# Patient Record
Sex: MALE | Race: BLACK OR AFRICAN AMERICAN | NOT HISPANIC OR LATINO | Employment: UNEMPLOYED | URBAN - METROPOLITAN AREA
[De-identification: names, ages, dates, MRNs, and addresses within clinical notes are randomized per-mention and may not be internally consistent; named-entity substitution may affect disease eponyms.]

---

## 2019-09-25 ENCOUNTER — OFFICE VISIT (OUTPATIENT)
Dept: FAMILY MEDICINE CLINIC | Facility: CLINIC | Age: 29
End: 2019-09-25

## 2019-09-25 VITALS
TEMPERATURE: 97.8 F | BODY MASS INDEX: 31.33 KG/M2 | DIASTOLIC BLOOD PRESSURE: 78 MMHG | RESPIRATION RATE: 20 BRPM | SYSTOLIC BLOOD PRESSURE: 112 MMHG | HEIGHT: 67 IN | HEART RATE: 86 BPM | OXYGEN SATURATION: 97 % | WEIGHT: 199.6 LBS

## 2019-09-25 DIAGNOSIS — R53.83 FATIGUE, UNSPECIFIED TYPE: ICD-10-CM

## 2019-09-25 DIAGNOSIS — M35.2 BEHCET'S DISEASE (HCC): Primary | ICD-10-CM

## 2019-09-25 DIAGNOSIS — J30.9 ALLERGIC RHINITIS, UNSPECIFIED SEASONALITY, UNSPECIFIED TRIGGER: ICD-10-CM

## 2019-09-25 DIAGNOSIS — G43.009 MIGRAINE WITHOUT AURA AND WITHOUT STATUS MIGRAINOSUS, NOT INTRACTABLE: ICD-10-CM

## 2019-09-25 PROCEDURE — 99204 OFFICE O/P NEW MOD 45 MIN: CPT | Performed by: FAMILY MEDICINE

## 2019-09-25 RX ORDER — KETOTIFEN FUMARATE 0.35 MG/ML
1 SOLUTION/ DROPS OPHTHALMIC 2 TIMES DAILY
COMMUNITY
End: 2019-11-12

## 2019-09-25 RX ORDER — SUMATRIPTAN 50 MG/1
TABLET, FILM COATED ORAL
Qty: 9 TABLET | Refills: 1 | Status: SHIPPED | OUTPATIENT
Start: 2019-09-25 | End: 2019-11-12

## 2019-09-25 RX ORDER — IBUPROFEN 400 MG/1
400 TABLET ORAL EVERY 6 HOURS PRN
COMMUNITY
End: 2019-11-12

## 2019-09-25 RX ORDER — LORATADINE 10 MG/1
10 TABLET ORAL DAILY
Qty: 30 TABLET | Refills: 3 | Status: SHIPPED | OUTPATIENT
Start: 2019-09-25 | End: 2019-11-12

## 2019-09-25 RX ORDER — FLUTICASONE PROPIONATE 50 MCG
2 SPRAY, SUSPENSION (ML) NASAL DAILY
Qty: 1 BOTTLE | Refills: 3 | Status: SHIPPED | OUTPATIENT
Start: 2019-09-25 | End: 2019-11-12

## 2019-09-25 RX ORDER — COLCHICINE 0.6 MG/1
0.6 TABLET ORAL 2 TIMES DAILY
COMMUNITY
End: 2020-06-03

## 2019-09-25 NOTE — PROGRESS NOTES
Meliton Patel is a 29 y.o. male who presents today to establish care.    Chief Complaint   Patient presents with   • Establish Care     NEED PCP        Pateint is here to establish care. He changed PCP due to being unhappy with his care. Rash on shoulder last month told is was herpes zoster by the ED. Took medication for 2 weeks but still has marks where the rash was. He also has some headache and fatigue. The fever comes and goes. No temp above 99.5. Happens 15-20 days a month he has headache and fever. Has had for the past 10 days especially. Describes as a burning pain in his head. Ibuprofen helps for a few hours but headache comes back. Working and studying make it worse and he becomes fatigued. He feels like he has swollen lymphoids in his neck. Feels like her has fever at night and worsening congestion at night.  He also reports nasal congestion and stuffiness.  He also has oral sores that come and go on the inside of the mouth. He has SOA and coughing whenever he is around smoke or large amount of dust. He was told he has Bahcets disease by previous PCP. He has seen rheumatology in the past but they were an hour and a half away. He was started on colchicine for Behcets disease but has not taken in it two months.          Review of Systems   Constitutional: Positive for chills and fatigue. Negative for fever.   HENT: Positive for congestion, mouth sores, postnasal drip, rhinorrhea, sinus pressure, sneezing and swollen glands. Negative for ear pain and trouble swallowing.    Eyes: Negative for visual disturbance.   Respiratory: Positive for cough. Negative for chest tightness, shortness of breath and wheezing.    Cardiovascular: Negative for chest pain and palpitations.   Gastrointestinal: Positive for nausea. Negative for abdominal pain, blood in stool, constipation, diarrhea, vomiting and GERD.   Endocrine: Negative for polydipsia and polyuria.   Genitourinary: Negative for difficulty urinating, flank pain,  frequency and hematuria.   Musculoskeletal: Negative for arthralgias and myalgias.   Skin: Negative for rash and skin lesions.   Neurological: Positive for headache. Negative for dizziness, seizures, syncope and confusion.   Hematological: Does not bruise/bleed easily.   Psychiatric/Behavioral: Negative for suicidal ideas and depressed mood. The patient is not nervous/anxious.         PHQ-9 Depression Screening  Little interest or pleasure in doing things? 0   Feeling down, depressed, or hopeless? 0   Trouble falling or staying asleep, or sleeping too much?     Feeling tired or having little energy?     Poor appetite or overeating?     Feeling bad about yourself - or that you are a failure or have let yourself or your family down?     Trouble concentrating on things, such as reading the newspaper or watching television?     Moving or speaking so slowly that other people could have noticed? Or the opposite - being so fidgety or restless that you have been moving around a lot more than usual?     Thoughts that you would be better off dead, or of hurting yourself in some way?     PHQ-9 Total Score 0   If you checked off any problems, how difficult have these problems made it for you to do your work, take care of things at home, or get along with other people?         History reviewed. No pertinent past medical history.     Past Surgical History:   Procedure Laterality Date   • ANAL FISTULA REPAIR  2009   • CHOLECYSTECTOMY          Family History   Problem Relation Age of Onset   • No Known Problems Mother    • No Known Problems Father    • Heart attack Maternal Grandfather    • No Known Problems Paternal Grandmother    • No Known Problems Paternal Grandfather         Social History     Socioeconomic History   • Marital status:      Spouse name: Not on file   • Number of children: Not on file   • Years of education: Not on file   • Highest education level: Not on file   Tobacco Use   • Smoking status: Former  "Smoker     Last attempt to quit: 2016     Years since quitting: 3.7   • Smokeless tobacco: Never Used   Substance and Sexual Activity   • Alcohol use: No     Frequency: Never   • Drug use: No   • Sexual activity: Yes     Partners: Female     Birth control/protection: None        Current Outpatient Medications on File Prior to Visit   Medication Sig Dispense Refill   • colchicine 0.6 MG tablet Take 0.6 mg by mouth 2 (Two) Times a Day.     • ibuprofen (ADVIL,MOTRIN) 400 MG tablet Take 400 mg by mouth Every 6 (Six) Hours As Needed for Mild Pain .     • ketotifen (ZADITOR) 0.025 % ophthalmic solution Administer 1 drop to both eyes 2 (Two) Times a Day.       No current facility-administered medications on file prior to visit.        No Known Allergies     Visit Vitals  /78   Pulse 86   Temp 97.8 °F (36.6 °C)   Resp 20   Ht 171 cm (67.32\")   Wt 90.5 kg (199 lb 9.6 oz)   SpO2 97%   BMI 30.96 kg/m²      Body mass index is 30.96 kg/m².    Physical Exam   Constitutional: He is oriented to person, place, and time. He appears well-developed and well-nourished. No distress.   HENT:   Head: Normocephalic and atraumatic.   Right Ear: Hearing, tympanic membrane, external ear and ear canal normal. cerumen impaction is not present.  Left Ear: Hearing, tympanic membrane, external ear and ear canal normal. An impacted cerumen is not present.  Mouth/Throat: Uvula is midline, oropharynx is clear and moist and mucous membranes are normal.   Eyes: EOM are normal. Pupils are equal, round, and reactive to light. Right eye exhibits no discharge. Left eye exhibits no discharge. No scleral icterus.   Neck: Normal range of motion. Neck supple. No tracheal deviation present. No thyromegaly present.   Cardiovascular: Normal rate, regular rhythm, normal heart sounds and intact distal pulses. Exam reveals no gallop and no friction rub.   No murmur heard.  Pulmonary/Chest: Effort normal and breath sounds normal. No stridor. No respiratory " distress. He has no wheezes. He has no rales.   Abdominal: Soft. Bowel sounds are normal. He exhibits no distension and no mass. There is no tenderness. There is no rebound and no guarding. No hernia.   Musculoskeletal: Normal range of motion. He exhibits no edema or tenderness.   Lymphadenopathy:     He has no cervical adenopathy.   Neurological: He is alert and oriented to person, place, and time. He exhibits normal muscle tone.   Skin: Skin is warm and dry. No rash noted. He is not diaphoretic.   Psychiatric: He has a normal mood and affect. His behavior is normal.        No results found for this or any previous visit.     Problems Addressed this Visit        Cardiovascular and Mediastinum    Migraine without aura and without status migrainosus, not intractable     Headaches are newly identified.  Medication changes per orders.             Relevant Medications    ibuprofen (ADVIL,MOTRIN) 400 MG tablet    colchicine 0.6 MG tablet    SUMAtriptan (IMITREX) 50 MG tablet       Respiratory    Allergic rhinitis     Patient was started on Flonase and loratadine.         Relevant Medications    fluticasone (FLONASE) 50 MCG/ACT nasal spray    loratadine (CLARITIN) 10 MG tablet       Other    Behcet's disease (CMS/HCC) - Primary     Diagnosis is uncertain although patient symptomology does seem consistent with Behcet's disease.  Patient was given referral to rheumatology.         Relevant Orders    Ambulatory Referral to Rheumatology    Fatigue     Labs were drawn to assess for physiologic causes of fatigue.         Relevant Orders    CBC & Differential    Comprehensive Metabolic Panel    Lipid Panel    TSH Rfx On Abnormal To Free T4    Hepatitis C Antibody    Hepatitis B Virus Profile          Return in about 1 month (around 10/25/2019).    Parts of this office note have been dictated by voice recognition software. Grammatical and/or spelling errors may be present.     Jose Guadalupe Bell MD  9/26/2019

## 2019-09-26 PROBLEM — G43.009 MIGRAINE WITHOUT AURA AND WITHOUT STATUS MIGRAINOSUS, NOT INTRACTABLE: Status: ACTIVE | Noted: 2019-09-26

## 2019-09-26 PROBLEM — J30.9 ALLERGIC RHINITIS: Status: ACTIVE | Noted: 2019-09-26

## 2019-09-26 NOTE — ASSESSMENT & PLAN NOTE
Diagnosis is uncertain although patient symptomology does seem consistent with Behcet's disease.  Patient was given referral to rheumatology.

## 2019-09-27 LAB
ALBUMIN SERPL-MCNC: 4.9 G/DL (ref 3.5–5.2)
ALBUMIN/GLOB SERPL: 2 G/DL
ALP SERPL-CCNC: 75 U/L (ref 39–117)
ALT SERPL-CCNC: 37 U/L (ref 1–41)
AST SERPL-CCNC: 20 U/L (ref 1–40)
BASOPHILS # BLD AUTO: 0.04 10*3/MM3 (ref 0–0.2)
BASOPHILS NFR BLD AUTO: 0.8 % (ref 0–1.5)
BILIRUB SERPL-MCNC: 1.1 MG/DL (ref 0.2–1.2)
BUN SERPL-MCNC: 21 MG/DL (ref 6–20)
BUN/CREAT SERPL: 29.2 (ref 7–25)
CALCIUM SERPL-MCNC: 9.5 MG/DL (ref 8.6–10.5)
CHLORIDE SERPL-SCNC: 104 MMOL/L (ref 98–107)
CHOLEST SERPL-MCNC: 183 MG/DL (ref 0–200)
CO2 SERPL-SCNC: 24.9 MMOL/L (ref 22–29)
CREAT SERPL-MCNC: 0.72 MG/DL (ref 0.76–1.27)
EOSINOPHIL # BLD AUTO: 0.18 10*3/MM3 (ref 0–0.4)
EOSINOPHIL NFR BLD AUTO: 3.8 % (ref 0.3–6.2)
ERYTHROCYTE [DISTWIDTH] IN BLOOD BY AUTOMATED COUNT: 12.4 % (ref 12.3–15.4)
GLOBULIN SER CALC-MCNC: 2.5 GM/DL
GLUCOSE SERPL-MCNC: 114 MG/DL (ref 65–99)
HBV CORE AB SERPL QL IA: NEGATIVE
HBV CORE IGM SERPL QL IA: NEGATIVE
HBV E AB SERPL QL IA: NEGATIVE
HBV E AG SERPL QL IA: NEGATIVE
HBV SURFACE AB SER QL: REACTIVE
HBV SURFACE AG SERPL QL IA: NEGATIVE
HCT VFR BLD AUTO: 46.4 % (ref 37.5–51)
HCV AB S/CO SERPL IA: <0.1 S/CO RATIO (ref 0–0.9)
HDLC SERPL-MCNC: 56 MG/DL (ref 40–60)
HGB BLD-MCNC: 15.6 G/DL (ref 13–17.7)
IMM GRANULOCYTES # BLD AUTO: 0.03 10*3/MM3 (ref 0–0.05)
IMM GRANULOCYTES NFR BLD AUTO: 0.6 % (ref 0–0.5)
LDLC SERPL CALC-MCNC: 87 MG/DL (ref 0–100)
LYMPHOCYTES # BLD AUTO: 1.19 10*3/MM3 (ref 0.7–3.1)
LYMPHOCYTES NFR BLD AUTO: 25.1 % (ref 19.6–45.3)
MCH RBC QN AUTO: 28.8 PG (ref 26.6–33)
MCHC RBC AUTO-ENTMCNC: 33.6 G/DL (ref 31.5–35.7)
MCV RBC AUTO: 85.6 FL (ref 79–97)
MONOCYTES # BLD AUTO: 0.38 10*3/MM3 (ref 0.1–0.9)
MONOCYTES NFR BLD AUTO: 8 % (ref 5–12)
NEUTROPHILS # BLD AUTO: 2.93 10*3/MM3 (ref 1.7–7)
NEUTROPHILS NFR BLD AUTO: 61.7 % (ref 42.7–76)
NRBC BLD AUTO-RTO: 0 /100 WBC (ref 0–0.2)
PLATELET # BLD AUTO: 243 10*3/MM3 (ref 140–450)
POTASSIUM SERPL-SCNC: 4.2 MMOL/L (ref 3.5–5.2)
PROT SERPL-MCNC: 7.4 G/DL (ref 6–8.5)
RBC # BLD AUTO: 5.42 10*6/MM3 (ref 4.14–5.8)
SODIUM SERPL-SCNC: 141 MMOL/L (ref 136–145)
TRIGL SERPL-MCNC: 200 MG/DL (ref 0–150)
TSH SERPL DL<=0.005 MIU/L-ACNC: 0.84 UIU/ML (ref 0.27–4.2)
VLDLC SERPL CALC-MCNC: 40 MG/DL
WBC # BLD AUTO: 4.75 10*3/MM3 (ref 3.4–10.8)

## 2019-10-15 ENCOUNTER — FLU SHOT (OUTPATIENT)
Dept: FAMILY MEDICINE CLINIC | Facility: CLINIC | Age: 29
End: 2019-10-15

## 2019-10-15 DIAGNOSIS — Z23 FLU VACCINE NEED: ICD-10-CM

## 2019-10-15 PROCEDURE — 90674 CCIIV4 VAC NO PRSV 0.5 ML IM: CPT | Performed by: FAMILY MEDICINE

## 2019-10-15 PROCEDURE — 90471 IMMUNIZATION ADMIN: CPT | Performed by: FAMILY MEDICINE

## 2019-11-12 ENCOUNTER — OFFICE VISIT (OUTPATIENT)
Dept: FAMILY MEDICINE CLINIC | Facility: CLINIC | Age: 29
End: 2019-11-12

## 2019-11-12 VITALS
DIASTOLIC BLOOD PRESSURE: 80 MMHG | BODY MASS INDEX: 31.28 KG/M2 | WEIGHT: 206.4 LBS | RESPIRATION RATE: 16 BRPM | SYSTOLIC BLOOD PRESSURE: 128 MMHG | HEIGHT: 68 IN | OXYGEN SATURATION: 97 % | TEMPERATURE: 97.3 F | HEART RATE: 80 BPM

## 2019-11-12 DIAGNOSIS — R13.10 IMPAIRED SWALLOWING ASSOCIATED WITH THROAT PAIN: ICD-10-CM

## 2019-11-12 DIAGNOSIS — K21.9 GASTROESOPHAGEAL REFLUX DISEASE, ESOPHAGITIS PRESENCE NOT SPECIFIED: Primary | ICD-10-CM

## 2019-11-12 DIAGNOSIS — R07.0 IMPAIRED SWALLOWING ASSOCIATED WITH THROAT PAIN: ICD-10-CM

## 2019-11-12 PROCEDURE — 99213 OFFICE O/P EST LOW 20 MIN: CPT | Performed by: FAMILY MEDICINE

## 2019-11-12 RX ORDER — OMEPRAZOLE 40 MG/1
40 CAPSULE, DELAYED RELEASE ORAL DAILY
Qty: 30 CAPSULE | Refills: 3 | Status: SHIPPED | OUTPATIENT
Start: 2019-11-12 | End: 2020-06-03 | Stop reason: SDUPTHER

## 2019-11-12 NOTE — PROGRESS NOTES
Meliton Patel is a 29 y.o. male who presents today for Sore Throat (pain when swallowing)      Patient states he has had difficulty swallowing for the past week and a sore throat for 2 weeks. Describes as a throbbing pain. He feels like food is getting stuck. Worse with food than with drinking. He has nausea but no vomiting. No changes with different foods. No alleviating or aggravating factors.       Sore Throat    This is a new problem. The current episode started 1 to 4 weeks ago. The problem has been gradually worsening (pain is lasting longer each time. ). Neither side of throat is experiencing more pain than the other. There has been no fever. The pain is at a severity of 7/10. The pain is moderate. Associated symptoms include abdominal pain, congestion, shortness of breath and trouble swallowing. Pertinent negatives include no coughing, diarrhea, drooling, ear discharge, ear pain, headaches, hoarse voice, plugged ear sensation, neck pain, stridor, swollen glands or vomiting. He has had no exposure to strep or mono. He has tried nothing for the symptoms.        Review of Systems   Constitutional: Negative for chills, diaphoresis and fever.   HENT: Positive for congestion, sore throat and trouble swallowing. Negative for drooling, ear discharge, ear pain, hoarse voice and swollen glands.    Respiratory: Positive for shortness of breath. Negative for cough and stridor.    Cardiovascular: Negative for chest pain and palpitations.   Gastrointestinal: Positive for abdominal pain, nausea and GERD. Negative for diarrhea and vomiting.   Musculoskeletal: Negative for neck pain.        The following portions of the patient's history were reviewed and updated as appropriate: allergies, current medications, past family history, past medical history, past social history, past surgical history and problem list.    Current Outpatient Medications on File Prior to Visit   Medication Sig Dispense Refill   • colchicine 0.6 MG  "tablet Take 0.6 mg by mouth 2 (Two) Times a Day.       No current facility-administered medications on file prior to visit.        No Known Allergies     Visit Vitals  /80   Pulse 80   Temp 97.3 °F (36.3 °C)   Resp 16   Ht 171.5 cm (67.5\")   Wt 93.6 kg (206 lb 6.4 oz)   SpO2 97%   BMI 31.85 kg/m²        Physical Exam   Constitutional: He is oriented to person, place, and time. He appears well-developed and well-nourished. No distress.   HENT:   Head: Atraumatic.   Eyes: EOM are normal.   Neck: Normal range of motion. Neck supple.   Cardiovascular: Normal rate, regular rhythm, normal heart sounds and intact distal pulses. Exam reveals no gallop and no friction rub.   No murmur heard.  Pulmonary/Chest: Effort normal and breath sounds normal. No respiratory distress. He has no wheezes. He has no rales.   Abdominal: Soft. Bowel sounds are normal. He exhibits no distension and no mass. There is no tenderness.   Musculoskeletal: He exhibits no edema.   Neurological: He is alert and oriented to person, place, and time.   Skin: Skin is warm and dry. He is not diaphoretic.   Psychiatric: He has a normal mood and affect. His behavior is normal.             Problems Addressed this Visit        Digestive    Gastroesophageal reflux disease - Primary     Patient was started on omeprazole.  He has appointment with  gastroenterology scheduled for later this week.  RTC precautions given.         Relevant Medications    omeprazole (priLOSEC) 40 MG capsule       Nervous and Auditory    Impaired swallowing associated with throat pain          Return in about 3 months (around 2/12/2020) for Follow-up GERD .    Parts of this office note have been dictated by voice recognition software. Grammatical and/or spelling errors may be present.    Jose Guadalupe Bell MD   11/18/2019            "

## 2019-11-18 PROBLEM — R13.10 IMPAIRED SWALLOWING ASSOCIATED WITH THROAT PAIN: Status: ACTIVE | Noted: 2019-11-18

## 2019-11-18 PROBLEM — R07.0 IMPAIRED SWALLOWING ASSOCIATED WITH THROAT PAIN: Status: ACTIVE | Noted: 2019-11-18

## 2019-11-18 PROBLEM — K21.9 GASTROESOPHAGEAL REFLUX DISEASE: Status: ACTIVE | Noted: 2019-11-18

## 2019-11-18 NOTE — ASSESSMENT & PLAN NOTE
Patient was started on omeprazole.  He has appointment with [facility] scheduled for later this week.  RTC precautions given.

## 2019-12-27 ENCOUNTER — OFFICE VISIT (OUTPATIENT)
Dept: FAMILY MEDICINE CLINIC | Facility: CLINIC | Age: 29
End: 2019-12-27

## 2019-12-27 VITALS
TEMPERATURE: 97.6 F | SYSTOLIC BLOOD PRESSURE: 100 MMHG | WEIGHT: 208 LBS | OXYGEN SATURATION: 98 % | BODY MASS INDEX: 31.52 KG/M2 | RESPIRATION RATE: 16 BRPM | DIASTOLIC BLOOD PRESSURE: 60 MMHG | HEIGHT: 68 IN | HEART RATE: 76 BPM

## 2019-12-27 DIAGNOSIS — N48.9 PENILE LESION: ICD-10-CM

## 2019-12-27 DIAGNOSIS — Z78.9 LANGUAGE BARRIER TO COMMUNICATION: ICD-10-CM

## 2019-12-27 DIAGNOSIS — M35.2 BEHCET'S DISEASE (HCC): ICD-10-CM

## 2019-12-27 DIAGNOSIS — J02.0 STREP PHARYNGITIS: Primary | ICD-10-CM

## 2019-12-27 DIAGNOSIS — N48.1 BALANITIS: ICD-10-CM

## 2019-12-27 LAB
HCT VFR BLDA CALC: 45.9 % (ref 38–51)
HGB BLDA-MCNC: 15.1 G/DL (ref 12–17)
MCH, POC: 29
MCHC, POC: 32.9
MCV, POC: 88.2
PLATELET # BLD AUTO: 286 10*3/MM3
PMV BLD: 7.6 FL
RBC, POC: 5.2
RDW, POC: 12
WBC # BLD: 7.4 10*3/UL

## 2019-12-27 PROCEDURE — 85027 COMPLETE CBC AUTOMATED: CPT | Performed by: NURSE PRACTITIONER

## 2019-12-27 PROCEDURE — 99213 OFFICE O/P EST LOW 20 MIN: CPT | Performed by: NURSE PRACTITIONER

## 2019-12-27 RX ORDER — NYSTATIN AND TRIAMCINOLONE ACETONIDE 100000; 1 [USP'U]/G; MG/G
OINTMENT TOPICAL 2 TIMES DAILY
Qty: 60 G | Refills: 0 | Status: SHIPPED | OUTPATIENT
Start: 2019-12-27 | End: 2020-06-03 | Stop reason: SDUPTHER

## 2019-12-27 RX ORDER — AMOXICILLIN 875 MG/1
875 TABLET, COATED ORAL 2 TIMES DAILY
Qty: 20 TABLET | Refills: 0 | Status: SHIPPED | OUTPATIENT
Start: 2019-12-27 | End: 2020-01-06

## 2019-12-27 NOTE — PROGRESS NOTES
Subjective   Meliton Patel is a 29 y.o. male.     Patient presents today with c/o headache, feeling feverish, malaise, sore throat. Patient has a history of Behcet's disease and is also c/o a penile lesion. He is currently being followed by [facility]. Patient is requesting referrals to infectious disease and dermatology as he states the penile lesions has been waxing and waning for over a year.     URI    This is a new problem. The current episode started 1 to 4 weeks ago. The problem has been gradually worsening. The maximum temperature recorded prior to his arrival was 100.4 - 100.9 F. Associated symptoms include congestion, headaches, neck pain and a sore throat. Pertinent negatives include no abdominal pain, chest pain, coughing, diarrhea, dysuria, ear pain, nausea, plugged ear sensation, rash, rhinorrhea, sinus pain, sneezing, vomiting or wheezing. He has tried nothing for the symptoms.       The following portions of the patient's history were reviewed and updated as appropriate: allergies, current medications, past family history, past medical history, past social history, past surgical history and problem list.    Allergies as of 12/27/2019   • (No Known Allergies)       Current Outpatient Medications on File Prior to Visit   Medication Sig   • colchicine 0.6 MG tablet Take 0.6 mg by mouth 2 (Two) Times a Day.   • omeprazole (priLOSEC) 40 MG capsule Take 1 capsule by mouth Daily.     No current facility-administered medications on file prior to visit.        Review of Systems   Constitutional: Positive for activity change, appetite change, fatigue and fever. Negative for chills, diaphoresis and unexpected weight change.   HENT: Positive for congestion and sore throat. Negative for ear pain, postnasal drip, rhinorrhea, sinus pressure, sinus pain, sneezing and voice change. Trouble swallowing: painful swallowing.    Respiratory: Negative.  Negative for cough and wheezing.    Cardiovascular: Negative.   Negative for chest pain.   Gastrointestinal: Negative for abdominal pain, diarrhea, nausea and vomiting.   Genitourinary: Positive for penile pain. Negative for difficulty urinating, discharge, dysuria, flank pain, hematuria, penile swelling, scrotal swelling and testicular pain.   Musculoskeletal: Positive for myalgias and neck pain. Negative for arthralgias.   Skin: Negative for rash.   Neurological: Positive for headaches. Negative for dizziness, tremors, seizures, syncope, facial asymmetry, speech difficulty, weakness, light-headedness and numbness.       Objective   Physical Exam   Constitutional: He is oriented to person, place, and time. Vital signs are normal. He appears well-developed and well-nourished. He is cooperative.  Non-toxic appearance. He does not have a sickly appearance. He does not appear ill. No distress.   HENT:   Head: Normocephalic and atraumatic.   Right Ear: Tympanic membrane and external ear normal.   Left Ear: Tympanic membrane and external ear normal.   Nose: Nose normal.   Mouth/Throat: Uvula is midline and mucous membranes are normal. No oral lesions. Posterior oropharyngeal erythema (moderate) present.   Neck: Normal range of motion. Neck supple. No thyromegaly present.   Cardiovascular: Normal rate, regular rhythm and normal heart sounds.   Pulmonary/Chest: Effort normal and breath sounds normal.   Abdominal: Soft. He exhibits no distension. There is no tenderness.   Genitourinary: Uncircumcised. Penile erythema and penile tenderness present.         Genitourinary Comments: When foreskin retracted area at base of glans appears erythematous, inflamed. OneSwab collected-patient reports significant pain with swab of area.    Lymphadenopathy:     He has no cervical adenopathy.   Neurological: He is alert and oriented to person, place, and time.   Skin: Skin is warm, dry and intact. No rash noted. He is not diaphoretic.   Psychiatric: He has a normal mood and affect. His behavior is  normal. Judgment and thought content normal.   Vitals reviewed.    Vitals:    12/27/19 1113   BP: 100/60   Pulse: 76   Resp: 16   Temp: 97.6 °F (36.4 °C)   SpO2: 98%     Body mass index is 32.1 kg/m².    Assessment/Plan   Meliton was seen today for headache.    Diagnoses and all orders for this visit:    Strep pharyngitis  -     amoxicillin (AMOXIL) 875 MG tablet; Take 1 tablet by mouth 2 (Two) Times a Day for 10 days.    Balanitis  -     nystatin-triamcinolone (MYCOLOG) 753933-5.1 UNIT/GM-% ointment; Apply  topically to the appropriate area as directed 2 (Two) Times a Day.    Behcet's disease (CMS/HCC)  -     POCT CBC    *WBC 7.4, Hgb 15.1, Hct 45.9, Plts 286    Penile lesion  -     Ambulatory Referral to Dermatology  -     Ambulatory Referral to Infectious Disease     *OneSwab of penile lesion for HSV    Discussed the nature of the medical condition(s) risks, complications, implications, management, safe and proper use of medications. Encouraged medication compliance, and keeping scheduled follow up appointments with me and any other providers.     Due to language barrier, an  was present during the history-taking and subsequent discussion (and for part of the physical exam) with this patient. A male chaperone (Dr. Bennett and Kevin Fry MA) during genital exam.

## 2019-12-27 NOTE — PATIENT INSTRUCTIONS
ÇáÊåÇÈ ÇáÍáÞ ÇáÚÞÏí  Strep Throat    ÇáÊåÇÈ ÇáÍáÞ ÇáÚÞÏí åæ ÚÈÇÑÉ Úä ÚÏæì ÈßÊíÑíÉ Ýí ÇáÍáÞ. ÞÏ íÓãí ãÞÏã ÇáÑÚÇíÉ ÇáÕÍíÉ ÇáÚÏæì ÇáÊåÇÈ ÇááæÒÊíä Ãæ ÇáÊåÇÈ ÇáÈáÚæã ÈäÇÁð Úáì ãÇ ÅÐÇ ßÇäÊ åäÇß ÊæÑã Ýí ÇááæÒÊíä Ãæ ãÄÎÑÉ ÇáÍáÞ. æíÚÊÈÑ ÇáÊåÇÈ ÇáÍáÞ ÇáÚÞÏí ÃßËÑ ÔíæÚðÇ áÏì ÇáÃØÝÇá ãä 5-15 ÚÇãðÇ ÎáÇá ÃÔåÑ ÇáÚÇã ÇáÔÊæíÉ¡ æáßäå íãßä Ãä íÕíÈ ÇáÃÔÎÇÕ Ýí Ãí Óä¡ æÎáÇá Ãí ÝÕá ãä ÇáÝÕæá. ÊäÊÞá Êáß ÇáÚÏæì ãä ÔÎÕ áÂÎÑ (ãÚÏí) ãä ÎáÇá ÇáÓÚÇá Ãæ ÇáÚØÇÓ¡ Ãæ Ãí ÓÈíá ááÇÊÕÇá ÇáÞÑíÈ.  ãÇ åí ÇáÃÓÈÇÈ¿  ÇáÊåÇÈ ÇáÍáÞ ÇáÚÞÏí íÍÏË ÈÓÈÈ ÈßÊÑíÇ ÊÓãì ÇáãßæÑÇÊ ÇáÚÞÏíÉ.  ãÇ ÚæÇãá ÒíÇÏÉ ÇáÎØÑ¿  íÒÏÇÏ ÙåæÑ åÐå ÇáÍÇáÉ Ýí:  • ÇáÃÔÎÇÕ ÇáÐíä íÞÖæä æÞÊðÇ Ýí ÃãÇßä ãÒÏÍãÉ ÍíË íãßä ááÚÏæì Ãä ÊäÊÔÑ ÈÓåæáÉ.  • ÇáÃÔÎÇÕ ÇáÐíä íßæäæä Ýí ÇÊÕÇá ÞÑíÈ ÈÔÎÕ ãÕÇÈ ÈÇáÊåÇÈ ÇáÍáÞ ÇáÚÞÏí.  ãÇ åí ÇáÚáÇãÇÊ Ãæ ÇáÃÚÑÇÖ¿  ÊÔãá ÃÚÑÇÖ åÐå ÇáÍÇáÉ ÇáÂÊí:  • Íãì Ãæ ÞÔÚÑíÑÉ.  • ÇÍãÑÇÑ Ãæ ÊæÑã Ãæ Ãáã Ýí ÇááæÒÊíä Ãæ ÇáÍáÞ.  • Ãáã Ãæ ÕÚæÈÉ Ýí ÇáÈáÚ.  • ÙåæÑ ÈÞÚ ÈíÖÇÁ Ãæ ÕÝÑÇÁ Úáì ÇááæÒÊíä Ãæ ÇáÍáÞ.  • ÛÏÏ ãÊæÑãÉ Ãæ ãÊÃáãÉ ÊÙåÑ ÈÇáÚäÞ Ãæ ÃÓÝá ÇáÝß.  • ÙåæÑ ØÝÍ ÌáÏí Úáì ÌãíÚ ÃÌÒÇÁ ÇáÌÓã (äÇÏÑ).  ßíÝ íÊã ÊÔÎíÕ ÇáãÑÖ¿  íÊã ÊÔÎíÕ åÐå ÇáÍÇáÉ ãä ÎáÇá ÅÌÑÇÁ ÝÍÕ ÇáãÓÊÖÏ ÇáÓÑíÚ Úä ØÑíÞ ÃÎÐ ãÓÍÉ ãä ÍáÞß (ãÒÑÚÉ ÍáÞ). ÚÇÏÉ ãÇ Êßæä äÊÇÆÌ ÇáÝÍÕ ÇáÓÑíÚ ááÈßÊÑíÇ ÇáÚÞÏíÉ ÌÇåÒÉ ÎáÇá ÏÞÇÆÞ ãÚÏæÏÉ¡ æáßä äÊÇÆÌ ÇÎÊÈÇÑ ãÒÑÚÉ ÇáÍáÞ ÊÙåÑ ÈÚÏ íæã Ãæ íæãíä.  ßíÝ íÊã ÇáÚáÇÌ¿  íÊã ÚáÇÌ åÐå ÇáÍÇáÉ ÈÇáãÖÇÏÇÊ ÇáÍíæíÉ.  ÇÊÈÚ ÇáÊÚáíãÇÊ ÇáÊÇáíÉ Ýí ÇáãäÒá:  ÇáÃÏæíÉ  • ÊäÇæá ÇáÃÏæíÉ ÇáÊí ÊõÕÑÝ ÈæÕÝÉ ØÈíÉ Ãæ Ïæä æÕÝÉ ØÈíÉ æÝÞðÇ áãÇ íÎÈÑß Èå ãÞÏã ÇáÑÚÇíÉ ÇáÕÍíÉ ÇáÎÇÕ Èß.  • ÊäÇæá ÇáãÖÇÏÇÊ ÇáÍíæíÉ æÝÞðÇ áÊÚáíãÇÊ ãÞÏã ÇáÑÚÇíÉ ÇáÕÍíÉ. áÇ ÊÊæÞÝ Úä ÊäÇæá ÇáãÖÇÏÇÊ ÇáÍíæíÉ ÍÊì ãÚ ÊÍÓä ÍÇáÊß.  • ÇÌÚá ÃÝÑÇÏ ÇáÃÓÑÉ ÇáÐíä íÚäÇæä ãä ÇáÊåÇÈ Ýí ÇáÍáÞ Ãæ Íãì íÎÖÚæä ááÝÍÕ ááßÔÝ Úä ÇáÊåÇÈ ÇáÍáÞ ÇáÚÞÏí. ÝÞÏ íÍÊÇÌæä Åáì ãÖÇÏÇÊ ÍíæíÉ ÅÐÇ ßÇäæÇ íÚÇäæä ãä ÚÏæì ÇáÈßÊÑíÇ ÇáÚÞÏíÉ.  ÊäÇæá ÇáØÚÇã æÇáÔÑÇÈ  • áÇ ÊÔÇÑß ÇáØÚÇã Ãæ ÃßæÇÈ ÇáÔÑÈ¡ Ãæ ÇÓÊÎÏÇã ÇáãÓÊáÒãÇÊ ÇáÔÎÕíÉ ÇáÊí ãä Çáããßä Ãä ÊÊÓÈÈ Ýí ÇäÊÞÇá ÇáÚÏæì ááÂÎÑíä.  • ÅÐÇ ßÇä ÇáÈáÚ ÕÚÈðÇ¡ ÍÇæá ÊäÇæá  ÇáÃØÚãÉ ÇááíäÉ ÍÊì íÊÍÓä ÇáÊåÇÈ ÇáÍáÞ.  • ÇÔÑÈ ßãíÉ ßÇÝíÉ ãä ÇáÓæÇÆá ááãÍÇÝÙÉ Úáì ÕÝÇÁ ÇáÈæá Ãæ áæäå ÇáÃÕÝÑ ÇáÈÇåÊ.  ÊÚáíãÇÊ ÚÇãÉ  • ÇÓÊÚãá ÇáÛÑÛÑÉ ÈãÒíÌ ÇáãÇÁ ÇáãÇáÍ ãä 3-4 ãÑÇÊ íæãíÇð Ãæ ÚäÏ ÇááÒæã. áÕäÚ ãÍáæá ãÒíÌ ÇáãÇÁ ÇáãÇáÍ¡ ÃÐÈ ÊãÇãðÇ äÕÝ ãáÚÞÉ Åáì ãáÚÞÉ æÇÍÏÉ Ýí ßæÈ ãÇÁ ÏÇÝÆ.  • ÊÃßÏ ãä ÞíÇã ÌãíÚ ÃÝÑÇÏ ÇáÃÓÑÉ ÈÛÓá íÏíåã ÌíÏðÇ.  • ÇÍÕá Úáì ßËíÑ ãä ÇáÑÇÍÉ.  • ÇÍÑÕ Úáì ÇáÈÞÇÁ Ýí ÇáãäÒá¡ æÚÏã ÇáÐåÇÈ Åáì ÇáãÏÑÓÉ Ãæ ÇáÚãá ÅáÇ ÈÚÏ 24 ÓÇÚÉ ãä ÊäÇæá ÇáãÖÇÏÇÊ ÇáÍíæíÉ.  • ÇÍÑÕ Úáì ÇáÇáÊÒÇã ÈÌãíÚ ÒíÇÑÇÊ ÇáãÊÇÈÚÉ Úáì ÇáäÍæ ÇáÐí íÎÈÑß Èå ãÞÏã ÇáÑÚÇíÉ ÇáÕÍíÉ ÇáÎÇÕ Èß. ÝåÐÇ ÃãÑ ãåã.  ÇÊÕá ÈãÞÏã ÑÚÇíÉ ÕÍíÉ Ýí ÇáÍÇáÇÊ ÇáÊÇáíÉ:  • ÇÓÊãÑÇÑ ÊÖÎã ÇáÛÏÏ ÇáÊí ÊÙåÑ Ýí ÚäÞß.  • ÇáÅÕÇÈÉ ÈØÝÍ Ãæ ÓÚÇá Ãæ Ãáã ÈÇáÃÐä.  • íÎÑÌ ÓÇÆá ßËíÝ ÚäÏ ÇáÓÚÇá áæäå ÃÎÖÑ Ãæ ÃÕÝÑ ãÇÆá Åáì ÇáÈäí Ãæ ãÎÊáØ ÈÏã.  • ÇáÅÕÇÈÉ ÈÃáã Ãæ ÊÚÈ ÊÒÏÇÏ ÍÏÊå Ãæ áÇ íÊÍÓä ãÚ ÊäÇæá ÇáÏæÇÁ.  • ÇáÔÚæÑ ÈÃä ÇáãÔßáÇÊ ÊÓæÁ ÈÏáÇð ãä Ãä ÊÊÍÓä.  • ÅÐÇ ßäÊ ÊÚÇäí ãä ÇáÍãì.  ÇØáÈ ÇáãÓÇÚÏÉ Úáì ÇáÝæÑ Ýí ÇáÍÇáÇÊ ÇáÊÇáíÉ:  • ÅÕÇÈÊß ÈÃíÉ ÃÚÑÇÖ ÌÏíÏÉ¡ ãËá: ÇáÞíÁ¡ Ãæ ÇáÕÏÇÚ ÇáÍÇÏ¡ Ãæ ÊíÈÓ ÇáÚäÞ Ãæ ÇáÔÚæÑ ÈÃáã Ýíå¡ Ãæ Ãáã Ýí ÇáÕÏÑ¡ Ãæ ÖíÞ Ýí ÇáÊäÝÓ.  • ÇáÔÚæÑ ÈÃáã ÍÇÏ Ýí ÇáÍáÞ¡ Ãæ æÌæÏ áÚÇÈ ÓÇÆá¡ Ãæ ÍÏæË ÊÛíÑÇÊ Ýí ÇáÕæÊ.  • ÇáÅÕÇÈÉ ÈÊæÑã Ýí ÇáÚäÞ¡ Ãæ ÇÍãÑÇÑ ÇáÌáÏ ÎáÝ ÇáÚäÞ¡ Ãæ ÇáÔÚæÑ ÈÃáã Èå.  • ÇáÅÕÇÈÉ ÈÚáÇãÇÊ ÇáÌÝÇÝ¡ ãËá: ÇáÅÚíÇÁ¡ æÌÝÇÝ ÇáÝã¡ æÇäÎÝÇÖ ãÚÏá ÇáÊÈæá.  • ÒíÇÏÉ ÇáÔÚæÑ ÈÇáäÚÇÓ¡ Ãæ ÚÏã ÇáÞÏÑÉ Úáì ÇáÇÓÊíÞÇÙ ÊãÇãðÇ.  • ÇÍãÑÇÑ ÇáãÝÇÕá Ãæ ÇáÔÚæÑ ÈÇáÃáã ÈåÇ.  áíÓ ÇáåÏÝ ãä åÐå ÇáãÚáæãÇÊ Ãä Êßæä ÈÏíáÇð ááÅÑÔÇÏÇÊ ÇáÊí íÞÏãåÇ ãæÝÑ ÇáÑÚÇíÉ ÇáÕÍíÉ. ÊÃßÏ ãä ãäÇÞÔÉ ÃíÉ ÃÓÆáÉ ÊÏæÑ Ýí Ðåäß ãÚ ãæÝÑ ÇáÑÚÇíÉ ÇáÕÍíÉ.þ  Document Released: 08/15/2012 Document Revised: 04/06/2018 Document Reviewed: 04/11/2016  [employer] Interactive Patient Education © 2019 [employer]  Amoxicillin capsules or tablets  ãÇ åÐÇ ÇáÏæÇÁ¿  ÃãæßÓíÓááíä ãÖÇÏ Ííæí ãä ãÌãæÚÉ ÇáÈäÓáíä.  íÓÊÎÏã áÚáÇÌ ÃäæÇÚ ãÚíäÉ ãä ÇáÚÏæì ÇáÈßÊíÑíÉ.  áä íßæä åÐÇ ÇáÏæÇÁ ÝÇÚáÇð áÚáÇÌ äÒáÇÊ ÇáÈÑÏ Ãæ ÇáÃäÝáæäÒÇ Ãæ ÇáÚÏæì ÇáÝíÑæÓíÉ ÇáÃÎÑì.  íãßä ÇÓÊÎÏÇã  åÐÇ ÇáÏæÇÁ áÃÛÑÇÖ ÃÎÑìº ÇÓÃá ãÞÏã ÇáÑÚÇíÉ ÇáÕÍíÉ Ãæ ÇáÕíÏáí ÅÐÇ ßÇäÊ áÏíß ÃÓÆáÉ.  ÃÓãÇÁ ÇáÚáÇãÇÊ ÇáÊÌÇÑíÉ ÇáãÚÑæÝÉ:þ Amoxil, Moxilin, Sumox, Trimox  ãÇ åí ÇáÃÔíÇÁ ÇáÊí íÌÈ Ãä ÃÎÈÑ ÈåÇ ãÞÏã ÇáÑÚÇíÉ ÇáÕÍíÉ ÞÈá ÊäÇæá åÐÇ ÇáÏæÇÁ¿  ÇáÃØÈÇÁ ÈÍÇÌÉ áãÚÑÝÉ ãÇ ÅÐÇ ßäÊ ãÕÇÈðÇ ÈÃí ãä åÐå ÇáÍÇáÇÊ:  -ÇáÑÈæ  -ÃãÑÇÖ Çáßáì  -ÑÏ ÝÚá ÛíÑ ÚÇÏí Ãæ ÍÓÇÓíÉ ÖÏ ¡ÃãæßÓíáíä Ãæ ãÖÇÏÇÊ ÇáÈäÓíáíä ÇáÍíæíÉ ÇáÃÎÑì Ãæ ãÖÇÏÇÊ ÇáÓíÝÇáæÓÈÑíä ÇáÍíæíÉ  -ÑÏ ÝÚá ÛíÑ ÚÇÏí Ãæ ÍÓÇÓíÉ ÖÏ ÇáÃÏæíÉ ÇáÃÎÑì Ãæ ÇáÃØÚãÉ Ãæ ÇáÃÕÈÇÛ Ãæ ÇáãæÇÏ ÇáÍÇÝÙÉ  -ÍÇãá Ãæ ÊÓÚíä ááÍãá  -ÇáÑÖÇÚÉ ÇáØÈíÚíÉ  ßíÝ íÌÈ Úáíø ÇÓÊÚãÇá åÐÇ ÇáÏæÇÁ¿  ÊäÇæá åÐÇ ÇáÏæÇÁ ÈÇáÝã ãÚ ßæÈ ãä ÇáãÇÁ.  ÇÊøóÈÚ ÇáÊÚáíãÇÊ ÇáãæÌæÏÉ Úáì ÇáÚÈæÉ Ãæ ãáÕÞ ÇáæÕÝÉ ÇáØÈíÉ.  íãßäß ÊäÇæá åÐÇ ÇáÏæÇÁ ãÚ ØÚÇã Ãæ Úáì ãÚÏÉ ÝÇÑÛÉ.  ÊäÇæá åÐÇ ÇáÏæÇÁ Úáì ÝÊÑÇÊ ãäÊÙãÉ.  áÇ ÊÊäÇæá åÐÇ ÇáÏæÇÁ ÃßËÑ ãä ÇáãÑÇÊ ÇáãæÕæÝÉ.  Þã ÈÅäåÇÁ ÇáÝÊÑÉ ÇáßÇãáÉ ÇáÎÇÕÉ ÈåÐÇ ÇáÏæÇÁ ßãÇ åæ ãÍÏÏ ÍÊì áæ ÙääÊ Ãä ÍÇáÊß ÃÝÖá.  áÇ ÊÝæÊ ÌÑÚÇÊ Ãæ ÊÊæÞÝ Úä åÐÇ ÇáÏæÇÁ ÞÈá ãæÚÏå.  ÊÍÏË Åáí ØÈíÈ ÇáÃØÝÇá ÈÔÃä ÇÓÊÚãÇá åÐÇ ÇáÏæÇÁ ááÃØÝÇá.  Ýí Ííä Ãä åÐÇ ÇáÏæÇÁ íãßä æÕÝå áÍÇáÇÊ ãÍÏÏÉ¡ ÅáÇ Ãäå íáÒã ÇÊÎÇÐ ÇáÇÍÊíÇØÇÊ ÇááÇÒãÉ.  ÇáÌÑÚÉ ÇáÒÇÆÏÉ : ÅÐÇ ÇÚÊÞÏÊ Ãäß ÊäÇæáÊ ßãíÉ ßÈíÑÉ ÌÏðÇ ãä åÐÇ ÇáÏæÇÁ¡ ÇÊÕá ÈãÑßÒ ÇáÊÍßã Ýí ÇáÓãæã Ãæ ÛÑÝÉ ÇáØæÇÑÆ ÝæÑðÇ.  ãáÇÍÙÉ: íÓÊÎÏã åÐÇ ÇáÏæÇÁ ãä ÃÌáß ÃäÊ ÝÞØ. áÇ ÊÔÇÑß ÂÎÑíä ãÚß Ýí ÊäÇæá åÐÇ ÇáÏæÇÁ.  ãÇÐÇ áæ ÊÑßÊ (äÓíÊ) ÌÑÚÉ¿  ÅÐÇ ÝÇÊÊß ÌÑÚÉ¡ ÊäÇæáåÇ Ýí ÃÞÑÈ æÞÊ ããßä.  ÅÐÇ ßÇä åÐÇ æÞÊ ÌÑÚÊß ÇáÊÇáíÉ ÊÞÑíÈðÇ ¡ ÝÊäÇæá Êáß ÇáÌÑÚÉ ÝÞØ.  áÇ ÊÊäÇæá ÌÑÚÊíä Ãæ ÌÑÚÉ ÒÇÆÏÉ.  ãÇ åí ÇáÃÔíÇÁ ÇáÊí ÞÏ ÊÊÝÇÚá ãÚ åÐÇ ÇáÏæÇÁ¿  -ÃãíáæÑÇíÏ  -ÍÈæÈ ãäÚ ÇáÍãá  -ßáæÑÇãÝíäíßæá  -ÇáãÇßÑæáÇíÏÇÊ  -ÈÑæÈíäíÓíÏ  -ÇáÓæáÝæäÇãÇíÏÇÊ  -ÇáÊíÊÑÇÓíßáíäÇÊ  åÐå ÇáÞÇÆãÉ ÞÏ áÇ ÊÕÝ ßá ÇáÊÝÇÚáÇÊ ÇáãÍÊãáÉ. Þã ÈÅÚØÇÁ ãÞÏã ÇáÑÚÇíÉ ÇáÕÍíÉ ÞÇÆãÉ Èßá ÇáÃÏæíÉ Ãæ ÇáÃÚÔÇÈ Ãæ ÇáÃÏæíÉ ÈÏæä æÕÝÉ Ãæ ÇáãßãáÇÊ ÇáÛÐÇÆíÉ ÇáÊí ÊÓÊÎÏãåÇ. ÃÎÈÑåã ÃíÖðÇ ÅÐÇ ßäÊ ÊÏÎä Ãæ ÊÔÑÈ ÇáßÍæá Ãæ ÊÓÊÎÏã ÚÞÇÑÇÊ ÛíÑ ÞÇäæäíÉ. ÞÏ ÊÊÝÇÚá ÈÚÖ ÇáÚäÇÕÑ ãÚ ÏæÇÆß.  ãÇ ÇáÐí íÌÈ Úáíø ÊÑÞÈå ÚäÏ ÇÓÊÚãÇá åÐÇ ÇáÏæÇÁ¿  ÃÎÈÑ ØÈíÈß Ãæ  ÃÎÕÇÆí ÇáÑÚÇíÉ ÇáÕÍíÉ ÅÐÇ áã ÊÊÍÓä ÃÚÑÇÖß ÎáÇá 2 Åáì 3 ÃíÇã.  ÊäÇæá ÌãíÚ ÌÑÚÇÊ åÐÇ ÇáÏæÇÁ ÍÓÈ ÇáÅÑÔÇÏÇÊ.  áÇ ÊÝæÊ ÌÑÚÇÊ Ãæ ÊÊæÞÝ Úä åÐÇ ÇáÏæÇÁ ÞÈá ãæÚÏå.  ÅÐÇ ßäÊ ãÕÇÈðÇ ÈÇáÓßÑ¡ ÝÞÏ ÊÍÕá Úáì äÊíÌÉ ÅíÌÇÈíÉ ÒÇÆÝÉ Úä ÇáÓßÑ Ýí Èæáß Ýí ÃäæÇÚ ãÚíäÉ ãä ÇÎÊÈÇÑÇÊ ÇáÈæá.  ÇÊÕá ÈØÈíÈß Ãæ ÃÎÕÇÆí ÇáÑÚÇíÉ ÇáÕÍíÉ.  áÇ ÊÚÇáÌ ÇáÅÓåÇá ÈÇáÃÏæíÉ ÇáãÊÇÍÉ ÈÏæä æÕÝÉ.  ÇÊÕá ÈØÈíÈß ÅÐÇ ßÇä áÏíß ÅÓåÇá ÇÓÊãÑ áÃßËÑ ãä 2 íæã Ãæ ÅÐÇ ßÇä ÇáÅÓåÇá ÔÏíÏðÇ æãÇÆíðÇ.  ãÇ åí ÇáÂËÇÑ ÇáÌÇäÈíÉ ÇáÊí íãßä Ãä ÃáÇÍÙåÇ ÚäÏ ÊáÞí åÐÇ ÇáÏæÇÁ¿  ÇáÂËÇÑ ÇáÌÇäÈíÉ ÇáÊí íÌÈ Ãä ÊÈáÛ ØÈíÈß Ãæ ÃÎÕÇÆí ÇáÑÚÇíÉ ÇáÕÍíÉ ÈåÇ Ýí ÃÞÑÈ æÞÊ ããßä:  -ÇáÍÓÇÓíÉ ãËá ÇáØÝÍ ÇáÌáÏí æÇáÍßÉ æÇáÇÑÊßÇÑíÇ (ÇáÔÑí) ææÑã ÇáæÌå Ãæ ÇáÔÝÇå Ãæ ÇááÓÇä.  -ãÔÇßá ÇáÊäÝÓ  -Èæá ÛÇãÞ  -ÇÍãÑÇÑ Ãæ ÊÍæÕá Ãæ ÊÞÔÑ Ãæ ÊÑåá Ýí ÇáÌáÏ¡ ÈãÇ Ýí Ðáß ãäØÞÉ ÏÇÎá ÇáÝã  -äæÈÇÊ ÇáÊÔäÌ  -ÅÓåÇá ÔÏíÏ Ãæ ãÇÆí  -ÕÚæÈÉ ÇáÊÈæá Ãæ ÊÛíÑ Ýí ßãíÉ ÇáÈæá  -äÒíÝ Ãæ ßÏãÇÊ ÛíÑ ãÚÊÇÏÉ  -ÖÚÝ Ãæ ÅÌåÇÏ ÛíÑ ãÚÊÇÏ  -ÇÕÝÑÇÑ ÇáÚíäíä Ãæ ÇáÌáÏ  ÇáÂËÇÑ ÇáÌÇäÈíÉ ÇáÊí áÇ ÊÊØáÈ ÑÚÇíÉ ØÈíÉ (ÃÈáÛ ØÈíÈß Ãæ ÃÎÕÇÆí ÇáÑÚÇíÉ ÇáÕÍíÉ ÅÐÇ ÇÓÊãÑÊ Ãæ ßÇäÊ ãËíÑÉ ááÞáÞ):  -ÇáÏæÇÑ  -ÇáÕÏÇÚ  -ÇÑÊÈÇß ÇáãÚÏÉ  -ÕÚæÈÉ Çáäæã  åÐå ÇáÞÇÆãÉ ÞÏ áÇ ÊÕÝ ßá ÇáÂËÇÑ ÇáÌÇäÈíÉ ÇáãÍÊãáÉ. ÇÊÕá ÈØÈíÈß ááÇÓÊÔÇÑÉ ÇáØÈíÉ Úä ÇáÂËÇÑ ÇáÌÇäÈíÉ. íãßäß ÇáÅÈáÇÛ Úä ÇáÂËÇÑ ÇáÌÇäÈíÉ áÅÏÇÑÉ ÇáÃÛÐíÉ æÇáÃÏæíÉ ÇáÃãÑíßíÉ (FDA) Úáì ÇáÑÞã ý.[phone]  Ãíä íÌÈ Úáíø ÇáÇÍÊÝÇÙ ÈÏæÇÆí¿  íÍÝÙ ÈÚíÏðÇ Úä ãÊäÇæá ÇáÃØÝÇá.  íÎÒä Ýí ÏÑÌÉ ÍÑÇÑÉ ÇáÛÑÝÉ Èíä 20 æ25 ÏÑÌÉ ãÆæíÉ (68 æ77 ÝåÑäåÇíÊ).  ÍÇÝÙ Úáì ÇáæÚÇÁ ãÍßã ÇáÅÛáÇÞ.  ÊÎáÕ ãä Ãí ÏæÇÁ ÛíÑ ãÓÊÎÏã ÈÚÏ ÊÇÑíÎ ÇäÊåÇÁ ÇáÕáÇÍíÉ ÇáãØÈæÚ Úáì ÇáãáÕÞ Ãæ ÇáÚÈæÉ.  ãáÇÍÙÉ: åÐå ÇáÕÍíÝÉ ÚÈÇÑÉ Úä ãáÎÕ: ÞÏ áÇ íÛØí åÐÇ ÇáãáÎøóÕ ßá ÇáãÚáæãÇÊ ÇáããßäÉ. ÅÐÇ ßÇäÊ áÏíß Ãí ÃÓÆáÉ Úä åÐÇ ÇáÏæÇÁ¡ ÊÍÏøË Åáì ÇáØÈíÈ Ãæ ÇáÕíÏáí Ãæ ãÞÏã ÇáÑÚÇíÉ ÇáÕÍíÉ.    © 2019 [employer] (2010-11-19 00:00:00)

## 2019-12-29 ENCOUNTER — TELEPHONE (OUTPATIENT)
Dept: FAMILY MEDICINE CLINIC | Facility: CLINIC | Age: 29
End: 2019-12-29

## 2019-12-29 NOTE — TELEPHONE ENCOUNTER
Pt advised of the new med changes. Pt voiced his understanding.   ----- Message from JAY León sent at 12/29/2019  2:27 PM EST -----  Regarding: FW: RX ALTERNATIVE   Contact: [phone]  I called the pharmacy and they can get insurance to pay for the medications by splitting the medications into two separate medicines. He should use both creams at the same time. They are filling it now.  ----- Message -----  From: Suzan Andrade MA  Sent: 12/27/2019   6:58 PM EST  To: JAY León  Subject: FW: RX ALTERNATIVE                                   ----- Message -----  From: Courtney Clements  Sent: 12/27/2019   2:39 PM EST  To: Suzan Andrade MA  Subject: RX ALTERNATIVE                                   nystatin-triamcinolone (MYCOLOG) 382161-8.1 UNIT/GM-% ointment IS NOT COVERED BY INSURANCE, CAN HE GET AN ALTERNATIVE PRESCRIBED?

## 2020-01-02 ENCOUNTER — TELEPHONE (OUTPATIENT)
Dept: FAMILY MEDICINE CLINIC | Facility: CLINIC | Age: 30
End: 2020-01-02

## 2020-01-02 NOTE — TELEPHONE ENCOUNTER
----- Message from JAY León sent at 1/1/2020  9:03 AM EST -----  Please let him know that he tested negative for herpes.

## 2020-05-04 ENCOUNTER — DOCUMENTATION (OUTPATIENT)
Dept: FAMILY MEDICINE CLINIC | Facility: CLINIC | Age: 30
End: 2020-05-04

## 2020-05-04 ENCOUNTER — HOSPITAL ENCOUNTER (EMERGENCY)
Facility: HOSPITAL | Age: 30
Discharge: HOME | End: 2020-05-04
Attending: EMERGENCY MEDICINE | Admitting: EMERGENCY MEDICINE

## 2020-05-04 ENCOUNTER — APPOINTMENT (OUTPATIENT)
Dept: CT IMAGING | Facility: HOSPITAL | Age: 30
End: 2020-05-04

## 2020-05-04 VITALS
HEIGHT: 68 IN | TEMPERATURE: 98.6 F | SYSTOLIC BLOOD PRESSURE: 129 MMHG | RESPIRATION RATE: 16 BRPM | DIASTOLIC BLOOD PRESSURE: 78 MMHG | BODY MASS INDEX: 32.84 KG/M2 | OXYGEN SATURATION: 95 % | HEART RATE: 104 BPM | WEIGHT: 216.71 LBS

## 2020-05-04 DIAGNOSIS — R06.02 SHORTNESS OF BREATH: ICD-10-CM

## 2020-05-04 DIAGNOSIS — R07.9 CHEST PAIN, UNSPECIFIED TYPE: Primary | ICD-10-CM

## 2020-05-04 LAB
ALBUMIN SERPL-MCNC: 5 G/DL (ref 3.5–5.2)
ALBUMIN/GLOB SERPL: 1.9 G/DL
ALP SERPL-CCNC: 82 U/L (ref 39–117)
ALT SERPL W P-5'-P-CCNC: 38 U/L (ref 1–41)
ANION GAP SERPL CALCULATED.3IONS-SCNC: 12 MMOL/L (ref 5–15)
AST SERPL-CCNC: 25 U/L (ref 1–40)
BASOPHILS # BLD AUTO: 0.04 10*3/MM3 (ref 0–0.2)
BASOPHILS NFR BLD AUTO: 0.7 % (ref 0–1.5)
BILIRUB SERPL-MCNC: 0.8 MG/DL (ref 0.2–1.2)
BUN BLD-MCNC: 16 MG/DL (ref 6–20)
BUN/CREAT SERPL: 18.4 (ref 7–25)
CALCIUM SPEC-SCNC: 9 MG/DL (ref 8.6–10.5)
CHLORIDE SERPL-SCNC: 104 MMOL/L (ref 98–107)
CO2 SERPL-SCNC: 24 MMOL/L (ref 22–29)
CREAT BLD-MCNC: 0.87 MG/DL (ref 0.76–1.27)
DEPRECATED RDW RBC AUTO: 37.5 FL (ref 37–54)
EOSINOPHIL # BLD AUTO: 0.24 10*3/MM3 (ref 0–0.4)
EOSINOPHIL NFR BLD AUTO: 4.3 % (ref 0.3–6.2)
ERYTHROCYTE [DISTWIDTH] IN BLOOD BY AUTOMATED COUNT: 12.1 % (ref 12.3–15.4)
GFR SERPL CREATININE-BSD FRML MDRD: 123 ML/MIN/1.73
GLOBULIN UR ELPH-MCNC: 2.7 GM/DL
GLUCOSE BLD-MCNC: 115 MG/DL (ref 65–99)
HCT VFR BLD AUTO: 45.7 % (ref 37.5–51)
HGB BLD-MCNC: 15.5 G/DL (ref 13–17.7)
HOLD SPECIMEN: NORMAL
HOLD SPECIMEN: NORMAL
IMM GRANULOCYTES # BLD AUTO: 0.03 10*3/MM3 (ref 0–0.05)
IMM GRANULOCYTES NFR BLD AUTO: 0.5 % (ref 0–0.5)
LYMPHOCYTES # BLD AUTO: 1.85 10*3/MM3 (ref 0.7–3.1)
LYMPHOCYTES NFR BLD AUTO: 33 % (ref 19.6–45.3)
MCH RBC QN AUTO: 29 PG (ref 26.6–33)
MCHC RBC AUTO-ENTMCNC: 33.9 G/DL (ref 31.5–35.7)
MCV RBC AUTO: 85.4 FL (ref 79–97)
MONOCYTES # BLD AUTO: 0.42 10*3/MM3 (ref 0.1–0.9)
MONOCYTES NFR BLD AUTO: 7.5 % (ref 5–12)
NEUTROPHILS # BLD AUTO: 3.03 10*3/MM3 (ref 1.7–7)
NEUTROPHILS NFR BLD AUTO: 54 % (ref 42.7–76)
NRBC BLD AUTO-RTO: 0 /100 WBC (ref 0–0.2)
NT-PROBNP SERPL-MCNC: 11.4 PG/ML (ref 5–450)
PLATELET # BLD AUTO: 248 10*3/MM3 (ref 140–450)
PMV BLD AUTO: 9.8 FL (ref 6–12)
POTASSIUM BLD-SCNC: 4.1 MMOL/L (ref 3.5–5.2)
PROT SERPL-MCNC: 7.7 G/DL (ref 6–8.5)
RBC # BLD AUTO: 5.35 10*6/MM3 (ref 4.14–5.8)
SARS-COV-2 RNA RESP QL NAA+PROBE: NOT DETECTED
SODIUM BLD-SCNC: 140 MMOL/L (ref 136–145)
TROPONIN T SERPL-MCNC: <0.01 NG/ML (ref 0–0.03)
WBC NRBC COR # BLD: 5.61 10*3/MM3 (ref 3.4–10.8)
WHOLE BLOOD HOLD SPECIMEN: NORMAL
WHOLE BLOOD HOLD SPECIMEN: NORMAL

## 2020-05-04 PROCEDURE — 80053 COMPREHEN METABOLIC PANEL: CPT | Performed by: EMERGENCY MEDICINE

## 2020-05-04 PROCEDURE — 0 IOPAMIDOL PER 1 ML: Performed by: EMERGENCY MEDICINE

## 2020-05-04 PROCEDURE — 87635 SARS-COV-2 COVID-19 AMP PRB: CPT | Performed by: NURSE PRACTITIONER

## 2020-05-04 PROCEDURE — 84484 ASSAY OF TROPONIN QUANT: CPT | Performed by: EMERGENCY MEDICINE

## 2020-05-04 PROCEDURE — 99283 EMERGENCY DEPT VISIT LOW MDM: CPT

## 2020-05-04 PROCEDURE — 83880 ASSAY OF NATRIURETIC PEPTIDE: CPT | Performed by: EMERGENCY MEDICINE

## 2020-05-04 PROCEDURE — 85025 COMPLETE CBC W/AUTO DIFF WBC: CPT | Performed by: EMERGENCY MEDICINE

## 2020-05-04 PROCEDURE — 93005 ELECTROCARDIOGRAM TRACING: CPT | Performed by: EMERGENCY MEDICINE

## 2020-05-04 PROCEDURE — 71275 CT ANGIOGRAPHY CHEST: CPT

## 2020-05-04 RX ORDER — SODIUM CHLORIDE 0.9 % (FLUSH) 0.9 %
10 SYRINGE (ML) INJECTION AS NEEDED
Status: DISCONTINUED | OUTPATIENT
Start: 2020-05-04 | End: 2020-05-05 | Stop reason: HOSPADM

## 2020-05-04 RX ORDER — PANTOPRAZOLE SODIUM 40 MG/1
40 TABLET, DELAYED RELEASE ORAL DAILY
Qty: 12 TABLET | Refills: 0 | Status: SHIPPED | OUTPATIENT
Start: 2020-05-04 | End: 2020-06-03

## 2020-05-04 RX ORDER — ALUMINA, MAGNESIA, AND SIMETHICONE 2400; 2400; 240 MG/30ML; MG/30ML; MG/30ML
15 SUSPENSION ORAL ONCE
Status: COMPLETED | OUTPATIENT
Start: 2020-05-04 | End: 2020-05-04

## 2020-05-04 RX ORDER — LIDOCAINE HYDROCHLORIDE 20 MG/ML
15 SOLUTION OROPHARYNGEAL ONCE
Status: COMPLETED | OUTPATIENT
Start: 2020-05-04 | End: 2020-05-04

## 2020-05-04 RX ADMIN — IOPAMIDOL 60 ML: 755 INJECTION, SOLUTION INTRAVENOUS at 14:08

## 2020-05-04 RX ADMIN — SODIUM CHLORIDE 500 ML: 9 INJECTION, SOLUTION INTRAVENOUS at 15:59

## 2020-05-04 RX ADMIN — ALUMINUM HYDROXIDE, MAGNESIUM HYDROXIDE, AND DIMETHICONE 15 ML: 400; 400; 40 SUSPENSION ORAL at 15:59

## 2020-05-04 RX ADMIN — LIDOCAINE HYDROCHLORIDE 15 ML: 20 SOLUTION ORAL; TOPICAL at 15:59

## 2020-05-04 NOTE — ED PROVIDER NOTES
Subjective   Patient is a 30-year-old male who presents to the ER today with complaints of midsternal chest pain, and shortness of breath that began 11 days ago but is worsened over the past 48 hours.  Was seen at his primary care provider's office at the [facility] and was found to be tachycardic with a rate of 118, nml BP, with normal oxygen saturations.  Patient reports that the shortness of breath is worse with exertion, and complains that the pain is slightly increased with deep breathing.  There is been no cough, fever, sore throat, lightheadedness or dizziness.  Denies any sick contact specifically no COVID or suspected COVID persons.  There is been no travel.  I spoke with Dr. Bell from [facility] who called ahead to give report on the patient.      History provided by:  Patient  Shortness of Breath   Severity:  Moderate  Onset quality:  Gradual  Duration:  11 days  Timing:  Constant  Progression:  Worsening  Chronicity:  New  Context: activity    Relieved by:  Nothing  Worsened by:  Activity and deep breathing  Ineffective treatments:  Rest  Associated symptoms: chest pain    Associated symptoms: no abdominal pain, no cough, no diaphoresis, no fever, no sore throat, no sputum production and no vomiting    Risk factors: no family hx of DVT, no hx of PE/DVT and no prolonged immobilization        Review of Systems   Constitutional: Negative for diaphoresis and fever.   HENT: Negative for sore throat.    Respiratory: Positive for shortness of breath. Negative for cough and sputum production.    Cardiovascular: Positive for chest pain.   Gastrointestinal: Negative for abdominal pain, diarrhea, nausea and vomiting.   Neurological: Negative for dizziness, syncope and light-headedness.   All other systems reviewed and are negative.      No past medical history on file.    No Known Allergies    No past surgical history on file.    No family history on file.    Social History     Socioeconomic History   • Marital status:       Spouse name: Not on file   • Number of children: Not on file   • Years of education: Not on file   • Highest education level: Not on file           Objective   Physical Exam   Constitutional: He is oriented to person, place, and time. He appears well-developed and well-nourished.   HENT:   Head: Normocephalic.   Neck: Neck supple.   Cardiovascular: Regular rhythm and normal pulses. Tachycardia present.   Pulmonary/Chest: Effort normal and breath sounds normal. No accessory muscle usage. No tachypnea. He has no decreased breath sounds. He has no wheezes. He has no rhonchi. He has no rales.   Abdominal: Soft. Bowel sounds are normal. He exhibits no distension. There is no tenderness. There is no guarding.   Musculoskeletal:        Right lower leg: Normal. He exhibits no edema.        Left lower leg: Normal. He exhibits no edema.   Neurological: He is alert and oriented to person, place, and time.   Skin: Skin is warm and dry.   Psychiatric: He has a normal mood and affect. His behavior is normal.   Vitals reviewed.      Procedures           ED Course      Recent Results (from the past 24 hour(s))   Comprehensive Metabolic Panel    Collection Time: 05/04/20  1:15 PM   Result Value Ref Range    Glucose 115 (H) 65 - 99 mg/dL    BUN 16 6 - 20 mg/dL    Creatinine 0.87 0.76 - 1.27 mg/dL    Sodium 140 136 - 145 mmol/L    Potassium 4.1 3.5 - 5.2 mmol/L    Chloride 104 98 - 107 mmol/L    CO2 24.0 22.0 - 29.0 mmol/L    Calcium 9.0 8.6 - 10.5 mg/dL    Total Protein 7.7 6.0 - 8.5 g/dL    Albumin 5.00 3.50 - 5.20 g/dL    ALT (SGPT) 38 1 - 41 U/L    AST (SGOT) 25 1 - 40 U/L    Alkaline Phosphatase 82 39 - 117 U/L    Total Bilirubin 0.8 0.2 - 1.2 mg/dL    eGFR  African Amer 123 >60 mL/min/1.73    Globulin 2.7 gm/dL    A/G Ratio 1.9 g/dL    BUN/Creatinine Ratio 18.4 7.0 - 25.0    Anion Gap 12.0 5.0 - 15.0 mmol/L   BNP    Collection Time: 05/04/20  1:15 PM   Result Value Ref Range    proBNP 11.4 5.0 - 450.0 pg/mL   Light  Blue Top    Collection Time: 05/04/20  1:15 PM   Result Value Ref Range    Extra Tube hold for add-on    Lavender Top    Collection Time: 05/04/20  1:15 PM   Result Value Ref Range    Extra Tube hold for add-on    Gold Top - SST    Collection Time: 05/04/20  1:15 PM   Result Value Ref Range    Extra Tube Hold for add-ons.    CBC Auto Differential    Collection Time: 05/04/20  1:15 PM   Result Value Ref Range    WBC 5.61 3.40 - 10.80 10*3/mm3    RBC 5.35 4.14 - 5.80 10*6/mm3    Hemoglobin 15.5 13.0 - 17.7 g/dL    Hematocrit 45.7 37.5 - 51.0 %    MCV 85.4 79.0 - 97.0 fL    MCH 29.0 26.6 - 33.0 pg    MCHC 33.9 31.5 - 35.7 g/dL    RDW 12.1 (L) 12.3 - 15.4 %    RDW-SD 37.5 37.0 - 54.0 fl    MPV 9.8 6.0 - 12.0 fL    Platelets 248 140 - 450 10*3/mm3    Neutrophil % 54.0 42.7 - 76.0 %    Lymphocyte % 33.0 19.6 - 45.3 %    Monocyte % 7.5 5.0 - 12.0 %    Eosinophil % 4.3 0.3 - 6.2 %    Basophil % 0.7 0.0 - 1.5 %    Immature Grans % 0.5 0.0 - 0.5 %    Neutrophils, Absolute 3.03 1.70 - 7.00 10*3/mm3    Lymphocytes, Absolute 1.85 0.70 - 3.10 10*3/mm3    Monocytes, Absolute 0.42 0.10 - 0.90 10*3/mm3    Eosinophils, Absolute 0.24 0.00 - 0.40 10*3/mm3    Basophils, Absolute 0.04 0.00 - 0.20 10*3/mm3    Immature Grans, Absolute 0.03 0.00 - 0.05 10*3/mm3    nRBC 0.0 0.0 - 0.2 /100 WBC   Troponin    Collection Time: 05/04/20  1:48 PM   Result Value Ref Range    Troponin T <0.010 0.000 - 0.030 ng/mL   Green Top (Gel)    Collection Time: 05/04/20  1:48 PM   Result Value Ref Range    Extra Tube Hold for add-ons.      Note: In addition to lab results from this visit, the labs listed above may include labs taken at another facility or during a different encounter within the last 24 hours. Please correlate lab times with ED admission and discharge times for further clarification of the services performed during this visit.    CT Angiogram Chest   Preliminary Result   1. No evidence of pulmonary embolus.   2. Mild pulmonary vascular  "congestion, and perhaps very early   interstitial edema. This study has low correlation with findings of   Covid 19 pneumonia.   3. No other significant chest disease is seen.                Vitals:    05/04/20 1301 05/04/20 1308   BP:  129/78   Pulse:  104   Resp:  16   Temp: 98.6 °F (37 °C)    TempSrc: Oral    SpO2:  95%   Weight: 98.3 kg (216 lb 11.4 oz)    Height: 172.7 cm (68\")      Medications   sodium chloride 0.9 % flush 10 mL (has no administration in time range)   sodium chloride 0.9 % flush 10 mL (has no administration in time range)   iopamidol (ISOVUE-370) 76 % injection 100 mL (60 mL Intravenous Given 5/4/20 1408)   aluminum-magnesium hydroxide-simethicone (MAALOX MAX) 400-400-40 MG/5ML suspension 15 mL (15 mL Oral Given 5/4/20 1559)   Lidocaine Viscous HCl (XYLOCAINE) 2 % mouth solution 15 mL (15 mL Mouth/Throat Given 5/4/20 1559)   sodium chloride 0.9 % bolus 500 mL (0 mL Intravenous Stopped 5/4/20 1640)     ECG/EMG Results (last 24 hours)     Procedure Component Value Units Date/Time    ECG 12 Lead [[id number]] Collected:  05/04/20 1310     Updated:  05/04/20 1401    Narrative:       Test Reason : SOA Protocol  Blood Pressure : **/** mmHG  Vent. Rate : 082 BPM     Atrial Rate : 082 BPM     P-R Int : 138 ms          QRS Dur : 092 ms      QT Int : 364 ms       P-R-T Axes : 022 -22 018 degrees     QTc Int : 425 ms    Normal sinus rhythm  No previous ECGs available  Confirmed by MAURA ALLEN MD ([id number]) on 5/4/2020 2:01:32 PM    Referred By:  EDMD           Confirmed By:MAURA ALLEN MD        ECG 12 Lead   Final Result   Test Reason : SOA Protocol   Blood Pressure : **/** mmHG   Vent. Rate : 082 BPM     Atrial Rate : 082 BPM      P-R Int : 138 ms          QRS Dur : 092 ms       QT Int : 364 ms       P-R-T Axes : 022 -22 018 degrees      QTc Int : 425 ms      Normal sinus rhythm   No previous ECGs available   Confirmed by MAURA ALLEN MD ([id number]) on 5/4/2020 2:01:32 PM      Referred By:  KAVIN           " Confirmed By:MAURA ALLEN MD        Reexamination: Patient sitting upright in bed in no acute distress.  States that he is comfortable and in no pain.    Discussed lab findings and radiology findings with patient.  Recommend follow-up with her chest pain clinic in the next 2 to 3 days.  COVID-19 testing performed.  Advised patient that we will contact him with results in the next 48 hours.  Until then, patient instructed to home quarantine until he receives the results of his COVID testing and is negative.  Instructed to return to the ER with worsening of symptoms or development of new symptoms.  Patient verbalized understanding of all discussed                                       MDM    Final diagnoses:   Chest pain, unspecified type   Shortness of breath            Melissa Clancy APRN  05/04/20 1710       Melissa Clancy APRN  05/04/20 1711

## 2020-05-04 NOTE — PROGRESS NOTES
29 yo male who walked into clinic complaining of CP and SOA for the past 11 days. He was triaged and instructed to go to the ED. Patient states he has not had cough, congestion, fever, sick contacts, LOC, and no one at home has similar symptoms. He states SOA has gotten worse and become constant in the last 3 days. He states he is SOA at rest and with activity. Worse with laying supine. He describes the chest pain as stabbing and lasting 20-30 seconds coming in episodes that occur 4-5 times daily.Patients vitals are stable but he remains tachycardic. No abnormal findings on heart or lung exam other than tachycardia.   Vitals: /74, O2 sat 97%, pulse 118.

## 2020-05-05 ENCOUNTER — EPISODE CHANGES (OUTPATIENT)
Dept: CASE MANAGEMENT | Facility: OTHER | Age: 30
End: 2020-05-05

## 2020-05-05 ENCOUNTER — TELEPHONE (OUTPATIENT)
Dept: EMERGENCY DEPT | Facility: HOSPITAL | Age: 30
End: 2020-05-05

## 2020-05-05 ENCOUNTER — PATIENT OUTREACH (OUTPATIENT)
Dept: CASE MANAGEMENT | Facility: OTHER | Age: 30
End: 2020-05-05

## 2020-05-05 NOTE — OUTREACH NOTE
Care Coordination Note    Called chest pain clinic and scheduled a mychart video visit 5/7/20 at 12:45pm.    Carla Gonzalez RN  Ambulatory     5/5/2020, 15:43

## 2020-05-05 NOTE — OUTREACH NOTE
ED Potential Covid Discharge Follow-up    Notified pt of video visit at chest pain clinic. Instructed pt to call clinic tomorrow with email address for further instructions for setting up visit. Advised to return to ED with new or worsening sxs. Pt v/u.    Carla Gonzalez RN  Ambulatory     5/5/2020, 15:45

## 2020-05-05 NOTE — OUTREACH NOTE
ED Potential Covid Discharge Follow-up    Pt returned call/vm regarding ED visit 5/4/20. Pt has been notified of negative covid results. He is still having the same chest pain and sob. He is taking the pantoprazole rx'd in ED. He said he had an appt at [facility] but it was cancelled due to covid. He accepted [facility] assistance in scheduling with chest pain clinic as per ED. He declined an appt with pcp Dr Bell at this time.    Carla Gonzalez RN  Ambulatory     5/5/2020, 15:34

## 2020-05-06 NOTE — PROGRESS NOTES
[facility]  Heart and Valve Center  Telemedicine note    05/07/2020         Meliton Lagos  [address]  [facility] [address]  [unfilled]    1990    Jose Guadalupe Bell MD    Meliton Lagos is a 30 y.o. male.      Subjective:     Chief Complaint:  No chief complaint on file.       This was an *** enabled telemedicine encounter.    HPI   30-year-old male with history of Behcet's disease and GERD scheduled today for telemedicine evaluation of chest pain at the request of Melissa THOMPSON/.  Patient presented the ED on 5/4 with an 11-day history of midsternal chest pain and shortness of breath.  He was seen by his primary care provider's office and was found to be tachycardic with a heart rate of 118.  Patient reports worsening dyspnea on exertion and chest tightness that is slightly worse with deep breathing.  EKG in the ED showed normal sinus rhythm with a heart rate of 82.  Troponin normal.  COVID-19 PCR swab normal.  CTA chest negative for PE but did show some mild pulmonary vascular congestion, possible very early interstitial edema versus minimal atelectasis.     Patient Active Problem List   Diagnosis   • Behcet's disease (CMS/HCC)   • Fatigue   • Allergic rhinitis   • Migraine without aura and without status migrainosus, not intractable   • Gastroesophageal reflux disease   • Impaired swallowing associated with throat pain       No past medical history on file.    Past Surgical History:   Procedure Laterality Date   • ANAL FISTULA REPAIR  2009   • CHOLECYSTECTOMY         Family History   Problem Relation Age of Onset   • No Known Problems Mother    • No Known Problems Father    • Heart attack Maternal Grandfather    • No Known Problems Paternal Grandmother    • No Known Problems Paternal Grandfather        Social History     Socioeconomic History   • Marital status:      Spouse name: Not on file   • Number of children: Not on file   • Years of education: Not on file   •  Highest education level: Not on file   Tobacco Use   • Smoking status: Former Smoker     Last attempt to quit: 2016     Years since quittin.3   • Smokeless tobacco: Never Used   Substance and Sexual Activity   • Alcohol use: No     Frequency: Never   • Drug use: No   • Sexual activity: Yes     Partners: Female     Birth control/protection: None       No Known Allergies      Current Outpatient Medications:   •  colchicine 0.6 MG tablet, Take 0.6 mg by mouth 2 (Two) Times a Day., Disp: , Rfl:   •  nystatin-triamcinolone (MYCOLOG) 934116-4.1 UNIT/GM-% ointment, Apply  topically to the appropriate area as directed 2 (Two) Times a Day., Disp: 60 g, Rfl: 0  •  omeprazole (priLOSEC) 40 MG capsule, Take 1 capsule by mouth Daily., Disp: 30 capsule, Rfl: 3  •  pantoprazole (PROTONIX) 40 MG EC tablet, Take 1 tablet by mouth Daily., Disp: 12 tablet, Rfl: 0    The following portions of the patient's history were reviewed today and updated as appropriate: allergies, current medications, past family history, past medical history, past social history, past surgical history and problem list     ROS    Objective:     There were no vitals filed for this visit.    There is no height or weight on file to calculate BMI.    Physical Exam    Lab and Diagnostic Review:  Results for orders placed or performed during the hospital encounter of 20   SARS-CoV-2 PCR ([place] IN-HOUSE PERFORMED), NP SWAB IN TRANSPORT MEDIA - Swab, Nasopharynx   Result Value Ref Range    COVID19 Not Detected Not Detected - Ref. Range   Comprehensive Metabolic Panel   Result Value Ref Range    Glucose 115 (H) 65 - 99 mg/dL    BUN 16 6 - 20 mg/dL    Creatinine 0.87 0.76 - 1.27 mg/dL    Sodium 140 136 - 145 mmol/L    Potassium 4.1 3.5 - 5.2 mmol/L    Chloride 104 98 - 107 mmol/L    CO2 24.0 22.0 - 29.0 mmol/L    Calcium 9.0 8.6 - 10.5 mg/dL    Total Protein 7.7 6.0 - 8.5 g/dL    Albumin 5.00 3.50 - 5.20 g/dL    ALT (SGPT) 38 1 - 41 U/L    AST (SGOT) 25 1  - 40 U/L    Alkaline Phosphatase 82 39 - 117 U/L    Total Bilirubin 0.8 0.2 - 1.2 mg/dL    eGFR  African Amer 123 >60 mL/min/1.73    Globulin 2.7 gm/dL    A/G Ratio 1.9 g/dL    BUN/Creatinine Ratio 18.4 7.0 - 25.0    Anion Gap 12.0 5.0 - 15.0 mmol/L   BNP   Result Value Ref Range    proBNP 11.4 5.0 - 450.0 pg/mL   Troponin   Result Value Ref Range    Troponin T <0.010 0.000 - 0.030 ng/mL   CBC Auto Differential   Result Value Ref Range    WBC 5.61 3.40 - 10.80 10*3/mm3    RBC 5.35 4.14 - 5.80 10*6/mm3    Hemoglobin 15.5 13.0 - 17.7 g/dL    Hematocrit 45.7 37.5 - 51.0 %    MCV 85.4 79.0 - 97.0 fL    MCH 29.0 26.6 - 33.0 pg    MCHC 33.9 31.5 - 35.7 g/dL    RDW 12.1 (L) 12.3 - 15.4 %    RDW-SD 37.5 37.0 - 54.0 fl    MPV 9.8 6.0 - 12.0 fL    Platelets 248 140 - 450 10*3/mm3    Neutrophil % 54.0 42.7 - 76.0 %    Lymphocyte % 33.0 19.6 - 45.3 %    Monocyte % 7.5 5.0 - 12.0 %    Eosinophil % 4.3 0.3 - 6.2 %    Basophil % 0.7 0.0 - 1.5 %    Immature Grans % 0.5 0.0 - 0.5 %    Neutrophils, Absolute 3.03 1.70 - 7.00 10*3/mm3    Lymphocytes, Absolute 1.85 0.70 - 3.10 10*3/mm3    Monocytes, Absolute 0.42 0.10 - 0.90 10*3/mm3    Eosinophils, Absolute 0.24 0.00 - 0.40 10*3/mm3    Basophils, Absolute 0.04 0.00 - 0.20 10*3/mm3    Immature Grans, Absolute 0.03 0.00 - 0.05 10*3/mm3    nRBC 0.0 0.0 - 0.2 /100 WBC   CTA Chest  IMPRESSION:  1.  No evidence of pulmonary embolus.  2.  Mild pulmonary vascular congestion, and perhaps very early  interstitial edema. This study has low correlation with findings of  Covid-19 pneumonia.  3.  No other significant chest disease is seen.    EKG 5/4/20 NSR  Assessment and Plan:   There are no diagnoses linked to this encounter.    This visit has been scheduled as a *** visit to comply with patient safety concerns in accordance with CDC recommendations. Total time of discussion was *** minutes.      It has been a pleasure to participate in the care of this patient.  Patient was instructed to call  the [facility] Center with any questions, concerns, or worsening symptoms.    *Please note that portions of this note were completed with a voice recognition program. Efforts were made to edit the dictations, but occasionally words are mistranscribed.

## 2020-05-07 ENCOUNTER — APPOINTMENT (OUTPATIENT)
Dept: CARDIOLOGY | Facility: HOSPITAL | Age: 30
End: 2020-05-07

## 2020-05-11 ENCOUNTER — OFFICE VISIT (OUTPATIENT)
Dept: CARDIOLOGY | Facility: HOSPITAL | Age: 30
End: 2020-05-11

## 2020-05-11 VITALS
DIASTOLIC BLOOD PRESSURE: 77 MMHG | OXYGEN SATURATION: 100 % | TEMPERATURE: 96.7 F | BODY MASS INDEX: 32.77 KG/M2 | SYSTOLIC BLOOD PRESSURE: 132 MMHG | HEIGHT: 68 IN | HEART RATE: 85 BPM | WEIGHT: 216.25 LBS

## 2020-05-11 DIAGNOSIS — R06.02 SHORTNESS OF BREATH: ICD-10-CM

## 2020-05-11 DIAGNOSIS — R07.9 CHEST PAIN, UNSPECIFIED TYPE: Primary | ICD-10-CM

## 2020-05-11 PROCEDURE — 99214 OFFICE O/P EST MOD 30 MIN: CPT | Performed by: NURSE PRACTITIONER

## 2020-05-11 NOTE — PROGRESS NOTES
"[facility]  Heart and Valve Center      05/11/2020         Meliton Lagos  [address]  [facility] [address]  [unfilled]    1990    Jose Guadalupe Bell MD    Meliton Lagos is a 30 y.o. male.      Subjective:     Chief Complaint:  Chest Pain and Establish Care       HPI   30-year-old male with history of questionable Behcet's disease, GERD and migraines who presents today for evaluation of chest pain at the request of Melissa THOMPSON/. Patient is accompanied by .  Patient presented the ED on 5/4 with an 11-day history of midsternal chest pain and shortness of breath.  Patient presented to his primary care provider and was found to be tachycardic with a heart rate of 118 bpm and therefore was sent to the ED.  EKGs and troponins negative.  CTA chest showed possible mild pulmonary vascular congestion and possible very early interstitial edema versus atelectasis. COVID 19 swab negative.    6 years ago he was living in [place] and had to travel to [place], he says his entire body turned yellow and he lost a lot of weight. He came [place] the [place] and had a cholecystectomy. He also had shortness of breath. These symptoms improved but 2 weeks ago his shortness of breath returned with associated chest pain. He reports shortness of breath is triggered by fumes (I.e. Cigarette smoke). Chest pain typically occurs after episode of shortness of breath. SOB happens at rest and usually associated with cough and some wheezing. However, he also notes some dyspnea and chest pain with activity. He does report that \"anger\" seems to aggravate symptoms. No worsening with food.      Cardiac risks: Gender  Patient Active Problem List   Diagnosis   • Behcet's disease (CMS/HCC)   • Fatigue   • Allergic rhinitis   • Migraine without aura and without status migrainosus, not intractable   • Gastroesophageal reflux disease   • Impaired swallowing associated with throat pain       History reviewed. No " pertinent past medical history.    Past Surgical History:   Procedure Laterality Date   • ANAL FISTULA REPAIR  2009   • CHOLECYSTECTOMY         Family History   Problem Relation Age of Onset   • No Known Problems Mother    • No Known Problems Father    • Heart attack Maternal Grandfather    • Heart disease Maternal Grandfather    • No Known Problems Paternal Grandmother    • No Known Problems Paternal Grandfather        Social History     Socioeconomic History   • Marital status:      Spouse name: Not on file   • Number of children: Not on file   • Years of education: Not on file   • Highest education level: Not on file   Tobacco Use   • Smoking status: Former Smoker     Last attempt to quit: 2016     Years since quittin.3   • Smokeless tobacco: Never Used   Substance and Sexual Activity   • Alcohol use: No     Frequency: Never   • Drug use: No   • Sexual activity: Yes     Partners: Female     Birth control/protection: None   Social History Narrative    Caffeine: 2 cups coffee daily       No Known Allergies      Current Outpatient Medications:   •  pantoprazole (PROTONIX) 40 MG EC tablet, Take 1 tablet by mouth Daily., Disp: 12 tablet, Rfl: 0  •  colchicine 0.6 MG tablet, Take 0.6 mg by mouth 2 (Two) Times a Day., Disp: , Rfl:   •  nystatin-triamcinolone (MYCOLOG) 532212-8.1 UNIT/GM-% ointment, Apply  topically to the appropriate area as directed 2 (Two) Times a Day., Disp: 60 g, Rfl: 0  •  omeprazole (priLOSEC) 40 MG capsule, Take 1 capsule by mouth Daily., Disp: 30 capsule, Rfl: 3    The following portions of the patient's history were reviewed today and updated as appropriate: allergies, current medications, past family history, past medical history, past social history, past surgical history and problem list     Review of Systems   Constitution: Positive for malaise/fatigue and weight gain. Negative for chills and fever.   HENT: Negative.    Eyes: Negative.    Cardiovascular: Positive for chest pain  "and dyspnea on exertion. Negative for claudication, cyanosis, irregular heartbeat, leg swelling, near-syncope, orthopnea, palpitations, paroxysmal nocturnal dyspnea and syncope.   Respiratory: Positive for shortness of breath and wheezing. Negative for cough and snoring.    Endocrine: Negative.    Hematologic/Lymphatic: Does not bruise/bleed easily.   Skin: Negative for poor wound healing.   Musculoskeletal: Negative.    Gastrointestinal: Negative for abdominal pain, heartburn, hematemesis, melena, nausea and vomiting.   Genitourinary: Negative.  Negative for hematuria.   Neurological: Negative.    Psychiatric/Behavioral: Negative.    Allergic/Immunologic: Negative.        Objective:     Vitals:    05/11/20 1316   BP: 132/77   BP Location: Right arm   Patient Position: Sitting   Cuff Size: Adult   Pulse: 85   Temp: 96.7 °F (35.9 °C)   TempSrc: Temporal   SpO2: 100%   Weight: 98.1 kg (216 lb 4 oz)   Height: 172.7 cm (68\")       Body mass index is 32.88 kg/m².    Physical Exam   Constitutional: He is oriented to person, place, and time. He appears well-developed and well-nourished. No distress.   HENT:   Head: Normocephalic.   Eyes: Pupils are equal, round, and reactive to light. Conjunctivae are normal.   Neck: Neck supple. No JVD present. No thyromegaly present.   Cardiovascular: Normal rate, regular rhythm, normal heart sounds and intact distal pulses. Exam reveals no gallop and no friction rub.   No murmur heard.  Pulmonary/Chest: Effort normal and breath sounds normal. No respiratory distress. He has no wheezes. He has no rales. He exhibits no tenderness.   Abdominal: Soft. Bowel sounds are normal.   Musculoskeletal: Normal range of motion. He exhibits no edema.   Neurological: He is alert and oriented to person, place, and time.   Skin: Skin is warm and dry.   Psychiatric: He has a normal mood and affect. His behavior is normal. Thought content normal.   Vitals reviewed.      Lab and Diagnostic Review:  Results " for orders placed or performed during the hospital encounter of 05/04/20   SARS-CoV-2 PCR ([place] IN-HOUSE PERFORMED), NP SWAB IN TRANSPORT MEDIA - Swab, Nasopharynx   Result Value Ref Range    COVID19 Not Detected Not Detected - Ref. Range   Comprehensive Metabolic Panel   Result Value Ref Range    Glucose 115 (H) 65 - 99 mg/dL    BUN 16 6 - 20 mg/dL    Creatinine 0.87 0.76 - 1.27 mg/dL    Sodium 140 136 - 145 mmol/L    Potassium 4.1 3.5 - 5.2 mmol/L    Chloride 104 98 - 107 mmol/L    CO2 24.0 22.0 - 29.0 mmol/L    Calcium 9.0 8.6 - 10.5 mg/dL    Total Protein 7.7 6.0 - 8.5 g/dL    Albumin 5.00 3.50 - 5.20 g/dL    ALT (SGPT) 38 1 - 41 U/L    AST (SGOT) 25 1 - 40 U/L    Alkaline Phosphatase 82 39 - 117 U/L    Total Bilirubin 0.8 0.2 - 1.2 mg/dL    eGFR  African Amer 123 >60 mL/min/1.73    Globulin 2.7 gm/dL    A/G Ratio 1.9 g/dL    BUN/Creatinine Ratio 18.4 7.0 - 25.0    Anion Gap 12.0 5.0 - 15.0 mmol/L   BNP   Result Value Ref Range    proBNP 11.4 5.0 - 450.0 pg/mL   Troponin   Result Value Ref Range    Troponin T <0.010 0.000 - 0.030 ng/mL   CBC Auto Differential   Result Value Ref Range    WBC 5.61 3.40 - 10.80 10*3/mm3    RBC 5.35 4.14 - 5.80 10*6/mm3    Hemoglobin 15.5 13.0 - 17.7 g/dL    Hematocrit 45.7 37.5 - 51.0 %    MCV 85.4 79.0 - 97.0 fL    MCH 29.0 26.6 - 33.0 pg    MCHC 33.9 31.5 - 35.7 g/dL    RDW 12.1 (L) 12.3 - 15.4 %    RDW-SD 37.5 37.0 - 54.0 fl    MPV 9.8 6.0 - 12.0 fL    Platelets 248 140 - 450 10*3/mm3    Neutrophil % 54.0 42.7 - 76.0 %    Lymphocyte % 33.0 19.6 - 45.3 %    Monocyte % 7.5 5.0 - 12.0 %    Eosinophil % 4.3 0.3 - 6.2 %    Basophil % 0.7 0.0 - 1.5 %    Immature Grans % 0.5 0.0 - 0.5 %    Neutrophils, Absolute 3.03 1.70 - 7.00 10*3/mm3    Lymphocytes, Absolute 1.85 0.70 - 3.10 10*3/mm3    Monocytes, Absolute 0.42 0.10 - 0.90 10*3/mm3    Eosinophils, Absolute 0.24 0.00 - 0.40 10*3/mm3    Basophils, Absolute 0.04 0.00 - 0.20 10*3/mm3    Immature Grans, Absolute 0.03 0.00 - 0.05  10*3/mm3    nRBC 0.0 0.0 - 0.2 /100 WBC     EKG 5/4/20  Normal sinus rhythm  No previous ECGs available  Assessment and Plan:   1. Chest pain, unspecified type  DENIZ risk score 0 and ASCVD risks low but difficult to determine etiology of symptoms, especially with language barrier.  He does have some exertional symptoms will proceed with GXT  - Treadmill Stress Test; Future  - Adult Transthoracic Echo Complete W/ Cont if Necessary Per Protocol; Future    2. Shortness of breath  - Treadmill Stress Test; Future  - Adult Transthoracic Echo Complete W/ Cont if Necessary Per Protocol; Future    If cardiac testing normal I have recommended that he follow-up with his primary care provider for noncardiac causes of chest pain and shortness of breath.  Will discuss results with patient over the phone      It has been a pleasure to participate in the care of this patient.  Patient was instructed to call the [facility] with any questions, concerns, or worsening symptoms.    *Please note that portions of this note were completed with a voice recognition program. Efforts were made to edit the dictations, but occasionally words are mistranscribed.

## 2020-05-22 ENCOUNTER — HOSPITAL ENCOUNTER (OUTPATIENT)
Dept: CARDIOLOGY | Facility: HOSPITAL | Age: 30
Discharge: HOME | End: 2020-05-22

## 2020-05-22 ENCOUNTER — HOSPITAL ENCOUNTER (OUTPATIENT)
Dept: CARDIOLOGY | Facility: HOSPITAL | Age: 30
Discharge: HOME | End: 2020-05-22
Admitting: NURSE PRACTITIONER

## 2020-05-22 VITALS
WEIGHT: 216.27 LBS | BODY MASS INDEX: 32.78 KG/M2 | HEIGHT: 68 IN | SYSTOLIC BLOOD PRESSURE: 100 MMHG | HEART RATE: 83 BPM | DIASTOLIC BLOOD PRESSURE: 80 MMHG | OXYGEN SATURATION: 96 % | RESPIRATION RATE: 18 BRPM

## 2020-05-22 VITALS — BODY MASS INDEX: 32.78 KG/M2 | WEIGHT: 216.27 LBS | HEIGHT: 68 IN

## 2020-05-22 DIAGNOSIS — R07.9 CHEST PAIN, UNSPECIFIED TYPE: ICD-10-CM

## 2020-05-22 DIAGNOSIS — R06.02 SHORTNESS OF BREATH: ICD-10-CM

## 2020-05-22 LAB
BH CV ECHO MEAS - AO MAX PG (FULL): 1.2 MMHG
BH CV ECHO MEAS - AO MAX PG: 4.3 MMHG
BH CV ECHO MEAS - AO MEAN PG (FULL): 0.97 MMHG
BH CV ECHO MEAS - AO MEAN PG: 2.4 MMHG
BH CV ECHO MEAS - AO ROOT AREA (BSA CORRECTED): 1.6
BH CV ECHO MEAS - AO ROOT AREA: 8.5 CM^2
BH CV ECHO MEAS - AO ROOT DIAM: 3.3 CM
BH CV ECHO MEAS - AO V2 MAX: 103.4 CM/SEC
BH CV ECHO MEAS - AO V2 MEAN: 71 CM/SEC
BH CV ECHO MEAS - AO V2 VTI: 24 CM
BH CV ECHO MEAS - AVA(I,A): 3.5 CM^2
BH CV ECHO MEAS - AVA(I,D): 3.5 CM^2
BH CV ECHO MEAS - AVA(V,A): 3.9 CM^2
BH CV ECHO MEAS - AVA(V,D): 3.9 CM^2
BH CV ECHO MEAS - BSA(HAYCOCK): 2.2 M^2
BH CV ECHO MEAS - BSA: 2.1 M^2
BH CV ECHO MEAS - BZI_BMI: 32.8 KILOGRAMS/M^2
BH CV ECHO MEAS - BZI_METRIC_HEIGHT: 172.7 CM
BH CV ECHO MEAS - BZI_METRIC_WEIGHT: 98 KG
BH CV ECHO MEAS - EDV(CUBED): 105.6 ML
BH CV ECHO MEAS - EDV(MOD-SP2): 86 ML
BH CV ECHO MEAS - EDV(MOD-SP4): 111 ML
BH CV ECHO MEAS - EDV(TEICH): 103.7 ML
BH CV ECHO MEAS - EF(CUBED): 71.7 %
BH CV ECHO MEAS - EF(MOD-BP): 62 %
BH CV ECHO MEAS - EF(MOD-SP2): 62.8 %
BH CV ECHO MEAS - EF(MOD-SP4): 61.3 %
BH CV ECHO MEAS - EF(TEICH): 63.4 %
BH CV ECHO MEAS - ESV(CUBED): 29.8 ML
BH CV ECHO MEAS - ESV(MOD-SP2): 32 ML
BH CV ECHO MEAS - ESV(MOD-SP4): 43 ML
BH CV ECHO MEAS - ESV(TEICH): 38 ML
BH CV ECHO MEAS - FS: 34.4 %
BH CV ECHO MEAS - IVS/LVPW: 1.1
BH CV ECHO MEAS - IVSD: 0.79 CM
BH CV ECHO MEAS - LA DIMENSION: 4 CM
BH CV ECHO MEAS - LA/AO: 1.2
BH CV ECHO MEAS - LAD MAJOR: 4.5 CM
BH CV ECHO MEAS - LAT PEAK E' VEL: 12.9 CM/SEC
BH CV ECHO MEAS - LATERAL E/E' RATIO: 7.4
BH CV ECHO MEAS - LV DIASTOLIC VOL/BSA (35-75): 52.6 ML/M^2
BH CV ECHO MEAS - LV MASS(C)D: 117.5 GRAMS
BH CV ECHO MEAS - LV MASS(C)DI: 55.6 GRAMS/M^2
BH CV ECHO MEAS - LV MAX PG: 3.1 MMHG
BH CV ECHO MEAS - LV MEAN PG: 1.4 MMHG
BH CV ECHO MEAS - LV SYSTOLIC VOL/BSA (12-30): 20.4 ML/M^2
BH CV ECHO MEAS - LV V1 MAX: 88.3 CM/SEC
BH CV ECHO MEAS - LV V1 MEAN: 54.9 CM/SEC
BH CV ECHO MEAS - LV V1 VTI: 18.6 CM
BH CV ECHO MEAS - LVIDD: 4.7 CM
BH CV ECHO MEAS - LVIDS: 3.1 CM
BH CV ECHO MEAS - LVLD AP2: 7.3 CM
BH CV ECHO MEAS - LVLD AP4: 7.5 CM
BH CV ECHO MEAS - LVLS AP2: 5.4 CM
BH CV ECHO MEAS - LVLS AP4: 5.9 CM
BH CV ECHO MEAS - LVOT AREA (M): 4.5 CM^2
BH CV ECHO MEAS - LVOT AREA: 4.6 CM^2
BH CV ECHO MEAS - LVOT DIAM: 2.4 CM
BH CV ECHO MEAS - LVPWD: 0.75 CM
BH CV ECHO MEAS - MED PEAK E' VEL: 10.7 CM/SEC
BH CV ECHO MEAS - MEDIAL E/E' RATIO: 8.9
BH CV ECHO MEAS - MV A MAX VEL: 64.1 CM/SEC
BH CV ECHO MEAS - MV DEC SLOPE: 449.7 CM/SEC^2
BH CV ECHO MEAS - MV DEC TIME: 0.21 SEC
BH CV ECHO MEAS - MV E MAX VEL: 97 CM/SEC
BH CV ECHO MEAS - MV E/A: 1.5
BH CV ECHO MEAS - MV P1/2T MAX VEL: 93 CM/SEC
BH CV ECHO MEAS - MV P1/2T: 60.6 MSEC
BH CV ECHO MEAS - MVA P1/2T LCG: 2.4 CM^2
BH CV ECHO MEAS - MVA(P1/2T): 3.6 CM^2
BH CV ECHO MEAS - PA ACC SLOPE: 447.2 CM/SEC^2
BH CV ECHO MEAS - PA ACC TIME: 0.17 SEC
BH CV ECHO MEAS - PA MAX PG: 2.4 MMHG
BH CV ECHO MEAS - PA PR(ACCEL): 3.7 MMHG
BH CV ECHO MEAS - PA V2 MAX: 77.3 CM/SEC
BH CV ECHO MEAS - PI END-D VEL: 82 CM/SEC
BH CV ECHO MEAS - RAP SYSTOLE: 3 MMHG
BH CV ECHO MEAS - RVSP: 16 MMHG
BH CV ECHO MEAS - SI(AO): 96.1 ML/M^2
BH CV ECHO MEAS - SI(CUBED): 35.9 ML/M^2
BH CV ECHO MEAS - SI(LVOT): 40.2 ML/M^2
BH CV ECHO MEAS - SI(MOD-SP2): 25.6 ML/M^2
BH CV ECHO MEAS - SI(MOD-SP4): 32.2 ML/M^2
BH CV ECHO MEAS - SI(TEICH): 31.1 ML/M^2
BH CV ECHO MEAS - SV(AO): 203.1 ML
BH CV ECHO MEAS - SV(CUBED): 75.7 ML
BH CV ECHO MEAS - SV(LVOT): 84.9 ML
BH CV ECHO MEAS - SV(MOD-SP2): 54 ML
BH CV ECHO MEAS - SV(MOD-SP4): 68 ML
BH CV ECHO MEAS - SV(TEICH): 65.7 ML
BH CV ECHO MEAS - TAPSE (>1.6): 2.9 CM2
BH CV ECHO MEAS - TR MAX PG: 13 MMHG
BH CV ECHO MEAS - TR MAX VEL: 179.8 CM/SEC
BH CV ECHO MEASUREMENTS AVERAGE E/E' RATIO: 8.22
BH CV STRESS BP STAGE 1: NORMAL
BH CV STRESS BP STAGE 2: NORMAL
BH CV STRESS DURATION MIN STAGE 1: 3
BH CV STRESS DURATION MIN STAGE 2: 1
BH CV STRESS DURATION SEC STAGE 2: 46
BH CV STRESS GRADE STAGE 1: 10
BH CV STRESS GRADE STAGE 2: 12
BH CV STRESS HR STAGE 1: 130
BH CV STRESS HR STAGE 2: 157
BH CV STRESS METS STAGE 1: 5
BH CV STRESS METS STAGE 2: 7.5
BH CV STRESS O2 STAGE 1: 96
BH CV STRESS O2 STAGE 2: 95
BH CV STRESS PROTOCOL 1: NORMAL
BH CV STRESS RECOVERY BP: NORMAL MMHG
BH CV STRESS RECOVERY HR: 101 BPM
BH CV STRESS RECOVERY O2: 96 %
BH CV STRESS SPEED STAGE 1: 1.7
BH CV STRESS SPEED STAGE 2: 2.5
BH CV STRESS STAGE 1: 1
BH CV STRESS STAGE 2: 2
BH CV VAS BP LEFT ARM: NORMAL MMHG
BH CV XLRA - RV BASE: 3.9 CM
BH CV XLRA - RV LENGTH: 7.2 CM
BH CV XLRA - RV MID: 2.4 CM
BH CV XLRA - TDI S': 13.8 CM/SEC
LEFT ATRIUM VOLUME INDEX: 16.1 ML/M^2
LEFT ATRIUM VOLUME: 34 ML
LV EF 2D ECHO EST: 60 %
MAXIMAL PREDICTED HEART RATE: 188 BPM
MAXIMAL PREDICTED HEART RATE: 188 BPM
PERCENT MAX PREDICTED HR: 86.17 %
STRESS BASELINE BP: NORMAL MMHG
STRESS BASELINE HR: 106 BPM
STRESS O2 SAT REST: 97 %
STRESS PERCENT HR: 101 %
STRESS POST ESTIMATED WORKLOAD: 6.7 METS
STRESS POST EXERCISE DUR MIN: 4 MIN
STRESS POST EXERCISE DUR SEC: 46 SEC
STRESS POST O2 SAT PEAK: 95 %
STRESS POST PEAK BP: NORMAL MMHG
STRESS POST PEAK HR: 162 BPM
STRESS TARGET HR: 160 BPM
STRESS TARGET HR: 160 BPM

## 2020-05-22 PROCEDURE — 93017 CV STRESS TEST TRACING ONLY: CPT

## 2020-05-22 PROCEDURE — 93306 TTE W/DOPPLER COMPLETE: CPT

## 2020-05-22 PROCEDURE — 93306 TTE W/DOPPLER COMPLETE: CPT | Performed by: INTERNAL MEDICINE

## 2020-05-22 PROCEDURE — 93018 CV STRESS TEST I&R ONLY: CPT | Performed by: INTERNAL MEDICINE

## 2020-05-26 ENCOUNTER — TELEPHONE (OUTPATIENT)
Dept: CARDIOLOGY | Facility: HOSPITAL | Age: 30
End: 2020-05-26

## 2020-05-26 NOTE — TELEPHONE ENCOUNTER
Called patient using  services. Discussed results of stress test and echo. Stress test was low risk, he did have decreased exercise capacity. He feels this is more related to his lungs. Echo was unremarkable except for possible small incidental PFO. Chest tightness occurs only with aggravation, when he lays on his side and when he is short of breath. Encouraged him to follow up with PCP and to start a walking program. If symptoms persist or worsen advised to call

## 2020-06-03 ENCOUNTER — OFFICE VISIT (OUTPATIENT)
Dept: FAMILY MEDICINE CLINIC | Facility: CLINIC | Age: 30
End: 2020-06-03

## 2020-06-03 VITALS
BODY MASS INDEX: 34.06 KG/M2 | WEIGHT: 217 LBS | RESPIRATION RATE: 21 BRPM | SYSTOLIC BLOOD PRESSURE: 100 MMHG | OXYGEN SATURATION: 97 % | TEMPERATURE: 97.6 F | HEART RATE: 85 BPM | HEIGHT: 67 IN | DIASTOLIC BLOOD PRESSURE: 76 MMHG

## 2020-06-03 DIAGNOSIS — K13.79 RECURRENT ORAL ULCERS: ICD-10-CM

## 2020-06-03 DIAGNOSIS — R21 RASH AND NONSPECIFIC SKIN ERUPTION: ICD-10-CM

## 2020-06-03 DIAGNOSIS — M79.10 MYALGIA: ICD-10-CM

## 2020-06-03 DIAGNOSIS — R53.83 FATIGUE, UNSPECIFIED TYPE: ICD-10-CM

## 2020-06-03 DIAGNOSIS — M25.50 ARTHRALGIA, UNSPECIFIED JOINT: ICD-10-CM

## 2020-06-03 DIAGNOSIS — R13.10 IMPAIRED SWALLOWING ASSOCIATED WITH THROAT PAIN: ICD-10-CM

## 2020-06-03 DIAGNOSIS — K21.9 GASTROESOPHAGEAL REFLUX DISEASE, ESOPHAGITIS PRESENCE NOT SPECIFIED: Primary | ICD-10-CM

## 2020-06-03 DIAGNOSIS — N50.89 ULCERS OF GENITAL ORGAN IN MALE: ICD-10-CM

## 2020-06-03 DIAGNOSIS — R07.0 IMPAIRED SWALLOWING ASSOCIATED WITH THROAT PAIN: ICD-10-CM

## 2020-06-03 DIAGNOSIS — M35.2 BEHCET'S DISEASE (HCC): ICD-10-CM

## 2020-06-03 PROBLEM — G44.89 OTHER HEADACHE SYNDROME: Status: ACTIVE | Noted: 2019-01-11

## 2020-06-03 PROBLEM — G44.89 OTHER HEADACHE SYNDROME: Status: RESOLVED | Noted: 2019-01-11 | Resolved: 2020-06-03

## 2020-06-03 PROCEDURE — 99214 OFFICE O/P EST MOD 30 MIN: CPT | Performed by: NURSE PRACTITIONER

## 2020-06-03 RX ORDER — OMEPRAZOLE 40 MG/1
40 CAPSULE, DELAYED RELEASE ORAL DAILY
Qty: 30 CAPSULE | Refills: 3 | Status: SHIPPED | OUTPATIENT
Start: 2020-06-03 | End: 2020-06-30

## 2020-06-03 RX ORDER — NYSTATIN AND TRIAMCINOLONE ACETONIDE 100000; 1 [USP'U]/G; MG/G
OINTMENT TOPICAL 2 TIMES DAILY
Qty: 60 G | Refills: 0 | OUTPATIENT
Start: 2020-06-03 | End: 2020-08-03 | Stop reason: HOSPADM

## 2020-06-03 NOTE — PROGRESS NOTES
Subjective   Meliton Lagos is a 30 y.o. male.     Patient presents today s/p ER visit for chest pain, shortness of breath 5/4/20. He had normal troponin and CT angiogram of chest. Patient was subsequently evaluated by cardiology and had a stress test with low risk results and transthoracic echo with EF 60%. He was advised to follow-up with his PCP for further evaluation. He is continuing to c/o shortness of breath which he states is worse at night. He denies snoring or waking up gasping for breath. Patient has a history of GERD and has not been taking any medication to treat-he stopped taking his PPI. He was referred to gastroenterology but was unable to get an appointment due to Covid-19 pandemic.   In addition, patient states that he is having excessive fatigue, intermittent oral and penile lesions, arthralgias and myalgias and is concerned that he may have an autoimmune disease. He was referred to  Rheumatology and was evaluated 11/7/19 for possible Behcet's disease (see scanned documents). To date, patient has not been diagnosed with any rheumatoid or autoimmune diseases. He states that he has been having these issues for over one year and is requesting a referral to infectious disease.       The following portions of the patient's history were reviewed and updated as appropriate: allergies, current medications, past family history, past medical history, past social history, past surgical history and problem list.    Allergies as of 06/03/2020   • (No Known Allergies)       Current Outpatient Medications on File Prior to Visit   Medication Sig   • [DISCONTINUED] colchicine 0.6 MG tablet Take 0.6 mg by mouth 2 (Two) Times a Day.   • [DISCONTINUED] nystatin-triamcinolone (MYCOLOG) 039378-9.1 UNIT/GM-% ointment Apply  topically to the appropriate area as directed 2 (Two) Times a Day.   • [DISCONTINUED] omeprazole (priLOSEC) 40 MG capsule Take 1 capsule by mouth Daily.   • [DISCONTINUED] pantoprazole  (PROTONIX) 40 MG EC tablet Take 1 tablet by mouth Daily.     No current facility-administered medications on file prior to visit.        Review of Systems   Constitutional: Positive for activity change and fatigue. Negative for appetite change, chills, diaphoresis, fever and unexpected weight change.   HENT: Positive for trouble swallowing. Negative for congestion, ear pain, postnasal drip, rhinorrhea, sinus pain, sore throat and voice change.         Recurrent oral ulcers   Respiratory: Positive for shortness of breath. Negative for cough, choking, chest tightness, wheezing and stridor.    Cardiovascular: Negative.    Gastrointestinal: Negative for abdominal distention, abdominal pain, diarrhea, nausea and vomiting.   Genitourinary: Positive for genital sores (recurrent penile ulcers).   Musculoskeletal: Positive for arthralgias and myalgias.   Skin: Positive for rash (intermittent).   Neurological: Negative.    Psychiatric/Behavioral: Negative for dysphoric mood. The patient is not nervous/anxious.        Objective   Physical Exam   Constitutional: He is oriented to person, place, and time. Vital signs are normal. He appears well-developed and well-nourished. He is cooperative.  Non-toxic appearance. He does not have a sickly appearance. He does not appear ill. No distress.   HENT:   Head: Normocephalic and atraumatic.   Right Ear: Tympanic membrane and external ear normal.   Left Ear: Tympanic membrane and external ear normal.   Nose: Mucosal edema present.   Mouth/Throat: Uvula is midline, oropharynx is clear and moist and mucous membranes are normal. No oral lesions.   Neck: Normal range of motion. Neck supple. No thyromegaly present.   Cardiovascular: Normal rate, regular rhythm and normal heart sounds.   Pulmonary/Chest: Effort normal and breath sounds normal. No stridor. No respiratory distress. He has no decreased breath sounds. He has no wheezes. He has no rhonchi. He has no rales.     Vascular Status -   His right foot exhibits no edema. His left foot exhibits no edema.  Lymphadenopathy:     He has no cervical adenopathy.   Neurological: He is alert and oriented to person, place, and time.   Skin: Skin is warm, dry and intact. No rash noted. He is not diaphoretic.   Psychiatric: He has a normal mood and affect. His behavior is normal. Judgment and thought content normal.   Vitals reviewed.    Vitals:    06/03/20 1527   BP: 100/76   Pulse: 85   Resp: 21   Temp: 97.6 °F (36.4 °C)   SpO2: 97%     Body mass index is 33.99 kg/m².    Assessment/Plan   Meliton was seen today for follow-up.    Diagnoses and all orders for this visit:    Gastroesophageal reflux disease, esophagitis presence not specified  -     omeprazole (priLOSEC) 40 MG capsule; Take 1 capsule by mouth Daily.  -     Ambulatory Referral to Gastroenterology    Behcet's disease (CMS/HCC)  -     C-reactive Protein  -     Rheumatoid Factor  -     BHAVIN    Arthralgia, unspecified joint  -     Sedimentation Rate  -     Uric Acid  -     Rheumatoid Factor  -     BHAVIN  -     HIV-1 / O / 2 Ag / Antibody 4th Generation    Myalgia  -     HIV-1 / O / 2 Ag / Antibody 4th Generation    Fatigue, unspecified type  -     Vitamin D 25 Hydroxy  -     Vitamin B12  -     EBV Antibody Profile  -     HIV-1 / O / 2 Ag / Antibody 4th Generation  -     TSH  -     Iron Profile    Rash and nonspecific skin eruption  -     RPR  -     BHAVIN  -     Hepatitis Panel, Acute  -     HIV-1 / O / 2 Ag / Antibody 4th Generation  -     Ambulatory Referral to Infectious Disease    Impaired swallowing associated with throat pain  -     omeprazole (priLOSEC) 40 MG capsule; Take 1 capsule by mouth Daily.  -     Ambulatory Referral to Gastroenterology    Recurrent oral ulcers  -     Ambulatory Referral to Infectious Disease    Ulcers of genital organ in male  -     nystatin-triamcinolone (MYCOLOG) 088626-3.1 UNIT/GM-% ointment; Apply  topically to the appropriate area as directed 2 (Two) Times a Day.  -      RPR  -     Ambulatory Referral to Infectious Disease     Discussed the nature of the medical condition(s) risks, complications, implications, management, safe and proper use of medications. Encouraged medication compliance, and keeping scheduled follow up appointments with me and any other providers.     Laboratory testing ordered today. Further recommendations after lab evaluation.    RTC if symptoms fail to improve, to ER if symptoms worsen.

## 2020-06-03 NOTE — PATIENT INSTRUCTIONS
ãÑÖ ÇáÌÒÑ ÇáãÚÏí ÇáãÑíÆí áÏì ÇáÈÇáÛíä  Gastroesophageal Reflux Disease, Adult    íÚäí LAMONT (ÇáÇÑÊÌÇÚ ÇáãÚÏí ÇáãÑíÆí) ÊÏÝÞ ÇáÍãÖ ãä ãÚÏÊß Åáì ÇáÃäÈæÈ ÇáÐí íÕá ÇáÝã ÈÇáãÚÏÉ (ÇáãÑíÁ). ÝÇáØÚÇã íäÊÞá ÚÇÏÉð Åáì ÇáÃÓÝá ÎáÇá ÇáãÑíÁ æíÈÞì Ýí ÇáãÚÏÉ áíõåÖã. áßä ÚäÏ ÍÏæË ÇáÇÑÊÌÇÚ ÇáãÚÏí ÇáãÑíÆí¡ ÝÅä ÇáØÚÇã æÍãÖ ÇáãÚÏÉ íÑÊÝÚÇä áÃÚáì æíÏÎáÇä ÇáãÑíÁ. ÃãÇ ÅÐÇ ÊÝÇÞãÊ ÇáãÔßáÉ¡ ÝÞÏ ÊõÔÎÕ ÇáÍÇáÉ Úáì ÅäåÇ ãÑÖ ÇáÇÑÊÌÇÚ ÇáãÚÏí ÇáãÑíÆí. Ðáß ÅÐÇ ßÇä ÇáÇÑÊÌÇÚ:  • ßËíÑ ÇáÍÏæË.   • íÓÈÈ ÃÚÑÇÖðÇ ãÊßÑÑÉ Ãæ ÔÏíÏÉ.   • íÓÈÈ ãÔÇßá ãËá ÊáÝ ÈÇáãÑíÁ.  ÝÚäÏãÇ íáÇãÓ ÇáÍãÖ ÇáãÑíÁ¡ ÝÅäå ÞÏ íÓÈÈ ÃáãðÇ (ÇáÊåÇÈðÇ) Ýí ÇáãÑíÁ. æÈãÑæÑ ÇáæÞÊ¡ íãßä Ãä íÓÈÈ ãÑÖ ÇáÇÑÊÌÇÚ ÇáãÚÏí ÇáãÑíÆí ÙåæÑ ÝÊÍÇÊ ÕÛíÑÉ (ÞÑÍ) Úáì ÈØÇäÉ ÇáãÑíÁ.  ãÇ ÃÓÈÇÈ ÇáÍÇáÉ¿  ÊÍÏË åÐå ÇáÍÇáÉ ÈÓÈÈ ãÔßáÉ Ýí ÇáÚÖáÇÊ Èíä ÇáãÑíÁ æÇáãÚÏÉ (ÇáãÕÑÉ ÇáãÑíÆíÉ ÇáÓÝáíÉ¡ Ãæ LES). æÊäÛáÞ ÚÖáÉ ÇáãÕÑÉ ÇáãÑíÆíÉ ÇáÓÝáíÉ ÚÇÏÉð ÈÚÏ ãÑæÑ ÇáØÚÇã ãä ÎáÇá ÇáãÑíÁ Åáì ÇáãÚÏÉ. áßä ÚäÏãÇ ÊÖÚÝ ÚÖáÉ ÇáãÕÑÉ ÇáãÑíÆíÉ ÇáÓÝáíÉ Ãæ ÊÕÈÍ ÛíÑ ØÈíÚíÉ¡ ÝÅäåÇ áÇ ÊäÛáÞ ÇäÛÇáÇð ÓáíãðÇ¡ ããÇ íÓãÍ ÈÕÚæÏ ÇáØÚÇã æÍãÖ ÇáãÚÏÉ Åáì ÇáãÑíÁ.  æíãßä Ãä ÊÖÚÝ ÚÖáÉ ÇáãÕÑÉ ÇáãÑíÆíÉ ÇáÓÝáíÉ ãä ÎáÇá ãæÇÏ ÛÐÇÆíÉ ãÚíäÉ æÃÏæíÉ æÍÇáÇÊ ØÈíÉ¡ ÈãÇ Ýí Ðáß:  • ÇÓÊÎÏÇã ÇáÊÈÛ.  • ÇáÍãá.  • ÝÊÞ ÍÌÇÈí.  • ÊäÇæá ÇáßÍæáíÇÊ.  • ÈÚÖ ÇáãÃßæáÇÊ æÇáãÔÑæÈÇÊ ÇáãÚíäÉ¡ ãËá ÇáÞåæÉ æÇáÔíßæáÇÊÉ æÇáÈÕá æÇáäÚäÇÚ.  ãÇ ÚæÇãá ÒíÇÏÉ ÇáÎØÑ¿  íÒÏÇÏ ÇÍÊãÇá ÇáÅÕÇÈÉ ÈåÐå ÇáÍÇáÉ Ýí ÇáÍÇáÇÊ ÇáÊÇáíÉ:  • ÒíÇÏÉ ÇáæÒä.  • ÇáÅÕÇÈÉ ÈÇÖØÑÇÈ íÄËÑ Ýí ÇáÃäÓÌÉ ÇáÖÇãÉ.  • ÊäÇæá NSAID (ãÖÇÏÇÊ ÇáÇáÊåÇÈ ÇááÇÓÊíÑæíÏíÉ).  ãÇ ÚáÇãÇÊ ÇáÍÇáÉ Ãæ ÃÚÑÇÖåÇ¿  ÊÔãá ÃÚÑÇÖ åÐå ÇáÍÇáÉ ãÇ íáí:  • ÍÑÞÉ Ýí Ýã ÇáãÚÏÉ.  • ÕÚæÈÉ Ãæ Ãáã Ýí ÇáÈáÛ.  • ÇáÔÚæÑ ÈæÌæÏ ÌáØÉ Ýí ÇáÍáÞ.  • ÇáÅÍÓÇÓ ÈØÚã ãÑÇÑÉ Ýí ÇáÝã.  • ãÔßáÇÊ Ýí ÇáÊäÝÓ.  • æÌæÏ ßãíÉ ßÈíÉ ãä ÇááÚÇÈ.  • ÇáÅÕÇÈÉ ÈÇÖØÑÇÈÇÊ Ýí ÇáãÚÏÉ Ãæ ÇäÊÝÇÎ.  • ÊÌÔÄ.  • Ãáã ÈÇáÕÏÑ. æíãßä Ãä íÍÏË Ãáã ÇáÕÏÑ ÈÓÈÈ ÚÏÉ ÍÇáÇÊ ãÎÊáÝÉ. áÐá íÌÈ ÇáÍÑÕ Úáì ãÑÇÌÚÉ ãÞÏã ÇáÑÚÇíÉ ÇáÕÍíÉ ÇáãÊÇÈÚ áß ÅÐÇ ßäÊ ÊÔÚÑ ÈÃáã Ýí ÇáÕÏÑ.  • ÖíÞ ÇáäÝÓ ææÌæÏ ÕÝíÑ ÚäÏ ÇáÊäÝÓ.  • ÓÚÇá ãÓÊãÑ (ãÒãä) Ãæ ÓÚÇá Ýí ÃæÞÇÊ  Çááíá.  • Èáì ãíäÇÁ ÇáÃÓäÇä.  • ÝÞÏÇä ÇáæÒä.  ßíÝ ÊõÔÎÕ åÐå ÇáÍÇáÉ¿  íÊæáì ãÞÏã ÇáÑÚÇíÉ ÇáÕÍíÉ ÇáÎÇÕ Èß ÈÊÓÌíá ÇáÊÇÑíÎ ÇáãÑÖí æÅÌÑÇÁ ÝÍÕ ØÈí. áÊÍÏíÏ ãÇ ÅÐÇ ßäÊ ÊÚÇäí ãä ãÑÖ ÇáÌÒÑ ÇáãÚÏí ÇáãÑíÆí ÎÝíÝ Ãæ ÍÇÏ¡ ÞÏ íÍÊÇÌ ãÞÏã ÇáÑÚÇíÉ ÇáÕÍíÉ Åáì ãÑÇÞÈÉ ÇÓÊÌÇÈÊß ááÚáÇÌ. ßãÇ ÞÏ ÊõÌÑì áß ÃíÖðÇ ÝÍæÕ¡ ÈãÇ Ýí Ðáß:  • ÇÎÊÈÇÑ ÝÍÕ ÇáãÚÏÉ æÇáãÑíÁ ÈÇÓÊÎÏÇã ÌåÇÒ Èå ßÇãíÑÇ ÕÛíÑÉ (ÊäÙíÑ ÏÇÎáí).  • ÇÎÊÈÇÑ áÞíÇÓ ãÓÊæì ÇáÍãæÖÉ Ýí ÇáãÑíÁ.  • ÇÎÊÈÇÑ áÞíÇÓ ãÏì ÇáÖÛØ ÇáãæÌæÏ Úáì ÇáãÑíÁ.  • ÝÍÕ ÈáÚ ÇáÈÇÑíæã Ãæ ÈáÚ ÇáÈÇÑíæã ÇáãÚÏá áÝÍÕ Ôßá ÇáãÑíÁ æÍÌãå æßÝÇÁÉ Úãáå.  ßíÝ ÊõÚÇáÌ åÐå ÇáÍÇáÉ¿  íßãä ÇáåÏÝ ãä ÇáÚáÇÌ Ýí ãÓÇÚÏÊß Úáì ÊÎÝíÝ ÇáÃÚÑÇÖ æÇáæÞÇíÉ ãä ÇáãÖÇÚÝÇÊ. ÊÎÊáÝ ØÑíÞÉ ÚáÇÌ åÐå ÇáÍÇáÉ ÇÚÊãÇÏðÇ Úáì ãÏì ÍÏÉ ÇáÃÚÑÇÖ. æÞÏ íæÕí ãÞÏã ÇáÑÚÇíÉ ÇáÕÍíÉ ÈãÇ íáí:  • ÊÛííÑÇÊ Ýí äÙÇãß ÇáÛÐÇÆí.  • ÇáÃÏæíÉ.  • ÇáÌÑÇÍÉ.  íÌÈ ÇÊÈÇÚ åÐå ÇáÊÚáíãÇÊ Ýí ÇáãäÒá:  ÇáÃßá æÇáÔÑÈ    • íÌÈ ÇÊÈÇÚ ÇáäÙÇã ÇáÛÐÇÆí ÇáÐí ÃæÕì Èå ãõÞÏøöã ÇáÑÚÇíÉ ÇáÕÍíÉ ÇáãÊÇÈÚ áß. æåæ ãÇ ÞÏ íÔãá ÊÌäÈ ÈÚÖ ÇáãÃßæáÇÊ æÇáãÔÑæÈÇÊ¡ ãËá:  ? ÇáÞåæÉ æÇáÔÇí (ÈÇáßÇÝííä Ãæ ÇáÎÇáííä ãäå).  ? ÇáãÔÑæÈÇÊ ÇáãÍÊæíÉ Úáì ßÍæá.  ? ãÔÑæÈÇÊ ÇáØÇÞÉ æÇáãÔÑæÈÇÊ ÇáÑíÇÖíÉ.  ? ÇáãÔÑæÈÇÊ ÇáÛÇÒíÉ Ãæ ÇáÕæÏÇ.  ? ÇáÔæßæáÇÊÉ æÇáßÇßÇæ.  ? äßåÇÊ ÇáÝáÝá æÇáäÚäÇÚ.  ? ÇáËæã æÇáÈÕá.  ? ÇáÝÌá ÇáÍÇÑ.  ? ÇáÃØÚãÉ ÇáÍÑíÝÉ Ãæ ÇáÍãÖíÉ¡ æãä ÈíäåÇ ÇáÈåÇÑÇÊ æãÓÍæÞ ÇáÝáÝá ÇáÍÇÑ æãÓÍæÞ ÇáßÇÑí æÇáÎá æÇáÕáÕÉ ÇáÍÇÑÉ æÕáÕÉ ÇáÈÇÑÈßíæ.  ? ÚÕÇÆÑ ÇáÝæÇßå ÇáÍãÖíÉº ãËá ÇáÈÑÊÞÇá æÇááíãæä æÇááíãæä ÇáÍÇãÖ.  ? ÇáÃØÚãÉ ÇáÊí ÊÚÊãÏ Úáì ÇáØãÇØã ãËá ÇáÕáÕÉ æÇáÝáÝá ÇáÍÇÑ æÇáÓáØÉ æÇáÈíÊÒÇ ÇáãÒæÏÉ ÈÇáÕáÕÉ ÇáÍãÑÇÁ.  ? ÇáÃØÚãÉ ÇáãÞáíÉ æÇáÛäíÉ ÈÇáÏåæä¡ ãËá ÇáÏæäÇÊÓ æÇáÈØÇØÓ ÇáãÞáíÉ æÔÑÇÆÍ ÇáÈØÇØÓ æÇáÊÊÈíáÇÊ ÚÇáíÉ ÇáÏåæä.  ? ÇááÍæã ÚÇáíÉ ÇáÏåæä¡ ãËá ÇáåæÊ ÏæÌ æÇááÍæã ÇáÍãÑÇÁ æÇáÈíÖÇÁ ÇáÛäíÉ ÈÇáÏåæä¡ ãËá ÔÑÇÆÍ ÇáÖáæÚ æÇáÓæÓíÓ æÇáßÝá æÇááÍã ÇáãÞÏÏ.  ? ãäÊÌÇÊ ÇáÃáÈÇä ÚÇáíÉ ÇáÏåæä¡ ãËá ÇáÍáíÈ ßÇãá ÇáÏÓã æÇáÒÈÏ æÇáÌÈä ÇáßÑíãí.  • íÌÈ ÊäÇæá æÌÈÇÊ ÕÛíÑÉ æãÊßÑÑÉ ÈÏáÇð ãä ÇáæÌÈÇÊ ÇáßÈíÑÉ.  • íÌÈ ÊÌäÈ ÔÑÈ ßãíÇÊ ßÈíÑÉ ãä ÇáÓæÇÆá ãÚ ÇáæÌÈÇÊ.  • íÌÈ ÊÌäÈ ÊäÇæá æÌÈÇÊ ÎáÇá 2-3 ÓÇÚÇÊ  ÞÈá Çáäæã.  • íÌÈ ÊÌäÈ ÇáÇÓÊáÞÇÁ ãÈÇÔÑÉ ÈÚÏ ÇáÃßá.  • ýíÊÚíä ÚÏã ããÇÑÓÉ ÇáÊãÑíäÇÊ ÇáÑíÇÖíÉ ÈÚÏ ÇáÃßá ãÈÇÔÑÉ.  äãØ ÇáÍíÇÉ    • íÊÚíä ÚÏã ÇÓÊÎÏÇã ãäÊÌÇÊ ÊÍÊæí Úáì ÇáäíßæÊíä Ãæ ÇáÊÈÛ¡ ãËá ÇáÓÌÇÆÑ æÇáÓÌÇÆÑ ÇáÅáßÊÑæäíÉ æÊÈÛ ÇáãÖÛ. íãßä ÇÓÊÔÇÑÉ ãÞÏã ÇáÑÚÇíÉ ÇáÕÍíÉ ÅÐÇ ÇÍÊÌÊ Åáì ÇáãÓÇÚÏÉ ááÅÞáÇÚ Úä ÇáÊÏÎíä.  • íÌÈ ãÍÇæáÉ ÇáÊÞáíá ãä ÇáÖÛæØ ÇáÊí ÊÊÚÑÖ áåÇ æãÓÊæì ÇáÊæÊÑ áÏíß ÈÇÊÈÇÚ æÓÇÆá ãËá ããÇÑÓÉ ÇáíæÌÇ Ãæ ÇáÊÃãá. ÃãÇ Ýí ÍÇáÉ ÇáÍÇÌÉ áãÓÇÚÏÉ áÎÝÖ ãÓÊæì ÇáÅÌåÇÏ¡ Ýíãßä ÇÓÊÔÇÑÉ ãõÞÏã ÇáÑÚÇíÉ ÇáÕÍíÉ.  • Ýí ÍÇáÉ ÒíÇÏÉ ÇáæÒä¡ ÝíÌÈ ÎÝÖ ÇáæÒä Åáì ÇáæÒä ÇáÕÍí. íÌÈ ÇÓÊÔÇÑÉ æØáÈ ÊæÌíåÇÊ ãÞÏã ÇáÑÚÇíÉ ÇáÕÍíÉ Íæá ßíÝíÉ ÎÝÖ ÇáæÒä ÈØÑíÞÉ ÕÍíÉ æÂãäÉ.  ÊÚáíãÇÊ ÚÇãÉ  • íÊÚíä ÇáÇäÊÈÇå áÌãíÚ ÇáÊÛíÑÇÊ ÇáÊí ÊØÑÃ Úáì ÇáÃÚÑÇÖ ÇáÊí ÊÚÇäíåÇ.  • íÊÚíä ÊäÇæá ÇáÃÏæíÉ ÇáÊí ÊõÕÑÝ ÈæÕÝÉ ØÈíÉ Ãæ Ïæä æÕÝÉ ØÈíÉ æÝÞðÇ áãÇ íÎÈÑßö Èå ãÞÏã ÇáÑÚÇíÉ ÇáÕÍíÉ ÇáÎÇÕ Èß. íÊÚíä ÚÏã ÊäÇæá ÇáÃÓÈÑíä Ãæ ÇáÃíÈæÈÑæÝíä Ãæ ÛíÑå ãä ãÖÇÏÇÊ ÇáÇáÊåÇÈÇÊ ÇááÇÓÊíÑæíÏíÉ ÅáÇ ÈãæÇÝÞÉ ãÞÏã ÇáÑÚÇíÉ.  • íÌÈ ÇÑÊÏÇÁ ãáÇÈÓ æÇÓÚÉ. æíÌÈ ÚÏã ÇÑÊÏÇÁ ÔíÁ ÖíÞ Íæá ÇáÎÕÑ ÞÏ íÖÛØ Úáì ÇáãÚÏÉ.  • íÌÈ ÑÝÚ (ÅÚáÇÁ) ãÞÏãÉ ÝÑÇÔß áãÓÇÝÉ 6 ÈæÕÇÊ ÈÇáÊÞÑíÈ (15 Óã).  • ßÐáß ÊÌäÈ ÇáÇäÍäÇÁ ÅÐÇ ßÇä íÄÏí Åáì ÒíÇÏÉ ÔÏÉ ÇáÃÚÑÇÖ.  • íÊÚíä ÇáÇáÊÒÇã ÈÌãíÚ ãæÇÚíÏ ÇáãÊÇÈÚÉ æÝÞðÇ áÊæÌíåÇÊ ãÞÏã ÇáÑÚÇíÉ ÇáÕÍíÉ. ÝåÐÇ ÃãÑ ãåã.  íÌÈ ÇáÇÊÕÇá ÈãÞÏã ÇáÑÚÇíÉ ÇáÕÍíÉ Ýí ÇáÍÇáÇÊ ÇáÊÇáíÉ:  • Ýí ÍÇáÉ ÇáÅÕÇÈÉ ÈÃí ããÇ íáí:  ? ÃÚÑÇÖ ÌÏíÏÉ.  ? ÝÞÏÇä ÇáæÒä Ïæä ÓÈÈ ãÝåæã.  ? ÕÚæÈÉ Ýí ÇáÈáÚ Ãæ Ãáã ÚäÏ ÇáÈáÚ.  ? æÌæÏ ÕæÊ ÃÒíÒ ÚäÏ ÇáÊäÝÓ Ãæ ÓÚÇá ãÓÊãÑ.  ? ÈÍÉ Ýí ÇáÕæÊ.  • ÚÏã ÇáÔÚæÑ ÈÊÍÓä ãÚ ÊáÞí ÇáÚáÇÌ.  íÊÚíä ØáÈ ÇáãÓÇÚÏÉ ÝæÑðÇ Ýí ÇáÍÇáÇÊ ÇáÊÇáíÉ:  • ÇáÔÚæÑ ÈÃáã Ýí ÇáÐÑÇÚíä Ãæ ÇáÚäÞ Ãæ ÇáÝß Ãæ ÇáÃÓäÇä Ãæ ÇáÙåÑ.  • ÇáÊÚÑÞ¡ Ãæ ÇáÔÚæÑ ÈÏæÇÑ Ãæ ÏæÎÉ.  • ÇáÅÕÇÈÉ ÈÃáã Ýí ÇáÕÏÑ Ãæ ÖíÞ Ýí ÇáÊäÝÓ.  • ÇáÊÞíÄ ÞíÆðÇ íÔÈå ÇáÏã Ãæ ÊÝá ÇáÞåæÉ.  • ÇáÅÛãÇÁ.  • ÅÎÑÇÌ ÈÑÇÒ ãÏãã Ãæ ÃÓæÏ.  • ÚÏã ÇáÞÏÑÉ Úáì ÇáÈáÚ Ãæ ÇáÔÑÈ Ãæ ÇáÃßá.  ãáÎÕ  • íÍÏË ÇÑÊÌÇÚ ÇáãÚÏÉ ÇáãÑíÆí ÚäÏ ÕÚæÏ ÇáÍãÖ ãä ÇáãÚÏÉ Åáì ÏÇÎá ÇáãÑíÁ. ãÑÖ ÇáÇÑÊÌÇÚ ÇáãÚÏí ÇáãÑíÆí ÍÇáÉ íÍÏË ÝíåÇ ÇáÇÑÊÌÇÚ ßËíÑðÇ¡ Ãæ  íÓÈÈ ÃÚÑÇÖðÇ ãÊßÑÑÉ Ãæ ÔÏíÏÉ¡ Ãæ íÓÈÈ ãÔÇßá ãËá ÊáÝ ÈÇáãÑíÁ.  • ÊÎÊáÝ ØÑíÞÉ ÚáÇÌ åÐå ÇáÍÇáÉ ÇÚÊãÇÏðÇ Úáì ãÏì ÍÏÉ ÇáÃÚÑÇÖ. æÞÏ íæÕí ãÞÏã ÇáÑÚÇíÉ ÇáÕÍíÉ ÈÅÌÑÇÁ ÊÛííÑÇÊ Ýí ÇáäÙÇã ÇáÛÐÇÆí æäãØ ÇáÍíÇÉ¡ Ãæ íÕÝ ÏæÇÁð¡ Ãæ íæÕí ÈÅÌÑÇÁ ÇáÌÑÇÍÉ.  • íÌÈ ÇáÇÊÕÇá ÈãõÞÏã ÇáÑÚÇíÉ ÇáÕÍíÉ ÅÐÇ ÙåÑÊ Úáíß ÃÚÑÇÖ ÌÏíÏÉ Ãæ ÒÇÏÊ ÔÏÉ ÇáÃÚÑÇÖ ÇáÊí ÊÚÇäí ãäåÇ.  • íÊÚíä ÊäÇæá ÇáÃÏæíÉ ÇáÊí ÊõÕÑÝ ÈæÕÝÉ ØÈíÉ Ãæ Ïæä æÕÝÉ ØÈíÉ æÝÞðÇ áãÇ íÎÈÑßö Èå ãÞÏã ÇáÑÚÇíÉ ÇáÕÍíÉ ÇáÎÇÕ Èß. íÊÚíä ÚÏã ÊäÇæá ÇáÃÓÈÑíä Ãæ ÇáÃíÈæÈÑæÝíä Ãæ ÛíÑå ãä ãÖÇÏÇÊ ÇáÇáÊåÇÈÇÊ ÇááÇÓÊíÑæíÏíÉ ÅáÇ ÈãæÇÝÞÉ ãÞÏã ÇáÑÚÇíÉ.  • íÊÚíä ÇáÇáÊÒÇã ÈÌãíÚ ãæÇÚíÏ ÇáãÊÇÈÚÉ æÝÞðÇ áÊæÌíåÇÊ ãÞÏã ÇáÑÚÇíÉ ÇáÕÍíÉ. ÝåÐÇ ÃãÑ ãåã.  áíÓ ÇáåÏÝ ãä åÐå ÇáãÚáæãÇÊ Ãä Êßæä ÈÏíáÇð ááÅÑÔÇÏÇÊ ÇáÊí íÞÏãåÇ ãæÝÑ ÇáÑÚÇíÉ ÇáÕÍíÉ. ÊÃßÏ ãä ãäÇÞÔÉ ÃíÉ ÃÓÆáÉ ÊÏæÑ Ýí Ðåäß ãÚ ãæÝÑ ÇáÑÚÇíÉ ÇáÕÍíÉ.þ  Document Released: 08/21/2012 Document Revised: 07/26/2019 Document Reviewed: 07/26/2019  [employer] Patient Education © 2020 [employer]  ÇáØÝÍ ÇáÌáÏí ÚäÏ ÇáÈÇáÛíä  Rash, Adult    ÇáØÝÍ ÚÈÇÑÉ Úä ÊÛíøõÑ Ýí áæä ÇáÌáÏ. æßÐáß ÞÏ íÄÏí ÇáØÝÍ Åáì ÊÛíøõÑ ÔÚæÑß ÈÌáÏß. æÊæÌÏ ÇáÚÏíÏ ãä ÇáÍÇáÇÊ æÇáÚæÇãá ÇáãÎÊáÝÉ ÇáÊí ãä Çáããßä Ãä ÊÓÈÈ ÇáØÝÍ. ßÐáß ÞÏ íÎÊÝí ÇáØÝÍ Ýí ÎáÇá ÃíÇã ÞáíáÉ¡ æÑÈãÇ íÓÊãÑ Ýí ÈÚÖ ÇáÍÇáÇÊ áÃÓÇÈíÚ ÞáíáÉ. ÊÔãá ÇáÃÓÈÇÈ ÇáÔÇÆÚÉ ááØÝÍ ÇáÌáÏí ãÇ íáí:  • ÚÏæì ÝíÑæÓíÉ¡ ãËá:  ? ÇáÈÑÏ.  ? ÇáÍÕÈÉ.  ? ãÑÖ ÈÇáíÏ Ãæ ÇáÞÏã Ãæ ÇáÝã.  • ÚÏæì ÈßÊíÑíÉ¡ ãËá:  ? ÇáÍãì ÇáÞÑãÒíÉ.  ? ÇáÞæÈÇÁ.  • ÚÏæì ÝØÑíÉ ãËá ÇáãÈíÖÇÊ.  • ÑÏæÏ ÝÚá ÊÍÓÓíÉ ÊÌÇå ÇáØÚÇã Ãæ ÇáÃÏæíÉ Ãæ ãäÊÌÇÊ ÇáÚäÇíÉ ÈÇáÈÔÑÉ.  ÇÊÈÚ åÐå ÇáÊÚáíãÇÊ Ýí ÇáãäÒá:  íåÏÝ ÇáÚáÇÌ Åáì ÅíÞÇÝ ÇáÍßÉ æãäÚ ÇáØÝÍ ÇáÌáÏí ãä ÇáÇäÊÔÇÑ. ÇäÊÈå áÌãíÚ ÇáÊÛíÑÇÊ ÇáÊí ÊØÑÃ Úáì ÇáÃÚÑÇÖ ÇáÊí ÊÚÇäíåÇ. ÇáÊÒã ÈåÐå ÇáÊÚãáíÇÊ ááãÓÇÚÏÉ Úáì ÊÍÓä ÍÇáÊß:  ÇáÃÏæíÉ    ÊäÇæá Ãæ ÇÓÊÎÏÇã ÇáÃÏæíÉ ÇáÊí ÊõÕÑÝ ÈæÕÝÉ ØÈíÉ Ãæ Ïæä æÕÝÉ ØÈíÉ æÝÞðÇ áÅÑÔÇÏÇÊ ãÞÏã ÇáÑÚÇíÉ ÇáÕÍíÉ ÇáÎÇÕ Èß. æÑÈãÇ ÊÔãá ÈÏæÑåÇ:  • ßÑíãÇÊ ßæÑÊíßæÓÊíÑæíÏÇÊ áÚáÇÌ ÇÍãÑÇÑ ÇáÌáÏ Ãæ ÊæÑãå.  • ÏåÇäÇÊ ãÖÇÏÉ ááÍßÉ.  • ÃÏæíÉ ÇáÍÓÇÓíÉ ÇáÊí ÊÄÎÐ Úä ØÑíÞ ÇáÝã (ãÖÇÏÇÊ  ÇáåíÓÊÇãíä).  • ÃÏæíÉ ßæÑÊíßæÓÊíÑæíÏÇÊ ÊÄÎÐ Úä ØÑíÞ ÇáÝã ááÃÚÑÇÖ ÇáÔÏíÏÉ.  ÇáÚäÇíÉ ÈÇáÈÔÑÉ  • ÖÚ ßãÇÏÇÊ ÈÇÑÏÉ Úáì ÇáãäÇØÞ ÇáãÕÇÈÉ.  • áÇ ÊÎÏÔ ÈÔÑÊß Ãæ ÊÍßåÇ.  • áÇ ÊÛØ ÇáØÝÍ. ÇÍÑÕ Úáì ÊÚÑíÖ ÇáØÝÍ Åáì ÇáåæÇÁ ÈÃßÈÑ ÞÏÑ ããßä.  ÇáÓíØÑÉ Úáì ÇáÍßÉ æÇáÍÏ ãä ÚÏã ÇáÑÇÍÉ  • ÊÌäÈ ÇáÇÓÊÍãÇã ÈãÇÁ ÓÇÎä ÅÐ ÞÏ íÄÏí Ðáß Åáì ÒíÇÏÉ ÇáÍßÉ. ÞÏ íÓÇÚÏ ÇáÍãÇã ÇáÈÇÑÏ Úáì ÊÍÓä ÍÇáÊß.  • ÌÑÈ ÇáÇÓÊÍãÇã ÈæÇÓØÉ:  ? ÃãáÇÍ ÇáÅÈÓæã. ÇÊÈÚ ÊÚáíãÇÊ ÇáÔÑßÉ ÇáãäÊÌÉ ÇáãßÊæÈÉ Úáì ÇáÚÈæÉ. íãßäß ÇáÍÕæá Úáì åÐå ÇáãÓÊÍÖÑÇÊ ãä ÇáÕíÏáíÉ ÇáãÌÇæÑÉ áßö Ãæ ãÊÌÑ ÇáÈÞÇáÉ.  ? ÕæÏÇ ÇáÎÈÒ. ÕÈ ßãíÉ ÕÛíÑÉ Åáì ãíÇå ÇáÇÓÊÍãÇã ßãÇ ÃÎÈÑß ãÞÏã ÇáÑÚÇíÉ ÇáÕÍíÉ.  ? ÏÞíÞ ÇáÔæÝÇÊ ÇáÛÑæÇäí. ÇÊÈÚ ÊÚáíãÇÊ ÇáÔÑßÉ ÇáãäÊÌÉ ÇáãßÊæÈÉ Úáì ÇáÚÈæÉ. íãßäß ÇáÍÕæá Úáíå ãä ÇáÕíÏáíÉ ÇáãÌÇæÑÉ Ãæ ãÊÌÑ ÇáÈÞÇáÉ.  • ÌÑÈ æÖÚ ãÚÌæä ÕæÏÇ ÇáÎÈÒ Úáì ÈÔÑÊß. ÊÞáøöíÈ ÇáãÇÁ Úáì ÕæÏÇ ÇáÎÈÒ ÍÊì ÊÕá Åáì ÞæÇã ÇáãÚÌæä.  • ÌÑøÈ ÇÓÊÚãÇá ÛÓæá ÇáßÇáÇãíä. æåÐÇ ÛÓæá íõÕÑÝ Ïæä æÕÝÉ ØÈíÉ æíÓÇÚÏ Úáì ÊÎÝíÝ ÇáÍßÉ.  • ÇÍÑÕ Úáì ÇáæÌæÏ Ýí ãßÇä ÈÇÑÏ æÊÌäÈ ÇáÊÚÑÖ ááÔãÓ. ÝÇáÚÑÞ æÇáÓÎæäÉ ÞÏ íÒíÏÇä ÇáÍßÉ.  ÊÚáíãÇÊ ÚÇãÉ    • íÌÈ äíá ÞÓØ ãä ÇáÑÇÍÉ ÍÓÈ ÇáÍÇÌÉ.  • ÇÔÑÈ ßãíÇÊ ßÇÝíÉ ãä ÇáÓæÇÆá áíÙá áæä Èæáß ÃÕÝÑ ÈÇåÊðÇ.  • ÇÑÊÏ ãáÇÈÓ æÇÓÚÉ.  • ÊÌäÈ ÇÓÊÎÏÇã ÃäæÇÚ ÇáÕÇÈæä Ãæ ÇáãäÙÝÇÊ ÇáãÚØÑÉ Ãæ ÇáÚØæÑ. ÇÓÊÎÏã ÃäæÇÚðÇ áØíÝÉ ãä ÇáÕÇÈæä æÇáãäÙÝÇÊ æÇáÚØæÑ æÛíÑåÇ ãä ÇáãäÊÌÇÊ ÇáÊÌãíáíÉ.  • ÊÌäÈ ÌãíÚ ÇáãæÇÏ ÇáÊí ÊõÓÈÈ ÇáØÝÍ. ÇÍÊÝÙ ÈãÝßÑÉ ÊÏæøöä ÈåÇ ÇáÃÔíÇÁ ÇáÊí ÊÓÈÈ áß ÇáØÝÍ. æíõÏæøöä ÈåÇ:  ? ãÇ ÇáÐí ÊÃßáå.  ? ãäÊÌÇÊ ÇáÊÌãíá ÇáÊí ÊÓÊÎÏãåÇ.  ? ÇáãÔÑæÈÇÊ ÇáÊí ÊÊäÇæáåÇ.  ? ÇáãáÇÈÓ ÇáÊí ÊÑÊÏíåÇ. æíÔãá Ðáß ÇáãÌæåÑÇÊ.  • ÇáÊÒã ÈÌãíÚ ãæÇÚíÏ ÇáãÊÇÈÚÉ æÝÞðÇ áÊæÌíåÇÊ ãÞÏã ÇáÑÚÇíÉ ÇáÕÍíÉ. ÝåÐÇ ÃãÑ ãåã.  ÇÊÕá ÈãÞÏã ÇáÑÚÇíÉ ÇáÕÍíÉ Ýí ÍÇáÉ:  • ÇáÊÚÑÞ áíáÇð.  • ÝÞÏÇäß ÇáæÒä.  • ÇáÊÈæá áÚÏÏ ãÑÇÊ ÃßËÑ ãä ÇáãÚÊÇÏ.  • ÇáÊÈæá ÃÞá ãä ÇáãÚÊÇÏ¡ Ãæ ãáÇÍÙÉ Ãä Èæáß áæäå ÏÇßä ÃßËÑ ãä ÇáãÚÊÇÏ.  • ÔÚæÑß ÈÇáÖÚÝ.  • ÇáÅÕÇÈÉ ÈÇáÊÞíÄ.  • ÊÍæá ÌáÏß Ãæ ÈíÇÖ Úíäíß Åáì Çááæä ÇáÃÕÝÑ (ÇáíÑÞÇä).  • ÈÔÑÊß:  ? ÇáÔÚæÑ ÈæÎÒ.  ? ÇáÔÚæÑ ÈÊäãíá.  • ÇáØÝÍ:  ? áÇ íÒæá ÈÚÏ ãÑæÑ ÚÏÉ ÃíÇã.  ? ÓæÁ  ÇáÍÇáÉ.  • ÊÔÚÑ ÈãÇ íáí:  ? ÇáÚØÔ Úáì äÍæ ÛíÑ ãÚÊÇÏ.  ? ÇáÅÑåÇÞ ÃßËÑ ãä ÇáãÚÊÇÏ.  • Ýí ÍÇáÉ ÇáÅÕÇÈÉ ÈÃí ããÇ íáí:  ? ÃÚÑÇÖ ÌÏíÏÉ.  ? Ãáã Ýí ÈØäß.  ? ÇáÅÕÇÈÉ ÈÇáÍãì.  ? ÇáÅÓåÇá.  ÇØáÈ ÇáãÓÇÚÏÉ Úáì ÇáÝæÑ Ýí ÇáÍÇáÇÊ ÇáÊÇáíÉ:  • ÇáÅÕÇÈÉ ÈÇáÍãì æÒíÇÏÉ ÔÏÉ ÇáÃÚÑÇÖ ÝÌÃÉ.  • ÇáÅÕÇÈÉ ÈÊÔæÔ ÇáÐåä.  • ÇáÅÕÇÈÉ ÈÕÏÇÚ ÔÏíÏ Ãæ ÊíÈÓ ÈÇáÚäÞ.  • ÇáÅÕÇÈÉ ÈÃáã Ãæ ÊÕáÈ ÔÏíÏ ÈÇáãÝÇÕá.  • ÇáãÚÇäÇÉ ãä äæÈÉ ÊÔäÌíÉ.  • ÙåæÑ ØÝÍ íÛØí ÌÓãß ßáå Ãæ ãÚÙãå. ÞÏ íßæä ÇáØÝÍ ãÄáã Ãæ ÛíÑ ãÄáã.  • ÙåæÑ ÈËæÑ:  ? ÃÚáì ÇáØÝÍ.  ? íÒÏÇÏ ÍÌãåÇ Ãæ ÊÙåÑ ãÌÊãÚÉ.  ? ãÄáãÉ.  ? ÏÇÎá ÃäÝß Ãæ Ýãß.  • ÙåæÑ ØÝÍ ÌáÏí:  ? íÈÏæ ãËá ÈÞÚ ÞÑãÒíÉ ÈÍÌã ÇáËÞæÈ ãäÊÔÑ Ýí ÌãíÚ ÃäÍÇÁ ÌÓÏß.  ? íÔÈå Ôßá ÞÑÕ ÇáÊÕæíÈ Ãæ åÏÝ ÇáÑãÇíÉ.  ? ÛíÑ ãÑÊÈØ ÈÇáÊÚÑÖ Åáì ÇáÔãÓ¡ æíßæä áæäå ÃÍãÑ æãÄáã æíÓÈÈ ÊÞÔÑ ÈÔÑÊß.  ãáÎÕ  • ÇáØÝÍ ÚÈÇÑÉ Úä ÊÛíøõÑ Ýí áæä ÇáÌáÏ. æÞÏ íÎÊÝí Ýí ÎáÇá ÃíÇã ÞáíáÉ¡ æÑÈãÇ íÓÊãÑ Ýí ÈÚÖ ÇáÍÇáÇÊ áãÏÉ ÃÓÇÈíÚ ÞáíáÉ.  • íåÏÝ ÇáÚáÇÌ Åáì ÅíÞÇÝ ÇáÍßÉ æãäÚ ÇáØÝÍ ÇáÌáÏí ãä ÇáÇäÊÔÇÑ.  • ÊäÇæá Ãæ ÇÓÊÎÏÇã ÇáÃÏæíÉ ÇáÊí ÊõÕÑÝ ÈæÕÝÉ ØÈíÉ Ãæ Ïæä æÕÝÉ ØÈíÉ æÝÞðÇ áÅÑÔÇÏÇÊ ãÞÏã ÇáÑÚÇíÉ ÇáÕÍíÉ ÇáÎÇÕ Èß.  • ÇÊÕá ÈãõÞÏã ÇáÑÚÇíÉ ÇáÕÍíÉ ÅÐÇ ÙåÑÊ Úáíß ÃÚÑÇÖ ÌÏíÏÉ Ãæ ÊÝÇÞãÊ ÇáÃÚÑÇÖ ÇáÊí ÊÚÇäí ãäåÇ.  • ÇáÊÒã ÈÌãíÚ ãæÇÚíÏ ÇáãÊÇÈÚÉ æÝÞðÇ áÊæÌíåÇÊ ãÞÏã ÇáÑÚÇíÉ ÇáÕÍíÉ. ÝåÐÇ ÃãÑ ãåã.  áíÓ ÇáåÏÝ ãä åÐå ÇáãÚáæãÇÊ Ãä Êßæä ÈÏíáÇð ááÅÑÔÇÏÇÊ ÇáÊí íÞÏãåÇ ãæÝÑ ÇáÑÚÇíÉ ÇáÕÍíÉ. ÊÃßÏ ãä ãäÇÞÔÉ ÃíÉ ÃÓÆáÉ ÊÏæÑ Ýí Ðåäß ãÚ ãæÝÑ ÇáÑÚÇíÉ ÇáÕÍíÉ.þ  Document Released: 08/15/2012 Document Revised: 08/20/2019 Document Reviewed: 08/20/2019  [employer] Patient Education © 2020 [employer]    Fatigue  If you have fatigue, you feel tired all the time and have a lack of energy or a lack of motivation. Fatigue may make it difficult to start or complete tasks because of exhaustion. In general, occasional or mild fatigue is often a normal response to activity or life. However, long-lasting (chronic) or extreme fatigue may be a symptom of a medical condition.  Follow these instructions at home:  General instructions  · Watch your fatigue  for any changes.  · Go to bed and get up at the same time every day.  · Avoid fatigue by pacing yourself during the day and getting enough sleep at night.  · Maintain a healthy weight.  Medicines  · Take over-the-counter and prescription medicines only as told by your health care provider.  · Take a multivitamin, if told by your health care provider.   · Do not use herbal or dietary supplements unless they are approved by your health care provider.  Activity    · Exercise regularly, as told by your health care provider.  · Use or practice techniques to help you relax, such as yoga, marilyn chi, meditation, or massage therapy.  Eating and drinking    · Avoid heavy meals in the evening.  · Eat a well-balanced diet, which includes lean proteins, whole grains, plenty of fruits and vegetables, and low-fat dairy products.  · Avoid consuming too much caffeine.  · Avoid the use of alcohol.  · Drink enough fluid to keep your urine pale yellow.  Lifestyle  · Change situations that cause you stress. Try to keep your work and personal schedule in balance.  · Do not use any products that contain nicotine or tobacco, such as cigarettes and e-cigarettes. If you need help quitting, ask your health care provider.  · Do not use drugs.  Contact a health care provider if:  · Your fatigue does not get better.  · You have a fever.  · You suddenly lose or gain weight.  · You have headaches.  · You have trouble falling asleep or sleeping through the night.  · You feel angry, guilty, anxious, or sad.  · You are unable to have a bowel movement (constipation).  · Your skin is dry.  · You have swelling in your legs or another part of your body.  Get help right away if:  · You feel confused.  · Your vision is blurry.  · You feel faint or you pass out.  · You have a severe headache.  · You have severe pain in your abdomen, your back, or the area between your waist and hips (pelvis).  · You have chest pain, shortness of breath, or an irregular or  fast heartbeat.  · You are unable to urinate, or you urinate less than normal.  · You have abnormal bleeding, such as bleeding from the rectum, vagina, nose, lungs, or nipples.  · You vomit blood.  · You have thoughts about hurting yourself or others.  If you ever feel like you may hurt yourself or others, or have thoughts about taking your own life, get help right away. You can go to your nearest emergency department or call:  · Your local emergency services (911 in the [place]  · A suicide crisis helpline, such as the National Suicide Prevention Lifeline at 1-765.104.7874. This is open 24 hours a day.  Summary  · If you have fatigue, you feel tired all the time and have a lack of energy or a lack of motivation.  · Fatigue may make it difficult to start or complete tasks because of exhaustion.  · Long-lasting (chronic) or extreme fatigue may be a symptom of a medical condition.  · Exercise regularly, as told by your health care provider.  · Change situations that cause you stress. Try to keep your work and personal schedule in balance.  This information is not intended to replace advice given to you by your health care provider. Make sure you discuss any questions you have with your health care provider.  Document Released: 10/14/2008 Document Revised: 04/09/2020 Document Reviewed: 09/12/2018  [employer] Patient Education © 2020 [employer]    Musculoskeletal Pain  Musculoskeletal pain refers to aches and pains in your bones, joints, muscles, and the tissues that surround them. This pain can occur in any part of the body. It can last for a short time (acute) or a long time (chronic).  A physical exam, lab tests, and imaging studies may be done to find the cause of your musculoskeletal pain.  Follow these instructions at home:    Lifestyle  · Try to control or lower your stress levels. Stress increases muscle tension and can worsen musculoskeletal pain. It is important to recognize when you are anxious or stressed  and learn ways to manage it. This may include:  ? Meditation or yoga.  ? Cognitive or behavioral therapy.  ? Acupuncture or massage therapy.  · You may continue all activities unless the activities cause more pain. When the pain gets better, slowly resume your normal activities. Gradually increase the intensity and duration of your activities or exercise.  Managing pain, stiffness, and swelling  · Take over-the-counter and prescription medicines only as told by your health care provider.  · When your pain is severe, bed rest may be helpful. Lie or sit in any position that is comfortable, but get out of bed and walk around at least every couple of hours.  · If directed, apply heat to the affected area as often as told by your health care provider. Use the heat source that your health care provider recommends, such as a moist heat pack or a heating pad.  ? Place a towel between your skin and the heat source.  ? Leave the heat on for 20-30 minutes.  ? Remove the heat if your skin turns bright red. This is especially important if you are unable to feel pain, heat, or cold. You may have a greater risk of getting burned.  · If directed, put ice on the painful area.  ? Put ice in a plastic bag.  ? Place a towel between your skin and the bag.  ? Leave the ice on for 20 minutes, 2-3 times a day.  General instructions  · Your health care provider may recommend that you see a physical therapist. This person can help you come up with a safe exercise program. Do any exercises as told by your physical therapist.  · Keep all follow-up visits, including any physical therapy visits, as told by your health care providers. This is important.  Contact a health care provider if:  · Your pain gets worse.  · Medicines do not help ease your pain.  · You cannot use the part of your body that hurts, such as your arm, leg, or neck.  · You have trouble sleeping.  · You have trouble doing your normal activities.  Get help right away if:  · You  have a new injury and your pain is worse or different.  · You feel numb or you have tingling in the painful area.  Summary  · Musculoskeletal pain refers to aches and pains in your bones, joints, muscles, and the tissues that surround them.  · This pain can occur in any part of the body.  · Your health care provider may recommend that you see a physical therapist. This person can help you come up with a safe exercise program. Do any exercises as told by your physical therapist.  · Lower your stress level. Stress can worsen musculoskeletal pain. Ways to lower stress may include meditation, yoga, cognitive or behavioral therapy, acupuncture, and massage therapy.  This information is not intended to replace advice given to you by your health care provider. Make sure you discuss any questions you have with your health care provider.  Document Released: 12/18/2006 Document Revised: 11/30/2018 Document Reviewed: 01/17/2018  [employer] Patient Education © 2020 [employer]

## 2020-06-05 ENCOUNTER — OFFICE VISIT (OUTPATIENT)
Dept: FAMILY MEDICINE CLINIC | Facility: CLINIC | Age: 30
End: 2020-06-05

## 2020-06-05 VITALS
HEART RATE: 82 BPM | SYSTOLIC BLOOD PRESSURE: 100 MMHG | TEMPERATURE: 98 F | WEIGHT: 217 LBS | HEIGHT: 68 IN | OXYGEN SATURATION: 99 % | RESPIRATION RATE: 18 BRPM | DIASTOLIC BLOOD PRESSURE: 72 MMHG | BODY MASS INDEX: 32.89 KG/M2

## 2020-06-05 DIAGNOSIS — K21.9 GASTROESOPHAGEAL REFLUX DISEASE, ESOPHAGITIS PRESENCE NOT SPECIFIED: Primary | ICD-10-CM

## 2020-06-05 PROCEDURE — 99213 OFFICE O/P EST LOW 20 MIN: CPT | Performed by: NURSE PRACTITIONER

## 2020-06-05 NOTE — PROGRESS NOTES
Subjective   Meliton Lagos is a 30 y.o. male.   Chief Complaint   Patient presents with   • Heartburn      Heartburn   He reports no abdominal pain, no chest pain, no choking, no coughing, no dysphagia, no early satiety, no globus sensation, no heartburn, no hoarse voice, no nausea, no sore throat, no stridor, no tooth decay, no water brash or no wheezing. This is a recurrent problem. The current episode started more than 1 year ago. The problem occurs frequently. The problem has been gradually improving. The symptoms are aggravated by certain foods. Pertinent negatives include no anemia, fatigue, melena, muscle weakness, orthopnea or weight loss. He has tried a PPI and an antacid for the symptoms. The treatment provided mild relief. Past procedures do not include an abdominal ultrasound, an EGD, esophageal manometry, esophageal pH monitoring, H. pylori antibody titer or a UGI. Past invasive treatments do not include gastroplasty, gastroplication or reflux surgery.    Patient speaks Amharic.   Using the language line solution.     Patient is here is as a walk in to follow up for labs. He was seen on 06/03/20. His labs are not yet resulted.    He is following up on heartburn.     He has referral to infectious disease. He is reporting he needs help to make appointment.       The following portions of the patient's history were reviewed and updated as appropriate: allergies, current medications, past family history, past medical history, past social history, past surgical history and problem list.    Review of Systems   Constitutional: Negative for fatigue and weight loss.   HENT: Negative for hoarse voice and sore throat.    Respiratory: Negative for cough, choking and wheezing.    Cardiovascular: Negative for chest pain.   Gastrointestinal: Negative for abdominal pain, dysphagia, heartburn, melena and nausea.   Musculoskeletal: Negative for muscle weakness.     Past Surgical History:   Procedure Laterality Date  "  • ANAL FISTULA REPAIR  2009   • CHOLECYSTECTOMY       History reviewed. No pertinent past medical history.    Objective   No Known Allergies  Visit Vitals  /72   Pulse 82   Temp 98 °F (36.7 °C)   Resp 18   Ht 172 cm (67.72\")   Wt 98.4 kg (217 lb)   SpO2 99%   BMI 33.27 kg/m²       Physical Exam   Constitutional: He appears well-developed and well-nourished.   HENT:   Head: Normocephalic.   Eyes: Pupils are equal, round, and reactive to light.   Neck: Normal range of motion.   Cardiovascular: Normal rate and regular rhythm.   Pulmonary/Chest: Effort normal and breath sounds normal.   Abdominal: Soft. There is no tenderness.   Musculoskeletal: Normal range of motion.   Skin: Skin is warm, dry and intact. Capillary refill takes less than 2 seconds.   Psychiatric: He has a normal mood and affect. His behavior is normal.   Vitals reviewed.      Assessment/Plan   There are no diagnoses linked to this encounter.    Discussed his previous referrals.   He now has appointment to follow up with PCP on 06/18/20 @ 4:15  He has been assisted to make appointments with specialist.   See patient instructions for heartburn.   Continue taking the omeprazole daily.          Patient Instructions   ÍÑÞÉ ÇáãÚÏÉ  Heartburn  ÍÑÞÉ ÇáãÚÏÉ åí Ãáã Ãæ ÅÒÚÇÌ ÞÏ íÕíÈ ÇáÍáÞ Ãæ ÇáÕÏÑ. æÚÇÏÉ ãÇ íæÕÝ ÈÃäå Ãáã ÍÇÑÞ. ßãÇ Ãäå ÞÏ íÓÈÈ ãÐÇÞðÇ ÍãÖíðÇ ÓíÆðÇ Ýí ÇáÝã. æÑÈãÇ íÒÏÇÏ ÇáÃáã ÇáäÇÊÌ Úä ÍÑÞÉ ÇáãÚÏÉ ÚäÏ ÇáÇÓÊáÞÇÁ Ãæ ÇáÇäÍäÇÁ¡ æÚÇÏÉ ãÇ ÊÓæÁ ÇáÍÇáÉ Ýí ÇáãÓÇÁ. ßÐáß ÞÏ ÊÍÏË ÍÑÞÉ ÇáãÚÏÉ äÊíÌÉ áÇÑÊÌÇÚ ãÍÊæíÇÊ ÇáãÚÏÉ áÃÚáì Åáì ÏÇÎá ÇáãÑíÁ (ÌóÒúÑ).  íÌÈ ÇÊÈÇÚ åÐå ÇáÊÚáíãÇÊ Ýí ÇáãäÒá:  ÇáÃßá æÇáÔÑÈ    • ÊÌäÈ ÊäÇæá ÇáÃØÚãÉ æÇáãÔÑæÈÇÊ ÇáÊí íæÕí ãõÞÏøöã ÇáÑÚÇíÉ ÇáÕÍíÉ ÇáãÊÇÈÚ áß ÈÊÌäÈåÇ. æÑÈãÇ íÔãá Ðáß:  ? ÇáÞåæÉ æÇáÔÇí (ÈÇáßÇÝííä Ãæ ÇáÎÇáííä ãäå).  ? ÇáãÔÑæÈÇÊ ÇáãÍÊæíÉ Úáì ßÍæá.  ? ãÔÑæÈÇÊ ÇáØÇÞÉ æÇáãÔÑæÈÇÊ ÇáÑíÇÖíÉ.  ? ÇáãÔÑæÈÇÊ ÇáÛÇÒíÉ Ãæ ÇáÕæÏÇ.  ? ÇáÔæßæáÇÊÉ æÇáßÇßÇæ.  ? äßåÇÊ ÇáÝáÝá æÇáäÚäÇÚ.  ? ÇáËæã æÇáÈÕá.  ? ÇáÝÌá ÇáÍÇÑ.  ? ÇáÃØÚãÉ ÇáÍÑíÝÉ Ãæ " ÇáÍãÖíÉ¡ æãä ÈíäåÇ ÇáÈåÇÑÇÊ æãÓÍæÞ ÇáÝáÝá ÇáÍÇÑ æãÓÍæÞ ÇáßÇÑí æÇáÎá æÇáÕáÕÉ ÇáÍÇÑÉ æÕáÕÉ ÇáÈÇÑÈßíæ.  ? ÚÕÇÆÑ ÇáÝæÇßå ÇáÍãÖíÉ¡ ãËá ÇáÈÑÊÞÇá æÇááíãæä æÇááíãæä ÇáÍÇãÖ.  ? ÇáÃØÚãÉ ÇáÊí ÊÚÊãÏ Úáì ÇáØãÇØã ãËá ÇáÕáÕÉ æÇáÝáÝá ÇáÍÇÑ æÇáÓáØÉ æÇáÈíÊÒÇ ÇáãÒæÏÉ ÈÇáÕáÕÉ ÇáÍãÑÇÁ.  ? ÇáÃØÚãÉ ÇáãÞáíÉ æÇáÛäíÉ ÈÇáÏåæä¡ ãËá ÇáÏæäÇÊÓ æÇáÈØÇØÓ ÇáãÞáíÉ æÔÑÇÆÍ ÇáÈØÇØÓ æÇáÊÊÈíáÇÊ ÚÇáíÉ ÇáÏåæä.  ? ÇááÍæã ÚÇáíÉ ÇáÏåæä¡ ãËá ÇáåæÊ ÏæÌ æÇááÍæã ÇáÍãÑÇÁ æÇáÈíÖÇÁ ÇáÛäíÉ ÈÇáÏåæä¡ ãËá ÔÑÇÆÍ ÇáÖáæÚ æÇáÓæÓíÓ æÇáßÝá æÇááÍã ÇáãÞÏÏ.  ? ãäÊÌÇÊ ÇáÃáÈÇä ÚÇáíÉ ÇáÏåæä¡ ãËá ÇáÍáíÈ ßÇãá ÇáÏÓã æÇáÒÈÏ æÇáÌÈä ÇáßÑíãí.  • íÌÈ ÊäÇæá æÌÈÇÊ ÕÛíÑÉ æãÊßÑÑÉ ÈÏáÇð ãä ÇáæÌÈÇÊ ÇáßÈíÑÉ.  • íÌÈ ÊÌäÈ ÔÑÈ ßãíÇÊ ßÈíÑÉ ãä ÇáÓæÇÆá ãÚ ÇáæÌÈÇÊ.  • íÌÈ ÊÌäÈ ÊäÇæá æÌÈÇÊ ÎáÇá 2-3 ÓÇÚÇÊ ÞÈá Çáäæã.  • íÌÈ ÊÌäÈ ÇáÇÓÊáÞÇÁ ãÈÇÔÑÉ ÈÚÏ ÇáÃßá.  • ýíÊÚíä ÚÏã ããÇÑÓÉ ÇáÊãÑíäÇÊ ÇáÑíÇÖíÉ ÈÚÏ ÇáÃßá ãÈÇÔÑÉ.  äãØ ÇáÍíÇÉ      • Ýí ÍÇáÉ ÒíÇÏÉ ÇáæÒä¡ ÝíÌÈ ÎÝÖ ÇáæÒä Åáì ÇáæÒä ÇáÕÍí. íÌÈ ÇÓÊÔÇÑÉ æØáÈ ÊæÌíåÇÊ ãÞÏã ÇáÑÚÇíÉ ÇáÕÍíÉ Íæá ßíÝíÉ ÎÝÖ ÇáæÒä ÈØÑíÞÉ ÕÍíÉ æÂãäÉ.  • íÊÚíä ÚÏã ÇÓÊÎÏÇã Ãí ãäÊÌÇÊ ÊÍÊæí Úáì ÇáäíßæÊíä Ãæ ÇáÊÈÛ¡ ãËá ÇáÓÌÇÆÑ æÇáÓÌÇÆÑ ÇáÅáßÊÑæäíÉ æÊÈÛ ÇáãÖÛ. ÝåÐå ÇáãäÊÌÇÊ ãä ÔÃäåÇ ÇáÊÓÈÈ Ýí ÊÝÇÞã ÇáÃÚÑÇÖ ÇáÊí ÊÚÇäí ãäåÇ. íÌÈ ÇÓÊÔÇÑÉ ãÞÏã ÇáÑÚÇíÉ ÇáÕÍíÉ ÅÐÇ ÇÍÊÌÊ Åáì ÇáãÓÇÚÏÉ ááÅÞáÇÚ Úä ÇáÊÏÎíä.  • íÌÈ ÇÑÊÏÇÁ ãáÇÈÓ æÇÓÚÉ. æíÌÈ ÚÏã ÇÑÊÏÇÁ ÔíÁ ÖíÞ Íæá ÇáÎÕÑ ÞÏ íÖÛØ Úáì ÇáãÚÏÉ.  • íÌÈ ÑÝÚ ÑÃÓ ÓÑíÑß ÈãÞÏÇÑ 6 ÈæÕÇÊ (15 Óã) ÊÞÑíÈðÇ ÚäÏ Çáäæã.  • íÌÈ ãÍÇæáÉ ÇáÍÏ ãä ÇáÖÛØ ÇáÐí ÊÊÚÑÖ áå¡ Úä ØÑíÞ ããÇÑÓÉ ÇáíæÌÇ Ãæ ÇáÊÃãá Úáì ÓÈíá ÇáãËÇá. ÃãÇ Ýí ÍÇáÉ ÇáÍÇÌÉ áãÓÇÚÏÉ áÎÝÖ ãÓÊæì ÇáÅÌåÇÏ¡ Ýíãßä ÇÓÊÔÇÑÉ ãõÞÏã ÇáÑÚÇíÉ ÇáÕÍíÉ.  ÊÚáíãÇÊ ÚÇãÉ  • íÊÚíä ÇáÇäÊÈÇå áÌãíÚ ÇáÊÛíÑÇÊ ÇáÊí ÊØÑÃ Úáì ÇáÃÚÑÇÖ ÇáÊí ÊÚÇäíåÇ.  • íÊÚíä ÊäÇæá ÇáÃÏæíÉ ÇáÊí ÊõÕÑÝ ÈæÕÝÉ ØÈíÉ Ãæ Ïæä æÕÝÉ ØÈíÉ æÝÞðÇ áãÇ íÎÈÑßö Èå ãÞÏã ÇáÑÚÇíÉ ÇáÕÍíÉ ÇáÎÇÕ Èß.  ? íÊÚíä ÚÏã ÊäÇæá ÇáÃÓÈÑíä Ãæ ÇáÃíÈæÈÑæÝíä Ãæ ÛíÑå ãä ãÖÇÏÇÊ ÇáÇáÊåÇÈÇÊ ÇááÇÓÊíÑæíÏíÉ ÅáÇ ÈãæÇÝÞÉ ãÞÏã ÇáÑÚÇíÉ.  ? íÌÈ Ãä íßæä ÅíÞÇÝ ÊäÇæá Ãí ÏæÇÁ ÝÞØ ÈÍÓÈ ÅÑÔÇÏÇÊ ãÞÏã ÇáÑÚÇíÉ ÇáÕÍíÉ ÇáãÊÇÈÚ áß.  æÓÈÈ Ðáß Ãä ËãÉ ÃÏæíÉ ãÚíäÉ ÞÏ íõÄÏí ÇáÊÚÌá Ýí ÅíÞÇÝåÇ Åáì ÊÝÇÞã ÇáÃÚÑÇÖ æÒíÇÏÊåÇ ÓæÁðÇ.  • íÊÚíä ÇáÇáÊÒÇã ÈÌãíÚ ãæÇÚíÏ ÇáãÊÇÈÚÉ æÝÞðÇ áÊæÌíåÇÊ ãÞÏã ÇáÑÚÇíÉ ÇáÕÍíÉ. ÝåÐÇ ÃãÑ ãåã.  íÌÈ ÇáÇÊÕÇá ÈãÞÏã ÇáÑÚÇíÉ ÇáÕÍíÉ Ýí ÇáÍÇáÇÊ ÇáÊÇáíÉ:  • ÙåæÑ ÃÚÑÇÖ ÌÏíÏÉ Úáíß.  • ÝÞÏÇä ÇáæÒä ÈÔßá ÛíÑ ãÈÑÑ.  • ÕÚæÈÉ ÇáÈáÚ Ãæ ÇáÊÃáã ÚäÏ ÇáÈáÚ.  • ÇáÅÕÇÈÉ ÈÃÒíÒ Ãæ ÓÚÇá ãÓÊãÑ.  • ÚÏã ÇáÔÚæÑ ÈÊÍÓä ãÚ ÊáÞí ÇáÚáÇÌ.  • ÇáÅÕÇÈÉ ÈÍÑÞÉ ÇáãÚÏÉ Úáì äÍæ ãÊßÑÑ áÃßËÑ ãä ÃÓÈæÚíä.  íÊÚíä ØáÈ ÇáãÓÇÚÏÉ Úáì ÇáÝæÑ Ýí ÇáÍÇáÇÊ ÇáÊÇáíÉ:  • ÇáÔÚæÑ ÈÃáã Ýí ÇáÐÑÇÚíä Ãæ ÇáÚäÞ Ãæ ÇáÝßíä Ãæ ÇáÃÓäÇä Ãæ ÇáÙåÑ.  • ÇáÊÚÑÞ Ãæ ÇáÔÚæÑ ÈÏæÇÑ Ãæ ÏæÎÉ.  • ÇáÅÕÇÈÉ ÈÖíÞ Ýí ÇáÊäÝÓ Ãæ Ãáã ÈÇáÕÏÑ.  • ÇáÊÞíÄ ãÚ ÊÔÇÈå ÇáÞíÁ ÈÇáÏã Ãæ ÍÈíÈÇÊ ÇáÞåæÉ.  • ÇÎÊáÇØ ÇáÈÑÇÒ ÈÇáÏã Ãæ íßæä áæäå ÃÓæÏðÇ.  ÑÈãÇ ÊãËá åÐå ÇáÃÚÑÇÖ ãÔßáÉ ÎØíÑÉ ÊÔßøá ÍÇáÉ ØÈíÉ ØÇÑÆÉ. íÊÚíä ÚÏã ÇäÊÙÇÑ ãÇ ÅÐÇ ßÇäÊ ÇáÃÚÑÇÖ ÓÊÒæá Ãã áÇ. æíÌÈ ÇáÍÕæá Úáì ÇáãÓÇÚÏÉ ÇáØÈíÉ Úáì ÇáÝæÑ. ÇÊÕá ÈÎÏãÇÊ ÇáØæÇÑÆ ÇáãÍáíÉ (911 Ýí ÇáæáÇíÇÊ ÇáãÊÍÏÉ). ÊÌäÈ ÇáÞíÇÏÉ ÈäÝÓß Åáì ÇáãÓÊÔÝì.  ãáÎÕ  • ÍÑÞÉ ÇáãÚÏÉ åí Ãáã Ãæ ÅÒÚÇÌ ÞÏ íÕíÈ ÇáÍáÞ Ãæ ÇáÕÏÑ. æÚÇÏÉ ãÇ íæÕÝ ÈÃäå Ãáã ÍÇÑÞ. ßãÇ Ãäå ÞÏ íÓÈÈ ãÐÇÞðÇ ÍãÖíðÇ ÓíÆðÇ Ýí ÇáÝã.  • ÊÌäÈ ÊäÇæá ÇáÃØÚãÉ æÇáãÔÑæÈÇÊ ÇáÊí íæÕí ãõÞÏøöã ÇáÑÚÇíÉ ÇáÕÍíÉ ÇáãÊÇÈÚ áß ÈÊÌäÈåÇ.  • íÊÚíä ÊäÇæá ÇáÃÏæíÉ ÇáÊí ÊõÕÑÝ ÈæÕÝÉ ØÈíÉ Ãæ Ïæä æÕÝÉ ØÈíÉ æÝÞðÇ áãÇ íÎÈÑßö Èå ãÞÏã ÇáÑÚÇíÉ ÇáÕÍíÉ ÇáÎÇÕ Èß. íÊÚíä ÚÏã ÊäÇæá ÇáÃÓÈÑíä Ãæ ÇáÃíÈæÈÑæÝíä Ãæ ÛíÑå ãä ãÖÇÏÇÊ ÇáÇáÊåÇÈÇÊ ÇááÇÓÊíÑæíÏíÉ ÅáÇ ÈãæÇÝÞÉ ãÞÏã ÇáÑÚÇíÉ.  • íÌÈ ÇáÇÊÕÇá ÈãõÞÏøöã ÇáÑÚÇíÉ ÇáÕÍíÉ ÅÐÇ áã ÊÊÍÓä ÇáÃÚÑÇÖ Ãæ ÅÐÇ ÊÝÇÞãÊ.  áíÓ ÇáåÏÝ ãä åÐå ÇáãÚáæãÇÊ Ãä Êßæä ÈÏíáÇð ááÅÑÔÇÏÇÊ ÇáÊí íÞÏãåÇ ãæÝÑ ÇáÑÚÇíÉ ÇáÕÍíÉ. ÊÃßÏ ãä ãäÇÞÔÉ ÃíÉ ÃÓÆáÉ ÊÏæÑ Ýí Ðåäß ãÚ ãæÝÑ ÇáÑÚÇíÉ ÇáÕÍíÉ.þ  Document Released: 04/10/2017 Document Revised: 06/27/2019 Document Reviewed: 06/27/2019  [employer] Patient Education © 2020 [employer]        Gabriela Batres, APRN

## 2020-06-07 NOTE — PATIENT INSTRUCTIONS
ÍÑÞÉ ÇáãÚÏÉ  Heartburn  ÍÑÞÉ ÇáãÚÏÉ åí Ãáã Ãæ ÅÒÚÇÌ ÞÏ íÕíÈ ÇáÍáÞ Ãæ ÇáÕÏÑ. æÚÇÏÉ ãÇ íæÕÝ ÈÃäå Ãáã ÍÇÑÞ. ßãÇ Ãäå ÞÏ íÓÈÈ ãÐÇÞðÇ ÍãÖíðÇ ÓíÆðÇ Ýí ÇáÝã. æÑÈãÇ íÒÏÇÏ ÇáÃáã ÇáäÇÊÌ Úä ÍÑÞÉ ÇáãÚÏÉ ÚäÏ ÇáÇÓÊáÞÇÁ Ãæ ÇáÇäÍäÇÁ¡ æÚÇÏÉ ãÇ ÊÓæÁ ÇáÍÇáÉ Ýí ÇáãÓÇÁ. ßÐáß ÞÏ ÊÍÏË ÍÑÞÉ ÇáãÚÏÉ äÊíÌÉ áÇÑÊÌÇÚ ãÍÊæíÇÊ ÇáãÚÏÉ áÃÚáì Åáì ÏÇÎá ÇáãÑíÁ (ÌóÒúÑ).  íÌÈ ÇÊÈÇÚ åÐå ÇáÊÚáíãÇÊ Ýí ÇáãäÒá:  ÇáÃßá æÇáÔÑÈ    • ÊÌäÈ ÊäÇæá ÇáÃØÚãÉ æÇáãÔÑæÈÇÊ ÇáÊí íæÕí ãõÞÏøöã ÇáÑÚÇíÉ ÇáÕÍíÉ ÇáãÊÇÈÚ áß ÈÊÌäÈåÇ. æÑÈãÇ íÔãá Ðáß:  ? ÇáÞåæÉ æÇáÔÇí (ÈÇáßÇÝííä Ãæ ÇáÎÇáííä ãäå).  ? ÇáãÔÑæÈÇÊ ÇáãÍÊæíÉ Úáì ßÍæá.  ? ãÔÑæÈÇÊ ÇáØÇÞÉ æÇáãÔÑæÈÇÊ ÇáÑíÇÖíÉ.  ? ÇáãÔÑæÈÇÊ ÇáÛÇÒíÉ Ãæ ÇáÕæÏÇ.  ? ÇáÔæßæáÇÊÉ æÇáßÇßÇæ.  ? äßåÇÊ ÇáÝáÝá æÇáäÚäÇÚ.  ? ÇáËæã æÇáÈÕá.  ? ÇáÝÌá ÇáÍÇÑ.  ? ÇáÃØÚãÉ ÇáÍÑíÝÉ Ãæ ÇáÍãÖíÉ¡ æãä ÈíäåÇ ÇáÈåÇÑÇÊ æãÓÍæÞ ÇáÝáÝá ÇáÍÇÑ æãÓÍæÞ ÇáßÇÑí æÇáÎá æÇáÕáÕÉ ÇáÍÇÑÉ æÕáÕÉ ÇáÈÇÑÈßíæ.  ? ÚÕÇÆÑ ÇáÝæÇßå ÇáÍãÖíÉ¡ ãËá ÇáÈÑÊÞÇá æÇááíãæä æÇááíãæä ÇáÍÇãÖ.  ? ÇáÃØÚãÉ ÇáÊí ÊÚÊãÏ Úáì ÇáØãÇØã ãËá ÇáÕáÕÉ æÇáÝáÝá ÇáÍÇÑ æÇáÓáØÉ æÇáÈíÊÒÇ ÇáãÒæÏÉ ÈÇáÕáÕÉ ÇáÍãÑÇÁ.  ? ÇáÃØÚãÉ ÇáãÞáíÉ æÇáÛäíÉ ÈÇáÏåæä¡ ãËá ÇáÏæäÇÊÓ æÇáÈØÇØÓ ÇáãÞáíÉ æÔÑÇÆÍ ÇáÈØÇØÓ æÇáÊÊÈíáÇÊ ÚÇáíÉ ÇáÏåæä.  ? ÇááÍæã ÚÇáíÉ ÇáÏåæä¡ ãËá ÇáåæÊ ÏæÌ æÇááÍæã ÇáÍãÑÇÁ æÇáÈíÖÇÁ ÇáÛäíÉ ÈÇáÏåæä¡ ãËá ÔÑÇÆÍ ÇáÖáæÚ æÇáÓæÓíÓ æÇáßÝá æÇááÍã ÇáãÞÏÏ.  ? ãäÊÌÇÊ ÇáÃáÈÇä ÚÇáíÉ ÇáÏåæä¡ ãËá ÇáÍáíÈ ßÇãá ÇáÏÓã æÇáÒÈÏ æÇáÌÈä ÇáßÑíãí.  • íÌÈ ÊäÇæá æÌÈÇÊ ÕÛíÑÉ æãÊßÑÑÉ ÈÏáÇð ãä ÇáæÌÈÇÊ ÇáßÈíÑÉ.  • íÌÈ ÊÌäÈ ÔÑÈ ßãíÇÊ ßÈíÑÉ ãä ÇáÓæÇÆá ãÚ ÇáæÌÈÇÊ.  • íÌÈ ÊÌäÈ ÊäÇæá æÌÈÇÊ ÎáÇá 2-3 ÓÇÚÇÊ ÞÈá Çáäæã.  • íÌÈ ÊÌäÈ ÇáÇÓÊáÞÇÁ ãÈÇÔÑÉ ÈÚÏ ÇáÃßá.  • ýíÊÚíä ÚÏã ããÇÑÓÉ ÇáÊãÑíäÇÊ ÇáÑíÇÖíÉ ÈÚÏ ÇáÃßá ãÈÇÔÑÉ.  äãØ ÇáÍíÇÉ      • Ýí ÍÇáÉ ÒíÇÏÉ ÇáæÒä¡ ÝíÌÈ ÎÝÖ ÇáæÒä Åáì ÇáæÒä ÇáÕÍí. íÌÈ ÇÓÊÔÇÑÉ æØáÈ ÊæÌíåÇÊ ãÞÏã ÇáÑÚÇíÉ ÇáÕÍíÉ Íæá ßíÝíÉ ÎÝÖ ÇáæÒä ÈØÑíÞÉ ÕÍíÉ æÂãäÉ.  • íÊÚíä ÚÏã ÇÓÊÎÏÇã Ãí ãäÊÌÇÊ ÊÍÊæí Úáì ÇáäíßæÊíä Ãæ ÇáÊÈÛ¡ ãËá ÇáÓÌÇÆÑ æÇáÓÌÇÆÑ ÇáÅáßÊÑæäíÉ æÊÈÛ ÇáãÖÛ. ÝåÐå ÇáãäÊÌÇÊ ãä ÔÃäåÇ ÇáÊÓÈÈ Ýí ÊÝÇÞã ÇáÃÚÑÇÖ ÇáÊí ÊÚÇäí ãäåÇ. íÌÈ ÇÓÊÔÇÑÉ ãÞÏã ÇáÑÚÇíÉ ÇáÕÍíÉ ÅÐÇ ÇÍÊÌÊ Åáì  ÇáãÓÇÚÏÉ ááÅÞáÇÚ Úä ÇáÊÏÎíä.  • íÌÈ ÇÑÊÏÇÁ ãáÇÈÓ æÇÓÚÉ. æíÌÈ ÚÏã ÇÑÊÏÇÁ ÔíÁ ÖíÞ Íæá ÇáÎÕÑ ÞÏ íÖÛØ Úáì ÇáãÚÏÉ.  • íÌÈ ÑÝÚ ÑÃÓ ÓÑíÑß ÈãÞÏÇÑ 6 ÈæÕÇÊ (15 Óã) ÊÞÑíÈðÇ ÚäÏ Çáäæã.  • íÌÈ ãÍÇæáÉ ÇáÍÏ ãä ÇáÖÛØ ÇáÐí ÊÊÚÑÖ áå¡ Úä ØÑíÞ ããÇÑÓÉ ÇáíæÌÇ Ãæ ÇáÊÃãá Úáì ÓÈíá ÇáãËÇá. ÃãÇ Ýí ÍÇáÉ ÇáÍÇÌÉ áãÓÇÚÏÉ áÎÝÖ ãÓÊæì ÇáÅÌåÇÏ¡ Ýíãßä ÇÓÊÔÇÑÉ ãõÞÏã ÇáÑÚÇíÉ ÇáÕÍíÉ.  ÊÚáíãÇÊ ÚÇãÉ  • íÊÚíä ÇáÇäÊÈÇå áÌãíÚ ÇáÊÛíÑÇÊ ÇáÊí ÊØÑÃ Úáì ÇáÃÚÑÇÖ ÇáÊí ÊÚÇäíåÇ.  • íÊÚíä ÊäÇæá ÇáÃÏæíÉ ÇáÊí ÊõÕÑÝ ÈæÕÝÉ ØÈíÉ Ãæ Ïæä æÕÝÉ ØÈíÉ æÝÞðÇ áãÇ íÎÈÑßö Èå ãÞÏã ÇáÑÚÇíÉ ÇáÕÍíÉ ÇáÎÇÕ Èß.  ? íÊÚíä ÚÏã ÊäÇæá ÇáÃÓÈÑíä Ãæ ÇáÃíÈæÈÑæÝíä Ãæ ÛíÑå ãä ãÖÇÏÇÊ ÇáÇáÊåÇÈÇÊ ÇááÇÓÊíÑæíÏíÉ ÅáÇ ÈãæÇÝÞÉ ãÞÏã ÇáÑÚÇíÉ.  ? íÌÈ Ãä íßæä ÅíÞÇÝ ÊäÇæá Ãí ÏæÇÁ ÝÞØ ÈÍÓÈ ÅÑÔÇÏÇÊ ãÞÏã ÇáÑÚÇíÉ ÇáÕÍíÉ ÇáãÊÇÈÚ áß. æÓÈÈ Ðáß Ãä ËãÉ ÃÏæíÉ ãÚíäÉ ÞÏ íõÄÏí ÇáÊÚÌá Ýí ÅíÞÇÝåÇ Åáì ÊÝÇÞã ÇáÃÚÑÇÖ æÒíÇÏÊåÇ ÓæÁðÇ.  • íÊÚíä ÇáÇáÊÒÇã ÈÌãíÚ ãæÇÚíÏ ÇáãÊÇÈÚÉ æÝÞðÇ áÊæÌíåÇÊ ãÞÏã ÇáÑÚÇíÉ ÇáÕÍíÉ. ÝåÐÇ ÃãÑ ãåã.  íÌÈ ÇáÇÊÕÇá ÈãÞÏã ÇáÑÚÇíÉ ÇáÕÍíÉ Ýí ÇáÍÇáÇÊ ÇáÊÇáíÉ:  • ÙåæÑ ÃÚÑÇÖ ÌÏíÏÉ Úáíß.  • ÝÞÏÇä ÇáæÒä ÈÔßá ÛíÑ ãÈÑÑ.  • ÕÚæÈÉ ÇáÈáÚ Ãæ ÇáÊÃáã ÚäÏ ÇáÈáÚ.  • ÇáÅÕÇÈÉ ÈÃÒíÒ Ãæ ÓÚÇá ãÓÊãÑ.  • ÚÏã ÇáÔÚæÑ ÈÊÍÓä ãÚ ÊáÞí ÇáÚáÇÌ.  • ÇáÅÕÇÈÉ ÈÍÑÞÉ ÇáãÚÏÉ Úáì äÍæ ãÊßÑÑ áÃßËÑ ãä ÃÓÈæÚíä.  íÊÚíä ØáÈ ÇáãÓÇÚÏÉ Úáì ÇáÝæÑ Ýí ÇáÍÇáÇÊ ÇáÊÇáíÉ:  • ÇáÔÚæÑ ÈÃáã Ýí ÇáÐÑÇÚíä Ãæ ÇáÚäÞ Ãæ ÇáÝßíä Ãæ ÇáÃÓäÇä Ãæ ÇáÙåÑ.  • ÇáÊÚÑÞ Ãæ ÇáÔÚæÑ ÈÏæÇÑ Ãæ ÏæÎÉ.  • ÇáÅÕÇÈÉ ÈÖíÞ Ýí ÇáÊäÝÓ Ãæ Ãáã ÈÇáÕÏÑ.  • ÇáÊÞíÄ ãÚ ÊÔÇÈå ÇáÞíÁ ÈÇáÏã Ãæ ÍÈíÈÇÊ ÇáÞåæÉ.  • ÇÎÊáÇØ ÇáÈÑÇÒ ÈÇáÏã Ãæ íßæä áæäå ÃÓæÏðÇ.  ÑÈãÇ ÊãËá åÐå ÇáÃÚÑÇÖ ãÔßáÉ ÎØíÑÉ ÊÔßøá ÍÇáÉ ØÈíÉ ØÇÑÆÉ. íÊÚíä ÚÏã ÇäÊÙÇÑ ãÇ ÅÐÇ ßÇäÊ ÇáÃÚÑÇÖ ÓÊÒæá Ãã áÇ. æíÌÈ ÇáÍÕæá Úáì ÇáãÓÇÚÏÉ ÇáØÈíÉ Úáì ÇáÝæÑ. ÇÊÕá ÈÎÏãÇÊ ÇáØæÇÑÆ ÇáãÍáíÉ (911 Ýí ÇáæáÇíÇÊ ÇáãÊÍÏÉ). ÊÌäÈ ÇáÞíÇÏÉ ÈäÝÓß Åáì ÇáãÓÊÔÝì.  ãáÎÕ  • ÍÑÞÉ ÇáãÚÏÉ åí Ãáã Ãæ ÅÒÚÇÌ ÞÏ íÕíÈ ÇáÍáÞ Ãæ ÇáÕÏÑ. æÚÇÏÉ ãÇ íæÕÝ ÈÃäå Ãáã ÍÇÑÞ. ßãÇ Ãäå ÞÏ íÓÈÈ ãÐÇÞðÇ ÍãÖíðÇ ÓíÆðÇ Ýí ÇáÝã.  • ÊÌäÈ ÊäÇæá ÇáÃØÚãÉ æÇáãÔÑæÈÇÊ ÇáÊí íæÕí ãõÞÏøöã ÇáÑÚÇíÉ ÇáÕÍíÉ ÇáãÊÇÈÚ áß ÈÊÌäÈåÇ.  • íÊÚíä ÊäÇæá  ÇáÃÏæíÉ ÇáÊí ÊõÕÑÝ ÈæÕÝÉ ØÈíÉ Ãæ Ïæä æÕÝÉ ØÈíÉ æÝÞðÇ áãÇ íÎÈÑßö Èå ãÞÏã ÇáÑÚÇíÉ ÇáÕÍíÉ ÇáÎÇÕ Èß. íÊÚíä ÚÏã ÊäÇæá ÇáÃÓÈÑíä Ãæ ÇáÃíÈæÈÑæÝíä Ãæ ÛíÑå ãä ãÖÇÏÇÊ ÇáÇáÊåÇÈÇÊ ÇááÇÓÊíÑæíÏíÉ ÅáÇ ÈãæÇÝÞÉ ãÞÏã ÇáÑÚÇíÉ.  • íÌÈ ÇáÇÊÕÇá ÈãõÞÏøöã ÇáÑÚÇíÉ ÇáÕÍíÉ ÅÐÇ áã ÊÊÍÓä ÇáÃÚÑÇÖ Ãæ ÅÐÇ ÊÝÇÞãÊ.  áíÓ ÇáåÏÝ ãä åÐå ÇáãÚáæãÇÊ Ãä Êßæä ÈÏíáÇð ááÅÑÔÇÏÇÊ ÇáÊí íÞÏãåÇ ãæÝÑ ÇáÑÚÇíÉ ÇáÕÍíÉ. ÊÃßÏ ãä ãäÇÞÔÉ ÃíÉ ÃÓÆáÉ ÊÏæÑ Ýí Ðåäß ãÚ ãæÝÑ ÇáÑÚÇíÉ ÇáÕÍíÉ.þ  Document Released: 04/10/2017 Document Revised: 06/27/2019 Document Reviewed: 06/27/2019  [employer] Patient Education © 2020 [employer]

## 2020-06-08 LAB
25(OH)D3+25(OH)D2 SERPL-MCNC: 14.3 NG/ML (ref 30–100)
ANA SER QL: NEGATIVE
CRP SERPL-MCNC: 0.23 MG/DL (ref 0–0.5)
EBV NA IGG SER IA-ACNC: >600 U/ML (ref 0–17.9)
EBV VCA IGG SER IA-ACNC: 399 U/ML (ref 0–17.9)
EBV VCA IGM SER IA-ACNC: <36 U/ML (ref 0–35.9)
ERYTHROCYTE [SEDIMENTATION RATE] IN BLOOD BY WESTERGREN METHOD: 4 MM/HR (ref 0–15)
HAV IGM SERPL QL IA: NEGATIVE
HBV CORE IGM SERPL QL IA: NEGATIVE
HBV SURFACE AG SERPL QL IA: NEGATIVE
HCV AB S/CO SERPL IA: <0.1 S/CO RATIO (ref 0–0.9)
HIV 1+2 AB+HIV1 P24 AG SERPL QL IA: NON REACTIVE
IRON SATN MFR SERPL: 19 % (ref 20–50)
IRON SERPL-MCNC: 82 MCG/DL (ref 59–158)
RPR SER QL: NON REACTIVE
SERVICE CMNT-IMP: ABNORMAL
TIBC SERPL-MCNC: 436 MCG/DL
TSH SERPL DL<=0.005 MIU/L-ACNC: 3.52 UIU/ML (ref 0.27–4.2)
UIBC SERPL-MCNC: 354 MCG/DL (ref 112–346)
URATE SERPL-MCNC: 4 MG/DL (ref 3.4–7)
VIT B12 SERPL-MCNC: 527 PG/ML (ref 211–946)

## 2020-06-10 DIAGNOSIS — E55.9 VITAMIN D DEFICIENCY: Primary | ICD-10-CM

## 2020-06-10 RX ORDER — ERGOCALCIFEROL 1.25 MG/1
50000 CAPSULE ORAL WEEKLY
Qty: 4 CAPSULE | Refills: 3 | Status: SHIPPED | OUTPATIENT
Start: 2020-06-10 | End: 2020-11-18 | Stop reason: SDUPTHER

## 2020-06-12 ENCOUNTER — TELEPHONE (OUTPATIENT)
Dept: FAMILY MEDICINE CLINIC | Facility: CLINIC | Age: 30
End: 2020-06-12

## 2020-06-12 NOTE — TELEPHONE ENCOUNTER
----- Message from JAY León sent at 6/12/2020  9:47 AM EDT -----  Regarding: RE: [address]   Contact: [phone]  yes  ----- Message -----  From: Lucy Hernandez  Sent: 6/12/2020   8:23 AM EDT  To: JAY León  Subject: FW: [address]                               ----- Message -----  From: Courtney Clements  Sent: 6/11/2020   3:55 PM EDT  To: Lucy Hernandez  Subject: [address]                               nystatin-triamcinolone COMBO IS NOT COVERED BY INSURANCE. CAN HE GET TWO SEPARATE CREAMS INSTEAD?

## 2020-06-28 ENCOUNTER — APPOINTMENT (OUTPATIENT)
Dept: PREADMISSION TESTING | Facility: HOSPITAL | Age: 30
End: 2020-06-28

## 2020-06-28 PROCEDURE — U0002 COVID-19 LAB TEST NON-CDC: HCPCS

## 2020-06-28 PROCEDURE — U0004 COV-19 TEST NON-CDC HGH THRU: HCPCS

## 2020-06-28 PROCEDURE — C9803 HOPD COVID-19 SPEC COLLECT: HCPCS

## 2020-06-29 LAB
REF LAB TEST METHOD: NORMAL
SARS-COV-2 RNA RESP QL NAA+PROBE: NOT DETECTED

## 2020-06-30 ENCOUNTER — LAB REQUISITION (OUTPATIENT)
Dept: LAB | Facility: HOSPITAL | Age: 30
End: 2020-06-30

## 2020-06-30 ENCOUNTER — OUTSIDE FACILITY SERVICE (OUTPATIENT)
Dept: GASTROENTEROLOGY | Facility: CLINIC | Age: 30
End: 2020-06-30

## 2020-06-30 DIAGNOSIS — R13.10 DYSPHAGIA, UNSPECIFIED: ICD-10-CM

## 2020-06-30 PROCEDURE — 43249 ESOPH EGD DILATION <30 MM: CPT | Performed by: INTERNAL MEDICINE

## 2020-06-30 PROCEDURE — 99204 OFFICE O/P NEW MOD 45 MIN: CPT | Performed by: INTERNAL MEDICINE

## 2020-06-30 PROCEDURE — 43239 EGD BIOPSY SINGLE/MULTIPLE: CPT | Performed by: INTERNAL MEDICINE

## 2020-06-30 PROCEDURE — 88305 TISSUE EXAM BY PATHOLOGIST: CPT | Performed by: INTERNAL MEDICINE

## 2020-06-30 RX ORDER — OMEPRAZOLE 40 MG/1
40 CAPSULE, DELAYED RELEASE ORAL
Qty: 60 CAPSULE | Refills: 11 | Status: SHIPPED | OUTPATIENT
Start: 2020-06-30 | End: 2020-11-25 | Stop reason: SDUPTHER

## 2020-07-01 LAB
CYTO UR: NORMAL
LAB AP CASE REPORT: NORMAL
LAB AP CLINICAL INFORMATION: NORMAL
PATH REPORT.FINAL DX SPEC: NORMAL
PATH REPORT.GROSS SPEC: NORMAL

## 2020-08-03 ENCOUNTER — APPOINTMENT (OUTPATIENT)
Dept: CT IMAGING | Facility: HOSPITAL | Age: 30
End: 2020-08-03

## 2020-08-03 ENCOUNTER — TELEPHONE (OUTPATIENT)
Dept: EMERGENCY DEPT | Facility: HOSPITAL | Age: 30
End: 2020-08-03

## 2020-08-03 ENCOUNTER — HOSPITAL ENCOUNTER (EMERGENCY)
Facility: HOSPITAL | Age: 30
Discharge: HOME | End: 2020-08-03
Attending: EMERGENCY MEDICINE | Admitting: EMERGENCY MEDICINE

## 2020-08-03 ENCOUNTER — APPOINTMENT (OUTPATIENT)
Dept: GENERAL RADIOLOGY | Facility: HOSPITAL | Age: 30
End: 2020-08-03

## 2020-08-03 VITALS
HEIGHT: 69 IN | TEMPERATURE: 98.8 F | SYSTOLIC BLOOD PRESSURE: 122 MMHG | OXYGEN SATURATION: 95 % | WEIGHT: 200 LBS | HEART RATE: 87 BPM | RESPIRATION RATE: 20 BRPM | BODY MASS INDEX: 29.62 KG/M2 | DIASTOLIC BLOOD PRESSURE: 74 MMHG

## 2020-08-03 DIAGNOSIS — R06.02 SHORTNESS OF BREATH: ICD-10-CM

## 2020-08-03 DIAGNOSIS — R07.9 CHEST PAIN, UNSPECIFIED TYPE: Primary | ICD-10-CM

## 2020-08-03 LAB
ALBUMIN SERPL-MCNC: 5 G/DL (ref 3.5–5.2)
ALBUMIN/GLOB SERPL: 1.6 G/DL
ALP SERPL-CCNC: 87 U/L (ref 39–117)
ALT SERPL W P-5'-P-CCNC: 55 U/L (ref 1–41)
ANION GAP SERPL CALCULATED.3IONS-SCNC: 13 MMOL/L (ref 5–15)
AST SERPL-CCNC: 36 U/L (ref 1–40)
BASOPHILS # BLD AUTO: 0.05 10*3/MM3 (ref 0–0.2)
BASOPHILS NFR BLD AUTO: 0.8 % (ref 0–1.5)
BILIRUB SERPL-MCNC: 0.8 MG/DL (ref 0–1.2)
BUN SERPL-MCNC: 10 MG/DL (ref 6–20)
BUN/CREAT SERPL: 10.9 (ref 7–25)
CALCIUM SPEC-SCNC: 9.8 MG/DL (ref 8.6–10.5)
CHLORIDE SERPL-SCNC: 101 MMOL/L (ref 98–107)
CO2 SERPL-SCNC: 27 MMOL/L (ref 22–29)
CREAT SERPL-MCNC: 0.92 MG/DL (ref 0.76–1.27)
DEPRECATED RDW RBC AUTO: 36.4 FL (ref 37–54)
EOSINOPHIL # BLD AUTO: 0.23 10*3/MM3 (ref 0–0.4)
EOSINOPHIL NFR BLD AUTO: 3.5 % (ref 0.3–6.2)
ERYTHROCYTE [DISTWIDTH] IN BLOOD BY AUTOMATED COUNT: 11.9 % (ref 12.3–15.4)
GFR SERPL CREATININE-BSD FRML MDRD: 116 ML/MIN/1.73
GFR SERPL CREATININE-BSD FRML MDRD: 95 ML/MIN/1.73
GLOBULIN UR ELPH-MCNC: 3.2 GM/DL
GLUCOSE SERPL-MCNC: 111 MG/DL (ref 65–99)
HCT VFR BLD AUTO: 47.1 % (ref 37.5–51)
HGB BLD-MCNC: 15.8 G/DL (ref 13–17.7)
HOLD SPECIMEN: NORMAL
HOLD SPECIMEN: NORMAL
IMM GRANULOCYTES # BLD AUTO: 0.03 10*3/MM3 (ref 0–0.05)
IMM GRANULOCYTES NFR BLD AUTO: 0.5 % (ref 0–0.5)
LIPASE SERPL-CCNC: 52 U/L (ref 13–60)
LYMPHOCYTES # BLD AUTO: 2.13 10*3/MM3 (ref 0.7–3.1)
LYMPHOCYTES NFR BLD AUTO: 32.5 % (ref 19.6–45.3)
MCH RBC QN AUTO: 28.6 PG (ref 26.6–33)
MCHC RBC AUTO-ENTMCNC: 33.5 G/DL (ref 31.5–35.7)
MCV RBC AUTO: 85.2 FL (ref 79–97)
MONOCYTES # BLD AUTO: 0.51 10*3/MM3 (ref 0.1–0.9)
MONOCYTES NFR BLD AUTO: 7.8 % (ref 5–12)
NEUTROPHILS NFR BLD AUTO: 3.61 10*3/MM3 (ref 1.7–7)
NEUTROPHILS NFR BLD AUTO: 54.9 % (ref 42.7–76)
NRBC BLD AUTO-RTO: 0 /100 WBC (ref 0–0.2)
NT-PROBNP SERPL-MCNC: 16.2 PG/ML (ref 0–450)
PLATELET # BLD AUTO: 276 10*3/MM3 (ref 140–450)
PMV BLD AUTO: 9.8 FL (ref 6–12)
POTASSIUM SERPL-SCNC: 4 MMOL/L (ref 3.5–5.2)
PROT SERPL-MCNC: 8.2 G/DL (ref 6–8.5)
RBC # BLD AUTO: 5.53 10*6/MM3 (ref 4.14–5.8)
REF LAB TEST METHOD: NORMAL
SARS-COV-2 RNA RESP QL NAA+PROBE: NOT DETECTED
SODIUM SERPL-SCNC: 141 MMOL/L (ref 136–145)
TROPONIN T SERPL-MCNC: <0.01 NG/ML (ref 0–0.03)
WBC # BLD AUTO: 6.56 10*3/MM3 (ref 3.4–10.8)
WHOLE BLOOD HOLD SPECIMEN: NORMAL
WHOLE BLOOD HOLD SPECIMEN: NORMAL

## 2020-08-03 PROCEDURE — 84484 ASSAY OF TROPONIN QUANT: CPT | Performed by: EMERGENCY MEDICINE

## 2020-08-03 PROCEDURE — 99284 EMERGENCY DEPT VISIT MOD MDM: CPT

## 2020-08-03 PROCEDURE — 80053 COMPREHEN METABOLIC PANEL: CPT | Performed by: EMERGENCY MEDICINE

## 2020-08-03 PROCEDURE — 83880 ASSAY OF NATRIURETIC PEPTIDE: CPT | Performed by: EMERGENCY MEDICINE

## 2020-08-03 PROCEDURE — 71275 CT ANGIOGRAPHY CHEST: CPT

## 2020-08-03 PROCEDURE — 83690 ASSAY OF LIPASE: CPT | Performed by: EMERGENCY MEDICINE

## 2020-08-03 PROCEDURE — 93005 ELECTROCARDIOGRAM TRACING: CPT | Performed by: EMERGENCY MEDICINE

## 2020-08-03 PROCEDURE — U0004 COV-19 TEST NON-CDC HGH THRU: HCPCS | Performed by: EMERGENCY MEDICINE

## 2020-08-03 PROCEDURE — 85025 COMPLETE CBC W/AUTO DIFF WBC: CPT | Performed by: EMERGENCY MEDICINE

## 2020-08-03 PROCEDURE — 93005 ELECTROCARDIOGRAM TRACING: CPT

## 2020-08-03 PROCEDURE — 0 IOPAMIDOL PER 1 ML: Performed by: EMERGENCY MEDICINE

## 2020-08-03 PROCEDURE — U0002 COVID-19 LAB TEST NON-CDC: HCPCS | Performed by: EMERGENCY MEDICINE

## 2020-08-03 PROCEDURE — 71045 X-RAY EXAM CHEST 1 VIEW: CPT

## 2020-08-03 RX ORDER — ALBUTEROL SULFATE 90 UG/1
2 AEROSOL, METERED RESPIRATORY (INHALATION) EVERY 4 HOURS PRN
Qty: 1 INHALER | Refills: 0 | Status: SHIPPED | OUTPATIENT
Start: 2020-08-03 | End: 2021-06-10

## 2020-08-03 RX ORDER — ASPIRIN 81 MG/1
324 TABLET, CHEWABLE ORAL ONCE
Status: DISCONTINUED | OUTPATIENT
Start: 2020-08-03 | End: 2020-08-03 | Stop reason: HOSPADM

## 2020-08-03 RX ORDER — SODIUM CHLORIDE 0.9 % (FLUSH) 0.9 %
10 SYRINGE (ML) INJECTION AS NEEDED
Status: DISCONTINUED | OUTPATIENT
Start: 2020-08-03 | End: 2020-08-03 | Stop reason: HOSPADM

## 2020-08-03 RX ADMIN — IOPAMIDOL 78 ML: 755 INJECTION, SOLUTION INTRAVENOUS at 01:39

## 2020-08-03 NOTE — ED PROVIDER NOTES
EMERGENCY DEPARTMENT ENCOUNTER      Pt Name: Meliton Lagos  MRN: [id number]  YOB: 1990  Date of evaluation: 8/3/2020  Provider: Pierce Harrison DO    CHIEF COMPLAINT       Chief Complaint   Patient presents with   • Chest Pain         HISTORY OF PRESENT ILLNESS  (Location/Symptom, Timing/Onset, Context/Setting, Quality, Duration, Modifying Factors, Severity.)   Meliton Lagos is a 30 y.o. male who presents to the emergency department for evaluation of intermittent chest pain shortness of breath on and off for the last 3 or 4 days.  Worse with deep inspiration.  Notes it is retrosternal, sharp in nature nonradiating.  Patient denies any fever or chills, no hemoptysis, denies any history of cardiac or pulmonary disease.  He has been a longtime smoker.  States he takes medication for acid reflux and vitamin D daily, has had a prior upper endoscopy.  Patient denies any cardiac history, no recent travel, no known sick contacts.  No known exposure to the coronavirus.  Currently at rest denies any active chest pain, but he does admits with any type of exertion he has some chest tightness and shortness of breath.  He denies any unilateral leg swelling, no history of clotting disorders.  Patient denies any other acute systemic complaints at this time.    History obtained using an Turkmen .    Nursing notes were reviewed.    REVIEW OF SYSTEMS    (2-9 systems for level 4, 10 or more for level 5)   ROS:  General:  No fevers, no chills, no weakness  Cardiovascular:  + Intermittent chest pain, no palpitations  Respiratory:  + shortness of breath, no cough, no wheezing  Gastrointestinal:  No pain, no nausea, no vomiting, no diarrhea  Musculoskeletal:  No muscle pain, no joint pain  Skin:  No rash, no easy bruising  Neurologic:  No speech problems, no headache, no extremity numbness, no extremity tingling, no extremity weakness  Psychiatric:  No anxiety  Genitourinary:  No dysuria, no  hematuria    Except as noted above the remainder of the review of systems was reviewed and negative.       PAST MEDICAL HISTORY     Past Medical History:   Diagnosis Date   • Jackson's esophagus    • GERD (gastroesophageal reflux disease)          SURGICAL HISTORY       Past Surgical History:   Procedure Laterality Date   • ANAL FISTULA REPAIR     • CHOLECYSTECTOMY           CURRENT MEDICATIONS       Current Facility-Administered Medications:   •  aspirin chewable tablet 324 mg, 324 mg, Oral, Once, Pierce Harrison, DO  •  sodium chloride 0.9 % flush 10 mL, 10 mL, Intravenous, PRN, Pierce Harrison, DO    Current Outpatient Medications:   •  omeprazole (priLOSEC) 40 MG capsule, Take 1 capsule by mouth 2 (Two) Times a Day Before Meals. Take a half hour before breakfast, Disp: 60 capsule, Rfl: 11  •  vitamin D (ERGOCALCIFEROL) 1.25 MG (20079 UT) capsule capsule, Take 1 capsule by mouth 1 (One) Time Per Week., Disp: 4 capsule, Rfl: 3  •  albuterol sulfate  (90 Base) MCG/ACT inhaler, Inhale 2 puffs Every 4 (Four) Hours As Needed for Wheezing or Shortness of Air., Disp: 1 inhaler, Rfl: 0    ALLERGIES     Patient has no known allergies.    FAMILY HISTORY       Family History   Problem Relation Age of Onset   • No Known Problems Mother    • No Known Problems Father    • Heart attack Maternal Grandfather    • Heart disease Maternal Grandfather    • No Known Problems Paternal Grandmother    • No Known Problems Paternal Grandfather           SOCIAL HISTORY       Social History     Socioeconomic History   • Marital status:      Spouse name: Not on file   • Number of children: Not on file   • Years of education: Not on file   • Highest education level: Not on file   Tobacco Use   • Smoking status: Former Smoker     Last attempt to quit: 2016     Years since quittin.5   • Smokeless tobacco: Never Used   Substance and Sexual Activity   • Alcohol use: No     Frequency: Never   • Drug use: No   •  "Sexual activity: Yes     Partners: Female     Birth control/protection: None   Social History Narrative    Caffeine: 2 cups coffee daily         PHYSICAL EXAM    (up to 7 for level 4, 8 or more for level 5)     Vitals:    08/03/20 0003 08/03/20 0022 08/03/20 0023   BP: 134/91 109/72    BP Location: Left arm     Patient Position: Sitting     Pulse: 93  82   Resp: 20     Temp: 98.8 °F (37.1 °C)     TempSrc: Oral     SpO2: 99%  97%   Weight: 90.7 kg (200 lb)     Height: 175 cm (68.9\")         Physical Exam  General :Patient is awake, alert, oriented, in no acute distress, nontoxic appearing  HEENT: Pupils are equally round and reactive to light, EOMI, conjunctivae clear, sclerae white, there is no injection no icterus.  Oral mucosa is moist, no exudate. Uvula is midline  Neck: Neck is supple, full range of motion, trachea midline  Cardiac: Heart regular rate, rhythm, no murmurs, rubs, or gallops  Lungs: Lungs are clear to auscultation, there is no wheezing, rhonchi, or rales. There is no use of accessory muscles.  Chest wall: There is no tenderness to palpation over the chest wall or over ribs  Abdomen: Abdomen is soft, nontender, nondistended. There is no firm or pulsatile masses, no rebound rigidity or guarding.   Musculoskeletal: 5 out of 5 strength in all 4 extremities.  No focal muscle deficits are appreciated.  No unilateral swelling.  Neuro: Motor intact, sensory intact, level of consciousness is normal  Dermatology: Skin is warm and dry  Psych: Mentation is grossly normal, cognition is grossly normal. Affect is appropriate.      DIAGNOSTIC RESULTS     EKG: All EKG's are interpreted by the Emergency Department Physician who either signs or Co-signs this chart in the absence of a cardiologist.    ECG 12 Lead   Final Result   Test Reason : chest pain   Blood Pressure : **/** mmHG   Vent. Rate : 109 BPM     Atrial Rate : 109 BPM      P-R Int : 130 ms          QRS Dur : 090 ms       QT Int : 340 ms       P-R-T Axes " : 053 -33 036 degrees      QTc Int : 457 ms         Sinus tachycardia   Left axis deviation   Nonspecific ST abnormality   Abnormal ECG   No previous ECGs available   Confirmed by SABRA DEVLIN MD ([id number]) on 8/3/2020 12:18:03 AM      Referred By:  EDMD           Confirmed By:SABRA DEVLIN MD          RADIOLOGY:   Non-plain film images such as CT, Ultrasound and MRI are read by the radiologist. Plain radiographic images are visualized and preliminarily interpreted by the emergency physician with the below findings:      [] Radiologist's Report Reviewed:  CT Chest Pulmonary Embolism   Final Result      1. No aortic aneurysm or dissection.   2. No PE.   3. Lungs clear.   4. Gallbladder surgically absent.      No CT findings present to indicate pneumonia. Note: CT may be negative in the earliest stages of COVID-19.      Signer Name: Willy Santiago MD    Signed: 8/3/2020 2:02 AM    Workstation Name: [facility]     Radiology [facility]      XR Chest 1 View   Final Result   1. Negative chest.      Signer Name: Willy Santiago MD    Signed: 8/3/2020 1:28 AM    Workstation Name: [facility]     Radiology [facility]            ED BEDSIDE ULTRASOUND:   Performed by ED Physician - none    LABS:    I have reviewed and interpreted all of the currently available lab results from this visit (if applicable):  Results for orders placed or performed during the hospital encounter of 08/03/20   Troponin   Result Value Ref Range    Troponin T <0.010 0.000 - 0.030 ng/mL   Comprehensive Metabolic Panel   Result Value Ref Range    Glucose 111 (H) 65 - 99 mg/dL    BUN 10 6 - 20 mg/dL    Creatinine 0.92 0.76 - 1.27 mg/dL    Sodium 141 136 - 145 mmol/L    Potassium 4.0 3.5 - 5.2 mmol/L    Chloride 101 98 - 107 mmol/L    CO2 27.0 22.0 - 29.0 mmol/L    Calcium 9.8 8.6 - 10.5 mg/dL    Total Protein 8.2 6.0 - 8.5 g/dL    Albumin 5.00 3.50 - 5.20 g/dL    ALT (SGPT) 55 (H) 1 - 41 U/L    AST (SGOT) 36 1 - 40 U/L    Alkaline  Phosphatase 87 39 - 117 U/L    Total Bilirubin 0.8 0.0 - 1.2 mg/dL    eGFR Non African Amer 95 >60 mL/min/1.73    eGFR  African Amer 116 >60 mL/min/1.73    Globulin 3.2 gm/dL    A/G Ratio 1.6 g/dL    BUN/Creatinine Ratio 10.9 7.0 - 25.0    Anion Gap 13.0 5.0 - 15.0 mmol/L   Lipase   Result Value Ref Range    Lipase 52 13 - 60 U/L   BNP   Result Value Ref Range    proBNP 16.2 0.0 - 450.0 pg/mL   CBC Auto Differential   Result Value Ref Range    WBC 6.56 3.40 - 10.80 10*3/mm3    RBC 5.53 4.14 - 5.80 10*6/mm3    Hemoglobin 15.8 13.0 - 17.7 g/dL    Hematocrit 47.1 37.5 - 51.0 %    MCV 85.2 79.0 - 97.0 fL    MCH 28.6 26.6 - 33.0 pg    MCHC 33.5 31.5 - 35.7 g/dL    RDW 11.9 (L) 12.3 - 15.4 %    RDW-SD 36.4 (L) 37.0 - 54.0 fl    MPV 9.8 6.0 - 12.0 fL    Platelets 276 140 - 450 10*3/mm3    Neutrophil % 54.9 42.7 - 76.0 %    Lymphocyte % 32.5 19.6 - 45.3 %    Monocyte % 7.8 5.0 - 12.0 %    Eosinophil % 3.5 0.3 - 6.2 %    Basophil % 0.8 0.0 - 1.5 %    Immature Grans % 0.5 0.0 - 0.5 %    Neutrophils, Absolute 3.61 1.70 - 7.00 10*3/mm3    Lymphocytes, Absolute 2.13 0.70 - 3.10 10*3/mm3    Monocytes, Absolute 0.51 0.10 - 0.90 10*3/mm3    Eosinophils, Absolute 0.23 0.00 - 0.40 10*3/mm3    Basophils, Absolute 0.05 0.00 - 0.20 10*3/mm3    Immature Grans, Absolute 0.03 0.00 - 0.05 10*3/mm3    nRBC 0.0 0.0 - 0.2 /100 WBC   Light Blue Top   Result Value Ref Range    Extra Tube hold for add-on    Green Top (Gel)   Result Value Ref Range    Extra Tube Hold for add-ons.    Lavender Top   Result Value Ref Range    Extra Tube hold for add-on    Gold Top - SST   Result Value Ref Range    Extra Tube Hold for add-ons.         All other labs were within normal range or not returned as of this dictation.      EMERGENCY DEPARTMENT COURSE and DIFFERENTIAL DIAGNOSIS/MDM:   Vitals:    Vitals:    08/03/20 0003 08/03/20 0022 08/03/20 0023   BP: 134/91 109/72    BP Location: Left arm     Patient Position: Sitting     Pulse: 93  82   Resp: 20     Temp:  "98.8 °F (37.1 °C)     TempSrc: Oral     SpO2: 99%  97%   Weight: 90.7 kg (200 lb)     Height: 175 cm (68.9\")              Patient with intermittent chest pain, shortness of breath on and off over the last 3 to 4 days.  Not appear acutely ill or toxic, on arrival he is awake and alert, no respiratory distress, no acute ischemic changes on his EKG.  Patient is low risk by Wells criteria, unable to use PERC criteria secondary to his tachycardia on arrival.  We did pain IV, labs, advanced imaging for further evaluation.  Blood work labs and imaging reviewed, no acute abnormalities, CT PE study lab with no acute findings.  I did update the patient on these results, he has had a recent cardiac work-up including a negative stress test, echo, as well as GI work-up for acid reflux.  He still notes he feels short of breath, plan for referral over to pulmonary specialist for reevaluation, will give him albuterol inhaler to use as needed.  Patient is agreeable to this.  Requesting a coronavirus test as he has been a couple months since he had one.  We will call him the results which should take a few days.  Return precautions discussed with the patient. I had a discussion with the patient/family regarding diagnosis, diagnostic results, treatment plan, and medications.  The patient/family indicated understanding of these instructions.  I spent adequate time at the bedside proceeding discharge necessary to personally discuss the aftercare instructions, giving patient education, providing explanations of the results of our evaluations/findings, and my decision making to assure that the patient/family understand the plan of care.  Time was allotted to answer questions at that time and throughout the ED course.  Emphasis was placed on timely follow-up after discharge.  I also discussed the potential for the development of an acute emergent condition requiring further evaluation, admission, or even surgical intervention. I discussed " that we found nothing during the visit today indicating the need for further workup, admission, or the presence of an unstable medical condition.  I encouraged the patient to return to the emergency department immediately for ANY concerns, worsening, new complaints, or if symptoms persist and unable to seek follow-up in a timely fashion.  The patient/family expressed understanding and agreement with this plan.  The patient will follow-up with their PCP in 1-2 days for reevaluation.       MEDICATIONS ADMINISTERED IN ED:  Medications   sodium chloride 0.9 % flush 10 mL (has no administration in time range)   aspirin chewable tablet 324 mg (has no administration in time range)   iopamidol (ISOVUE-370) 76 % injection 100 mL (78 mL Intravenous Given 8/3/20 0139)       PROCEDURES:  Procedures    CRITICAL CARE TIME    Total Critical Care time was 0 minutes, excluding separately reportable procedures.   There was a high probability of clinically significant/life threatening deterioration in the patient's condition which required my urgent intervention.      FINAL IMPRESSION      1. Chest pain, unspecified type    2. Shortness of breath          DISPOSITION/PLAN     ED Disposition     ED Disposition Condition Comment    Discharge Stable           PATIENT REFERRED TO:  Sanna Herr, JAY  [address]  [address]  [place] [address]  [phone]    In 2 days      Daryn Thomson MD  [address]  [address]  [place] [address]  [phone]    Schedule an appointment as soon as possible for a visit   Pulmonologist - Lung Specialist    [facility] Emergency Department  [address] [address] [phone]    If symptoms worsen      DISCHARGE MEDICATIONS:     Medication List      START taking these medications    albuterol sulfate  (90 Base) MCG/ACT inhaler  Commonly known as:  PROVENTIL HFA;VENTOLIN HFA;PROAIR HFA  Inhale 2 puffs Every 4 (Four)  Hours As Needed for Wheezing or Shortness of   Air.        CONTINUE taking these medications    omeprazole 40 MG capsule  Commonly known as:  priLOSEC  Take 1 capsule by mouth 2 (Two) Times a Day Before Meals. Take a half hour   before breakfast     vitamin D 1.25 MG (00460 UT) capsule capsule  Commonly known as:  ERGOCALCIFEROL  Take 1 capsule by mouth 1 (One) Time Per Week.        STOP taking these medications    nystatin-triamcinolone 945383-4.1 UNIT/GM-% ointment  Commonly known as:  MYCOLOG              Comment: Please note this report has been produced using speech recognition software.      Pierce Harrison DO  Attending Emergency Physician               Pierce Harrison DO  08/03/20 [id number]

## 2020-08-11 DIAGNOSIS — R06.02 SHORTNESS OF BREATH: Primary | ICD-10-CM

## 2020-09-15 PROBLEM — R07.89 OTHER CHEST PAIN: Status: ACTIVE | Noted: 2020-09-15

## 2020-10-14 ENCOUNTER — OFFICE VISIT (OUTPATIENT)
Dept: PULMONOLOGY | Facility: CLINIC | Age: 30
End: 2020-10-14

## 2020-10-14 VITALS
BODY MASS INDEX: 32.5 KG/M2 | OXYGEN SATURATION: 97 % | HEART RATE: 92 BPM | WEIGHT: 219.4 LBS | DIASTOLIC BLOOD PRESSURE: 80 MMHG | SYSTOLIC BLOOD PRESSURE: 110 MMHG | TEMPERATURE: 98.2 F | HEIGHT: 69 IN

## 2020-10-14 DIAGNOSIS — Z87.891 FORMER SMOKER: ICD-10-CM

## 2020-10-14 DIAGNOSIS — K21.9 CHRONIC GERD: ICD-10-CM

## 2020-10-14 DIAGNOSIS — J30.89 SEASONAL AND PERENNIAL ALLERGIC RHINITIS: ICD-10-CM

## 2020-10-14 DIAGNOSIS — J30.2 SEASONAL AND PERENNIAL ALLERGIC RHINITIS: ICD-10-CM

## 2020-10-14 DIAGNOSIS — R06.02 SHORTNESS OF BREATH: Primary | ICD-10-CM

## 2020-10-14 DIAGNOSIS — K22.2 ESOPHAGEAL STRICTURE: ICD-10-CM

## 2020-10-14 DIAGNOSIS — R07.89 OTHER CHEST PAIN: ICD-10-CM

## 2020-10-14 PROCEDURE — 99204 OFFICE O/P NEW MOD 45 MIN: CPT | Performed by: INTERNAL MEDICINE

## 2020-10-14 NOTE — PROGRESS NOTES
PULMONARY  NOTE    Chief Complaint     Dyspnea, chest discomfort, cough, former smoker, reflux, esophageal stricture    History of Present Illness     30-year-old Nepalese male referred for evaluation of dyspnea, cough, and chest discomfort  He was seen today in the office with the assistance of a professional     He has a history of tobacco abuse that resolved about 5 years ago.    He has no prior history of known lung disease.    His symptoms have occurred earlier this year and have resulted in multiple emergency room and physician visits.  He has had a cardiac evaluation, has seen an allergist, ENT, and gastroenterology.  This is in addition to his PCP, and also being referred to our office.    He has shortness of breath that typically will come on suddenly.  It will occur at rest, with exertion, and also many times with sleep.  Sometimes he has severe symptoms and other times not  At least on 2 occasions when his symptoms have been severe, he has gone to the emergency room where he has been evaluated with CT angiograms as noted below.    He is also noted a cough for at least several months.  He coughs on a daily basis.  He produces no sputum and has never had hemoptysis.  He also has been sneezing.    He has undergone an EGD which revealed esophagitis, and esophageal stricture, and gastritis.  He underwent an esophageal dilation and was placed on twice a day PPI.  Despite that, he does not follow reflux precautions.    He has seen an allergist but no immunotherapy has been recommended.    He saw ENT and apparently a nasal spray was recommended.    Patient Active Problem List   Diagnosis   • Behcet's disease (CMS/HCC)   • Fatigue   • Migraine without aura and without status migrainosus, not intractable   • Impaired swallowing associated with throat pain   • Recurrent oral ulcers   • Ulcers of genital organ in male   • Other chest pain   • Shortness of breath   • Chronic GERD   • Esophageal stricture  "(S/P Dilation 2020)   • Former smoker (None since )   • Allergic rhinitis     No Known Allergies    Current Outpatient Medications:   •  albuterol sulfate  (90 Base) MCG/ACT inhaler, Inhale 2 puffs Every 4 (Four) Hours As Needed for Wheezing or Shortness of Air., Disp: 1 inhaler, Rfl: 0  •  omeprazole (priLOSEC) 40 MG capsule, Take 1 capsule by mouth 2 (Two) Times a Day Before Meals. Take a half hour before breakfast, Disp: 60 capsule, Rfl: 11  •  vitamin D (ERGOCALCIFEROL) 1.25 MG (72411 UT) capsule capsule, Take 1 capsule by mouth 1 (One) Time Per Week., Disp: 4 capsule, Rfl: 3  MEDICATION LIST AND ALLERGIES REVIEWED.    Family History   Problem Relation Age of Onset   • No Known Problems Mother    • No Known Problems Father    • Heart attack Maternal Grandfather    • Heart disease Maternal Grandfather    • No Known Problems Paternal Grandmother    • No Known Problems Paternal Grandfather      Social History     Tobacco Use   • Smoking status: Former Smoker     Quit date:      Years since quittin.7   • Smokeless tobacco: Never Used   Substance Use Topics   • Alcohol use: No     Frequency: Never   • Drug use: No     Social History     Social History Narrative    Caffeine: 2 cups coffee daily        Has 3 children    Emigrated from [place] 4 years ago    Previously worked in taxi maintenance prior to Covid, now going to school    Smoked cigarettes up till 5 years ago    No history of drug use     FAMILY AND SOCIAL HISTORY REVIEWED.    Review of Systems  ALSO REFER TO SCANNED ROS SHEET FROM SAME DATE.    /80   Pulse 92   Temp 98.2 °F (36.8 °C)   Ht 175 cm (68.9\")   Wt 99.5 kg (219 lb 6.4 oz)   SpO2 97% Comment: resting at room air  BMI 32.49 kg/m²   Physical Exam  Vitals signs and nursing note reviewed.   Constitutional:       General: He is not in acute distress.     Appearance: He is well-developed. He is not diaphoretic.   HENT:      Head: Normocephalic and atraumatic.   Neck: "      Thyroid: No thyromegaly.   Cardiovascular:      Rate and Rhythm: Normal rate and regular rhythm.      Heart sounds: Normal heart sounds. No murmur.   Pulmonary:      Effort: Pulmonary effort is normal.      Breath sounds: Normal breath sounds. No stridor.   Abdominal:      General: Bowel sounds are normal.      Palpations: Abdomen is soft.   Musculoskeletal: Normal range of motion.   Lymphadenopathy:      Cervical: No cervical adenopathy.      Upper Body:      Right upper body: No supraclavicular or epitrochlear adenopathy.      Left upper body: No supraclavicular or epitrochlear adenopathy.   Skin:     General: Skin is warm and dry.   Neurological:      Mental Status: He is alert and oriented to person, place, and time.   Psychiatric:         Behavior: Behavior normal.         Results     CT scans of the chest from 5/4/2020 and 8/3/2020 reviewed on PACS.  No pulmonary emboli and no intrathoracic abnormalities    Chest x-ray from 8/3/2020 reviewed on PACS.  No infiltrates, consolidation or effusions    Problem List       ICD-10-CM ICD-9-CM   1. Shortness of breath  R06.02 786.05   2. Other chest pain  R07.89 786.59   3. Former smoker (None since 2015)  Z87.891 V15.82   4. Esophageal stricture (S/P Dilation 6/2020)  K22.2 530.3   5. Chronic GERD  K21.9 530.81   6. Allergic rhinitis  J30.89 477.9    J30.2        Discussion     His exam and chest imaging is unremarkable.  His symptoms are atypical for obstructive airways disease.  He has no evidence of restrictive lung disease.  Repeated CT angiograms have revealed no evidence of pulmonary emboli.    I suspect that reflux and esophageal disease is contributing some to his symptoms.  Despite having what appears to be quite severe esophagitis, reflux, and gastritis he is not following reflux precautions.  We spent quite a bit of time discussing reflux precautions and I gave him a reflux information sheet.    To further evaluate his dyspnea, I have recommended a  cardiopulmonary exercise test.  We will facilitate that.    I will plan to see him back after the CPX.    Dereck Thomson MD  Note electronically signed    CC: Sanna Herr APRN

## 2020-10-15 DIAGNOSIS — R06.02 SHORTNESS OF BREATH: Primary | ICD-10-CM

## 2020-11-18 ENCOUNTER — OFFICE VISIT (OUTPATIENT)
Dept: INTERNAL MEDICINE | Facility: CLINIC | Age: 30
End: 2020-11-18

## 2020-11-18 VITALS
WEIGHT: 215.8 LBS | RESPIRATION RATE: 16 BRPM | SYSTOLIC BLOOD PRESSURE: 122 MMHG | OXYGEN SATURATION: 98 % | HEART RATE: 88 BPM | TEMPERATURE: 97.7 F | DIASTOLIC BLOOD PRESSURE: 70 MMHG | BODY MASS INDEX: 31.96 KG/M2 | HEIGHT: 69 IN

## 2020-11-18 DIAGNOSIS — K22.2 ESOPHAGEAL STRICTURE: ICD-10-CM

## 2020-11-18 DIAGNOSIS — Z23 NEEDS FLU SHOT: ICD-10-CM

## 2020-11-18 DIAGNOSIS — K21.00 GASTROESOPHAGEAL REFLUX DISEASE WITH ESOPHAGITIS, UNSPECIFIED WHETHER HEMORRHAGE: Primary | ICD-10-CM

## 2020-11-18 DIAGNOSIS — E55.9 VITAMIN D DEFICIENCY: ICD-10-CM

## 2020-11-18 PROBLEM — J30.2 SEASONAL AND PERENNIAL ALLERGIC RHINITIS: Status: RESOLVED | Noted: 2020-10-14 | Resolved: 2020-11-18

## 2020-11-18 PROBLEM — R53.83 FATIGUE: Status: RESOLVED | Noted: 2019-09-25 | Resolved: 2020-11-18

## 2020-11-18 PROBLEM — R06.02 SHORTNESS OF BREATH: Status: RESOLVED | Noted: 2020-10-14 | Resolved: 2020-11-18

## 2020-11-18 PROBLEM — J30.89 SEASONAL AND PERENNIAL ALLERGIC RHINITIS: Status: RESOLVED | Noted: 2020-10-14 | Resolved: 2020-11-18

## 2020-11-18 PROBLEM — K13.79 RECURRENT ORAL ULCERS: Status: RESOLVED | Noted: 2019-01-11 | Resolved: 2020-11-18

## 2020-11-18 PROBLEM — R07.89 OTHER CHEST PAIN: Status: RESOLVED | Noted: 2020-09-15 | Resolved: 2020-11-18

## 2020-11-18 PROCEDURE — 99214 OFFICE O/P EST MOD 30 MIN: CPT | Performed by: FAMILY MEDICINE

## 2020-11-18 PROCEDURE — 90686 IIV4 VACC NO PRSV 0.5 ML IM: CPT | Performed by: FAMILY MEDICINE

## 2020-11-18 PROCEDURE — 90471 IMMUNIZATION ADMIN: CPT | Performed by: FAMILY MEDICINE

## 2020-11-18 RX ORDER — ERGOCALCIFEROL 1.25 MG/1
50000 CAPSULE ORAL WEEKLY
Qty: 4 CAPSULE | Refills: 3 | Status: SHIPPED | OUTPATIENT
Start: 2020-11-18 | End: 2021-06-08

## 2020-11-18 NOTE — PATIENT INSTRUCTIONS
ãÑÖ ÇáÌÒÑ ÇáãÚÏí ÇáãÑíÆí áÏì ÇáÈÇáÛíä  Gastroesophageal Reflux Disease, Adult    íÚäí LAMONT (ÇáÇÑÊÌÇÚ ÇáãÚÏí ÇáãÑíÆí) ÊÏÝÞ ÇáÍãÖ ãä ãÚÏÊß Åáì ÇáÃäÈæÈ ÇáÐí íÕá ÇáÝã ÈÇáãÚÏÉ (ÇáãÑíÁ). ÝÇáØÚÇã íäÊÞá ÚÇÏÉð Åáì ÇáÃÓÝá ÎáÇá ÇáãÑíÁ æíÈÞì Ýí ÇáãÚÏÉ áíõåÖã. áßä ÚäÏ ÍÏæË ÇáÇÑÊÌÇÚ ÇáãÚÏí ÇáãÑíÆí¡ ÝÅä ÇáØÚÇã æÍãÖ ÇáãÚÏÉ íÑÊÝÚÇä áÃÚáì æíÏÎáÇä ÇáãÑíÁ. ÃãÇ ÅÐÇ ÊÝÇÞãÊ ÇáãÔßáÉ¡ ÝÞÏ ÊõÔÎÕ ÇáÍÇáÉ Úáì ÅäåÇ ãÑÖ ÇáÇÑÊÌÇÚ ÇáãÚÏí ÇáãÑíÆí. Ðáß ÅÐÇ ßÇä ÇáÇÑÊÌÇÚ:  • ßËíÑ ÇáÍÏæË.   • íÓÈÈ ÃÚÑÇÖðÇ ãÊßÑÑÉ Ãæ ÔÏíÏÉ.   • íÓÈÈ ãÔÇßá ãËá ÊáÝ ÈÇáãÑíÁ.  ÝÚäÏãÇ íáÇãÓ ÇáÍãÖ ÇáãÑíÁ¡ ÝÅäå ÞÏ íÓÈÈ ÃáãðÇ (ÇáÊåÇÈðÇ) Ýí ÇáãÑíÁ. æÈãÑæÑ ÇáæÞÊ¡ íãßä Ãä íÓÈÈ ãÑÖ ÇáÇÑÊÌÇÚ ÇáãÚÏí ÇáãÑíÆí ÙåæÑ ÝÊÍÇÊ ÕÛíÑÉ (ÞÑÍ) Úáì ÈØÇäÉ ÇáãÑíÁ.  ãÇ ÃÓÈÇÈ ÇáÍÇáÉ¿  ÊÍÏË åÐå ÇáÍÇáÉ ÈÓÈÈ ãÔßáÉ Ýí ÇáÚÖáÇÊ Èíä ÇáãÑíÁ æÇáãÚÏÉ (ÇáãÕÑÉ ÇáãÑíÆíÉ ÇáÓÝáíÉ¡ Ãæ LES). æÊäÛáÞ ÚÖáÉ ÇáãÕÑÉ ÇáãÑíÆíÉ ÇáÓÝáíÉ ÚÇÏÉð ÈÚÏ ãÑæÑ ÇáØÚÇã ãä ÎáÇá ÇáãÑíÁ Åáì ÇáãÚÏÉ. áßä ÚäÏãÇ ÊÖÚÝ ÚÖáÉ ÇáãÕÑÉ ÇáãÑíÆíÉ ÇáÓÝáíÉ Ãæ ÊÕÈÍ ÛíÑ ØÈíÚíÉ¡ ÝÅäåÇ áÇ ÊäÛáÞ ÇäÛÇáÇð ÓáíãðÇ¡ ããÇ íÓãÍ ÈÕÚæÏ ÇáØÚÇã æÍãÖ ÇáãÚÏÉ Åáì ÇáãÑíÁ.  æíãßä Ãä ÊÖÚÝ ÚÖáÉ ÇáãÕÑÉ ÇáãÑíÆíÉ ÇáÓÝáíÉ ãä ÎáÇá ãæÇÏ ÛÐÇÆíÉ ãÚíäÉ æÃÏæíÉ æÍÇáÇÊ ØÈíÉ¡ ÈãÇ Ýí Ðáß:  • ÇÓÊÎÏÇã ÇáÊÈÛ.  • ÇáÍãá.  • ÝÊÞ ÍÌÇÈí.  • ÊäÇæá ÇáßÍæáíÇÊ.  • ÈÚÖ ÇáãÃßæáÇÊ æÇáãÔÑæÈÇÊ ÇáãÚíäÉ¡ ãËá ÇáÞåæÉ æÇáÔíßæáÇÊÉ æÇáÈÕá æÇáäÚäÇÚ.  ãÇ ÚæÇãá ÒíÇÏÉ ÇáÎØÑ¿  íÒÏÇÏ ÇÍÊãÇá ÇáÅÕÇÈÉ ÈåÐå ÇáÍÇáÉ Ýí ÇáÍÇáÇÊ ÇáÊÇáíÉ:  • ÒíÇÏÉ ÇáæÒä.  • ÇáÅÕÇÈÉ ÈÇÖØÑÇÈ íÄËÑ Ýí ÇáÃäÓÌÉ ÇáÖÇãÉ.  • ÊäÇæá NSAID (ãÖÇÏÇÊ ÇáÇáÊåÇÈ ÇááÇÓÊíÑæíÏíÉ).  ãÇ ÚáÇãÇÊ ÇáÍÇáÉ Ãæ ÃÚÑÇÖåÇ¿  ÊÔãá ÃÚÑÇÖ åÐå ÇáÍÇáÉ ãÇ íáí:  • ÍÑÞÉ Ýí Ýã ÇáãÚÏÉ.  • ÕÚæÈÉ Ãæ Ãáã Ýí ÇáÈáÛ.  • ÇáÔÚæÑ ÈæÌæÏ ÌáØÉ Ýí ÇáÍáÞ.  • ÇáÅÍÓÇÓ ÈØÚã ãÑÇÑÉ Ýí ÇáÝã.  • ãÔßáÇÊ Ýí ÇáÊäÝÓ.  • æÌæÏ ßãíÉ ßÈíÉ ãä ÇááÚÇÈ.  • ÇáÅÕÇÈÉ ÈÇÖØÑÇÈÇÊ Ýí ÇáãÚÏÉ Ãæ ÇäÊÝÇÎ.  • ÊÌÔÄ.  • Ãáã ÈÇáÕÏÑ. æíãßä Ãä íÍÏË Ãáã ÇáÕÏÑ ÈÓÈÈ ÚÏÉ ÍÇáÇÊ ãÎÊáÝÉ. áÐá íÌÈ ÇáÍÑÕ Úáì ãÑÇÌÚÉ ãÞÏã ÇáÑÚÇíÉ ÇáÕÍíÉ ÇáãÊÇÈÚ áß ÅÐÇ ßäÊ ÊÔÚÑ ÈÃáã Ýí ÇáÕÏÑ.  • ÖíÞ ÇáäÝÓ ææÌæÏ ÕÝíÑ ÚäÏ ÇáÊäÝÓ.  • ÓÚÇá ãÓÊãÑ (ãÒãä) Ãæ ÓÚÇá Ýí ÃæÞÇÊ  Çááíá.  • Èáì ãíäÇÁ ÇáÃÓäÇä.  • ÝÞÏÇä ÇáæÒä.  ßíÝ ÊõÔÎÕ åÐå ÇáÍÇáÉ¿  íÊæáì ãÞÏã ÇáÑÚÇíÉ ÇáÕÍíÉ ÇáÎÇÕ Èß ÈÊÓÌíá ÇáÊÇÑíÎ ÇáãÑÖí æÅÌÑÇÁ ÝÍÕ ØÈí. áÊÍÏíÏ ãÇ ÅÐÇ ßäÊ ÊÚÇäí ãä ãÑÖ ÇáÌÒÑ ÇáãÚÏí ÇáãÑíÆí ÎÝíÝ Ãæ ÍÇÏ¡ ÞÏ íÍÊÇÌ ãÞÏã ÇáÑÚÇíÉ ÇáÕÍíÉ Åáì ãÑÇÞÈÉ ÇÓÊÌÇÈÊß ááÚáÇÌ. ßãÇ ÞÏ ÊõÌÑì áß ÃíÖðÇ ÝÍæÕ¡ ÈãÇ Ýí Ðáß:  • ÇÎÊÈÇÑ ÝÍÕ ÇáãÚÏÉ æÇáãÑíÁ ÈÇÓÊÎÏÇã ÌåÇÒ Èå ßÇãíÑÇ ÕÛíÑÉ (ÊäÙíÑ ÏÇÎáí).  • ÇÎÊÈÇÑ áÞíÇÓ ãÓÊæì ÇáÍãæÖÉ Ýí ÇáãÑíÁ.  • ÇÎÊÈÇÑ áÞíÇÓ ãÏì ÇáÖÛØ ÇáãæÌæÏ Úáì ÇáãÑíÁ.  • ÝÍÕ ÈáÚ ÇáÈÇÑíæã Ãæ ÈáÚ ÇáÈÇÑíæã ÇáãÚÏá áÝÍÕ Ôßá ÇáãÑíÁ æÍÌãå æßÝÇÁÉ Úãáå.  ßíÝ ÊõÚÇáÌ åÐå ÇáÍÇáÉ¿  íßãä ÇáåÏÝ ãä ÇáÚáÇÌ Ýí ãÓÇÚÏÊß Úáì ÊÎÝíÝ ÇáÃÚÑÇÖ æÇáæÞÇíÉ ãä ÇáãÖÇÚÝÇÊ. ÊÎÊáÝ ØÑíÞÉ ÚáÇÌ åÐå ÇáÍÇáÉ ÇÚÊãÇÏðÇ Úáì ãÏì ÍÏÉ ÇáÃÚÑÇÖ. æÞÏ íæÕí ãÞÏã ÇáÑÚÇíÉ ÇáÕÍíÉ ÈãÇ íáí:  • ÊÛííÑÇÊ Ýí äÙÇãß ÇáÛÐÇÆí.  • ÇáÃÏæíÉ.  • ÇáÌÑÇÍÉ.  íÌÈ ÇÊÈÇÚ åÐå ÇáÊÚáíãÇÊ Ýí ÇáãäÒá:  ÇáÃßá æÇáÔÑÈ    • íÌÈ ÇÊÈÇÚ ÇáäÙÇã ÇáÛÐÇÆí ÇáÐí ÃæÕì Èå ãõÞÏøöã ÇáÑÚÇíÉ ÇáÕÍíÉ ÇáãÊÇÈÚ áß. æåæ ãÇ ÞÏ íÔãá ÊÌäÈ ÈÚÖ ÇáãÃßæáÇÊ æÇáãÔÑæÈÇÊ¡ ãËá:  ? ÇáÞåæÉ æÇáÔÇí (ÈÇáßÇÝííä Ãæ ÇáÎÇáííä ãäå).  ? ÇáãÔÑæÈÇÊ ÇáãÍÊæíÉ Úáì ßÍæá.  ? ãÔÑæÈÇÊ ÇáØÇÞÉ æÇáãÔÑæÈÇÊ ÇáÑíÇÖíÉ.  ? ÇáãÔÑæÈÇÊ ÇáÛÇÒíÉ Ãæ ÇáÕæÏÇ.  ? ÇáÔæßæáÇÊÉ æÇáßÇßÇæ.  ? äßåÇÊ ÇáÝáÝá æÇáäÚäÇÚ.  ? ÇáËæã æÇáÈÕá.  ? ÇáÝÌá ÇáÍÇÑ.  ? ÇáÃØÚãÉ ÇáÍÑíÝÉ Ãæ ÇáÍãÖíÉ¡ æãä ÈíäåÇ ÇáÈåÇÑÇÊ æãÓÍæÞ ÇáÝáÝá ÇáÍÇÑ æãÓÍæÞ ÇáßÇÑí æÇáÎá æÇáÕáÕÉ ÇáÍÇÑÉ æÕáÕÉ ÇáÈÇÑÈßíæ.  ? ÚÕÇÆÑ ÇáÝæÇßå ÇáÍãÖíÉº ãËá ÇáÈÑÊÞÇá æÇááíãæä æÇááíãæä ÇáÍÇãÖ.  ? ÇáÃØÚãÉ ÇáÊí ÊÚÊãÏ Úáì ÇáØãÇØã ãËá ÇáÕáÕÉ æÇáÝáÝá ÇáÍÇÑ æÇáÓáØÉ æÇáÈíÊÒÇ ÇáãÒæÏÉ ÈÇáÕáÕÉ ÇáÍãÑÇÁ.  ? ÇáÃØÚãÉ ÇáãÞáíÉ æÇáÛäíÉ ÈÇáÏåæä¡ ãËá ÇáÏæäÇÊÓ æÇáÈØÇØÓ ÇáãÞáíÉ æÔÑÇÆÍ ÇáÈØÇØÓ æÇáÊÊÈíáÇÊ ÚÇáíÉ ÇáÏåæä.  ? ÇááÍæã ÚÇáíÉ ÇáÏåæä¡ ãËá ÇáåæÊ ÏæÌ æÇááÍæã ÇáÍãÑÇÁ æÇáÈíÖÇÁ ÇáÛäíÉ ÈÇáÏåæä¡ ãËá ÔÑÇÆÍ ÇáÖáæÚ æÇáÓæÓíÓ æÇáßÝá æÇááÍã ÇáãÞÏÏ.  ? ãäÊÌÇÊ ÇáÃáÈÇä ÚÇáíÉ ÇáÏåæä¡ ãËá ÇáÍáíÈ ßÇãá ÇáÏÓã æÇáÒÈÏ æÇáÌÈä ÇáßÑíãí.  • íÌÈ ÊäÇæá æÌÈÇÊ ÕÛíÑÉ æãÊßÑÑÉ ÈÏáÇð ãä ÇáæÌÈÇÊ ÇáßÈíÑÉ.  • íÌÈ ÊÌäÈ ÔÑÈ ßãíÇÊ ßÈíÑÉ ãä ÇáÓæÇÆá ãÚ ÇáæÌÈÇÊ.  • íÌÈ ÊÌäÈ ÊäÇæá æÌÈÇÊ ÎáÇá 2-3 ÓÇÚÇÊ  ÞÈá Çáäæã.  • íÌÈ ÊÌäÈ ÇáÇÓÊáÞÇÁ ãÈÇÔÑÉ ÈÚÏ ÇáÃßá.  • ýíÊÚíä ÚÏã ããÇÑÓÉ ÇáÊãÑíäÇÊ ÇáÑíÇÖíÉ ÈÚÏ ÇáÃßá ãÈÇÔÑÉ.  äãØ ÇáÍíÇÉ    • íÊÚíä ÚÏã ÇÓÊÎÏÇã ãäÊÌÇÊ ÊÍÊæí Úáì ÇáäíßæÊíä Ãæ ÇáÊÈÛ¡ ãËá ÇáÓÌÇÆÑ æÇáÓÌÇÆÑ ÇáÅáßÊÑæäíÉ æÊÈÛ ÇáãÖÛ. íãßä ÇÓÊÔÇÑÉ ãÞÏã ÇáÑÚÇíÉ ÇáÕÍíÉ ÅÐÇ ÇÍÊÌÊ Åáì ÇáãÓÇÚÏÉ ááÅÞáÇÚ Úä ÇáÊÏÎíä.  • íÌÈ ãÍÇæáÉ ÇáÊÞáíá ãä ÇáÖÛæØ ÇáÊí ÊÊÚÑÖ áåÇ æãÓÊæì ÇáÊæÊÑ áÏíß ÈÇÊÈÇÚ æÓÇÆá ãËá ããÇÑÓÉ ÇáíæÌÇ Ãæ ÇáÊÃãá. ÃãÇ Ýí ÍÇáÉ ÇáÍÇÌÉ áãÓÇÚÏÉ áÎÝÖ ãÓÊæì ÇáÅÌåÇÏ¡ Ýíãßä ÇÓÊÔÇÑÉ ãõÞÏã ÇáÑÚÇíÉ ÇáÕÍíÉ.  • Ýí ÍÇáÉ ÒíÇÏÉ ÇáæÒä¡ ÝíÌÈ ÎÝÖ ÇáæÒä Åáì ÇáæÒä ÇáÕÍí. íÌÈ ÇÓÊÔÇÑÉ æØáÈ ÊæÌíåÇÊ ãÞÏã ÇáÑÚÇíÉ ÇáÕÍíÉ Íæá ßíÝíÉ ÎÝÖ ÇáæÒä ÈØÑíÞÉ ÕÍíÉ æÂãäÉ.  ÊÚáíãÇÊ ÚÇãÉ  • íÊÚíä ÇáÇäÊÈÇå áÌãíÚ ÇáÊÛíÑÇÊ ÇáÊí ÊØÑÃ Úáì ÇáÃÚÑÇÖ ÇáÊí ÊÚÇäíåÇ.  • íÊÚíä ÊäÇæá ÇáÃÏæíÉ ÇáÊí ÊõÕÑÝ ÈæÕÝÉ ØÈíÉ Ãæ Ïæä æÕÝÉ ØÈíÉ æÝÞðÇ áãÇ íÎÈÑßö Èå ãÞÏã ÇáÑÚÇíÉ ÇáÕÍíÉ ÇáÎÇÕ Èß. íÊÚíä ÚÏã ÊäÇæá ÇáÃÓÈÑíä Ãæ ÇáÃíÈæÈÑæÝíä Ãæ ÛíÑå ãä ãÖÇÏÇÊ ÇáÇáÊåÇÈÇÊ ÇááÇÓÊíÑæíÏíÉ ÅáÇ ÈãæÇÝÞÉ ãÞÏã ÇáÑÚÇíÉ.  • íÌÈ ÇÑÊÏÇÁ ãáÇÈÓ æÇÓÚÉ. æíÌÈ ÚÏã ÇÑÊÏÇÁ ÔíÁ ÖíÞ Íæá ÇáÎÕÑ ÞÏ íÖÛØ Úáì ÇáãÚÏÉ.  • íÌÈ ÑÝÚ (ÅÚáÇÁ) ãÞÏãÉ ÝÑÇÔß áãÓÇÝÉ 6 ÈæÕÇÊ ÈÇáÊÞÑíÈ (15 Óã).  • ßÐáß ÊÌäÈ ÇáÇäÍäÇÁ ÅÐÇ ßÇä íÄÏí Åáì ÒíÇÏÉ ÔÏÉ ÇáÃÚÑÇÖ.  • íÊÚíä ÇáÇáÊÒÇã ÈÌãíÚ ãæÇÚíÏ ÇáãÊÇÈÚÉ æÝÞðÇ áÊæÌíåÇÊ ãÞÏã ÇáÑÚÇíÉ ÇáÕÍíÉ. ÝåÐÇ ÃãÑ ãåã.  íÌÈ ÇáÇÊÕÇá ÈãÞÏã ÇáÑÚÇíÉ ÇáÕÍíÉ Ýí ÇáÍÇáÇÊ ÇáÊÇáíÉ:  • Ýí ÍÇáÉ ÇáÅÕÇÈÉ ÈÃí ããÇ íáí:  ? ÃÚÑÇÖ ÌÏíÏÉ.  ? ÝÞÏÇä ÇáæÒä Ïæä ÓÈÈ ãÝåæã.  ? ÕÚæÈÉ Ýí ÇáÈáÚ Ãæ Ãáã ÚäÏ ÇáÈáÚ.  ? æÌæÏ ÕæÊ ÃÒíÒ ÚäÏ ÇáÊäÝÓ Ãæ ÓÚÇá ãÓÊãÑ.  ? ÈÍÉ Ýí ÇáÕæÊ.  • ÚÏã ÇáÔÚæÑ ÈÊÍÓä ãÚ ÊáÞí ÇáÚáÇÌ.  íÊÚíä ØáÈ ÇáãÓÇÚÏÉ ÝæÑðÇ Ýí ÇáÍÇáÇÊ ÇáÊÇáíÉ:  • ÇáÔÚæÑ ÈÃáã Ýí ÇáÐÑÇÚíä Ãæ ÇáÚäÞ Ãæ ÇáÝß Ãæ ÇáÃÓäÇä Ãæ ÇáÙåÑ.  • ÇáÊÚÑÞ¡ Ãæ ÇáÔÚæÑ ÈÏæÇÑ Ãæ ÏæÎÉ.  • ÇáÅÕÇÈÉ ÈÃáã Ýí ÇáÕÏÑ Ãæ ÖíÞ Ýí ÇáÊäÝÓ.  • ÇáÊÞíÄ ÞíÆðÇ íÔÈå ÇáÏã Ãæ ÊÝá ÇáÞåæÉ.  • ÇáÅÛãÇÁ.  • ÅÎÑÇÌ ÈÑÇÒ ãÏãã Ãæ ÃÓæÏ.  • ÚÏã ÇáÞÏÑÉ Úáì ÇáÈáÚ Ãæ ÇáÔÑÈ Ãæ ÇáÃßá.  ãáÎÕ  • íÍÏË ÇÑÊÌÇÚ ÇáãÚÏÉ ÇáãÑíÆí ÚäÏ ÕÚæÏ ÇáÍãÖ ãä ÇáãÚÏÉ Åáì ÏÇÎá ÇáãÑíÁ. ãÑÖ ÇáÇÑÊÌÇÚ ÇáãÚÏí ÇáãÑíÆí ÍÇáÉ íÍÏË ÝíåÇ ÇáÇÑÊÌÇÚ ßËíÑðÇ¡ Ãæ  íÓÈÈ ÃÚÑÇÖðÇ ãÊßÑÑÉ Ãæ ÔÏíÏÉ¡ Ãæ íÓÈÈ ãÔÇßá ãËá ÊáÝ ÈÇáãÑíÁ.  • ÊÎÊáÝ ØÑíÞÉ ÚáÇÌ åÐå ÇáÍÇáÉ ÇÚÊãÇÏðÇ Úáì ãÏì ÍÏÉ ÇáÃÚÑÇÖ. æÞÏ íæÕí ãÞÏã ÇáÑÚÇíÉ ÇáÕÍíÉ ÈÅÌÑÇÁ ÊÛííÑÇÊ Ýí ÇáäÙÇã ÇáÛÐÇÆí æäãØ ÇáÍíÇÉ¡ Ãæ íÕÝ ÏæÇÁð¡ Ãæ íæÕí ÈÅÌÑÇÁ ÇáÌÑÇÍÉ.  • íÌÈ ÇáÇÊÕÇá ÈãõÞÏã ÇáÑÚÇíÉ ÇáÕÍíÉ ÅÐÇ ÙåÑÊ Úáíß ÃÚÑÇÖ ÌÏíÏÉ Ãæ ÒÇÏÊ ÔÏÉ ÇáÃÚÑÇÖ ÇáÊí ÊÚÇäí ãäåÇ.  • íÊÚíä ÊäÇæá ÇáÃÏæíÉ ÇáÊí ÊõÕÑÝ ÈæÕÝÉ ØÈíÉ Ãæ Ïæä æÕÝÉ ØÈíÉ æÝÞðÇ áãÇ íÎÈÑßö Èå ãÞÏã ÇáÑÚÇíÉ ÇáÕÍíÉ ÇáÎÇÕ Èß. íÊÚíä ÚÏã ÊäÇæá ÇáÃÓÈÑíä Ãæ ÇáÃíÈæÈÑæÝíä Ãæ ÛíÑå ãä ãÖÇÏÇÊ ÇáÇáÊåÇÈÇÊ ÇááÇÓÊíÑæíÏíÉ ÅáÇ ÈãæÇÝÞÉ ãÞÏã ÇáÑÚÇíÉ.  • íÊÚíä ÇáÇáÊÒÇã ÈÌãíÚ ãæÇÚíÏ ÇáãÊÇÈÚÉ æÝÞðÇ áÊæÌíåÇÊ ãÞÏã ÇáÑÚÇíÉ ÇáÕÍíÉ. ÝåÐÇ ÃãÑ ãåã.  áíÓ ÇáåÏÝ ãä åÐå ÇáãÚáæãÇÊ Ãä Êßæä ÈÏíáÇð ááÅÑÔÇÏÇÊ ÇáÊí íÞÏãåÇ ãæÝÑ ÇáÑÚÇíÉ ÇáÕÍíÉ. ÊÃßÏ ãä ãäÇÞÔÉ ÃíÉ ÃÓÆáÉ ÊÏæÑ Ýí Ðåäß ãÚ ãæÝÑ ÇáÑÚÇíÉ ÇáÕÍíÉ.þ  Document Released: 08/21/2012 Document Revised: 07/26/2019 Document Reviewed: 07/26/2019  [employer] Patient Education © 2020 [employer]

## 2020-11-18 NOTE — PROGRESS NOTES
Subjective     Meliton Lagos is a 30 y.o. male.     Chief Complaint   Patient presents with   • Establish Care     stomach issues       History of Present Illness           Gastroesophageal Reflux   Patient complains of belching, heartburn and nausea. This is a recurrent problem. The current episode started more than 1 year ago. The problem occurs frequently. The heartburn duration is several minutes. The heartburn is located in the substernum.   The heartburn does not wake pt from sleep. The heartburn limits pt activity.   The heartburn changes with position.   The symptoms are aggravated by certain foods, lying down and caffeine.   Pertinent negatives include no melena.   He has a history of tobacco abuse that resolved about 5 years ago.  He had a cardiac evaluation, allergist, ENT, and gastroenterology evaluation for his sx , still not resolved .  This is in addition to his PCP, and also being referred to our office.   There are no known risk factors.   He had EGD which revealed esophagitis with esophageal stricture, and gastritis s/p esophageal dilation and was placed on twice a day PPI.  Patient was on PPI for the symptoms. The treatment provided some relief.       Also has intermittent oral and penile lesions, arthralgias and myalgias , was concern about having an autoimmune disease. He was seen by [facility] and was evaluated 11/7/19 for possible Behcet's disease . To date, patient has not been diagnosed with any rheumatoid or autoimmune diseases.  His labs showed low vit d , WAS ON 1 MONTH SUPPLY ONLY FOR VIT D       The following portions of the patient's history were reviewed and updated as appropriate: allergies, current medications, past family history, past medical history, past social history, past surgical history and problem list.    Review of Systems   Constitutional: Negative for activity change, appetite change, chills and fever.   HENT: Negative for congestion, ear discharge, ear pain,  rhinorrhea, sinus pressure, sneezing, sore throat, swollen glands and trouble swallowing.    Eyes: Negative for blurred vision, double vision, discharge and visual disturbance.   Respiratory: Positive for apnea. Negative for cough, chest tightness, shortness of breath, wheezing and stridor.    Cardiovascular: Negative for chest pain, palpitations and leg swelling.   Gastrointestinal: Positive for GERD and indigestion. Negative for abdominal distention, abdominal pain, anal bleeding, blood in stool, constipation, nausea and vomiting.   Genitourinary: Negative for decreased urine volume, difficulty urinating and dysuria.   Musculoskeletal: Negative for gait problem, joint swelling, myalgias, neck pain and neck stiffness.   Skin: Negative for color change, pallor, rash, skin lesions and bruise.   Neurological: Negative for dizziness, tremors, seizures, syncope, speech difficulty, weakness, headache and confusion.   Hematological: Does not bruise/bleed easily.   Psychiatric/Behavioral: Negative for agitation, behavioral problems, decreased concentration, sleep disturbance, suicidal ideas, depressed mood and stress.   All other systems reviewed and are negative.      Vitals:    11/18/20 1609   BP: 122/70   Pulse: 88   Resp: 16   Temp: 97.7 °F (36.5 °C)   SpO2: 98%           11/18/20  1609   Weight: 97.9 kg (215 lb 12.8 oz)         Body mass index is 31.6 kg/m².      Current Outpatient Medications   Medication Sig Dispense Refill   • albuterol sulfate  (90 Base) MCG/ACT inhaler Inhale 2 puffs Every 4 (Four) Hours As Needed for Wheezing or Shortness of Air. 1 inhaler 0   • omeprazole (priLOSEC) 40 MG capsule Take 1 capsule by mouth 2 (Two) Times a Day Before Meals. Take a half hour before breakfast 60 capsule 11   • vitamin D (ERGOCALCIFEROL) 1.25 MG (67218 UT) capsule capsule Take 1 capsule by mouth 1 (One) Time Per Week. 4 capsule 3     No current facility-administered medications for this visit.                  Objective   Physical Exam  Vitals signs and nursing note reviewed.   Constitutional:       General: He is not in acute distress.     Appearance: He is well-developed. He is not ill-appearing, toxic-appearing or diaphoretic.   HENT:      Head: Normocephalic.      Mouth/Throat:      Mouth: Mucous membranes are moist.      Pharynx: Oropharynx is clear.   Eyes:      General: No scleral icterus.     Conjunctiva/sclera: Conjunctivae normal.   Neck:      Musculoskeletal: Neck supple.      Thyroid: No thyromegaly.   Cardiovascular:      Rate and Rhythm: Normal rate and regular rhythm.      Heart sounds: Normal heart sounds. No murmur. No friction rub. No gallop.    Pulmonary:      Effort: Pulmonary effort is normal. No respiratory distress.      Breath sounds: Normal breath sounds. No stridor. No wheezing, rhonchi or rales.   Abdominal:      General: Bowel sounds are normal. There is no distension.      Palpations: Abdomen is soft. There is no mass.      Tenderness: There is no abdominal tenderness. There is no guarding or rebound.      Hernia: No hernia is present.   Musculoskeletal: Normal range of motion.   Skin:     General: Skin is warm.      Coloration: Skin is not jaundiced or pale.      Findings: No erythema or rash.   Neurological:      Mental Status: He is alert and oriented to person, place, and time.      Motor: No abnormal muscle tone.      Deep Tendon Reflexes: Reflexes normal.   Psychiatric:         Mood and Affect: Mood normal.         Behavior: Behavior normal.         Thought Content: Thought content normal.         Judgment: Judgment normal.           Assessment/Plan   Diagnoses and all orders for this visit:    1. Gastroesophageal reflux disease with esophagitis, unspecified whether hemorrhage (Primary)  -     H. Pylori Breath Test - Breath, Lung; Future  - Recommendations discussed with patient for decreasing LAOMNT symptoms: eliminate foods that may trigger symptoms, avoid alcohol at bedtime, and  avoid lying down after 3 hours of eating.  Common irritating foods include: chocolate, garlic, onions, citrus fruits, coffee, alcohol, highly seasoned foods and carbonated beverages.       2. Esophageal stricture (S/P Dilation 6/2020);  - Stable , close monitoring     3. Vitamin D deficiency  -     vitamin D (ERGOCALCIFEROL) 1.25 MG (89340 UT) capsule capsule; Take 1 capsule by mouth 1 (One) Time Per Week.  Dispense: 4 capsule; Refill: 3    4. Needs flu shot  -     Fluarix/Fluzone/Afluria Quad>6 Months      I have fully discussed the nature of the medical condition(s) risks, complications, management, safe and proper use of medications.   I have discussed the SIDE EFFECT OF MEDICATION and importance TO report any side effect , the patient expressed good understanding.  Encouraged medication compliance and the importance of keeping scheduled follow up appointments with me and any other providers.    Patient instructed to follow up with our office for results on any labs/imaging ordered during this visit.    Home care discussed  Screening for Depression done , please see [facility] PHQ 2/9  All questions answered  Patient verbalizes understanding and agrees to treatment plan.

## 2020-11-19 ENCOUNTER — LAB (OUTPATIENT)
Dept: LAB | Facility: HOSPITAL | Age: 30
End: 2020-11-19

## 2020-11-19 DIAGNOSIS — K21.00 GASTROESOPHAGEAL REFLUX DISEASE WITH ESOPHAGITIS, UNSPECIFIED WHETHER HEMORRHAGE: ICD-10-CM

## 2020-11-19 PROCEDURE — 83013 H PYLORI (C-13) BREATH: CPT | Performed by: FAMILY MEDICINE

## 2020-11-20 LAB — UREA BREATH TEST QL: NEGATIVE

## 2020-11-23 ENCOUNTER — TELEPHONE (OUTPATIENT)
Dept: INTERNAL MEDICINE | Facility: CLINIC | Age: 30
End: 2020-11-23

## 2020-11-23 NOTE — TELEPHONE ENCOUNTER
----- Message from Vivienne Angel MD sent at 11/22/2020 11:18 PM EST -----  PLEASE call for normal results, no H.pylori

## 2020-11-25 ENCOUNTER — LAB (OUTPATIENT)
Dept: LAB | Facility: HOSPITAL | Age: 30
End: 2020-11-25

## 2020-11-25 ENCOUNTER — OFFICE VISIT (OUTPATIENT)
Dept: INTERNAL MEDICINE | Facility: CLINIC | Age: 30
End: 2020-11-25

## 2020-11-25 VITALS
WEIGHT: 215 LBS | SYSTOLIC BLOOD PRESSURE: 116 MMHG | BODY MASS INDEX: 31.84 KG/M2 | DIASTOLIC BLOOD PRESSURE: 78 MMHG | HEIGHT: 69 IN | RESPIRATION RATE: 16 BRPM | HEART RATE: 92 BPM | OXYGEN SATURATION: 95 % | TEMPERATURE: 97.3 F

## 2020-11-25 DIAGNOSIS — G89.29 CHRONIC ABDOMINAL PAIN: ICD-10-CM

## 2020-11-25 DIAGNOSIS — R14.0 ABDOMINAL BLOATING: ICD-10-CM

## 2020-11-25 DIAGNOSIS — K22.2 ESOPHAGEAL STRICTURE: ICD-10-CM

## 2020-11-25 DIAGNOSIS — Z00.00 ENCOUNTER FOR ANNUAL PHYSICAL EXAM: Primary | ICD-10-CM

## 2020-11-25 DIAGNOSIS — R10.9 CHRONIC ABDOMINAL PAIN: ICD-10-CM

## 2020-11-25 DIAGNOSIS — K21.9 CHRONIC GERD: ICD-10-CM

## 2020-11-25 PROCEDURE — 86003 ALLG SPEC IGE CRUDE XTRC EA: CPT | Performed by: FAMILY MEDICINE

## 2020-11-25 PROCEDURE — 99395 PREV VISIT EST AGE 18-39: CPT | Performed by: FAMILY MEDICINE

## 2020-11-25 PROCEDURE — 82785 ASSAY OF IGE: CPT | Performed by: FAMILY MEDICINE

## 2020-11-25 RX ORDER — OMEPRAZOLE 40 MG/1
40 CAPSULE, DELAYED RELEASE ORAL DAILY
Qty: 30 CAPSULE | Refills: 3 | Status: SHIPPED | OUTPATIENT
Start: 2020-11-25 | End: 2021-06-08

## 2020-11-25 NOTE — PATIENT INSTRUCTIONS

## 2020-11-25 NOTE — PROGRESS NOTES
Patient Care Team:  Vivienne Angel MD as PCP - General (Family Medicine)  Jose Guadalupe Bell MD (Family Medicine)     Chief complaint: Patient is in today for a physical          Patient is a 30 y.o. male who presents for his yearly physical exam.     HPI      Chronic GERD and stomach upset with diarrhea,seen by GI, had EGD and CSC showed gastritis ,recommendation to be on PPI for long time but he did not   His sx worse with certain food.         Health maintenance/lifestyle:  Immunization History   Administered Date(s) Administered   • Flu Vaccine Intradermal Quad 18-64YR 10/15/2019   • Flu Vaccine Split Quad 2017   • Flulaval/Fluarix/Fluzone Quad 2020   • Hepatitis B Vaccine Adult IM 10/05/2016, 2016, 05/15/2017   • Influenza Quad Vaccine (Inpatient) 10/05/2016   • TD Preservative Free 05/15/2017   • Tdap 2016, 2020   • Varicella 10/05/2016   • flucelvax quad pfs =>4 YRS 10/15/2019         DIET: Regular diet   EXERCISE: 2 -3 x /week ,30 minutes  SLEEP: about 6-8 hours   WATER INTAKE: fair   CAFFEINE INTAKE: drinks about 2 cups daily   SEATBELT USE: yes  SUNSCREEN: not usually   Dental exam: x 2 /year      PHQ-2 Depression Screening  Little interest or pleasure in doing things? 0   Feeling down, depressed, or hopeless? 0   PHQ-2 Total Score 0     Social History     Tobacco Use   Smoking Status Former Smoker   • Quit date:    • Years since quittin.9   Smokeless Tobacco Never Used     Social History     Substance and Sexual Activity   Alcohol Use No   • Frequency: Never         Review of Systems   Constitutional: Negative for activity change, appetite change, chills and fever.   HENT: Negative for congestion, ear discharge, ear pain, rhinorrhea, sinus pressure, sneezing, sore throat, swollen glands and trouble swallowing.    Eyes: Negative for blurred vision, double vision, discharge and visual disturbance.   Respiratory: Negative for apnea, cough, chest tightness, shortness  of breath, wheezing and stridor.    Cardiovascular: Negative for chest pain, palpitations and leg swelling.   Gastrointestinal: Positive for abdominal distention, abdominal pain, diarrhea, GERD and indigestion. Negative for anal bleeding, blood in stool, constipation, nausea and vomiting.   Genitourinary: Negative for decreased urine volume, difficulty urinating and dysuria.   Musculoskeletal: Negative for gait problem, joint swelling, myalgias, neck pain and neck stiffness.   Skin: Negative for color change, pallor, rash, skin lesions and bruise.   Neurological: Negative for dizziness, tremors, seizures, syncope, speech difficulty, weakness, headache and confusion.   Hematological: Does not bruise/bleed easily.   Psychiatric/Behavioral: Negative for agitation, behavioral problems, decreased concentration, sleep disturbance, suicidal ideas, depressed mood and stress. The patient is nervous/anxious.    All other systems reviewed and are negative.        History  Past Medical History:   Diagnosis Date   • Jackson's esophagus    • GERD (gastroesophageal reflux disease)       Past Surgical History:   Procedure Laterality Date   • ANAL FISTULA REPAIR     • CHOLECYSTECTOMY        No Known Allergies   Family History   Problem Relation Age of Onset   • No Known Problems Mother    • No Known Problems Father    • Heart attack Maternal Grandfather    • Heart disease Maternal Grandfather    • No Known Problems Paternal Grandmother    • No Known Problems Paternal Grandfather      Social History     Socioeconomic History   • Marital status:      Spouse name: Not on file   • Number of children: Not on file   • Years of education: Not on file   • Highest education level: Not on file   Tobacco Use   • Smoking status: Former Smoker     Quit date:      Years since quittin.9   • Smokeless tobacco: Never Used   Substance and Sexual Activity   • Alcohol use: No     Frequency: Never   • Drug use: No   • Sexual activity:  "Yes     Partners: Female     Birth control/protection: None   Social History Narrative    Caffeine: 2 cups coffee daily        Has 3 children    Emigrated from [place] 4 years ago    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 Take a half hour before breakfast, Disp: 30 capsule, Rfl: 3  •  vitamin D (ERGOCALCIFEROL) 1.25 MG (72899 UT) capsule capsule, Take 1 capsule by mouth 1 (One) Time Per Week., Disp: 4 capsule, Rfl: 3                  /78   Pulse 92   Temp 97.3 °F (36.3 °C)   Resp 16   Ht 176 cm (69.29\")   Wt 97.5 kg (215 lb)   SpO2 95%   BMI 31.48 kg/m²       Physical Exam  Vitals signs and nursing note reviewed.   Constitutional:       General: He is not in acute distress.     Appearance: He is well-developed. He is not ill-appearing, toxic-appearing or diaphoretic.   HENT:      Head: Normocephalic.      Right Ear: Tympanic membrane, ear canal and external ear normal.      Left Ear: Tympanic membrane, ear canal and external ear normal.      Nose: No rhinorrhea.      Mouth/Throat:      Mouth: Mucous membranes are moist.      Pharynx: Oropharynx is clear.   Eyes:      General: No scleral icterus.     Extraocular Movements: Extraocular movements intact.      Conjunctiva/sclera: Conjunctivae normal.      Pupils: Pupils are equal, round, and reactive to light.   Neck:      Musculoskeletal: Neck supple.      Thyroid: No thyromegaly.      Comments: No enlarged thyroid    Cardiovascular:      Rate and Rhythm: Normal rate and regular rhythm.      Heart sounds: Normal heart sounds. No murmur. No friction rub. No gallop.    Pulmonary:      " Effort: Pulmonary effort is normal. No respiratory distress.      Breath sounds: Normal breath sounds. No stridor. No wheezing, rhonchi or rales.   Abdominal:      General: Bowel sounds are normal. There is no distension.      Palpations: Abdomen is soft. There is no mass.      Tenderness: There is no abdominal tenderness. There is no guarding or rebound.      Hernia: No hernia is present.   Musculoskeletal: Normal range of motion.         General: No swelling or tenderness.      Right lower leg: No edema.      Left lower leg: No edema.   Skin:     General: Skin is warm.      Coloration: Skin is not jaundiced or pale.      Findings: No erythema or rash.   Neurological:      General: No focal deficit present.      Mental Status: He is alert and oriented to person, place, and time.      Cranial Nerves: No cranial nerve deficit.      Sensory: No sensory deficit.      Motor: No weakness or abnormal muscle tone.      Coordination: Coordination normal.      Gait: Gait normal.      Deep Tendon Reflexes: Reflexes normal.   Psychiatric:         Mood and Affect: Mood normal.         Behavior: Behavior normal.         Thought Content: Thought content normal.         Judgment: Judgment normal.                   Diagnoses and all orders for this visit:    1. Encounter for annual physical exam (Primary)    2. Chronic GERD  -     omeprazole (priLOSEC) 40 MG capsule; Take 1 capsule by mouth Daily. Take a half hour before breakfast  Dispense: 30 capsule; Refill: 3    3. Esophageal stricture (S/P Dilation 6/2020);  - Advised to continue PPI    4. Chronic abdominal pain  -     Food Allergen Panel With / IgE; Future    5. Abdominal bloating  -     Food Allergen Panel With / IgE; Future          Discussed with pt; Regular exercise, healthy diet. Calcium intake, Sunscreen use encouraged.         Follow up: Return in about 2 months (around 1/25/2021).  Plan of care discussed with pt. They verbalized understanding and agreement.     Vivienne  MD Jeanne   11/25/2020   16:17 EST                  * Please note that portions of this note were completed with a voice recognition program. Efforts were made to edit the dictation but occasionally words are erroneously transcribed.

## 2020-11-30 LAB
CLAM IGE QN: 0.11 KU/L
CODFISH IGE QN: <0.1 KU/L
CONV CLASS DESCRIPTION: ABNORMAL
CORN IGE QN: <0.1 KU/L
COW MILK IGE QN: <0.1 KU/L
EGG WHITE IGE QN: <0.1 KU/L
IGE SERPL-ACNC: 107 IU/ML (ref 6–495)
PEANUT IGE QN: <0.1 KU/L
SCALLOP IGE QN: <0.1 KU/L
SHRIMP IGE QN: 0.88 KU/L
SOYBEAN IGE QN: <0.1 KU/L
WALNUT IGE QN: <0.1 KU/L
WHEAT IGE QN: <0.1 KU/L

## 2020-12-01 ENCOUNTER — TELEPHONE (OUTPATIENT)
Dept: INTERNAL MEDICINE | Facility: CLINIC | Age: 30
End: 2020-12-01

## 2020-12-01 NOTE — TELEPHONE ENCOUNTER
----- Message from Vivienne Angel MD sent at 12/1/2020  8:34 AM EST -----  PLEASE call for lab results, showed he is allergic to Shrimp and clams, he should avoided.

## 2021-01-08 ENCOUNTER — PRIOR AUTHORIZATION (OUTPATIENT)
Dept: INTERNAL MEDICINE | Facility: CLINIC | Age: 31
End: 2021-01-08

## 2021-01-12 ENCOUNTER — TELEPHONE (OUTPATIENT)
Dept: INTERNAL MEDICINE | Facility: CLINIC | Age: 31
End: 2021-01-12

## 2021-01-12 NOTE — TELEPHONE ENCOUNTER
Insurance would not cover high dose vitamin D.  would like for him to take vitamin D OTC at 2000 IU daily.

## 2021-06-08 ENCOUNTER — OFFICE VISIT (OUTPATIENT)
Dept: FAMILY MEDICINE CLINIC | Facility: CLINIC | Age: 31
End: 2021-06-08

## 2021-06-08 VITALS
DIASTOLIC BLOOD PRESSURE: 84 MMHG | TEMPERATURE: 98 F | SYSTOLIC BLOOD PRESSURE: 124 MMHG | HEIGHT: 68 IN | RESPIRATION RATE: 20 BRPM | HEART RATE: 83 BPM | OXYGEN SATURATION: 100 % | BODY MASS INDEX: 33.34 KG/M2 | WEIGHT: 220 LBS

## 2021-06-08 DIAGNOSIS — J02.9 SORE THROAT: ICD-10-CM

## 2021-06-08 DIAGNOSIS — J02.0 STREP THROAT: Primary | ICD-10-CM

## 2021-06-08 DIAGNOSIS — R13.14 PHARYNGOESOPHAGEAL DYSPHAGIA: ICD-10-CM

## 2021-06-08 DIAGNOSIS — Z78.9 LANGUAGE BARRIER AFFECTING HEALTH CARE: ICD-10-CM

## 2021-06-08 DIAGNOSIS — K21.9 CHRONIC GERD: Chronic | ICD-10-CM

## 2021-06-08 LAB
EXPIRATION DATE: ABNORMAL
INTERNAL CONTROL: ABNORMAL
Lab: ABNORMAL
S PYO AG THROAT QL: POSITIVE

## 2021-06-08 PROCEDURE — 99214 OFFICE O/P EST MOD 30 MIN: CPT | Performed by: NURSE PRACTITIONER

## 2021-06-08 PROCEDURE — 87880 STREP A ASSAY W/OPTIC: CPT | Performed by: NURSE PRACTITIONER

## 2021-06-08 RX ORDER — PANTOPRAZOLE SODIUM 40 MG/1
40 TABLET, DELAYED RELEASE ORAL DAILY
Qty: 30 TABLET | Refills: 3 | Status: SHIPPED | OUTPATIENT
Start: 2021-06-08 | End: 2021-08-30

## 2021-06-08 RX ORDER — AMOXICILLIN AND CLAVULANATE POTASSIUM 875; 125 MG/1; MG/1
1 TABLET, FILM COATED ORAL 2 TIMES DAILY
Qty: 20 TABLET | Refills: 0 | Status: SHIPPED | OUTPATIENT
Start: 2021-06-08 | End: 2021-06-18

## 2021-06-08 NOTE — PROGRESS NOTES
Follow Up Office Note     Patient Name: Meliton Lagos  : 1990   MRN: [id number]     Chief Complaint:    Chief Complaint   Patient presents with   • Sore Throat     not sore, but something in throat       History of Present Illness: Meliton Lagos is a 31 y.o. male who presents today with c/o sore throat and difficulty swallowing. Patient states that he stopped taking omeprazole because he felt that it was not helping him. He has not kept his follow-up appointments with gastroenterology regarding his GERD and Jackson's esophagus.    Sore Throat   This is a new problem. The current episode started in the past 7 days. The problem has been gradually worsening. There has been no fever. The pain is moderate. Associated symptoms include trouble swallowing. Pertinent negatives include no abdominal pain, congestion, coughing, ear pain, hoarse voice, neck pain, shortness of breath, stridor or vomiting. He has had no exposure to strep. He has tried cool liquids for the symptoms.   Heartburn  He complains of dysphagia, globus sensation, heartburn and a sore throat. He reports no abdominal pain, no chest pain, no choking, no coughing, no hoarse voice, no nausea, no stridor, no water brash or no wheezing. This is a chronic problem. The current episode started more than 1 year ago. The problem occurs frequently. The heartburn is of moderate intensity. The heartburn does not wake him from sleep. The heartburn does not limit his activity. The heartburn doesn't change with position. The symptoms are aggravated by certain foods. Pertinent negatives include no fatigue, melena, muscle weakness, orthopnea or weight loss. Risk factors include Jackson's esophagus. He has tried a PPI and a diet change for the symptoms. The treatment provided mild relief. Past procedures include an EGD.        Subjective      Review of Systems:   Review of Systems   Constitutional: Positive for appetite change. Negative for activity  "change, chills, diaphoresis, fatigue, fever and weight loss.   HENT: Positive for sore throat and trouble swallowing. Negative for congestion, ear pain, facial swelling, hoarse voice, postnasal drip, sinus pain and voice change.    Respiratory: Negative for cough, choking, chest tightness, shortness of breath, wheezing and stridor.    Cardiovascular: Negative.  Negative for chest pain.   Gastrointestinal: Positive for dysphagia and heartburn. Negative for abdominal pain, blood in stool, melena, nausea and vomiting.   Musculoskeletal: Negative for muscle weakness and neck pain.   Skin: Negative.    Neurological: Negative.         Past Medical History:   Past Medical History:   Diagnosis Date   • Jackson's esophagus    • GERD (gastroesophageal reflux disease)          Medications:     Current Outpatient Medications:   •  amoxicillin-clavulanate (AUGMENTIN) 875-125 MG per tablet, Take 1 tablet by mouth 2 (Two) Times a Day for 10 days., Disp: 20 tablet, Rfl: 0  •  pantoprazole (PROTONIX) 40 MG EC tablet, Take 1 tablet by mouth Daily., Disp: 30 tablet, Rfl: 3    Allergies:   No Known Allergies      Objective     Physical Exam:  Vital Signs:   Vitals:    06/08/21 1451   BP: 124/84   Pulse: 83   Resp: 20   Temp: 98 °F (36.7 °C)   SpO2: 100%   Weight: 99.8 kg (220 lb)   Height: 172.7 cm (68\")   PainSc:   7   PainLoc: Throat     Body mass index is 33.45 kg/m².     Physical Exam  Vitals and nursing note reviewed.   Constitutional:       General: He is not in acute distress.     Appearance: Normal appearance. He is well-developed. He is not ill-appearing, toxic-appearing or diaphoretic.   HENT:      Head: Normocephalic and atraumatic.      Right Ear: Tympanic membrane and external ear normal.      Left Ear: Tympanic membrane and external ear normal.      Nose: Nose normal.      Mouth/Throat:      Lips: Pink.      Mouth: Mucous membranes are moist.      Pharynx: Posterior oropharyngeal erythema (moderate) present. No pharyngeal " swelling, oropharyngeal exudate or uvula swelling.   Neck:      Thyroid: No thyroid mass or thyromegaly.   Cardiovascular:      Rate and Rhythm: Normal rate and regular rhythm.      Heart sounds: Normal heart sounds, S1 normal and S2 normal.   Pulmonary:      Effort: Pulmonary effort is normal. No respiratory distress.      Breath sounds: Normal breath sounds. No stridor. No wheezing.   Abdominal:      General: Bowel sounds are normal. There is no distension.      Palpations: Abdomen is soft.      Tenderness: There is no abdominal tenderness.   Musculoskeletal:      Cervical back: Normal range of motion and neck supple.   Lymphadenopathy:      Cervical: Cervical adenopathy present.   Skin:     General: Skin is warm and dry.   Neurological:      General: No focal deficit present.      Mental Status: He is alert and oriented to person, place, and time.   Psychiatric:         Mood and Affect: Mood normal.         Behavior: Behavior normal. Behavior is cooperative.         Thought Content: Thought content normal.         Judgment: Judgment normal.         Assessment / Plan      Assessment/Plan:   Diagnoses and all orders for this visit:    1. Strep throat (Primary)  -     amoxicillin-clavulanate (AUGMENTIN) 875-125 MG per tablet; Take 1 tablet by mouth 2 (Two) Times a Day for 10 days.  Dispense: 20 tablet; Refill: 0    2. Chronic GERD  -     pantoprazole (PROTONIX) 40 MG EC tablet; Take 1 tablet by mouth Daily.  Dispense: 30 tablet; Refill: 3  -     Ambulatory Referral to Gastroenterology    3. Pharyngoesophageal dysphagia  -     Ambulatory Referral to Gastroenterology        -     GERD symptoms and impaired swallowing issues worsening. Start Protonix. Follow-up with gastroenterology.    4. Sore throat  -     POC Rapid Strep A  Lab Results   Component Value Date    OANH Positive (A) 06/08/2021     5. Language barrier affecting health care        -      Due to language barrier, an  was present during the  history-taking and subsequent discussion (and for part of the physical exam) with this patient.    Follow Up:   PRN and at next scheduled appointment(s) with PCP.    Discussed the nature of the medical condition(s) risks, complications, implications, management, safe and proper use of medications. Encouraged medication compliance, and keeping scheduled follow up appointments with me and any other providers.      RTC if symptoms fail to improve, to ER if symptoms worsen.      JAY León  [facility] [place]       Please note that portions of this note may have been completed with a voice recognition program. Efforts were made to edit the dictations, but occasionally words are mistranscribed.

## 2021-06-08 NOTE — PATIENT INSTRUCTIONS
ÇáÊåÇÈ ÇáÍáÞ ÚäÏ ÇáÈÇáÛíä  Strep Throat, Adult  ÇáÊåÇÈ ÇáÍáÞ ÚÏæì ÈßÊíÑíÉ ÊÕíÈ ÇáÍáÞ. æÊäÊÔÑ ÎáÇá ÇáÃÔåÑ ÇáÈÇÑÏÉ ãä ÇáÓäÉ. æÛÇáÈðÇ ãÇ íÕíÈ ÇáÊåÇÈ ÇáÍáÞ ÇáÈßÊíÑí ÇáÃØÝÇá ÇáÐíä ÊÊÑÇæÍ ÃÚãÇÑåã Èíä 5-15 ÚÇãðÇ. æãÚ Ðáß ÝÅäå ÞÏ íÕíÈ ÇáÅäÓÇä Ýí Ãí Óä æÝí Ãí æÞÊ ãä ÇáÚÇã. æÊäÊÞá Êáß ÇáÚÏæì ãä ÔÎÕ áÂÎÑ ÈÇáÓÚÇá Ãæ ÇáÚØÇÓ Ãæ ÇáÇÎÊáÇØ Úä ÞÑÈ.  ÞÏ íÓÊÎÏã ãõÞÏøöã ÇáÑÚÇíÉ ÇáÕÍíÉ ÇáãÊÇÈÚ ÃÓãÇÁð ÃÎÑì áæÕÝ ÇáÚÏæì. Ýíãßä Ãä ÊõÓãì ÇáÊåÇÈ ÇááæÒÊíä (Ýí ÍÇáÉ ÊæÑã ÇááæÒÊíä)¡ Ãæ ÇáÊåÇÈ ÇáÈáÚæã (Ýí ÍÇáÉ ÊæÑã ãÄÎÑÉ ÇáÍáÞ).  ãÇ ÃÓÈÇÈ åÐå ÇáÍÇáÉ¿  åÐå ÇáÍÇáÉ ÊÓÈÈåÇ ÈßÊíÑíÇ ÇáãßæÑÇÊ ÇáÚÞÏíÉ ÇáãÞíÍÉ.  ãÇ ÚæÇãá ÒíÇÏÉ ÇáÎØÑ¿  ÑÈãÇ ÊÒÏÇÏ ÇÍÊãÇáíÉ ÇáÅÕÇÈÉ ÈåÐå ÇáÍÇáÉ Ýí ÇáÙÑæÝ ÇáÊÇáíÉ:  • Êæáí ÑÚÇíÉ ÇáÃØÝÇá Ýí Óä ÇáãÏÑÓÉ¡ Ãæ ÇáÇÎÊáÇØ ÈãËá åÄáÇÁ ÇáÃØÝÇá. ÝÇáÃØÝÇá ÃßËÑ ÚÑÖÉ ááÅÕÇÈÉ ÈÇáÊåÇÈ ÇáÍáÞ æíãßä Ãä íäÞáæå ááÂÎÑíä.  • ÇáæÌæÏ Ýí ÇáÃãÇßä ÇáãÒÏÍãÉ ÍíË íãßä ááÚÏæì Ãä ÊäÊÔÑ ÈÓåæáÉ.  • ÇáÊÚÇãá Úä ÞÑÈ ãÚ ÔÎÕ ãÕÇÈ ÈÇáÊåÇÈ ÇáÍáÞ ÇáÈßÊíÑí.  ãÇ ÚáÇãÇÊ ÇáÍÇáÉ Ãæ ÃÚÑÇÖåÇ¿  ÊÔãá ÃÚÑÇÖ åÐå ÇáÍÇáÉ ãÇ íáí:  • Íãì Ãæ ÞÔÚÑíÑÉ.  • ÇÍãÑÇÑ Ãæ ÊæÑã Ãæ Ãáã Ýí ÇááæÒÊíä Ãæ ÇáÍáÞ.  • Ãáã Ãæ ÕÚæÈÉ Ýí ÇáÈáÚ.  • ÙåæÑ ÈÞÚ ÈíÖÇÁ Ãæ ÕÝÑÇÁ Úáì ÇááæÒÊíä Ãæ ÇáÍáÞ.  • Ãáã ÚäÏ áãÓ ÇáÛÏÏ ÇáÊí ÊÙåÑ ÈÇáÚäÞ Ãæ ÃÓÝá ÇáÝß.  • ÊÛíÑ ÇáäÝÓ áÊÕÈÍ ÑÇÆÍÊå ßÑíåÉ.  • ØÝÍ ÌáÏí áæäå ÃÍãÑ íÛØí ÇáÌÓã ßáå. æÐáß ÃãÑ äÇÏÑ ÇáÍÏæË.  ßíÝ ÊõÔÎÕ åÐå ÇáÍÇáÉ¿    ÊõÔÎÕ åÐå ÇáÍÇáÉ ÎáÇá ÊÍÇáíá ÇáßÔÝ Úä ÇáÈßÊíÑíÇ ÇáÊí ÊÓÈÈ ÇáÊåÇÈ ÇáÍáÞ ÇáÈßÊíÑí¡ ãÚ ÞíÇÓ ßãíÊåÇ Ýí ÍÇáÉ æÌæÏåÇ. æåí:  • ÝÍÕ ÇáãÓÊÖÏ ÇáÓÑíÚ. æáÅÌÑÇÁ åÐÇ ÇáÝÍÕ¡ ÊÄÎÐ ãÓÍÉ ãä ÍáÞß áÝÍÕåÇ æÇáÊÍÞÞ ãä æÌæÏ ÈßÊíÑíÇ. æÚÇÏÉ¡ ÊÙåÑ ÇáäÊíÌÉ Ýí ÎáÇá ÏÞÇÆÞ.  • ÊÍáíá ãÒÑÚÉ ÇáÍáÞ. æáÅÌÑÇÁ åÐÇ ÇáÝÍÕ¡ ÊÄÎÐ ãÓÍÉ ãä ÍáÞß. Ëã ÊõæÖÚ ÇáÚíäÉ Ýí ßæÈ íÓãÍ ááÚÏæì ÈÇáäãæ. æÊÙåÑ ÇáäÊÇÆÌ ÚÇÏÉ Ýí ÎáÇá íæã Ãæ íæãíä.  ßíÝ ÊõÚÇáÌ åÐå ÇáÍÇáÉ¿  íãßä ÚáÇÌ åÐå ÇáÍÇáÉ ÈÇáÃÓÇáíÈ ÇáÂÊíÉ:  • ÇáÃÏæíÉ ÇáÊí ÊÞÊá ÇáÌÑÇËíã (ÇáãÖÇÏÇÊ ÇáÍíæíÉ).  • ÇáÃÏæíÉ ÇáãÓßäÉ ááÃáã Ãæ ÇáÎÇÝÖÉ ááÍÑÇÑÉ. æÊÔãá:  ? ÅíÈæÈÑæÝíä Ãæ ÃÓíÊÇãíäæÝíä.  ? ÇáÃÓÈÑíä¡ æáÇ íõÚØì ÅáÇ ááãÑÖì ÃßÈÑ ãä 18 ÚÇãðÇ.  ? Íáæì ÊÎÝíÝ ÇÍÊÞÇä  ÇáÍáÞ.  ? ÈÎÇÎÇÊ ÇáÍáÞ.  ÇÊÈÚ ÇáÊÚáíãÇÊ ÇáÊÇáíÉ Ýí ÇáãäÒá:  ÇáÃÏæíÉ    • ÊäÇæá ÇáÃÏæíÉ¡ ÇáÊí ÊõÕÑÝ ÈæÕÝÉ ØÈíÉ Ãæ Ïæä æÕÝÉ ØÈíÉ¡ æÝÞ ãÇ íÎÈÑß Èå ãÞÏã ÇáÑÚÇíÉ ÇáÕÍíÉ ÇáÎÇÕ Èß.  • ÊäÇæá ÇáãÖÇÏÇÊ ÇáÍíæíÉ ÍÓÈ ÊÚáíãÇÊ ãõÞÏøöã ÇáÑÚÇíÉ ÇáÕÍíÉ ÇáãÊÇÈÚ áß. áÇ? ÊÊæÞÝ Úä ÊäÇæá ÇáãÖÇÏ ÇáÍíæí ÍÊì æÅä ÈÏÃÊ ÊÔÚÑ ÈÊÍÓä ÍÇáÊß.  ÇáÃßá æÇáÔÑÈ    • Ýí ÍÇáÉ ÕÚæÈÉ ÇáÈáÚ¡ ÍÇæá ÊäÇæá ÇáÃØÚãÉ ÇááíäÉ ÍÊì íÎÝ ÇáÊåÇÈ ÇáÍáÞ.  • ÇÔÑÈ ßãíÇÊ ßÇÝíÉ ãä ÇáÓæÇÆá áíÙá áæä Èæáß ÃÕÝÑðÇ ÈÇåÊðÇ.  • ááãÓÇÚÏÉ Ýí ÊÎÝíÝ ÇáÃáã¡ íãßä Ãä ÊÊäÇæá:   ? ÇáÓæÇÆá ÇáÏÇÝÆÉ¡ ãËá ÇáÍÓÇÁ æÇáÔÇí.  ? ÇáÓæÇÆá ÇáÈÇÑÏÉ ãËá ÇáÍáæíÇÊ ÇáãÌãÏÉ Ãæ ÇáãÕÇÕÇÊ ÇáãËáÌÉ.  ÊÚáíãÇÊ ÚÇãÉ  • ÇÓÊÚãá ÇáÛÑÛÑÉ ÈãÒíÌ ÇáãÇÁ ÇáãÇáÍ ãä 3-4 ãÑÇÊ íæãíðÇ Ãæ ÚäÏ ÇááÒæã. áÅÚÏÇÏ ãÍáæá ÇáãÇÁ ÇáãÇáÍ¡ ÃÐÈ äÕÝ ãáÚÞÉ Åáì ãáÚÞÉ æÇÍÏÉ (3-6 Ìã) ãä ÇáãáÍ Ýí ßæÈ ãÇÁ ÏÇÝÆ (237 ãá).  • íÌÈ ÇáÍÕæá Úáì ÞÏÑ æÇÝÑ ãä ÇáÑÇÍÉ.  • ÇÍÑÕ Úáì ÇáÈÞÇÁ Ýí ÇáãäÒá¡ æÚÏã ÇáÐåÇÈ Åáì ÇáÚãá Ãæ ÇáãÏÑÓÉ ÅáÇ ÈÚÏ 24 ÓÇÚÉ ãä ÈÏÁ ÊäÇæá ÇáãÖÇÏÇÊ ÇáÍíæíÉ.  • ÊÌäÈ ÇáÊÏÎíä Ãæ ÇáæÌæÏ ÈÇáÞÑÈ ãä ÇáãÏÎäíä.  • ÇáÊÒã ÈÌãíÚ ãæÇÚíÏ ÇáãÊÇÈÚÉ æÝÞ ÊæÌíåÇÊ ãÞÏã ÇáÑÚÇíÉ ÇáÕÍíÉ. ÝåÐÇ ÃãÑ ãåã.  ßíÝ íõãßä ÇáæÞÇíÉ ãä Ðáß¿    • áÇ ÊÔÇÑß ÇáØÚÇã Ãæ ÃßæÇÈ ÇáÔÑÈ Ãæ ÇÓÊÎÏÇã ÇáãÓÊáÒãÇÊ ÇáÔÎÕíÉ ÇáÊí ãä Çáããßä Ãä ÊÊÓÈÈ Ýí ÇäÊÞÇá ÇáÚÏæì ááÂÎÑíä.  • ÇÛÓá íÏíß ÌíÏðÇ ÈÇáãÇÁ æÇáÕÇÈæä¡ æÇÍÑÕ Úáì Ãä íÛÓá ßá ãä Ýí ÇáãäÒá ÃíÏíåã ÌíÏðÇ.  • ÇÌÚá ÃÝÑÇÏ ÇáÃÓÑÉ ÇáÐíä íÚÇäæä ãä ÇáÊåÇÈ Ýí ÇáÍáÞ Ãæ Íãì íÎÖÚæä ááÝÍÕ ááßÔÝ Úä ÇáÊåÇÈ ÇáÍáÞ ÇáÈßÊíÑí. áÃäåã ÞÏ íÍÊÇÌ Åáì ÊäÇæá ãÖÇÏ Ííæí ÅÐÇ ßÇäæÇ ãÕÇÈíä Èå.  ÇÊÕá ÈãÞÏã ÇáÑÚÇíÉ ÇáÕÍíÉ Ýí ÇáÍÇáÇÊ ÇáÊÇáíÉ:  • ÇÓÊãÑÇÑ ÊÖÎã ÇáÛÏÏ ÇáÊí ÊÙåÑ Ýí ÚäÞß.  • ÇáÅÕÇÈÉ ÈØÝÍ Ãæ ÓÚÇá Ãæ Ãáã ÈÇáÃÐä.  • ÎÑæÌ ãÎÇØ áÒÌ ÚäÏ ÇáÓÚÇá áæäå ÃÎÖÑ Ãæ ÃÕÝÑ ãÇÆá Åáì ÇáÈäí Ãæ ãÎÊáØ ÈÏã.  • ÇáÅÕÇÈÉ ÈÃáã Ãæ ÊÚÈ ÊÒÏÇÏ ÍÏÊå Ãæ áÇ íÊÍÓä ãÚ ÊäÇæá ÇáÏæÇÁ.  • ÇÓÊãÑÇÑ ÊÝÇÞã ÇáÃÚÑÇÖ æÚÏã ÊÍÓäåÇ.  • ÇáÅÕÇÈÉ ÈÍãì.  ÇØáÈ ÇáãÓÇÚÏÉ Úáì ÇáÝæÑ Ýí ÇáÍÇáÇÊ ÇáÊÇáíÉ:  • ÅÕÇÈÊß ÈÃíÉ ÃÚÑÇÖ ÌÏíÏÉ ãËá ÇáÞíÁ¡ Ãæ ÇáÕÏÇÚ ÇáÍÇÏ Ãæ ÊíÈÓ ÇáÚäÞ Ãæ ÇáÔÚæÑ ÈÃáã Ýíå¡ Ãæ Ãáã Ýí ÇáÕÏÑ¡ Ãæ ÖíÞ Ýí ÇáÊäÝÓ.  • ÇáÔÚæÑ  ÈÃáã ÍÇÏ Ýí ÇáÍáÞ Ãæ æÌæÏ áÚÇÈ ÓÇÆá Ãæ ÍÏæË ÊÛíÑÇÊ Ýí ÇáÕæÊ.  • ÇáÅÕÇÈÉ ÈÊæÑã Ýí ÇáÚäÞ¡ Ãæ ÇÍãÑÇÑ ÇáÌáÏ ÎáÝ ÇáÚäÞ¡ Ãæ ÇáÔÚæÑ ÈÃáã Èå.  • ÇáÅÕÇÈÉ ÈÚáÇãÇÊ ÇáÌÝÇÝ ãËá ÇáÊÚÈ (ÇáÅÚíÇÁ) æÌÝÇÝ ÇáÝã æÞáÉ ÇáÊÈæá.  • ÒíÇÏÉ ÇáÔÚæÑ ÈÇáäÚÇÓ Ãæ ÚÏã ÇáÞÏÑÉ Úáì ÇáÇÓÊíÞÇÙ ÊãÇãðÇ.  • ÇÍãÑÇÑ ÇáãÝÇÕá Ãæ ÇáÔÚæÑ ÈÇáÃáã ÈåÇ.  ãáÎÕ  • ÇáÊåÇÈ ÇáÍáÞ ÚÏæì ÊÕíÈ ÇáÍáÞ ÈÓÈÈ ÈßÊíÑíÇ ÇÓãåÇ ÇáÚÞÏíÉ ÇáãÞíÍÉ. æÊäÊÞá Êáß ÇáÚÏæì ãä ÔÎÕ áÂÎÑ ÎáÇá ÇáÓÚÇá Ãæ ÇáÚØÇÓ Ãæ ÇáÇÎÊáÇØ Úä ÞÑÈ.  • ÊäÇæá ÇáÃÏæíÉ¡ ÈãÇ ÝíåÇ ÇáãÖÇÏÇÊ ÇáÍíæíÉ¡ ÍÓÈ ÊÚáíãÇÊ ãõÞÏøöã ÇáÑÚÇíÉ ÇáÕÍíÉ ÇáãÊÇÈÚ áß. áÇ? ÊÊæÞÝ Úä ÊäÇæá ÇáãÖÇÏ ÇáÍíæí ÍÊì æÅä ÈÏÃÊ ÊÔÚÑ ÈÊÍÓä ÍÇáÊß.  • áãäÚ ÇäÊÔÇÑ ÇáÌÑÇËíã¡ ÇÛÓá íÏíß ÌíÏðÇ ÈÇáãÇÁ æÇáÕÇÈæä. æÇØáÈ ãä ÇáÂÎÑíä ÇáÞíÇã ÈåÐÇ. áÇ ÊÊÈÇÏá ÇáØÚÇã Ãæ ÇáÃßæÇÈ Ãæ ÇáÃÛÑÇÖ ÇáÔÎÕíÉ ãÚ ÇáÂÎÑíä.  • ÇØáÈ ÇáãÓÇÚÏÉ ÝæÑðÇ Ýí ÍÇáÉ ÅÕÇÈÊß ÈÃÚÑÇÖ ÌÏíÏÉ ãËá ÇáÞíÁ Ãæ ÇáÕÏÇÚ ÇáÔÏíÏ Ãæ ÊíÈÓ ÇáÚäÞ Ãæ ÇáÔÚæÑ ÈÃáã Ýíå Ãæ Ãáã Ýí ÇáÕÏÑ Ãæ ÖíÞ Ýí ÇáÊäÝÓ.  áíÓ ÇáåÏÝ ãä åÐå ÇáãÚáæãÇÊ Ãä Êßæä ÈÏíáÇð ááÅÑÔÇÏÇÊ ÇáÊí íÞÏãåÇ ãæÝÑ ÇáÑÚÇíÉ ÇáÕÍíÉ. ÊÃßÏ ãä ãäÇÞÔÉ ÃíÉ ÃÓÆáÉ ÊÏæÑ Ýí Ðåäß ãÚ ãæÝÑ ÇáÑÚÇíÉ ÇáÕÍíÉ.þ  Document Revised: 04/07/2020 Document Reviewed: 04/07/2020  Elsevier Patient Education © 2021 [employer].  Amoxicillin; Clavulanic Acid Tablets  ãÇ åÐÇ ÇáÏæÇÁ¿  ÃãæßÓíÓááíäº ÍãÖ ßáÇÝíæáäß ãÖÇÏ Ííæí ãä ãÌãæÚÉ ÇáÈäÓáíä.  íÚÇáÌ ÈÚÖ ÍÇáÇÊ ÇáÚÏæì ÇáÊí ÊÓÈÈåÇ ÇáÈßÊíÑíÇ.  áä íßæä åÐÇ ÇáÏæÇÁ ÝÚÇáÇð Ýí ÚáÇÌ äÒáÇÊ ÇáÈÑÏ Ãæ ÇáÅäÝáæäÒÇ Ãæ ÇáÝíÑæÓÇÊ ÇáÃÎÑì.  íãßä ÇÓÊÎÏÇã åÐÇ ÇáÏæÇÁ áÃÛÑÇÖ ÃÎÑìº ÇÓÃá ãÞÏã ÇáÑÚÇíÉ ÇáÕÍíÉ Ãæ ÇáÕíÏáí ÅÐÇ ßÇäÊ áÏíß ÃÓÆáÉ.  ÃÓãÇÁ ÇáÚáÇãÇÊ ÇáÊÌÇÑíÉ ÇáãÚÑæÝÉ:þ Augmentin  ãÇ åí ÇáÃÔíÇÁ ÇáÊí íÌÈ Ãä ÃÎÈÑ ÈåÇ ãÞÏã ÇáÑÚÇíÉ ÇáÕÍíÉ ÞÈá ÊäÇæá åÐÇ ÇáÏæÇÁ¿  ÇáÃØÈÇÁ ÈÍÇÌÉ áãÚÑÝÉ ãÇ ÅÐÇ ßäÊ ãÕÇÈðÇ ÈÃí ãä åÐå ÇáÍÇáÇÊ:  • ÃãÑÇÖ ÇáãÚÏÉ¡ ãËá ÇáÊåÇÈ ÇáÞæáæä  • ÃãÑÇÖ Çáßáì  • ÃãÑÇÖ ÇáßÈÏ  • ÏÇÁ æÍíÏÇÊ ÇáäæÇÉ  • ÑÏ ÝÚá ÛíÑ ÚÇÏí Ãæ ÍÓÇÓíÉ ÖÏ ÃãæßÓíáíä Ãæ ÇáÈäÓíáíä Ãæ ÇáÓíÝÇáæÓÈÑíä Ãæ ÇáãÖÇÏÇÊ ÇáÍíæíÉ ÇáÃÎÑì Ãæ ÍãÖ ÇáßáÇÝæáÇäíß  • ÑÏ ÝÚá ÛíÑ ÚÇÏí Ãæ ÍÓÇÓíÉ ÖÏ ÇáÃÏæíÉ ÇáÃÎÑì Ãæ ÇáÃØÚãÉ Ãæ  ÇáÃÕÈÇÛ Ãæ ÇáãæÇÏ ÇáÍÇÝÙÉ  • ÍÇãá Ãæ ÊÓÚíä ááÍãá  • ÇáÑÖÇÚÉ ÇáØÈíÚíÉ  ßíÝ íÌÈ Úáíø ÇÓÊÚãÇá åÐÇ ÇáÏæÇÁ¿  ÊäÇæá åÐÇ ÇáÏæÇÁ Úä ØÑíÞ ÇáÝã.  ÊäÇæáå ÈÇáÔßá ÇáãÍÏÏ Úáì ãáÕÞ ÇáæÕÝÉ ÇáØÈíÉ Ýí äÝÓ ÇáæÞÊ ßá íæã.  ÊäÇæá åÐÇ ÇáÏæÇÁ ãÚ ÇáØÚÇã Ýí ÈÏÇíÉ æÌÈÉ Ãæ æÌÈÉ ÓÑíÚÉ.  ÊäÇæá ßá åÐÇ ÇáÏæÇÁ ãÇ áã íØáÈ ãäß ØÈíÈß Ãæ ÃÎÕÇÆí ÇáÑÚÇíÉ ÇáÕÍíÉ ÇáÊæÞÝ Úä ÊäÇæáå ãÈßÑðÇ.  ÇÓÊãÑ Ýí ÊäÇæáå ÍÊì áæ ßäÊ ÊÚÊÞÏ Ãäß ÃÝÖá.  ÊÍÏË Åáì ØÈíÈß Ãæ ÃÎÕÇÆí ÇáÑÚÇíÉ ÇáØÈíÉ ÈÔÃä ÇÓÊÚãÇá åÐÇ ÇáÏæÇÁ ááÃØÝÇá.  Ýí Ííä Ãäå íãßä æÕÝå áÍÇáÇÊ ãÍÏÏÉ¡ ÅáÇ Ãäå íáÒã ÇÊÎÇÐ ÇáÇÍÊíÇØÇÊ ÇááÇÒãÉ.  ÇáÌÑÚÉ ÇáÒÇÆÏÉ : ÅÐÇ ÇÚÊÞÏÊ Ãäß ÊäÇæáÊ ßãíÉ ßÈíÑÉ ÌÏðÇ ãä åÐÇ ÇáÏæÇÁ¡ ÇÊÕá ÈãÑßÒ ÇáÊÍßã Ýí ÇáÓãæã Ãæ ÛÑÝÉ ÇáØæÇÑÆ ÝæÑðÇ.  ãáÇÍÙÉ: íÓÊÎÏã åÐÇ ÇáÏæÇÁ ãä ÃÌáß ÃäÊ ÝÞØ. áÇ ÊÔÇÑß ÂÎÑíä ãÚß Ýí ÊäÇæá åÐÇ ÇáÏæÇÁ.  ãÇÐÇ áæ ÊÑßÊ (äÓíÊ) ÌÑÚÉ¿  ÅÐÇ ÝÇÊÊß ÌÑÚÉ¡ ÊäÇæáåÇ Ýí ÃÞÑÈ æÞÊ ããßä.  ÅÐÇ ßÇä åÐÇ æÞÊ ÌÑÚÊß ÇáÊÇáíÉ ÊÞÑíÈðÇ ¡ ÝÇÓÊÎÏã Êáß ÇáÌÑÚÉ ÝÞØ.  áÇ ÊÊäÇæá ÌÑÚÊíä Ãæ ÌÑÚÉ ÒÇÆÏÉ.  ãÇ åí ÇáÃÔíÇÁ ÇáÊí ÞÏ ÊÊÝÇÚá ãÚ åÐÇ ÇáÏæÇÁ¿  • ÃáæÈíÑæäíá  • ãÖÇÏÇÊ ÇáÊÌáØ  • ÍÈæÈ ãäÚ ÇáÍãá  • ãíËæÊÑíßÓíÊ  • ÈÑæÈíäíÓíÏ  åÐå ÇáÞÇÆãÉ ÞÏ áÇ ÊÕÝ ßá ÇáÊÝÇÚáÇÊ ÇáãÍÊãáÉ. Þã ÈÅÚØÇÁ ãÞÏã ÇáÑÚÇíÉ ÇáÕÍíÉ ÞÇÆãÉ Èßá ÇáÃÏæíÉ Ãæ ÇáÃÚÔÇÈ Ãæ ÇáÃÏæíÉ ÈÏæä æÕÝÉ Ãæ ÇáãßãáÇÊ ÇáÛÐÇÆíÉ ÇáÊí ÊÓÊÎÏãåÇ. ÃÎÈÑåã ÃíÖðÇ ÅÐÇ ßäÊ ÊÏÎä Ãæ ÊÔÑÈ ÇáßÍæá Ãæ ÊÓÊÎÏã ÚÞÇÑÇÊ ÛíÑ ÞÇäæäíÉ. ÞÏ ÊÊÝÇÚá ÈÚÖ ÇáÚäÇÕÑ ãÚ ÏæÇÆß.  ãÇ ÇáÐí íÌÈ Úáíø ÊÑÞÈå ÚäÏ ÇÓÊÚãÇá åÐÇ ÇáÏæÇÁ¿  ÃÎÈÑ ØÈíÈß Ãæ ÃÎÕÇÆí ÇáÑÚÇíÉ ÇáÕÍíÉ ÅÐÇ áã ÊÈÏÃ ÃÚÑÇÖß Ýí ÇáÊÍÓä.  åÐÇ ÇáÏæÇÁ ÞÏ íÓÈÈ ÑÏæÏ ÝÚá ÎØíÑÉ Úáì ÇáÌáÏ.  æíãßä Ãä ÊÍÏË ÈÚÏ ÃÓÇÈíÚ Åáì ÔåæÑ ãä ÈÏÁ ÇáÏæÇÁ.  ÇÊÕá ÈØÈíÈß Ãæ ÃÎÕÇÆí ÇáÑÚÇíÉ ÇáÕÍíÉ Úáì ÇáÝæÑ ÅÐÇ áÇÍÙÊ Íãøì Ãæ ÃÚÑÇÖðÇ ÊÔÈå ÃÚÑÇÖ ÇáÅäÝáæäÒÇ ãÚ ØÝÍ.  ÞÏ íßæä ÇáØÝÍ ÃÍãÑ Ãæ ÃÑÌæÇäí Çááæä Ëã íÊÍæá Åáì ÈËæÑ Ãæ ÊÞÔÑ ááÌáÏ.  Ãæ ÞÏ ÊáÇÍÙ ØÝÍðÇ ÃÍãÑ Çááæä ãÚ ÊæÑã Ýí ÇáæÌå Ãæ ÇáÔÝÊíä Ãæ ÇáÛÏÏ ÇááíãÝÇæíÉ Ýí ÑÞÈÊß Ãæ ÊÍÊ ÐÑÇÚíß.  áÇ ÊÚÇáÌ ÇáÅÓåÇá ÈÇáÃÏæíÉ ÇáãÊÇÍÉ ÈÏæä æÕÝÉ.  ÇÊÕá ÈØÈíÈß ÅÐÇ ßÇä áÏíß ÅÓåÇá ÇÓÊãÑ áÃßËÑ ãä 2 íæã Ãæ ÅÐÇ ßÇä ÔÏíÏðÇ æãÇÆíðÇ.  ÅÐÇ ßäÊ ãÕÇÈðÇ ÈÇáÓßÑ¡ ÝÞÏ  ÊÍÕá Úáì äÊíÌÉ ÅíÌÇÈíÉ ÒÇÆÝÉ Úä ÇáÓßÑ Ýí Èæáß.  ÇÊÕá ÈØÈíÈß Ãæ ÃÎÕÇÆí ÇáÑÚÇíÉ ÇáÕÍíÉ.  ÍÈæÈ ãäÚ ÇáÍãá ÞÏ áÇ ÊÚãá ÈÔßá ÕÍíÍ ÃËäÇÁ ÊäÇæáß áåÐÇ ÇáÏæÇÁ.  ÊÍÏøË Åáì ØÈíÈß Úä ÇÓÊÎÏÇã æÓíáÉ ÅÖÇÝíÉ áãäÚ ÇáÍãá.  ãÇ åí ÇáÂËÇÑ ÇáÌÇäÈíÉ ÇáÊí íãßä Ãä ÃáÇÍÙåÇ ÚäÏ ÊáÞí åÐÇ ÇáÏæÇÁ¿  ÇáÂËÇÑ ÇáÌÇäÈíÉ ÇáÊí íÌÈ Ãä ÊÈáÛ ØÈíÈß Ãæ ÃÎÕÇÆí ÇáÑÚÇíÉ ÇáÕÍíÉ ÈåÇ Ýí ÃÞÑÈ æÞÊ ããßä:  • ÇáÍÓÇÓíÉ ãËá ÇáØÝÍ ÇáÌáÏí æÇáÍßÉ æÇáÇÑÊßÇÑíÇ (ÇáÔÑí) ææÑã ÇáæÌå Ãæ ÇáÔÝÇå Ãæ ÇááÓÇä.  • ÕÚæÈÉ ÇáÊäÝÓ  • Èæá ÛÇãÞ  • ÇáÍãøì Ãæ ÇáÞÔÚÑíÑÉ Ãæ ÇÍÊÞÇä ÇáÍáÞ  • ÇÍãÑÇÑ Ãæ ÊÍæÕá Ãæ ÊÞÔÑ Ãæ ÊÑåá Ýí ÇáÌáÏ¡ ÈãÇ Ýí Ðáß ãäØÞÉ ÏÇÎá ÇáÝã  • äæÈÇÊ (äæÈÇÊ ÊÔäÌ)  • ÕÚæÈÉ ÇáÊÈæá Ãæ ÊÛíÑ Ýí ßãíÉ ÇáÈæá  • äÒíÝ Ãæ ßÏãÇÊ ÛíÑ ãÚÊÇÏÉ  • ÖÚÝ Ãæ ÅÌåÇÏ ÛíÑ ãÚÊÇÏ  • ÈÞÚ Ãæ ÞÑÍ ÈíÖÇÁ Ýí ÇáÝã Ãæ ÇáÍáÞ  ÇáÂËÇÑ ÇáÌÇäÈíÉ ÇáÊí áÇ ÊÊØáÈ ÑÚÇíÉ ØÈíÉ (ÃÈáÛ ØÈíÈß Ãæ ÃÎÕÇÆí ÇáÑÚÇíÉ ÇáÕÍíÉ ÅÐÇ ÇÓÊãÑÊ Ãæ ßÇäÊ ãËíÑÉ ááÞáÞ):  • ÅÓåÇá  • ÇáÏæÇÑ  • ÇáÕÏÇÚ  • ÛËíÇä Ãæ ÞíÁ  • ÇÑÊÈÇß ÇáãÚÏÉ  • ÊåíÌ Ýí ÇáÃÚÖÇÁ ÇáÊäÇÓáíÉ Ãæ ÇáÔÑÌ  åÐå ÇáÞÇÆãÉ ÞÏ áÇ ÊÕÝ ßá ÇáÂËÇÑ ÇáÌÇäÈíÉ ÇáãÍÊãáÉ. ÇÊÕá ÈØÈíÈß ááÇÓÊÔÇÑÉ ÇáØÈíÉ Úä ÇáÂËÇÑ ÇáÌÇäÈíÉ. íãßäß ÇáÅÈáÇÛ Úä ÇáÂËÇÑ ÇáÌÇäÈíÉ áÅÏÇÑÉ ÇáÃÛÐíÉ æÇáÃÏæíÉ ÇáÃãÑíßíÉ (FDA) Úáì ÇáÑÞã [phone]  Ãíä íÌÈ Úáíø ÇáÇÍÊÝÇÙ ÈÏæÇÆí¿  íÍÝÙ ÈÚíÏðÇ Úä ãÊäÇæá ÇáÃØÝÇá Ãæ ÇáÍíæÇäÇÊ ÇáÃáíÝÉ.  íÎÒä Ýí ÏÑÌÉ ÍÑÇÑÉ ÇáÛÑÝÉ Èíä 20 æ25 ÏÑÌÉ ãÆæíÉ (68 æ77 ÝåÑäåÇíÊ).  ÊÎáÕ ãä Ãí ÏæÇÁ ÛíÑ ãÓÊÎÏã ÈÚÏ ÊÇÑíÎ ÇäÊåÇÁ ÇáÕáÇÍíÉ.  ãáÇÍÙÉ: åÐå ÇáÕÍíÝÉ ÚÈÇÑÉ Úä ãáÎÕ: ÞÏ áÇ íÛØí åÐÇ ÇáãáÎøóÕ ßá ÇáãÚáæãÇÊ ÇáããßäÉ. ÅÐÇ ßÇäÊ áÏíß Ãí ÃÓÆáÉ Úä åÐÇ ÇáÏæÇÁ¡ ÊÍÏøË Åáì ÇáØÈíÈ Ãæ ÇáÕíÏáí Ãæ ãÞÏã ÇáÑÚÇíÉ ÇáÕÍíÉ.    © 2021 Elsevier/Gold Standard ([id number] 00:00:00)  ãÑÖ ÇáÌÒÑ ÇáãÚÏí ÇáãÑíÆí áÏì ÇáÈÇáÛíä  Gastroesophageal Reflux Disease, Adult    íÚäí LAMONT (ÇáÇÑÊÌÇÚ ÇáãÚÏí ÇáãÑíÆí) ÊÏÝÞ ÇáÍãÖ ãä ãÚÏÊß Åáì ÇáÃäÈæÈ ÇáÐí íÕá ÇáÝã ÈÇáãÚÏÉ (ÇáãÑíÁ). ÝÇáØÚÇã íäÊÞá ÚÇÏÉð Åáì ÇáÃÓÝá ÎáÇá ÇáãÑíÁ æíÈÞì Ýí ÇáãÚÏÉ áíõåÖã. áßä ÚäÏ ÍÏæË ÇáÇÑÊÌÇÚ ÇáãÚÏí ÇáãÑíÆí¡ ÝÅä ÇáØÚÇã æÍãÖ ÇáãÚÏÉ íÑÊÝÚÇä áÃÚáì æíÏÎáÇä ÇáãÑíÁ. ÃãÇ ÅÐÇ ÊÝÇÞãÊ ÇáãÔßáÉ¡ ÝÞÏ  ÊõÔÎÕ ÇáÍÇáÉ Úáì ÅäåÇ ãÑÖ ÇáÇÑÊÌÇÚ ÇáãÚÏí ÇáãÑíÆí. Ðáß ÅÐÇ ßÇä ÇáÇÑÊÌÇÚ:  • ßËíÑ ÇáÍÏæË.   • íÓÈÈ ÃÚÑÇÖðÇ ãÊßÑÑÉ Ãæ ÔÏíÏÉ.   • íÓÈÈ ãÔÇßá ãËá ÊáÝ ÈÇáãÑíÁ.  ÝÚäÏãÇ íáÇãÓ ÇáÍãÖ ÇáãÑíÁ¡ ÝÅäå ÞÏ íÓÈÈ ÃáãðÇ (ÇáÊåÇÈðÇ) Ýí ÇáãÑíÁ. æÈãÑæÑ ÇáæÞÊ¡ íãßä Ãä íÓÈÈ ãÑÖ ÇáÇÑÊÌÇÚ ÇáãÚÏí ÇáãÑíÆí ÙåæÑ ÝÊÍÇÊ ÕÛíÑÉ (ÞÑÍ) Úáì ÈØÇäÉ ÇáãÑíÁ.  ãÇ ÃÓÈÇÈ ÇáÍÇáÉ¿  ÊÍÏË åÐå ÇáÍÇáÉ ÈÓÈÈ ãÔßáÉ Ýí ÇáÚÖáÇÊ Èíä ÇáãÑíÁ æÇáãÚÏÉ (ÇáãÕÑÉ ÇáãÑíÆíÉ ÇáÓÝáíÉ¡ Ãæ LES). æÊäÛáÞ ÚÖáÉ ÇáãÕÑÉ ÇáãÑíÆíÉ ÇáÓÝáíÉ ÚÇÏÉð ÈÚÏ ãÑæÑ ÇáØÚÇã ãä ÎáÇá ÇáãÑíÁ Åáì ÇáãÚÏÉ. áßä ÚäÏãÇ ÊÖÚÝ ÚÖáÉ ÇáãÕÑÉ ÇáãÑíÆíÉ ÇáÓÝáíÉ Ãæ ÊÕÈÍ ÛíÑ ØÈíÚíÉ¡ ÝÅäåÇ áÇ ÊäÛáÞ ÇäÛÇáÇð ÓáíãðÇ¡ ããÇ íÓãÍ ÈÕÚæÏ ÇáØÚÇã æÍãÖ ÇáãÚÏÉ Åáì ÇáãÑíÁ.  æíãßä Ãä ÊÖÚÝ ÚÖáÉ ÇáãÕÑÉ ÇáãÑíÆíÉ ÇáÓÝáíÉ ãä ÎáÇá ãæÇÏ ÛÐÇÆíÉ ãÚíäÉ æÃÏæíÉ æÍÇáÇÊ ØÈíÉ¡ ÈãÇ Ýí Ðáß:  • ÇÓÊÎÏÇã ÇáÊÈÛ.  • ÇáÍãá.  • ÝÊÞ ÍÌÇÈí.  • ÊäÇæá ÇáßÍæáíÇÊ.  • ÈÚÖ ÇáãÃßæáÇÊ æÇáãÔÑæÈÇÊ ÇáãÚíäÉ¡ ãËá ÇáÞåæÉ æÇáÔíßæáÇÊÉ æÇáÈÕá æÇáäÚäÇÚ.  ãÇ ÚæÇãá ÒíÇÏÉ ÇáÎØÑ¿  íÒÏÇÏ ÇÍÊãÇá ÇáÅÕÇÈÉ ÈåÐå ÇáÍÇáÉ Ýí ÇáÍÇáÇÊ ÇáÊÇáíÉ:  • ÒíÇÏÉ ÇáæÒä.  • ÇáÅÕÇÈÉ ÈÇÖØÑÇÈ íÄËÑ Ýí ÇáÃäÓÌÉ ÇáÖÇãÉ.  • ÊäÇæá NSAID (ãÖÇÏÇÊ ÇáÇáÊåÇÈ ÇááÇÓÊíÑæíÏíÉ).  ãÇ ÚáÇãÇÊ ÇáÍÇáÉ Ãæ ÃÚÑÇÖåÇ¿  ÊÔãá ÃÚÑÇÖ åÐå ÇáÍÇáÉ ãÇ íáí:  • ÍÑÞÉ Ýí Ýã ÇáãÚÏÉ.  • ÕÚæÈÉ Ãæ Ãáã Ýí ÇáÈáÛ.  • ÇáÔÚæÑ ÈæÌæÏ ÌáØÉ Ýí ÇáÍáÞ.  • ÇáÅÍÓÇÓ ÈØÚã ãÑÇÑÉ Ýí ÇáÝã.  • ãÔßáÇÊ Ýí ÇáÊäÝÓ.  • æÌæÏ ßãíÉ ßÈíÉ ãä ÇááÚÇÈ.  • ÇáÅÕÇÈÉ ÈÇÖØÑÇÈÇÊ Ýí ÇáãÚÏÉ Ãæ ÇäÊÝÇÎ.  • ÊÌÔÄ.  • Ãáã ÈÇáÕÏÑ. æíãßä Ãä íÍÏË Ãáã ÇáÕÏÑ ÈÓÈÈ ÚÏÉ ÍÇáÇÊ ãÎÊáÝÉ. áÐá íÌÈ ÇáÍÑÕ Úáì ãÑÇÌÚÉ ãÞÏã ÇáÑÚÇíÉ ÇáÕÍíÉ ÇáãÊÇÈÚ áß ÅÐÇ ßäÊ ÊÔÚÑ ÈÃáã Ýí ÇáÕÏÑ.  • ÖíÞ ÇáäÝÓ ææÌæÏ ÕÝíÑ ÚäÏ ÇáÊäÝÓ.  • ÓÚÇá ãÓÊãÑ (ãÒãä) Ãæ ÓÚÇá Ýí ÃæÞÇÊ Çááíá.  • Èáì ãíäÇÁ ÇáÃÓäÇä.  • ÝÞÏÇä ÇáæÒä.  ßíÝ ÊõÔÎÕ åÐå ÇáÍÇáÉ¿  íÊæáì ãÞÏã ÇáÑÚÇíÉ ÇáÕÍíÉ ÇáÎÇÕ Èß ÈÊÓÌíá ÇáÊÇÑíÎ ÇáãÑÖí æÅÌÑÇÁ ÝÍÕ ØÈí. áÊÍÏíÏ ãÇ ÅÐÇ ßäÊ ÊÚÇäí ãä ãÑÖ ÇáÌÒÑ ÇáãÚÏí ÇáãÑíÆí ÎÝíÝ Ãæ ÍÇÏ¡ ÞÏ íÍÊÇÌ ãÞÏã ÇáÑÚÇíÉ ÇáÕÍíÉ Åáì ãÑÇÞÈÉ ÇÓÊÌÇÈÊß ááÚáÇÌ. ßãÇ ÞÏ ÊõÌÑì áß ÃíÖðÇ ÝÍæÕ¡ ÈãÇ Ýí Ðáß:  • ÇÎÊÈÇÑ ÝÍÕ ÇáãÚÏÉ æÇáãÑíÁ ÈÇÓÊÎÏÇã ÌåÇÒ Èå ßÇãíÑÇ ÕÛíÑÉ (ÊäÙíÑ  ÏÇÎáí).  • ÇÎÊÈÇÑ áÞíÇÓ ãÓÊæì ÇáÍãæÖÉ Ýí ÇáãÑíÁ.  • ÇÎÊÈÇÑ áÞíÇÓ ãÏì ÇáÖÛØ ÇáãæÌæÏ Úáì ÇáãÑíÁ.  • ÝÍÕ ÈáÚ ÇáÈÇÑíæã Ãæ ÈáÚ ÇáÈÇÑíæã ÇáãÚÏá áÝÍÕ Ôßá ÇáãÑíÁ æÍÌãå æßÝÇÁÉ Úãáå.  ßíÝ ÊõÚÇáÌ åÐå ÇáÍÇáÉ¿  íßãä ÇáåÏÝ ãä ÇáÚáÇÌ Ýí ãÓÇÚÏÊß Úáì ÊÎÝíÝ ÇáÃÚÑÇÖ æÇáæÞÇíÉ ãä ÇáãÖÇÚÝÇÊ. ÊÎÊáÝ ØÑíÞÉ ÚáÇÌ åÐå ÇáÍÇáÉ ÇÚÊãÇÏðÇ Úáì ãÏì ÍÏÉ ÇáÃÚÑÇÖ. æÞÏ íæÕí ãÞÏã ÇáÑÚÇíÉ ÇáÕÍíÉ ÈãÇ íáí:  • ÊÛííÑÇÊ Ýí äÙÇãß ÇáÛÐÇÆí.  • ÇáÃÏæíÉ.  • ÇáÌÑÇÍÉ.  íÌÈ ÇÊÈÇÚ åÐå ÇáÊÚáíãÇÊ Ýí ÇáãäÒá:  ÇáÃßá æÇáÔÑÈ    • íÌÈ ÇÊÈÇÚ ÇáäÙÇã ÇáÛÐÇÆí ÇáÐí ÃæÕì Èå ãõÞÏøöã ÇáÑÚÇíÉ ÇáÕÍíÉ ÇáãÊÇÈÚ áß. æåæ ãÇ ÞÏ íÔãá ÊÌäÈ ÈÚÖ ÇáãÃßæáÇÊ æÇáãÔÑæÈÇÊ¡ ãËá:  ? ÇáÞåæÉ æÇáÔÇí (ÈÇáßÇÝííä Ãæ ÇáÎÇáííä ãäå).  ? ÇáãÔÑæÈÇÊ ÇáãÍÊæíÉ Úáì ßÍæá.  ? ãÔÑæÈÇÊ ÇáØÇÞÉ æÇáãÔÑæÈÇÊ ÇáÑíÇÖíÉ.  ? ÇáãÔÑæÈÇÊ ÇáÛÇÒíÉ Ãæ ÇáÕæÏÇ.  ? ÇáÔæßæáÇÊÉ æÇáßÇßÇæ.  ? äßåÇÊ ÇáÝáÝá æÇáäÚäÇÚ.  ? ÇáËæã æÇáÈÕá.  ? ÇáÝÌá ÇáÍÇÑ.  ? ÇáÃØÚãÉ ÇáÍÑíÝÉ Ãæ ÇáÍãÖíÉ¡ æãä ÈíäåÇ ÇáÈåÇÑÇÊ æãÓÍæÞ ÇáÝáÝá ÇáÍÇÑ æãÓÍæÞ ÇáßÇÑí æÇáÎá æÇáÕáÕÉ ÇáÍÇÑÉ æÕáÕÉ ÇáÈÇÑÈßíæ.  ? ÚÕÇÆÑ ÇáÝæÇßå ÇáÍãÖíÉº ãËá ÇáÈÑÊÞÇá æÇááíãæä æÇááíãæä ÇáÍÇãÖ.  ? ÇáÃØÚãÉ ÇáÊí ÊÚÊãÏ Úáì ÇáØãÇØã ãËá ÇáÕáÕÉ æÇáÝáÝá ÇáÍÇÑ æÇáÓáØÉ æÇáÈíÊÒÇ ÇáãÒæÏÉ ÈÇáÕáÕÉ ÇáÍãÑÇÁ.  ? ÇáÃØÚãÉ ÇáãÞáíÉ æÇáÛäíÉ ÈÇáÏåæä¡ ãËá ÇáÏæäÇÊÓ æÇáÈØÇØÓ ÇáãÞáíÉ æÔÑÇÆÍ ÇáÈØÇØÓ æÇáÊÊÈíáÇÊ ÚÇáíÉ ÇáÏåæä.  ? ÇááÍæã ÚÇáíÉ ÇáÏåæä¡ ãËá ÇáåæÊ ÏæÌ æÇááÍæã ÇáÍãÑÇÁ æÇáÈíÖÇÁ ÇáÛäíÉ ÈÇáÏåæä¡ ãËá ÔÑÇÆÍ ÇáÖáæÚ æÇáÓæÓíÓ æÇáßÝá æÇááÍã ÇáãÞÏÏ.  ? ãäÊÌÇÊ ÇáÃáÈÇä ÚÇáíÉ ÇáÏåæä¡ ãËá ÇáÍáíÈ ßÇãá ÇáÏÓã æÇáÒÈÏ æÇáÌÈä ÇáßÑíãí.  • íÌÈ ÊäÇæá æÌÈÇÊ ÕÛíÑÉ æãÊßÑÑÉ ÈÏáÇð ãä ÇáæÌÈÇÊ ÇáßÈíÑÉ.  • íÌÈ ÊÌäÈ ÔÑÈ ßãíÇÊ ßÈíÑÉ ãä ÇáÓæÇÆá ãÚ ÇáæÌÈÇÊ.  • íÌÈ ÊÌäÈ ÊäÇæá æÌÈÇÊ ÎáÇá 2-3 ÓÇÚÇÊ ÞÈá Çáäæã.  • íÌÈ ÊÌäÈ ÇáÇÓÊáÞÇÁ ãÈÇÔÑÉ ÈÚÏ ÇáÃßá.  • ýíÊÚíä ÚÏã ããÇÑÓÉ ÇáÊãÑíäÇÊ ÇáÑíÇÖíÉ ÈÚÏ ÇáÃßá ãÈÇÔÑÉ.  äãØ ÇáÍíÇÉ    • íÊÚíä ÚÏã ÇÓÊÎÏÇã ãäÊÌÇÊ ÊÍÊæí Úáì ÇáäíßæÊíä Ãæ ÇáÊÈÛ¡ ãËá ÇáÓÌÇÆÑ æÇáÓÌÇÆÑ ÇáÅáßÊÑæäíÉ æÊÈÛ ÇáãÖÛ. íãßä ÇÓÊÔÇÑÉ ãÞÏã ÇáÑÚÇíÉ ÇáÕÍíÉ ÅÐÇ ÇÍÊÌÊ Åáì ÇáãÓÇÚÏÉ ááÅÞáÇÚ Úä ÇáÊÏÎíä.  • íÌÈ ãÍÇæáÉ ÇáÊÞáíá ãä ÇáÖÛæØ ÇáÊí ÊÊÚÑÖ áåÇ æãÓÊæì ÇáÊæÊÑ áÏíß  ÈÇÊÈÇÚ æÓÇÆá ãËá ããÇÑÓÉ ÇáíæÌÇ Ãæ ÇáÊÃãá. ÃãÇ Ýí ÍÇáÉ ÇáÍÇÌÉ áãÓÇÚÏÉ áÎÝÖ ãÓÊæì ÇáÅÌåÇÏ¡ Ýíãßä ÇÓÊÔÇÑÉ ãõÞÏã ÇáÑÚÇíÉ ÇáÕÍíÉ.  • Ýí ÍÇáÉ ÒíÇÏÉ ÇáæÒä¡ ÝíÌÈ ÎÝÖ ÇáæÒä Åáì ÇáæÒä ÇáÕÍí. íÌÈ ÇÓÊÔÇÑÉ æØáÈ ÊæÌíåÇÊ ãÞÏã ÇáÑÚÇíÉ ÇáÕÍíÉ Íæá ßíÝíÉ ÎÝÖ ÇáæÒä ÈØÑíÞÉ ÕÍíÉ æÂãäÉ.  ÊÚáíãÇÊ ÚÇãÉ  • íÊÚíä ÇáÇäÊÈÇå áÌãíÚ ÇáÊÛíÑÇÊ ÇáÊí ÊØÑÃ Úáì ÇáÃÚÑÇÖ ÇáÊí ÊÚÇäíåÇ.  • íÊÚíä ÊäÇæá ÇáÃÏæíÉ ÇáÊí ÊõÕÑÝ ÈæÕÝÉ ØÈíÉ Ãæ Ïæä æÕÝÉ ØÈíÉ æÝÞðÇ áãÇ íÎÈÑßö Èå ãÞÏã ÇáÑÚÇíÉ ÇáÕÍíÉ ÇáÎÇÕ Èß. íÊÚíä ÚÏã ÊäÇæá ÇáÃÓÈÑíä Ãæ ÇáÃíÈæÈÑæÝíä Ãæ ÛíÑå ãä ãÖÇÏÇÊ ÇáÇáÊåÇÈÇÊ ÇááÇÓÊíÑæíÏíÉ ÅáÇ ÈãæÇÝÞÉ ãÞÏã ÇáÑÚÇíÉ.  • íÌÈ ÇÑÊÏÇÁ ãáÇÈÓ æÇÓÚÉ. æíÌÈ ÚÏã ÇÑÊÏÇÁ ÔíÁ ÖíÞ Íæá ÇáÎÕÑ ÞÏ íÖÛØ Úáì ÇáãÚÏÉ.  • íÌÈ ÑÝÚ (ÅÚáÇÁ) ãÞÏãÉ ÝÑÇÔß áãÓÇÝÉ 6 ÈæÕÇÊ ÈÇáÊÞÑíÈ (15 Óã).  • ßÐáß ÊÌäÈ ÇáÇäÍäÇÁ ÅÐÇ ßÇä íÄÏí Åáì ÒíÇÏÉ ÔÏÉ ÇáÃÚÑÇÖ.  • íÊÚíä ÇáÇáÊÒÇã ÈÌãíÚ ãæÇÚíÏ ÇáãÊÇÈÚÉ æÝÞðÇ áÊæÌíåÇÊ ãÞÏã ÇáÑÚÇíÉ ÇáÕÍíÉ. ÝåÐÇ ÃãÑ ãåã.  íÌÈ ÇáÇÊÕÇá ÈãÞÏã ÇáÑÚÇíÉ ÇáÕÍíÉ Ýí ÇáÍÇáÇÊ ÇáÊÇáíÉ:  • Ýí ÍÇáÉ ÇáÅÕÇÈÉ ÈÃí ããÇ íáí:  ? ÃÚÑÇÖ ÌÏíÏÉ.  ? ÝÞÏÇä ÇáæÒä Ïæä ÓÈÈ ãÝåæã.  ? ÕÚæÈÉ Ýí ÇáÈáÚ Ãæ Ãáã ÚäÏ ÇáÈáÚ.  ? æÌæÏ ÕæÊ ÃÒíÒ ÚäÏ ÇáÊäÝÓ Ãæ ÓÚÇá ãÓÊãÑ.  ? ÈÍÉ Ýí ÇáÕæÊ.  • ÚÏã ÇáÔÚæÑ ÈÊÍÓä ãÚ ÊáÞí ÇáÚáÇÌ.  íÊÚíä ØáÈ ÇáãÓÇÚÏÉ ÝæÑðÇ Ýí ÇáÍÇáÇÊ ÇáÊÇáíÉ:  • ÇáÔÚæÑ ÈÃáã Ýí ÇáÐÑÇÚíä Ãæ ÇáÚäÞ Ãæ ÇáÝß Ãæ ÇáÃÓäÇä Ãæ ÇáÙåÑ.  • ÇáÊÚÑÞ¡ Ãæ ÇáÔÚæÑ ÈÏæÇÑ Ãæ ÏæÎÉ.  • ÇáÅÕÇÈÉ ÈÃáã Ýí ÇáÕÏÑ Ãæ ÖíÞ Ýí ÇáÊäÝÓ.  • ÇáÊÞíÄ ÞíÆðÇ íÔÈå ÇáÏã Ãæ ÊÝá ÇáÞåæÉ.  • ÇáÅÛãÇÁ.  • ÅÎÑÇÌ ÈÑÇÒ ãÏãã Ãæ ÃÓæÏ.  • ÚÏã ÇáÞÏÑÉ Úáì ÇáÈáÚ Ãæ ÇáÔÑÈ Ãæ ÇáÃßá.  ãáÎÕ  • íÍÏË ÇÑÊÌÇÚ ÇáãÚÏÉ ÇáãÑíÆí ÚäÏ ÕÚæÏ ÇáÍãÖ ãä ÇáãÚÏÉ Åáì ÏÇÎá ÇáãÑíÁ. ãÑÖ ÇáÇÑÊÌÇÚ ÇáãÚÏí ÇáãÑíÆí ÍÇáÉ íÍÏË ÝíåÇ ÇáÇÑÊÌÇÚ ßËíÑðÇ¡ Ãæ íÓÈÈ ÃÚÑÇÖðÇ ãÊßÑÑÉ Ãæ ÔÏíÏÉ¡ Ãæ íÓÈÈ ãÔÇßá ãËá ÊáÝ ÈÇáãÑíÁ.  • ÊÎÊáÝ ØÑíÞÉ ÚáÇÌ åÐå ÇáÍÇáÉ ÇÚÊãÇÏðÇ Úáì ãÏì ÍÏÉ ÇáÃÚÑÇÖ. æÞÏ íæÕí ãÞÏã ÇáÑÚÇíÉ ÇáÕÍíÉ ÈÅÌÑÇÁ ÊÛííÑÇÊ Ýí ÇáäÙÇã ÇáÛÐÇÆí æäãØ ÇáÍíÇÉ¡ Ãæ íÕÝ ÏæÇÁð¡ Ãæ íæÕí ÈÅÌÑÇÁ ÇáÌÑÇÍÉ.  • íÌÈ ÇáÇÊÕÇá ÈãõÞÏã ÇáÑÚÇíÉ ÇáÕÍíÉ ÅÐÇ ÙåÑÊ Úáíß ÃÚÑÇÖ ÌÏíÏÉ Ãæ ÒÇÏÊ ÔÏÉ ÇáÃÚÑÇÖ ÇáÊí ÊÚÇäí ãäåÇ.  • íÊÚíä ÊäÇæá ÇáÃÏæíÉ ÇáÊí  ÊõÕÑÝ ÈæÕÝÉ ØÈíÉ Ãæ Ïæä æÕÝÉ ØÈíÉ æÝÞðÇ áãÇ íÎÈÑßö Èå ãÞÏã ÇáÑÚÇíÉ ÇáÕÍíÉ ÇáÎÇÕ Èß. íÊÚíä ÚÏã ÊäÇæá ÇáÃÓÈÑíä Ãæ ÇáÃíÈæÈÑæÝíä Ãæ ÛíÑå ãä ãÖÇÏÇÊ ÇáÇáÊåÇÈÇÊ ÇááÇÓÊíÑæíÏíÉ ÅáÇ ÈãæÇÝÞÉ ãÞÏã ÇáÑÚÇíÉ.  • íÊÚíä ÇáÇáÊÒÇã ÈÌãíÚ ãæÇÚíÏ ÇáãÊÇÈÚÉ æÝÞðÇ áÊæÌíåÇÊ ãÞÏã ÇáÑÚÇíÉ ÇáÕÍíÉ. ÝåÐÇ ÃãÑ ãåã.  áíÓ ÇáåÏÝ ãä åÐå ÇáãÚáæãÇÊ Ãä Êßæä ÈÏíáÇð ááÅÑÔÇÏÇÊ ÇáÊí íÞÏãåÇ ãæÝÑ ÇáÑÚÇíÉ ÇáÕÍíÉ. ÊÃßÏ ãä ãäÇÞÔÉ ÃíÉ ÃÓÆáÉ ÊÏæÑ Ýí Ðåäß ãÚ ãæÝÑ ÇáÑÚÇíÉ ÇáÕÍíÉ.þ  Document Revised: 07/26/2019 Document Reviewed: 07/26/2019  [employer] Patient Education © 2021 [email or web address]  Pantoprazole tablets  ãÇ åÐÇ ÇáÏæÇÁ¿  ÈÇäÊæÈÑÇÒæá íãäÚ ÅäÊÇÌ ÇáÃÍãÇÖ Ýí ÇáãÚÏÉ.  íÓÊÎÏã áÚáÇÌ ãÑÖ ÇÑÊÌÇÚ ÇáÍãÖ ááãÑÆ æÇáÊåÇÈ ÇáãÑíÁ æãÊáÇÒãÉ ÒæáíäÌÑ- ÃáíÓæä.  íãßä ÇÓÊÎÏÇã åÐÇ ÇáÏæÇÁ áÃÛÑÇÖ ÃÎÑìº ÇÓÃá ãÞÏã ÇáÑÚÇíÉ ÇáÕÍíÉ Ãæ ÇáÕíÏáí ÅÐÇ ßÇäÊ áÏíß ÃÓÆáÉ.  ÃÓãÇÁ ÇáÚáÇãÇÊ ÇáÊÌÇÑíÉ ÇáãÚÑæÝÉ:þ Protonix  ãÇ åí ÇáÃÔíÇÁ ÇáÊí íÌÈ Ãä ÃÎÈÑ ÈåÇ ãÞÏã ÇáÑÚÇíÉ ÇáÕÍíÉ ÞÈá ÊäÇæá åÐÇ ÇáÏæÇÁ¿  åã ÈÍÇÌÉ Åáí ãÚÑÝÉ ãÇ ÅÐÇ ßäÊ ãÕÇÈðÇ ÈÃí ãä åÐå ÇáÍÇáÇÊ ÇáÂä:  • ÃãÑÇÖ ÇáßÈÏ  • ÇäÎÝÇÖ ãÓÊæíÇÊ ÇáãÇÛäÓíæã Ýí ÇáÏã  • ÇáÐÆÈÉ  • ÑÏ ÝÚá ÛíÑ ÚÇÏí Ãæ ÍÓÇÓíÉ ÖÏ ÇæãíÈÑÇÒæá Ãæ áÇäÓæÈÑÇÒæá Ãæ ÈÇäÊæÈÑÇÒæá Ãæ áÇÈíÈÑÇÒæá  • ÑÏ ÝÚá ÛíÑ ÚÇÏí Ãæ ÍÓÇÓíÉ ÖÏ ÇáÃÏæíÉ ÇáÃÎÑì Ãæ ÇáÃØÚãÉ Ãæ ÇáÃÕÈÇÛ Ãæ ÇáãæÇÏ ÇáÍÇÝÙÉ  • ÍÇãá Ãæ ÊÓÚíä ááÍãá  • ÇáÑÖÇÚÉ ÇáØÈíÚíÉ  ßíÝ íÌÈ Úáíø ÇÓÊÚãÇá åÐÇ ÇáÏæÇÁ¿  ÊäÇæá åÐÇ ÇáÏæÇÁ Úä ØÑíÞ ÇáÝã.  ÇÈÊáÚ åÐÇ ÇáÏæÇÁ ÈÇáßÇãá ãÚ ßæÈ ãÇÁ.  ÇÊøóÈÚ ÇáÊÚáíãÇÊ ÇáãæÌæÏÉ Úáì ÇáÚÈæÉ Ãæ ãáÕÞ ÇáæÕÝÉ ÇáØÈíÉ.  áÇ ÊßÓÑ Ãæ ÊÓÍÞ Ãæ ÊãÖÛ åÐÇ ÇáÏæÇÁ.  ÊäÇæá åÐÇ ÇáÏæÇÁ Úáì ÝÊÑÇÊ ãäÊÙãÉ.  áÇ ÊÊäÇæá åÐÇ ÇáÏæÇÁ ÃßËÑ ãä ÇáãÑÇÊ ÇáãæÕæÝÉ.  ÊÍÏË Åáí ØÈíÈ ÇáÃØÝÇá ÈÔÃä ÇÓÊÚãÇá åÐÇ ÇáÏæÇÁ ááÃØÝÇá.  Ýí Ííä Ãä åÐÇ ÇáÏæÇÁ íãßä Ãä íæÕÝ ááÃØÝÇá ÇáÐíä íÈáÛæä ãä ÇáÚãÑ 5 ÓäæÇÊ ÝÃßÈÑ áÚáÇÌ ÇáÍÇáÇÊ ÇáãÍÏÏÉ¡ ÅáÇ Ãäå íáÒã ÇÊÎÇÐ ÇáÇÍÊíÇØÇÊ ÇááÇÒãÉ.  ÇáÌÑÚÉ ÇáÒÇÆÏÉ : ÅÐÇ ÇÚÊÞÏÊ Ãäß ÊäÇæáÊ ßãíÉ ßÈíÑÉ ÌÏðÇ ãä åÐÇ ÇáÏæÇÁ¡ ÇÊÕá ÈãÑßÒ ÇáÊÍßã Ýí ÇáÓãæã Ãæ ÛÑÝÉ ÇáØæÇÑÆ ÝæÑðÇ.  ãáÇÍÙÉ: íÓÊÎÏã åÐÇ ÇáÏæÇÁ ãä ÃÌáß ÃäÊ ÝÞØ. áÇ ÊÔÇÑß ÂÎÑíä ãÚß Ýí ÊäÇæá åÐÇ  ÇáÏæÇÁ.  ãÇÐÇ áæ ÊÑßÊ (äÓíÊ) ÌÑÚÉ¿  ÅÐÇ ÝÇÊÊß ÌÑÚÉ¡ ÊäÇæáåÇ Ýí ÃÞÑÈ æÞÊ ããßä.  ÅÐÇ ßÇä åÐÇ æÞÊ ÌÑÚÊß ÇáÊÇáíÉ ÊÞÑíÈðÇ ¡ ÝÊäÇæá Êáß ÇáÌÑÚÉ ÝÞØ.  áÇ ÊÊäÇæá ÌÑÚÊíä Ãæ ÌÑÚÉ ÒÇÆÏÉ.  ãÇ åí ÇáÃÔíÇÁ ÇáÊí ÞÏ ÊÊÝÇÚá ãÚ åÐÇ ÇáÏæÇÁ¿  áÇ ÊäÇæá åÐÇ ÇáÏæÇÁ ãÚ Ãí ããÇ íáí:  • ÇÊÇÒäÇÝíÑ  • äíáÝíäÇÝíÑ  åÐÇ ÇáÏæÇÁ íãßä Ãä íÊÝÇÚá ÃíÖðÇ ãÚ ÇáÃÏæíÉ ÇáÊÇáíÉ:  • ÃãÈíÓíáíä  • ÏíáÇÝíÑÏíä  • ÅÑáæÊíäíÈ  • ÃãáÇÍ ÇáÍÏíÏ  • ÈÚÖ ÃÏæíÉ ÚÏæì ÇáÝØÑíÇÊ ãËá ÝáßæäÇÒæá æÇíÊÑÇßæäÇÒæá æßíÊæßæäÇÒæá æÝæÑíßæäÇÒæá  • ãíËæÊÑíßÓíÊ  • ãíßæÝíäæáÇÊ ãæÝíÊíá  • æÇÑÝÇÑíä  åÐå ÇáÞÇÆãÉ ÞÏ áÇ ÊÕÝ ßá ÇáÊÝÇÚáÇÊ ÇáãÍÊãáÉ. Þã ÈÅÚØÇÁ ãÞÏã ÇáÑÚÇíÉ ÇáÕÍíÉ ÞÇÆãÉ Èßá ÇáÃÏæíÉ Ãæ ÇáÃÚÔÇÈ Ãæ ÇáÃÏæíÉ ÈÏæä æÕÝÉ Ãæ ÇáãßãáÇÊ ÇáÛÐÇÆíÉ ÇáÊí ÊÓÊÎÏãåÇ. ÃÎÈÑåã ÃíÖðÇ ÅÐÇ ßäÊ ÊÏÎä Ãæ ÊÔÑÈ ÇáßÍæá Ãæ ÊÓÊÎÏã ÚÞÇÑÇÊ ÛíÑ ÞÇäæäíÉ. ÞÏ ÊÊÝÇÚá ÈÚÖ ÇáÚäÇÕÑ ãÚ ÏæÇÆß.  ãÇ ÇáÐí íÌÈ Úáíø ÊÑÞÈå ÚäÏ ÇÓÊÚãÇá åÐÇ ÇáÏæÇÁ¿  ÞÏ íÓÊÛÑÞ ÇáÃãÑ ÚÏÉ ÃíÇã ÞÈá ÊÍÓä Ãáã ÇáãÚÏÉ.  ÇÊÕá ÈØÈíÈß Ãæ ÃÎÕÇÆí ÇáÑÚÇíÉ ÇáÕÍíÉ ÅÐÇ áã ÊÈÏÃ ÍÇáÊß Ýí ÇáÊÍÓä Ãæ ÅÐÇ ÇÒÏÇÏÊ ÓæÁðÇ.  åÐÇ ÇáÏæÇÁ ÞÏ íÓÈÈ ÑÏæÏ ÝÚá ÎØíÑÉ Úáì ÇáÌáÏ.  æíãßä Ãä ÊÍÏË ÈÚÏ ÃÓÇÈíÚ Åáì ÔåæÑ ãä ÈÏÁ ÇáÏæÇÁ.  ÇÊÕá ÈØÈíÈß Ãæ ÃÎÕÇÆí ÇáÑÚÇíÉ ÇáÕÍíÉ Úáì ÇáÝæÑ ÅÐÇ áÇÍÙÊ Íãøì Ãæ ÃÚÑÇÖðÇ ÊÔÈå ÃÚÑÇÖ ÇáÅäÝáæäÒÇ ãÚ ØÝÍ.  ÞÏ íßæä ÇáØÝÍ ÃÍãÑ Ãæ ÃÑÌæÇäí Çááæä Ëã íÊÍæá Åáì ÈËæÑ Ãæ ÊÞÔÑ ááÌáÏ.  Ãæ ÞÏ ÊáÇÍÙ ØÝÍðÇ ÃÍãÑ Çááæä ãÚ ÊæÑã Ýí ÇáæÌå Ãæ ÇáÔÝÊíä Ãæ ÇáÛÏÏ ÇááíãÝÇæíÉ Ýí ÑÞÈÊß Ãæ ÊÍÊ ÐÑÇÚíß.  ÓæÝ ÊÍÊÇÌ Åáì ÅÌÑÇÁ ÇÎÊÈÇÑÇÊ ãäÊÙãÉ ááÏã ÃËäÇÁ ÊäÇæáß áåÐÇ ÇáÏæÇÁ.  ÞÏ íÊÓÈÈ åÐÇ ÇáÏæÇÁ Ýí ÇäÎÝÇÖ äÓÈÉ ÝíÊÇãíä È 12 Ýí ÇáÌÓã.  æíäÈÛí Úáíß Ãä ÊÊÃßÏ ãä ÍÕæáß Úáì ßãíÉ ßÇÝíÉ ãä ÝíÊÇãíä È 12 ÃËäÇÁ ÊäÇæá åÐÇ ÇáÏæÇÁ.  äÇÞÔ ÇáÃØÚãÉ æÇáÝíÊÇãíäÇÊ ÇáÊí ÊÊäÇæáåÇ ãÚ ØÈíÈß Ãæ ÃÎÕÇÆí ÇáÑÚÇíÉ ÇáÕÍíÉ.  ãÇ åí ÇáÂËÇÑ ÇáÌÇäÈíÉ ÇáÊí íãßä Ãä ÃáÇÍÙåÇ ÚäÏ ÊáÞí åÐÇ ÇáÏæÇÁ¿  ÇáÂËÇÑ ÇáÌÇäÈíÉ ÇáÊí íÌÈ Ãä ÊÈáÛ ØÈíÈß Ãæ ÃÎÕÇÆí ÇáÑÚÇíÉ ÇáÕÍíÉ ÈåÇ Ýí ÃÞÑÈ æÞÊ ããßä:  • ÇáÍÓÇÓíÉ ãËá ÇáØÝÍ ÇáÌáÏí æÇáÍßÉ æÇáÇÑÊßÇÑíÇ (ÇáÔÑí) ææÑã ÇáæÌå Ãæ ÇáÔÝÇå Ãæ ÇááÓÇä.  • Ãáã Ýí ÇáÚÙÇã Ãæ ÇáãÝÇÕá Ãæ ÇáÚÖáÇÊ  • ÕÚæÈÉ ÇáÊäÝÓ  • Ãáã Ãæ ÖíÞ Ýí  ÇáÕÏÑ  • Èæá ÃÕÝÑ ÛÇãÞ Ãæ Èäí  • ÇáÏæÇÑ  • äÈÖÇÊ ÇáÞáÈ ÇáÓÑíÚÉ Ãæ ÛíÑ ÇáãäÊÙãÉ  • ÇáÔÚæÑ ÈÇáÏæÇÑ Ãæ ÇáÅÛãÇÁ  • ÇáÍãøì Ãæ ÇÍÊÞÇä ÇáÍáÞ  • ÊÞáÕÇÊ Ãæ ÊÔäÌÇÊ ÇáÚÖáÇÊ  • ÇáÎÝÞÇä  • ØÝÍ Úáì ÇáÎÏíä Ãæ ÇáÐÑÇÚíä íÒÏÇÏ ÓæÁðÇ Ýí ÇáÔãÓ  • ÇÍãÑÇÑ Ãæ ÊÍæÕá Ãæ ÊÞÔÑ Ãæ ÊÑåá Ýí ÇáÌáÏ¡ ÈãÇ Ýí Ðáß ãäØÞÉ ÏÇÎá ÇáÝã  • äæÈÇÊ (äæÈÇÊ ÊÔäÌ)  • ÓáÇÆá ÇáãÚÏÉ  • ÇÑÊÌÇÝ  • äÒíÝ Ãæ ßÏãÇÊ ÛíÑ ãÚÊÇÏÉ  • ÖÚÝ Ãæ ÅÌåÇÏ ÛíÑ ãÚÊÇÏ  • ÇÕÝÑÇÑ ÇáÚíäíä Ãæ ÇáÌáÏ  ÇáÂËÇÑ ÇáÌÇäÈíÉ ÇáÊí áÇ ÊÊØáÈ ÑÚÇíÉ ØÈíÉ (ÃÈáÛ ØÈíÈß Ãæ ÃÎÕÇÆí ÇáÑÚÇíÉ ÇáÕÍíÉ ÅÐÇ ÇÓÊãÑÊ Ãæ ßÇäÊ ãËíÑÉ ááÞáÞ):  • ÅãÓÇß  • ÅÓåÇá  • ÌÝÇÝ ÇáÝã  • ÇáÕÏÇÚ  • ÇáÛËíÇä  åÐå ÇáÞÇÆãÉ ÞÏ áÇ ÊÕÝ ßá ÇáÂËÇÑ ÇáÌÇäÈíÉ ÇáãÍÊãáÉ. ÇÊÕá ÈØÈíÈß ááÇÓÊÔÇÑÉ ÇáØÈíÉ Úä ÇáÂËÇÑ ÇáÌÇäÈíÉ. íãßäß ÇáÅÈáÇÛ Úä ÇáÂËÇÑ ÇáÌÇäÈíÉ áÅÏÇÑÉ ÇáÃÛÐíÉ æÇáÃÏæíÉ ÇáÃãÑíßíÉ (FDA) Úáì ÇáÑÞã [phone]  Ãíä íÌÈ Úáíø ÇáÇÍÊÝÇÙ ÈÏæÇÆí¿  íÍÝÙ ÈÚíÏðÇ Úä ãÊäÇæá ÇáÃØÝÇá.  íÎÒä Ýí ÏÑÌÉ ÍÑÇÑÉ ÇáÛÑÝÉ Èíä 15 æ30 ÏÑÌÉ ãÆæíÉ (59 æ86 ÝåÑäåÇíÊ).  íÍÝÙ ÈÚíÏðÇ Úä ÇáÑØæÈÉ æÇáÖæÁ.  ÊÎáÕ ãä Ãí ÏæÇÁ ÛíÑ ãÓÊÎÏã ÈÚÏ ÊÇÑíÎ ÇäÊåÇÁ ÇáÕáÇÍíÉ ÇáãØÈæÚ Úáì ÇáãáÕÞ Ãæ ÇáÚÈæÉ.  ãáÇÍÙÉ: åÐå ÇáÕÍíÝÉ ÚÈÇÑÉ Úä ãáÎÕ: ÞÏ áÇ íÛØí åÐÇ ÇáãáÎøóÕ ßá ÇáãÚáæãÇÊ ÇáããßäÉ. ÅÐÇ ßÇäÊ áÏíß Ãí ÃÓÆáÉ Úä åÐÇ ÇáÏæÇÁ¡ ÊÍÏøË Åáì ÇáØÈíÈ Ãæ ÇáÕíÏáí Ãæ ãÞÏã ÇáÑÚÇíÉ ÇáÕÍíÉ.    © 2021 [employer] (2020-05-18 00:00:00)    Jackson's Esophagus    Jackson's esophagus occurs when the tissue that lines the esophagus changes or becomes damaged. The esophagus is the tube that carries food from the throat to the stomach. With Jackson's esophagus, the cells that line the esophagus are replaced by cells that are similar to the lining of the intestines (intestinal metaplasia).  Jackson's esophagus itself may not cause any symptoms. However, many people who have Jackson's esophagus also have gastroesophageal reflux disease (GERD), which may cause symptoms such as heartburn. Over time, a few people with this condition may develop cancer of the esophagus. Treatment may include medicines, procedures to  destroy the abnormal cells, or surgery.  What are the causes?  The exact cause of this condition is not known. In some cases, the condition develops from damage to the lining of the esophagus caused by GERD. GERD occurs when stomach acids flow up from the stomach into the esophagus. Frequent symptoms of GERD may cause intestinal metaplasia or cause cell changes (dysplasia).  What increases the risk?  You are more likely to develop this condition if you:  · Have GERD.  · Are male.  · Are .  · Are obese.  · Are older than 50.  · Have a hiatal hernia. This is a condition in which part of your stomach bulges into your chest.  · Smoke.  What are the signs or symptoms?  People with Jackson's esophagus often have no symptoms. However, many people with this condition also have GERD. Symptoms of GERD may include:  · Heartburn.  · Difficulty swallowing.  · Dry cough.  How is this diagnosed?  This condition may be diagnosed based on:  · Results of an upper gastrointestinal endoscopy. For this exam, a thin, flexible tube with a light and a camera on the end (endoscope) is passed down your esophagus. Your health care provider can view the inside of your esophagus during this procedure.  · Results of a biopsy. For this procedure, several tissue samples are removed (biopsy) from your esophagus. They are then checked for intestinal metaplasia or dysplasia.  How is this treated?  Treatment for this condition may include:  · Medicines (proton pump inhibitors, or PPIs) to decrease or stop GERD.  · Periodic endoscopic exams to make sure that cancer is not developing.  · A procedure or surgery for dysplasia. This may include:  ? Removal or destruction of abnormal cells.  ? Removal of part of the esophagus (esophagectomy).  Follow these instructions at home:  Eating and drinking  · Eat more fruits and vegetables.  · Avoid fatty foods.  · Eat small, frequent meals instead of large meals.  · Avoid foods that cause heartburn. These  foods include:  ? Coffee and alcoholic drinks.  ? Tomatoes and foods made with tomatoes.  ? Greasy or spicy foods.  ? Chocolate and peppermint.  · Do not drink alcohol.  General instructions  · Take over-the-counter and prescription medicines only as told by your health care provider.  · Do not use any products that contain nicotine or tobacco, such as cigarettes and e-cigarettes. If you need help quitting, ask your health care provider.  · If you are being treated for GERD, make sure you take medicines and follow all instructions as told by your health care provider.  · Keep all follow-up visits as told by your health care provider. This is important.  Contact a health care provider if:  · You have heartburn or GERD symptoms.  · You have difficulty swallowing.  Get help right away if:  · You have chest pain.  · You are unable to swallow.  · You vomit blood or material that looks like coffee grounds.  · Your stool (feces) is bright red or dark.  Summary  · Jackson's esophagus occurs when the tissue that lines the esophagus changes or becomes damaged.  · Jackson's esophagus may be diagnosed with an upper gastrointestinal endoscopy and a biopsy.  · Treatment may include medicines, procedures to remove abnormal cells, or surgery.  · Follow your health care provider's instructions about what to eat and drink, what medicines to take, and when to call for help.  This information is not intended to replace advice given to you by your health care provider. Make sure you discuss any questions you have with your health care provider.  Document Revised: 04/15/2019 Document Reviewed: 04/15/2019  [employer] Patient Education © 2021 [employer]

## 2021-06-10 PROBLEM — Z75.8 LANGUAGE BARRIER AFFECTING HEALTH CARE: Status: ACTIVE | Noted: 2021-06-10

## 2021-06-10 PROBLEM — Z60.3 LANGUAGE BARRIER AFFECTING HEALTH CARE: Status: ACTIVE | Noted: 2021-06-10

## 2021-06-10 PROBLEM — Z78.9 LANGUAGE BARRIER AFFECTING HEALTH CARE: Status: ACTIVE | Noted: 2021-06-10

## 2021-06-10 PROBLEM — R13.10 IMPAIRED SWALLOWING ASSOCIATED WITH THROAT PAIN: Chronic | Status: ACTIVE | Noted: 2019-11-18

## 2021-06-10 PROBLEM — Z78.9 LANGUAGE BARRIER AFFECTING HEALTH CARE: Chronic | Status: ACTIVE | Noted: 2021-06-10

## 2021-06-10 PROBLEM — R07.0 IMPAIRED SWALLOWING ASSOCIATED WITH THROAT PAIN: Chronic | Status: ACTIVE | Noted: 2019-11-18

## 2021-06-10 PROBLEM — Z60.3 LANGUAGE BARRIER AFFECTING HEALTH CARE: Chronic | Status: ACTIVE | Noted: 2021-06-10

## 2021-06-10 PROBLEM — Z75.8 LANGUAGE BARRIER AFFECTING HEALTH CARE: Chronic | Status: ACTIVE | Noted: 2021-06-10

## 2021-06-10 NOTE — ASSESSMENT & PLAN NOTE
GERD symptoms and impaired swallowing issues worsening. Start Protonix. Follow-up with gastroenterology.

## 2021-06-12 DIAGNOSIS — J02.0 STREP THROAT: ICD-10-CM

## 2021-06-12 DIAGNOSIS — R19.7 DIARRHEA, UNSPECIFIED TYPE: Primary | ICD-10-CM

## 2021-06-12 RX ORDER — AZITHROMYCIN 250 MG/1
TABLET, FILM COATED ORAL
Qty: 6 TABLET | Refills: 0 | Status: SHIPPED | OUTPATIENT
Start: 2021-06-12 | End: 2021-06-17

## 2021-06-12 RX ORDER — SACCHAROMYCES BOULARDII 250 MG
250 CAPSULE ORAL 2 TIMES DAILY
Qty: 60 EACH | Refills: 0 | Status: SHIPPED | OUTPATIENT
Start: 2021-06-12 | End: 2021-10-26

## 2021-06-12 RX ORDER — AMOXICILLIN AND CLAVULANATE POTASSIUM 875; 125 MG/1; MG/1
1 TABLET, FILM COATED ORAL 2 TIMES DAILY
Qty: 20 TABLET | Refills: 0 | Status: CANCELLED | OUTPATIENT
Start: 2021-06-12 | End: 2021-06-22

## 2021-06-13 ENCOUNTER — APPOINTMENT (OUTPATIENT)
Dept: PREADMISSION TESTING | Facility: HOSPITAL | Age: 31
End: 2021-06-13

## 2021-06-13 PROCEDURE — U0004 COV-19 TEST NON-CDC HGH THRU: HCPCS

## 2021-06-13 PROCEDURE — C9803 HOPD COVID-19 SPEC COLLECT: HCPCS

## 2021-06-14 LAB — SARS-COV-2 RNA NOSE QL NAA+PROBE: NOT DETECTED

## 2021-06-16 ENCOUNTER — OUTSIDE FACILITY SERVICE (OUTPATIENT)
Dept: GASTROENTEROLOGY | Facility: CLINIC | Age: 31
End: 2021-06-16

## 2021-06-16 PROCEDURE — 43249 ESOPH EGD DILATION <30 MM: CPT | Performed by: INTERNAL MEDICINE

## 2021-06-16 PROCEDURE — 43239 EGD BIOPSY SINGLE/MULTIPLE: CPT | Performed by: INTERNAL MEDICINE

## 2021-08-26 ENCOUNTER — OFFICE VISIT (OUTPATIENT)
Dept: FAMILY MEDICINE CLINIC | Facility: CLINIC | Age: 31
End: 2021-08-26

## 2021-08-26 VITALS
BODY MASS INDEX: 32.65 KG/M2 | RESPIRATION RATE: 18 BRPM | SYSTOLIC BLOOD PRESSURE: 120 MMHG | WEIGHT: 208 LBS | DIASTOLIC BLOOD PRESSURE: 80 MMHG | TEMPERATURE: 98.6 F | HEIGHT: 67 IN | HEART RATE: 86 BPM | OXYGEN SATURATION: 98 %

## 2021-08-26 DIAGNOSIS — R39.89 DARK YELLOW-COLORED URINE: ICD-10-CM

## 2021-08-26 DIAGNOSIS — R21 RASH AND OTHER NONSPECIFIC SKIN ERUPTION: ICD-10-CM

## 2021-08-26 DIAGNOSIS — B37.2 YEAST DERMATITIS: Primary | ICD-10-CM

## 2021-08-26 LAB
BILIRUB BLD-MCNC: NEGATIVE MG/DL
CLARITY, POC: CLEAR
COLOR UR: YELLOW
GLUCOSE UR STRIP-MCNC: NEGATIVE MG/DL
KETONES UR QL: NEGATIVE
LEUKOCYTE EST, POC: NEGATIVE
NITRITE UR-MCNC: NEGATIVE MG/ML
PH UR: 6 [PH] (ref 5–8)
PROT UR STRIP-MCNC: NEGATIVE MG/DL
RBC # UR STRIP: NEGATIVE /UL
SP GR UR: 1.02 (ref 1–1.03)
UROBILINOGEN UR QL: NORMAL

## 2021-08-26 PROCEDURE — 99214 OFFICE O/P EST MOD 30 MIN: CPT | Performed by: NURSE PRACTITIONER

## 2021-08-26 RX ORDER — NYSTATIN AND TRIAMCINOLONE ACETONIDE 100000; 1 [USP'U]/G; MG/G
OINTMENT TOPICAL 2 TIMES DAILY
Qty: 30 G | Refills: 1 | Status: SHIPPED | OUTPATIENT
Start: 2021-08-26 | End: 2021-09-02

## 2021-08-26 RX ORDER — NYSTATIN 100000 [USP'U]/G
POWDER TOPICAL 3 TIMES DAILY
Qty: 1 EACH | Refills: 1 | Status: SHIPPED | OUTPATIENT
Start: 2021-08-26 | End: 2021-09-02

## 2021-08-27 LAB
ALBUMIN SERPL-MCNC: 4.9 G/DL (ref 3.5–5.2)
ALBUMIN/GLOB SERPL: 1.8 G/DL
ALP SERPL-CCNC: 90 U/L (ref 39–117)
ALT SERPL-CCNC: 72 U/L (ref 1–41)
AST SERPL-CCNC: 35 U/L (ref 1–40)
BASOPHILS # BLD AUTO: 0.05 10*3/MM3 (ref 0–0.2)
BASOPHILS NFR BLD AUTO: 0.8 % (ref 0–1.5)
BILIRUB SERPL-MCNC: 1 MG/DL (ref 0–1.2)
BUN SERPL-MCNC: 18 MG/DL (ref 6–20)
BUN/CREAT SERPL: 21.4 (ref 7–25)
CALCIUM SERPL-MCNC: 9.6 MG/DL (ref 8.6–10.5)
CHLORIDE SERPL-SCNC: 103 MMOL/L (ref 98–107)
CO2 SERPL-SCNC: 26.5 MMOL/L (ref 22–29)
CREAT SERPL-MCNC: 0.84 MG/DL (ref 0.76–1.27)
EOSINOPHIL # BLD AUTO: 0.12 10*3/MM3 (ref 0–0.4)
EOSINOPHIL NFR BLD AUTO: 2 % (ref 0.3–6.2)
ERYTHROCYTE [DISTWIDTH] IN BLOOD BY AUTOMATED COUNT: 12.3 % (ref 12.3–15.4)
GLOBULIN SER CALC-MCNC: 2.8 GM/DL
GLUCOSE SERPL-MCNC: 85 MG/DL (ref 65–99)
HCT VFR BLD AUTO: 46.5 % (ref 37.5–51)
HGB BLD-MCNC: 15.5 G/DL (ref 13–17.7)
IMM GRANULOCYTES # BLD AUTO: 0.03 10*3/MM3 (ref 0–0.05)
IMM GRANULOCYTES NFR BLD AUTO: 0.5 % (ref 0–0.5)
LYMPHOCYTES # BLD AUTO: 1.83 10*3/MM3 (ref 0.7–3.1)
LYMPHOCYTES NFR BLD AUTO: 30.3 % (ref 19.6–45.3)
MCH RBC QN AUTO: 28.5 PG (ref 26.6–33)
MCHC RBC AUTO-ENTMCNC: 33.3 G/DL (ref 31.5–35.7)
MCV RBC AUTO: 85.6 FL (ref 79–97)
MONOCYTES # BLD AUTO: 0.33 10*3/MM3 (ref 0.1–0.9)
MONOCYTES NFR BLD AUTO: 5.5 % (ref 5–12)
NEUTROPHILS # BLD AUTO: 3.68 10*3/MM3 (ref 1.7–7)
NEUTROPHILS NFR BLD AUTO: 60.9 % (ref 42.7–76)
NRBC BLD AUTO-RTO: 0 /100 WBC (ref 0–0.2)
PLATELET # BLD AUTO: 276 10*3/MM3 (ref 140–450)
POTASSIUM SERPL-SCNC: 4.7 MMOL/L (ref 3.5–5.2)
PROT SERPL-MCNC: 7.7 G/DL (ref 6–8.5)
RBC # BLD AUTO: 5.43 10*6/MM3 (ref 4.14–5.8)
SODIUM SERPL-SCNC: 140 MMOL/L (ref 136–145)
WBC # BLD AUTO: 6.04 10*3/MM3 (ref 3.4–10.8)

## 2021-08-30 PROBLEM — R21 RASH AND OTHER NONSPECIFIC SKIN ERUPTION: Status: ACTIVE | Noted: 2021-08-30

## 2021-08-30 PROBLEM — R39.89 DARK YELLOW-COLORED URINE: Status: ACTIVE | Noted: 2021-08-30

## 2021-08-30 PROBLEM — B37.2 YEAST DERMATITIS: Status: ACTIVE | Noted: 2021-08-30

## 2021-08-30 NOTE — PROGRESS NOTES
"Chief Complaint  Rash    Subjective          Meliton Lagos presents to [facility]  Patient presents to office with c/o rash to buttocks that started several days ago and gradually worsening. States rash itches and on bilateral buttocks. Denies fever or chills. Complaints of rash to forehead. Requesting labs to assess for infection in his body. States he had a COVID vaccination and went to the ER afterwards due to chest pain and all testing was normal. He is hesitant to get 2nd COVID vaccine. No active chest pain and issue has resolved since ER visit.       Objective   Vital Signs:   /80   Pulse 86   Temp 98.6 °F (37 °C)   Resp 18   Ht 170 cm (66.93\")   Wt 94.3 kg (208 lb)   SpO2 98%   BMI 32.65 kg/m²     Physical Exam  Vitals and nursing note reviewed. Exam conducted with a chaperone present.   Constitutional:       Appearance: Normal appearance.   HENT:      Head: Normocephalic and atraumatic.      Nose: Nose normal.      Mouth/Throat:      Mouth: Mucous membranes are moist.      Pharynx: Oropharynx is clear.   Eyes:      Pupils: Pupils are equal, round, and reactive to light.   Cardiovascular:      Rate and Rhythm: Normal rate and regular rhythm.      Pulses: Normal pulses.      Heart sounds: Normal heart sounds.   Pulmonary:      Effort: Pulmonary effort is normal.      Breath sounds: Normal breath sounds.   Abdominal:      General: Bowel sounds are normal. There is no distension.      Tenderness: There is no abdominal tenderness.   Musculoskeletal:         General: No swelling. Normal range of motion.      Cervical back: Normal range of motion and neck supple.      Right lower leg: No edema.   Skin:     General: Skin is warm and dry.      Findings: Rash present.          Neurological:      Mental Status: He is alert.        Result Review :     Common labs    Common Labsle 8/26/21 8/26/21    1046 1046   Glucose  85   BUN  18   Creatinine  0.84   eGFR Non "  Am  105   eGFR African Am  128   Sodium  140   Potassium  4.7   Chloride  103   Calcium  9.6   Total Protein  7.7   Albumin  4.90   Total Bilirubin  1.0   Alkaline Phosphatase  90   AST (SGOT)  35   ALT (SGPT)  72 (A)   WBC 6.04    Hemoglobin 15.5    Hematocrit 46.5    Platelets 276    (A) Abnormal value       Comments are available for some flowsheets but are not being displayed.           CBC    CBC 8/26/21   WBC 6.04   RBC 5.43   Hemoglobin 15.5   Hematocrit 46.5   MCV 85.6   MCH 28.5   MCHC 33.3   RDW 12.3   Platelets 276           Data reviewed: Recent hospitalization notes ER visit          Assessment and Plan    Diagnoses and all orders for this visit:    1. Yeast dermatitis (Primary)  Assessment & Plan:  Use medications as directed  Keep affected areas clean and dry  Explained excessive heat and moisture contributes to growth of yeast      Orders:  -     nystatin (MYCOSTATIN) 666597 UNIT/GM powder; Apply  topically to the appropriate area as directed 3 (Three) Times a Day for 7 days.  Dispense: 1 each; Refill: 1  -     nystatin-triamcinolone (MYCOLOG) 018958-6.1 UNIT/GM-% ointment; Apply  topically to the appropriate area as directed 2 (Two) Times a Day for 7 days.  Dispense: 30 g; Refill: 1  -     CBC & Differential  -     Comprehensive metabolic panel    2. Rash and other nonspecific skin eruption  Assessment & Plan:  Labs today per patient request to check for infection   Explained rash on forehead is acne and to wash 2 times daily and apply OTC acne cream to forehead    Orders:  -     Cancel: CBC & Differential; Future  -     Cancel: Comprehensive Metabolic Panel; Future  -     Comprehensive Metabolic Panel  -     CBC & Differential  -     CBC & Differential  -     Comprehensive metabolic panel    3. Dark yellow-colored urine  Assessment & Plan:  UA in office with normal findings   Drink plenty of water and stay well hydrated    Orders:  -     POCT urinalysis dipstick, automated  -     CBC &  Differential  -     Comprehensive metabolic panel    I spent 30 minutes caring for Ali on this date of service. This time includes time spent by me in the following activities:preparing for the visit, reviewing tests, obtaining and/or reviewing a separately obtained history, performing a medically appropriate examination and/or evaluation , counseling and educating the patient/family/caregiver, ordering medications, tests, or procedures, documenting information in the medical record and independently interpreting results and communicating that information with the patient/family/caregiver  Follow Up   Return if symptoms worsen or fail to improve.  Patient was given instructions and counseling regarding his condition or for health maintenance advice. Please see specific information pulled into the AVS if appropriate.

## 2021-08-30 NOTE — ASSESSMENT & PLAN NOTE
Labs today per patient request to check for infection   Explained rash on forehead is acne and to wash 2 times daily and apply OTC acne cream to forehead

## 2021-08-30 NOTE — ASSESSMENT & PLAN NOTE
Use medications as directed  Keep affected areas clean and dry  Explained excessive heat and moisture contributes to growth of yeast

## 2021-09-02 ENCOUNTER — OFFICE VISIT (OUTPATIENT)
Dept: FAMILY MEDICINE CLINIC | Facility: CLINIC | Age: 31
End: 2021-09-02

## 2021-09-02 VITALS
HEART RATE: 88 BPM | RESPIRATION RATE: 18 BRPM | BODY MASS INDEX: 32.65 KG/M2 | TEMPERATURE: 98.6 F | DIASTOLIC BLOOD PRESSURE: 82 MMHG | SYSTOLIC BLOOD PRESSURE: 116 MMHG | HEIGHT: 67 IN | WEIGHT: 208 LBS | OXYGEN SATURATION: 98 %

## 2021-09-02 DIAGNOSIS — K21.9 CHRONIC GERD: Primary | Chronic | ICD-10-CM

## 2021-09-02 DIAGNOSIS — R07.89 CHEST TIGHTNESS: ICD-10-CM

## 2021-09-02 DIAGNOSIS — Z78.9 LANGUAGE BARRIER AFFECTING HEALTH CARE: Chronic | ICD-10-CM

## 2021-09-02 PROCEDURE — 99214 OFFICE O/P EST MOD 30 MIN: CPT | Performed by: NURSE PRACTITIONER

## 2021-09-02 RX ORDER — PANTOPRAZOLE SODIUM 40 MG/1
40 TABLET, DELAYED RELEASE ORAL 2 TIMES DAILY
Qty: 60 TABLET | Refills: 2 | Status: SHIPPED | OUTPATIENT
Start: 2021-09-02 | End: 2021-11-09

## 2021-09-04 PROBLEM — R07.89 CHEST TIGHTNESS: Status: ACTIVE | Noted: 2021-09-04

## 2021-09-04 NOTE — PROGRESS NOTES
Follow Up Office Note     Patient Name: Meliton Lagos  : 1990   MRN: [id number]     Chief Complaint:    Chief Complaint   Patient presents with   • Chest Pain       History of Present Illness: Meliton Lagos is a 31 y.o. male who presents today s/p ER visit at [facility] for chest pain and shortness of breath. Patient reports that his symptoms began two days after receiving his first Moderna vaccine approximately 9 days ago. Patient had a negative cardiac workup at ER. He was advised to f/u with his PCP to discuss whether or not he should take the second Moderna Covid-19 vaccine. Patient is concerned that he may have had an allergic reaction to the vaccine.  Patient has chronic GERD and has a PMH significant for Jackson's esophagus. He follows with Dr. Mobley for management.  Patient has had a previous cardiac workup with [facility] in May 2020.    Chest Pain   This is a recurrent problem. The current episode started more than 1 year ago. The onset quality is sudden. The problem occurs intermittently. The problem has been waxing and waning. The pain is present in the substernal region. The pain is moderate. The quality of the pain is described as tightness and pressure. The pain radiates to the epigastrium. Pertinent negatives include no abdominal pain, back pain, cough, diaphoresis, dizziness, fever, hemoptysis, irregular heartbeat, leg pain, lower extremity edema, nausea, near-syncope, numbness, orthopnea, palpitations, PND, sputum production, syncope, vomiting or weakness.      Adult Transthoracic Echo Complete W/ Cont if Necessary Per Protocol (2020 16:03)  Treadmill Stress Test (2020 15:11)    Subjective      Review of Systems:   Review of Systems   Constitutional: Negative for activity change, appetite change, chills, diaphoresis, fatigue, fever and unexpected weight change.   HENT: Negative for congestion, facial swelling, sinus pain, trouble swallowing and  "voice change.    Respiratory: Positive for chest tightness. Negative for cough, hemoptysis, sputum production, choking and stridor.    Cardiovascular: Positive for chest pain. Negative for palpitations, orthopnea, syncope, PND and near-syncope.   Gastrointestinal: Negative for abdominal pain, diarrhea, nausea and vomiting.        GERD   Musculoskeletal: Negative for back pain.   Neurological: Negative.  Negative for dizziness, weakness and numbness.        Past Medical History:   Past Medical History:   Diagnosis Date   • Jackson's esophagus    • GERD (gastroesophageal reflux disease)          Medications:     Current Outpatient Medications:   •  pantoprazole (PROTONIX) 40 MG EC tablet, Take 1 tablet by mouth 2 (two) times a day., Disp: 60 tablet, Rfl: 2  •  saccharomyces boulardii (Florastor) 250 MG capsule, Take 1 capsule by mouth 2 (Two) Times a Day., Disp: 60 each, Rfl: 0    Allergies:   No Known Allergies      Objective     Physical Exam:  Vital Signs:   Vitals:    09/02/21 1136   BP: 116/82   Pulse: 88   Resp: 18   Temp: 98.6 °F (37 °C)   SpO2: 98%   Weight: 94.3 kg (208 lb)   Height: 170 cm (66.93\")     Body mass index is 32.65 kg/m².     Physical Exam  Vitals and nursing note reviewed.   Constitutional:       General: He is not in acute distress.     Appearance: Normal appearance. He is well-developed. He is not ill-appearing, toxic-appearing or diaphoretic.   HENT:      Head: Normocephalic and atraumatic.   Cardiovascular:      Rate and Rhythm: Normal rate and regular rhythm.      Heart sounds: Normal heart sounds, S1 normal and S2 normal. No murmur heard.     Pulmonary:      Effort: Pulmonary effort is normal. No tachypnea or respiratory distress.      Breath sounds: Normal breath sounds. No stridor. No decreased breath sounds, wheezing, rhonchi or rales.   Musculoskeletal:      Right lower leg: No edema.      Left lower leg: No edema.   Skin:     General: Skin is warm and dry.      Findings: No rash. "   Neurological:      General: No focal deficit present.      Mental Status: He is alert and oriented to person, place, and time.   Psychiatric:         Attention and Perception: Attention and perception normal.         Mood and Affect: Mood is anxious.         Speech: Speech normal.         Behavior: Behavior normal. Behavior is cooperative.         Thought Content: Thought content normal.         Cognition and Memory: Cognition and memory normal.         Judgment: Judgment normal.         Assessment / Plan      Assessment/Plan:   Diagnoses and all orders for this visit:    1. Chronic GERD (Primary)  Assessment & Plan:  GERD symptoms worsening recently. Medication changes per orders. Follow-up with gastroenterology.    Orders:  -     pantoprazole (PROTONIX) 40 MG EC tablet; Take 1 tablet by mouth 2 (two) times a day.  Dispense: 60 tablet; Refill: 2    2. Chest tightness  Assessment & Plan:  Pantoprazole 40 mg BID.       3. Language barrier affecting health care        - Due to language barrier, an  was present during the history-taking and subsequent discussion (and for part of the physical exam) with this patient.    *I consulted with Dr. De Jesus regarding patient. He was amenable to review patient ER discharge summary and examine patient. Dr. De Jesus does not feel that patient's symptoms were related to the vaccine or to a cardiac issue, but were most likely an exacerbation of chronic GERD along with anxiety about Covid-19 vaccine. Per his recommendation I will send prescription for pantoprazole 40 mg BID.   I also personally called [facility] to inquire if it would be possible for patient to get the Sean and Sean vaccine after receiving his first dose of Moderna and was advised that cross-vaccination is not approved or recommended.    Follow Up:   4 weeks    Discussed the nature of the medical condition(s) risks, complications, implications, management, safe and proper use of  medications. Encouraged medication compliance, and keeping scheduled follow up appointments with me and any other providers.      RTC if symptoms fail to improve, to ER if symptoms worsen.      JAY León  [facility]       Please note that portions of this note may have been completed with a voice recognition program. Efforts were made to edit the dictations, but occasionally words are mistranscribed.

## 2021-09-04 NOTE — PATIENT INSTRUCTIONS
Chest Wall Pain  Chest wall pain is pain in or around the bones and muscles of your chest. Sometimes, an injury causes this pain. Excessive coughing or overuse of arm and chest muscles may also cause chest wall pain. Sometimes, the cause may not be known. This pain may take several weeks or longer to get better.  Follow these instructions at home:  Managing pain, stiffness, and swelling    · If directed, put ice on the painful area:  ? Put ice in a plastic bag.  ? Place a towel between your skin and the bag.  ? Leave the ice on for 20 minutes, 2-3 times per day.  Activity  · Rest as told by your health care provider.  · Avoid activities that cause pain. These include any activities that use your chest muscles or your abdominal and side muscles to lift heavy items. Ask your health care provider what activities are safe for you.  General instructions    · Take over-the-counter and prescription medicines only as told by your health care provider.  · Do not use any products that contain nicotine or tobacco, such as cigarettes, e-cigarettes, and chewing tobacco. These can delay healing after injury. If you need help quitting, ask your health care provider.  · Keep all follow-up visits as told by your health care provider. This is important.  Contact a health care provider if:  · You have a fever.  · Your chest pain becomes worse.  · You have new symptoms.  Get help right away if:  · You have nausea or vomiting.  · You feel sweaty or light-headed.  · You have a cough with mucus from your lungs (sputum) or you cough up blood.  · You develop shortness of breath.  These symptoms may represent a serious problem that is an emergency. Do not wait to see if the symptoms will go away. Get medical help right away. Call your local emergency services (911 in the [place] Do not drive yourself to the hospital.  Summary  · Chest wall pain is pain in or around the bones and muscles of your chest.  · Depending on the cause, it may be  treated with ice, rest, medicines, and avoiding activities that cause pain.  · Contact a health care provider if you have a fever, worsening chest pain, or new symptoms.  · Get help right away if you feel light-headed or you develop shortness of breath. These symptoms may be an emergency.  This information is not intended to replace advice given to you by your health care provider. Make sure you discuss any questions you have with your health care provider.  Document Revised: 06/20/2019 Document Reviewed: 06/20/2019  [employer] Patient Education © 2021 [employer]    Nonspecific Chest Pain, Adult  Chest pain can be caused by many different conditions. It can be caused by a condition that is life-threatening and requires treatment right away. It can also be caused by something that is not life-threatening. If you have chest pain, it can be hard to know the difference, so it is important to get help right away to make sure that you do not have a serious condition.  Some life-threatening causes of chest pain include:  · Heart attack.  · A tear in the body's main blood vessel (aortic dissection).  · Inflammation around your heart (pericarditis).  · A problem in the lungs, such as a blood clot (pulmonary embolism) or a collapsed lung (pneumothorax).  Some non life-threatening causes of chest pain include:  · Heartburn.  · Anxiety or stress.  · Damage to the bones, muscles, and cartilage that make up your chest wall.  · Pneumonia or bronchitis.  · Shingles infection (varicella-zoster virus).  Chest pain can feel like:  · Pain or discomfort on the surface of your chest or deep in your chest.  · Crushing, pressure, aching, or squeezing pain.  · Burning or tingling.  · Dull or sharp pain that is worse when you move, cough, or take a deep breath.  · Pain or discomfort that is also felt in your back, neck, jaw, shoulder, or arm, or pain that spreads to any of these areas.  Your chest pain may come and go. It may also be  constant. Your health care provider will do lab tests and other studies to find the cause of your pain. Treatment will depend on the cause of your chest pain.  Follow these instructions at home:  Medicines  · Take over-the-counter and prescription medicines only as told by your health care provider.  · If you were prescribed an antibiotic, take it as told by your health care provider. Do not stop taking the antibiotic even if you start to feel better.  Lifestyle    · Rest as directed by your health care provider.  · Do not use any products that contain nicotine or tobacco, such as cigarettes and e-cigarettes. If you need help quitting, ask your health care provider.  · Do not drink alcohol.  · Make healthy lifestyle choices as recommended. These may include:  ? Getting regular exercise. Ask your health care provider to suggest some activities that are safe for you.  ? Eating a heart-healthy diet. This includes plenty of fresh fruits and vegetables, whole grains, low-fat (lean) protein, and low-fat dairy products. A dietitian can help you find healthy eating options.  ? Maintaining a healthy weight.  ? Managing any other health conditions you have, such as high blood pressure (hypertension) or diabetes.  ? Reducing stress, such as with yoga or relaxation techniques.  General instructions  · Pay attention to any changes in your symptoms. Tell your health care provider about them or any new symptoms.  · Avoid any activities that cause chest pain.  · Keep all follow-up visits as told by your health care provider. This is important. This includes visits for any further testing if your chest pain does not go away.  Contact a health care provider if:  · Your chest pain does not go away.  · You feel depressed.  · You have a fever.  Get help right away if:  · Your chest pain gets worse.  · You have a cough that gets worse, or you cough up blood.  · You have severe pain in your abdomen.  · You faint.  · You have sudden,  unexplained chest discomfort.  · You have sudden, unexplained discomfort in your arms, back, neck, or jaw.  · You have shortness of breath at any time.  · You suddenly start to sweat, or your skin gets clammy.  · You feel nausea or you vomit.  · You suddenly feel lightheaded or dizzy.  · You have severe weakness, or unexplained weakness or fatigue.  · Your heart begins to beat quickly, or it feels like it is skipping beats.  These symptoms may represent a serious problem that is an emergency. Do not wait to see if the symptoms will go away. Get medical help right away. Call your local emergency services (911 in the [place] Do not drive yourself to the hospital.  Summary  · Chest pain can be caused by a condition that is serious and requires urgent treatment. It may also be caused by something that is not life-threatening.  · If you have chest pain, it is very important to see your health care provider. Your health care provider may do lab tests and other studies to find the cause of your pain.  · Follow your health care provider's instructions on taking medicines, making lifestyle changes, and getting emergency treatment if symptoms become worse.  · Keep all follow-up visits as told by your health care provider. This includes visits for any further testing if your chest pain does not go away.  This information is not intended to replace advice given to you by your health care provider. Make sure you discuss any questions you have with your health care provider.  Document Revised: 06/20/2019 Document Reviewed: 06/20/2019  [employer] Patient Education © 2021 [employer]    Gastroesophageal Reflux Disease, Adult  Gastroesophageal reflux (LAMONT) happens when acid from the stomach flows up into the tube that connects the mouth and the stomach (esophagus). Normally, food travels down the esophagus and stays in the stomach to be digested. However, when a person has LAMONT, food and stomach acid sometimes move back up into the  esophagus. If this becomes a more serious problem, the person may be diagnosed with a disease called gastroesophageal reflux disease (GERD). GERD occurs when the reflux:  · Happens often.  · Causes frequent or severe symptoms.  · Causes problems such as damage to the esophagus.  When stomach acid comes in contact with the esophagus, the acid may cause soreness (inflammation) in the esophagus. Over time, GERD may create small holes (ulcers) in the lining of the esophagus.  What are the causes?  This condition is caused by a problem with the muscle between the esophagus and the stomach (lower esophageal sphincter, or LES). Normally, the LES muscle closes after food passes through the esophagus to the stomach. When the LES is weakened or abnormal, it does not close properly, and that allows food and stomach acid to go back up into the esophagus.  The LES can be weakened by certain dietary substances, medicines, and medical conditions, including:  · Tobacco use.  · Pregnancy.  · Having a hiatal hernia.  · Alcohol use.  · Certain foods and beverages, such as coffee, chocolate, onions, and peppermint.  What increases the risk?  You are more likely to develop this condition if you:  · Have an increased body weight.  · Have a connective tissue disorder.  · Use NSAID medicines.  What are the signs or symptoms?  Symptoms of this condition include:  · Heartburn.  · Difficult or painful swallowing.  · The feeling of having a lump in the throat.  · A bitter taste in the mouth.  · Bad breath.  · Having a large amount of saliva.  · Having an upset or bloated stomach.  · Belching.  · Chest pain. Different conditions can cause chest pain. Make sure you see your health care provider if you experience chest pain.  · Shortness of breath or wheezing.  · Ongoing (chronic) cough or a night-time cough.  · Wearing away of tooth enamel.  · Weight loss.  How is this diagnosed?  Your health care provider will take a medical history and perform  a physical exam. To determine if you have mild or severe GERD, your health care provider may also monitor how you respond to treatment. You may also have tests, including:  · A test to examine your stomach and esophagus with a small camera (endoscopy).  · A test that measures the acidity level in your esophagus.  · A test that measures how much pressure is on your esophagus.  · A barium swallow or modified barium swallow test to show the shape, size, and functioning of your esophagus.  How is this treated?  The goal of treatment is to help relieve your symptoms and to prevent complications. Treatment for this condition may vary depending on how severe your symptoms are. Your health care provider may recommend:  · Changes to your diet.  · Medicine.  · Surgery.  Follow these instructions at home:  Eating and drinking    · Follow a diet as recommended by your health care provider. This may involve avoiding foods and drinks such as:  ? Coffee and tea (with or without caffeine).  ? Drinks that contain alcohol.  ? Energy drinks and sports drinks.  ? Carbonated drinks or sodas.  ? Chocolate and cocoa.  ? Peppermint and mint flavorings.  ? Garlic and onions.  ? Horseradish.  ? Spicy and acidic foods, including peppers, chili powder, allan powder, vinegar, hot sauces, and barbecue sauce.  ? Citrus fruit juices and citrus fruits, such as oranges, stephanie, and limes.  ? Tomato-based foods, such as red sauce, chili, salsa, and pizza with red sauce.  ? Fried and fatty foods, such as donuts, french fries, potato chips, and high-fat dressings.  ? High-fat meats, such as hot dogs and fatty cuts of red and white meats, such as rib eye steak, sausage, ham, and horvath.  ? High-fat dairy items, such as whole milk, butter, and cream cheese.  · Eat small, frequent meals instead of large meals.  · Avoid drinking large amounts of liquid with your meals.  · Avoid eating meals during the 2-3 hours before bedtime.  · Avoid lying down right  after you eat.  · Do not exercise right after you eat.  Lifestyle    · Do not use any products that contain nicotine or tobacco, such as cigarettes, e-cigarettes, and chewing tobacco. If you need help quitting, ask your health care provider.  · Try to reduce your stress by using methods such as yoga or meditation. If you need help reducing stress, ask your health care provider.  · If you are overweight, reduce your weight to an amount that is healthy for you. Ask your health care provider for guidance about a safe weight loss goal.  General instructions  · Pay attention to any changes in your symptoms.  · Take over-the-counter and prescription medicines only as told by your health care provider. Do not take aspirin, ibuprofen, or other NSAIDs unless your health care provider told you to do so.  · Wear loose-fitting clothing. Do not wear anything tight around your waist that causes pressure on your abdomen.  · Raise (elevate) the head of your bed about 6 inches (15 cm).  · Avoid bending over if this makes your symptoms worse.  · Keep all follow-up visits as told by your health care provider. This is important.  Contact a health care provider if:  · You have:  ? New symptoms.  ? Unexplained weight loss.  ? Difficulty swallowing or it hurts to swallow.  ? Wheezing or a persistent cough.  ? A hoarse voice.  · Your symptoms do not improve with treatment.  Get help right away if you:  · Have pain in your arms, neck, jaw, teeth, or back.  · Feel sweaty, dizzy, or light-headed.  · Have chest pain or shortness of breath.  · Vomit and your vomit looks like blood or coffee grounds.  · Faint.  · Have stool that is bloody or black.  · Cannot swallow, drink, or eat.  Summary  · Gastroesophageal reflux happens when acid from the stomach flows up into the esophagus. GERD is a disease in which the reflux happens often, causes frequent or severe symptoms, or causes problems such as damage to the esophagus.  · Treatment for this  condition may vary depending on how severe your symptoms are. Your health care provider may recommend diet and lifestyle changes, medicine, or surgery.  · Contact a health care provider if you have new or worsening symptoms.  · Take over-the-counter and prescription medicines only as told by your health care provider. Do not take aspirin, ibuprofen, or other NSAIDs unless your health care provider told you to do so.  · Keep all follow-up visits as told by your health care provider. This is important.  This information is not intended to replace advice given to you by your health care provider. Make sure you discuss any questions you have with your health care provider.  Document Revised: 06/26/2019 Document Reviewed: 06/26/2019  [employer] Patient Education © 2021 [employer]    Food Choices for Gastroesophageal Reflux Disease, Adult  When you have gastroesophageal reflux disease (GERD), the foods you eat and your eating habits are very important. Choosing the right foods can help ease the discomfort of GERD. Consider working with a dietitian to help you make healthy food choices.  What are tips for following this plan?  Reading food labels  · Read the label for foods that are low in saturated fat. Foods that have less than 5% of daily value (DV) of fat and 0 g of trans fats may help with your symptoms.  Cooking  · Cook foods using methods other than frying. This may include baking, steaming, grilling, or broiling. These are all methods that do not need a lot of fat for cooking.  · To add flavor, try to use herbs that are low in spice and acidity.  Meal planning    · Choose healthy foods that are low in fat, such as fruits, vegetables, whole grains, low-fat dairy products, lean meats, fish, and poultry.  · Eat frequent, small meals instead of three large meals each day. Eat your meals slowly, in a relaxed setting. Avoid bending over or lying down until 2-3 hours after eating.  · Limit high-fat foods such as fatty  meats or fried foods.  · Limit your intake of oils, butter, and shortening to less than 8 teaspoons each day.  · Avoid the following:  ? Foods that cause symptoms. These may be different for different people. Keep a food diary to keep track of foods that cause symptoms.  ? Alcohol.  ? Drinking large amounts of liquid with meals.  ? Eating meals during the 2-3 hours before bed.  Lifestyle  · Maintain a healthy weight. Ask your health care provider what weight is healthy for you. If you need to lose weight, work with your health care provider to do so safely.  · Exercise for at least 30 minutes on 5 or more days each week, or as told by your health care provider.  · Avoid wearing clothes that fit tightly around your waist and chest.  · Do not use any products that contain nicotine or tobacco, such as cigarettes, e-cigarettes, and chewing tobacco. If you need help quitting, ask your health care provider.  · Sleep with the head of your bed raised. Use a wedge under the mattress or blocks under the bed frame to raise the head of the bed.  What foods should I eat?    Eat a healthy, well-balanced diet of fruits, vegetables, whole grains, low-fat dairy products, lean meats, fish, and poultry. Each person is different. Foods that may trigger symptoms in one person may not trigger any symptoms in another person. Work with your health care provider to identify foods that are safe for you.  The items listed above may not be a complete list of foods and beverages you can eat. Contact a dietitian for more information.  What foods should I avoid?  Limiting some of these foods may help in managing the symptoms of GERD. Everyone is different. Consult a dietitian or your health care provider to help you identify the exact foods to avoid, if any.  Fruits  Any fruits prepared with added fat. Any fruits that cause symptoms. For some people this may include citrus fruits, such as oranges, grapefruit, pineapple, and  stephanie.  Vegetables  Deep-fried vegetables. French fries. Any vegetables prepared with added fat. Any vegetables that cause symptoms. For some people, this may include tomatoes and tomato products, chili peppers, onions and garlic, and horseradish.  Grains  Pastries or quick breads with added fat.  Meats and other proteins  High-fat meats, such as fatty beef or pork, hot dogs, ribs, ham, sausage, salami, and horvath. Fried meat or protein, including fried fish and fried chicken. Nuts and nut butters.  Dairy  Whole milk and chocolate milk. Sour cream. Cream. Ice cream. Cream cheese. Milkshakes.  Fats and oils  Butter. Margarine. Shortening. Ghee.  Beverages  Coffee and tea, with or without caffeine. Carbonated beverages. Sodas. Energy drinks. Fruit juice made with acidic fruits (such as orange or grapefruit). Tomato juice. Alcoholic drinks.  Sweets and desserts  Chocolate and cocoa. Donuts.  Seasonings and condiments  Pepper. Peppermint and spearmint. Added salt. Any condiments, herbs, or seasonings that cause symptoms. For some people, this may include allan, hot sauce, or vinegar-based salad dressings.  The items listed above may not be a complete list of foods and beverages you should avoid. Contact a dietitian for more information.  Questions to ask your health care provider  Diet and lifestyle changes are usually the first steps that are taken to manage symptoms of GERD. If diet and lifestyle changes do not improve your symptoms, talk with your health care provider about taking medicines.  Where to find more information  · [employer] Gastrointestinal [employer]: aboutgerd.org  Summary  · When you have gastroesophageal reflux disease (GERD), food and lifestyle choices may be very helpful in easing the discomfort of GERD.  · Eat frequent, small meals instead of three large meals each day. Eat your meals slowly, in a relaxed setting. Avoid bending over or lying down until 2-3 hours after  eating.  · Limit high-fat foods such as fatty meat or fried foods.  This information is not intended to replace advice given to you by your health care provider. Make sure you discuss any questions you have with your health care provider.  Document Revised: 10/12/2020 Document Reviewed: 10/12/2020  [employer] Patient Education © 2021 [employer]  Pantoprazole tablets  What is this medicine?  PANTOPRAZOLE (pan TOE pra zole) prevents the production of acid in the stomach. It is used to treat gastroesophageal reflux disease (GERD), inflammation of the esophagus, and Zollinger-Dominique syndrome.  This medicine may be used for other purposes; ask your health care provider or pharmacist if you have questions.  COMMON BRAND NAME(S): Protonix  What should I tell my health care provider before I take this medicine?  They need to know if you have any of these conditions:  · liver disease  · low levels of magnesium in the blood  · lupus  · an unusual or allergic reaction to omeprazole, lansoprazole, pantoprazole, rabeprazole, other medicines, foods, dyes, or preservatives  · pregnant or trying to get pregnant  · breast-feeding  How should I use this medicine?  Take this medicine by mouth. Swallow the tablets whole with a drink of water. Follow the directions on the prescription label. Do not crush, break, or chew. Take your medicine at regular intervals. Do not take your medicine more often than directed.  Talk to your pediatrician regarding the use of this medicine in children. While this drug may be prescribed for children as young as 5 years for selected conditions, precautions do apply.  Overdosage: If you think you have taken too much of this medicine contact a poison control center or emergency room at once.  NOTE: This medicine is only for you. Do not share this medicine with others.  What if I miss a dose?  If you miss a dose, take it as soon as you can. If it is almost time for your next dose, take only that dose. Do  not take double or extra doses.  What may interact with this medicine?  Do not take this medicine with any of the following medications:  · atazanavir  · nelfinavir  This medicine may also interact with the following medications:  · ampicillin  · delavirdine  · erlotinib  · iron salts  · medicines for fungal infections like ketoconazole, itraconazole and voriconazole  · methotrexate  · mycophenolate mofetil  · warfarin  This list may not describe all possible interactions. Give your health care provider a list of all the medicines, herbs, non-prescription drugs, or dietary supplements you use. Also tell them if you smoke, drink alcohol, or use illegal drugs. Some items may interact with your medicine.  What should I watch for while using this medicine?  It can take several days before your stomach pain gets better. Check with your doctor or health care provider if your condition does not start to get better, or if it gets worse.  This medicine may cause serious skin reactions. They can happen weeks to months after starting the medicine. Contact your health care provider right away if you notice fevers or flu-like symptoms with a rash. The rash may be red or purple and then turn into blisters or peeling of the skin. Or, you might notice a red rash with swelling of the face, lips or lymph nodes in your neck or under your arms.  You may need blood work done while you are taking this medicine.  This medicine may cause a decrease in vitamin B12. You should make sure that you get enough vitamin B12 while you are taking this medicine. Discuss the foods you eat and the vitamins you take with your health care provider.  What side effects may I notice from receiving this medicine?  Side effects that you should report to your doctor or health care professional as soon as possible:  ·   allergic reactions like skin rash, itching or hives, swelling of the face, lips, or tongue  ·   bone, muscle or joint pain  ·   breathing  problems  ·   chest pain or chest tightness  ·   dark yellow or brown urine  ·   dizziness  ·   fast, irregular heartbeat  ·   feeling faint or lightheaded  ·   fever or sore throat  ·   muscle spasm  ·   palpitations  ·   rash on cheeks or arms that gets worse in the sun  ·   redness, blistering, peeling or loosening of the skin, including inside the mouth  ·   seizures  · stomach polyps  ·   tremors  ·   unusual bleeding or bruising  ·   unusually weak or tired  ·   yellowing of the eyes or skin  Side effects that usually do not require medical attention (report to your doctor or health care professional if they continue or are bothersome):  ·   constipation  ·   diarrhea  ·   dry mouth  ·   headache  ·   nausea  This list may not describe all possible side effects. Call your doctor for medical advice about side effects. You may report side effects to FDA at 7-778-FDA-8585.  Where should I keep my medicine?  Keep out of the reach of children.  Store at room temperature between 15 and 30 degrees C (59 and 86 degrees F). Protect from light and moisture. Throw away any unused medicine after the expiration date.  NOTE: This sheet is a summary. It may not cover all possible information. If you have questions about this medicine, talk to your doctor, pharmacist, or health care provider.  © 2021 Elsevier/Gold Standard (2020-03-27 13:18:32)    InPact.mesafe Information Sheet  Get vaccinated. Get your smartphone. Get started with [employer].  [employer]-safe  SM after vaccination health   Use your smartphone to tell CDC about any side effects after getting the COVID-19 vaccine. You'll also get reminders if you need a second vaccine dose.  Sign up with your smartphone's browser at [email or web address] OR  Aim your smartphone's camera at this code  What is v-safe?  V-safe is a smartphone-based tool that uses text messaging and web surveys to provide personalized health check-ins after you receive a COVID-19 vaccination. Through v-safe, you can  "quickly tell CDC if you have any side effects after getting the COVID-19 vaccine. Depending on your answers, someone from [facility] may call to check on you. And Amplience will remind you to get your second COVID-19 vaccine dose if you need one.  Your participation in [employer] makes a difference -- it helps keep COVID-19 vaccines safe.  How can I participate?  Once you get a COVID-19 vaccine, you can enroll in Amplience using your smartphone. Participation is voluntary and you can opt out at any time. You will receive text messages from [employer] around 2 p.m. local time. To opt out, simply text \"STOP\" when v-safe sends you a text message. You can also start v-safe again by texting \"START.\"  How long do v-safe check-ins last?  During the first week after you get your vaccine, [employer] will send you a text message each day to ask how you are doing. Then you will get check-in messages once a week for up to 5 weeks. The questions v-safe asks should take less than 5 minutes to answer. If you need a second dose of vaccine, v-safe will provide a new 6-week check-in process so you can share your second-dose vaccine experience as well. You'll also receive check-ins 3, 6, and 12 months after your final dose of vaccine.  Is my health information safe?  Yes. Your personal information in Amplience is protected so that it stays confidential and private.*  *To the extent v-safe uses existing information systems managed by CDC, FDA, and other federal agencies, the systems employ strict security measures appropriate for the data's level of sensitivity.  How to register and use v-safe  You will need your smartphone and information about the COVID-19 vaccine you received. This information can be found on your vaccination record card; if you cannot find your card, please contact your healthcare provider.  [place]    1. Go to the Amplience website using one of the two options below:  ? Use your smartphone's browser to go to [email or web address] OR  ? Aim your " smartphone's camera at this code  2. Read the instructions. Click Get Started.  3. Enter your name, mobile number, and other requested information. Click [place].  4. You will receive a text message with a verification code on your smartphone. Enter the code in v-safe and click Verify.  5. At the top of the screen, click Enter vaccine information.  6. Select which COVID-19 vaccine you received (found on your vaccination record card; if you cannot find your card, please contact your healthcare provider). Then enter the date you were vaccinated. Click Next.  7. Review your vaccine information. If correct, click Submit. If not, click Go Back.  8. Congrats! You're all set! If you complete your registration before 2 p.m. local time, [email or web address] will start your initial health check-in around 2 p.m. that day. If you register after 2 p.m., [email or web address] will start your initial health check-in immediately after you register -- just follow the instructions. You will receive a reminder text message from [email or web address] when it's time for the next check-in -- around 2 p.m. local time. Just click the link in the text message to start the check-in.  Complete a [email or web address] health check-in  1. When you receive a v-safe check-in text message on your smartphone, click the link when ready.  2. Follow the instructions to complete the check-in.  Troubleshooting  How can I come back and finish a check-in later if I'm interrupted?  · Click the link in the text message reminder to restart and complete your check-in.  How do I update my vaccine information after my second COVID-19 vaccine dose?  · V-safe will automatically ask you to update your second dose information. Just follow the instructions.  Need help with [email or web address]?  Call [phone] ([phone])  TTy [phone]  Open 24 hours, 7 days a week  Visit www.cdc.gov/vsafe  12/11/2020  This information is not intended to replace advice given to you by your health care provider. Make sure you discuss any  questions you have with your health care provider.  Document Revised: 04/14/2021 Document Reviewed: 04/14/2021  Elsevier Patient Education © 2021 [employer]  Frequently Asked Questions About COVID-19 Vaccination  Below are answers to commonly asked questions about COVID-19 vaccination. CDC also has information for busting common vaccine myths available in facts about COVID-19 vaccines.  How do I get a vaccine?  There are several places you can look for a vaccination provider. You can visit [email or web address] or check your [facility] health department or local pharmacy's website. Visit How Do I Get a COVID-19 Vaccine to learn more.  Related page:  · How Do I Find a Vaccine  What is the update on the Sean & Sean's Chase COVID-19 Vaccine?  CDC and FDA have recommended that use of Sean & Sean's Chase (J&J/Chase) COVID-19 Vaccine resume in the [place], effective April 23, 2021. However, women younger than 50 years old especially should be aware of the rare risk of blood clots with low platelets after vaccination, and that other COVID-19 vaccines are available where this risk has not been seen. If you received a J&J/Chase vaccine, here is what you need to know. Read the CDC/FDA statement.  Related page:  CDC Recommends Use of Sean & Sean's Chase COVID-19 Vaccine Resume  CDC  Can I choose which COVID-19 vaccine I get?  You should get any COVID-19 vaccine that is available when you are eligible. Do not wait for a specific brand. All currently authorized and recommended COVID-19 vaccines are safe and effective, and CDC does not recommend one vaccine over another.  Learn more about your COVID-19 vaccination, including how to find a vaccination location, what to expect at your appointment, and more.  Related page:  · Your Vaccination  What are the most common side effects after getting a COVID-19 vaccine?  After getting vaccinated, you might have some side effects, which are normal signs that  your body is building protection. Common side effects are pain, redness, and swelling in the arm where you received the shot as well as tiredness, headache, muscle pain, chills, fever, and nausea throughout the rest of the body. These side effects could affect your ability to do daily activities, but they should go away in a few days. Learn more about what to expect after getting a COVID-19 vaccine.  Related page:  · Possible Side Effects  If I am pregnant, can I get a COVID-19 vaccine?  Yes, if you are pregnant, you can receive a COVID-19 vaccine.  You might want to have a conversation with your healthcare provider to help you decide whether to get vaccinated. While such a conversation might be helpful, it is not required before vaccination. Learn more about vaccination considerations for people who are pregnant or breastfeeding.  If you are pregnant and have received a COVID-19 vaccine, we encourage you to enroll in [employer] smartphone-based tool that provides personalized health check-ins after vaccination. A [employer] pregnancy registry has been established to gather information on the health of pregnant people who have received a COVID-19 vaccine.  Related pages:  · COVID-19 Vaccines for Pregnant or Breastfeeding People  · Monitoring Systems for Pregnant People  · V-safe Pregnancy Registry  How long does protection from a COVID-19 vaccine last?  We don't know how long protection lasts for those who are vaccinated. What we do know is that COVID-19 has caused very serious illness and death for a lot of people. If you get COVID-19, you also risk giving it to loved ones who may get very sick. Getting a COVID-19 vaccine is a safer choice.  Experts are working to learn more about both natural immunity and vaccine-induced immunity. CDC will keep the public informed as new evidence becomes available.  Related page:  · Vaccines Work  Do I need to wear a mask and avoid close contact with others if I have gotten 2 doses  of the vaccine?  It depends. For now, fully vaccinated people can gather indoors without physical distancing or wearing masks with:  · Other people who are fully vaccinated  · Unvaccinated people from one other household, unless any of those people or anyone they live with has an increased risk for severe illness from COVID-19  Until more is known, fully vaccinated people should continue to wear masks and stay 6 feet apart from other people in other settings, like when they are in public or visiting with unvaccinated people from multiple households.  Additional recommendations can be found at When You've Been Fully Vaccinated.  Related pages:  · When You've Been Fully Vaccinated  · Key Things to Know  · Protect Yourself and Others  If I have already had COVID-19 and recovered, do I still need to get vaccinated with a COVID-19 vaccine?  Yes, you should be vaccinated regardless of whether you already had COVID-19. That's because experts do not yet know how long you are protected from getting sick again after recovering from COVID-19. Even if you have already recovered from COVID-19, it is possible--although rare--that you could be infected with the virus that causes COVID-19 again. Learn more about why getting vaccinated is a safer way to build protection than getting infected.  If you were treated for COVID-19 with monoclonal antibodies or convalescent plasma, you should wait 90 days before getting a COVID-19 vaccine. Talk to your doctor if you are unsure what treatments you received or if you have more questions about getting a COVID-19 vaccine.   Experts are still learning more about how long vaccines protect against COVID-19 in real-world conditions. CDC will keep the public informed as new evidence becomes available.  Related page:  · Benefits of Getting Vaccinated  What are the ingredients in COVID-19 vaccines?  Vaccine ingredients can vary by . To learn more about the ingredients in authorized  COVID-19 vaccines, see  · Information about the Pfizer-BioNTech COVID-19 vaccine  · Information about the Moderna COVID-19 vaccine  · Information about the Sean & Audible Magic's Chase COVID-19 Vaccine  Related pages:  · Pfizer-BioNTech  · Moderna  · Sean & Sean / Chase  Who is paying for the COVID-19 vaccines?  The federal government is providing the vaccine free of charge to all people living in the [place], regardless of their immigration or health insurance status.  COVID-19 vaccination providers cannot:  · Charge you for the vaccine  · Charge you directly for any administration fees, copays, or coinsurance  · Deny vaccination to anyone who does not have health insurance coverage, is underinsured, or is out of network  · Charge an office visit or other fee to the recipient if the only service provided is a COVID-19 vaccination  · Require additional services in order for a person to receive a COVID-19 vaccine; however, additional healthcare services can be provided at the same time and billed as appropriate  COVID-19 vaccination providers can:  · Seek appropriate reimbursement from the recipient's plan or program (e.g., private health insurance, Medicare, Medicaid) for a vaccine administration fee  ? However, providers cannot charge the vaccine recipient the balance of the bill  · Seek reimbursement for uninsured vaccine recipients from the Health Resources and Services Administration's COVID-19 Uninsured Program  How many doses of COVID-19 vaccine will I need to get?  The number of doses needed depends on which vaccine you receive. To get the most protection:  · Two Pfizer-BioNTech vaccine doses should be given 3 weeks (21 days) apart.  · Two Moderna vaccine doses should be given 1 month (28 days) apart.  · Sean & [employer] [place] (J&J/Chase) COVID-19 vaccine requires only one dose.  If you receive a vaccine that requires two doses, you should get your second shot as close to the recommended  interval as possible. However, your second dose may be given up to 6 weeks (42 days) after the first dose, if necessary.. You should not get the second dose earlier than the recommended interval.  Related pages:  · Pfizer-BioNTech  · Moderna  · Sean & Sean / Chase  If I have an underlying condition, can I get a COVID-19 vaccine?  People with underlying medical conditions can receive a COVID-19 vaccine as long as they have not had an immediate or severe allergic reaction to a COVID-19 vaccine or to any of the ingredients in the vaccine. Learn more about vaccination considerations for people with underlying medical conditions. Vaccination is an important consideration for adults of any age with certain underlying medical conditions because they are at increased risk for severe illness from COVID-19.  Related pages  · Underlying Medical Conditions  · People at High Risk  · People with Allergies  Can I get vaccinated against COVID-19 while I am currently sick with COVID-19?  No. People with COVID-19 who have symptoms should wait to be vaccinated until they have recovered from their illness and have met the criteria for discontinuing isolation; those without symptoms should also wait until they meet the criteria before getting vaccinated. This guidance also applies to people who get COVID-19 before getting their second dose of vaccine.  Related pages:  · When to Quarantine  · Ending Home Isolation  Answers to more questions about:  · Healthcare Professionals and COVID-19 Vaccines  · VaccineFinder  · Vaccine Administration Management System (VAMS)  · COVID-19 Vaccination in Long-term Care Facilities  · V-safe after Vaccination Health   04/27/2021  Content source: National Center for Immunization and Respiratory Diseases (NCIRD), Division of Viral Diseases  This information is not intended to replace advice given to you by your health care provider. Make sure you discuss any questions you have with your  health care provider.  Document Revised: 04/28/2021 Document Reviewed: 04/28/2021  Elsevier Patient Education © 2021 [employer]  SARS-CoV-2 Virus (COVID-19) Vaccine Suspension for Injection (Moderna COVID-19 Vaccine)  What is this medicine?  SARS COVID-19 VACCINE (SARZ koh-vid 19 vak SEEN) is a vaccine used to reduce the risk of getting COVID-19. This vaccine does not treat COVID-19. There is no FDA-approved vaccine to prevent COVID-19. The FDA has authorized the emergency use of this vaccine during the COVID-19 pandemic.  This medicine may be used for other purposes; ask your health care provider or pharmacist if you have questions.  COMMON BRAND NAME(S): Moderna COVID-19  What should I tell my health care provider before I take this medicine?  They need to know if you have any of these conditions:  · any allergies  · bleeding disorder  · fever or infection  · immune system problems  · received passive antibody therapy such as convalescent plasma or monoclonal antibodies for a COVID-19 infection  · recent or upcoming vaccine including previous COVID-19 vaccine  · an unusual or allergic reaction to COVID-19 vaccine, other medicines, foods, dyes, or preservatives  · pregnant or trying to get pregnant  · breast-feeding  How should I use this medicine?  This vaccine is injected into a muscle. It is given by a health care provider.  This vaccine requires 2 doses given 1 month apart to get the full benefit. Set a reminder for when your next dose is due. It is important you get the same type or brand of vaccine for both doses.  You will get a vaccination card to show you when to return for your second dose. Remember to bring your card with you when you return for your second dose.  A copy of the Fact Sheet for Recipients and Caregivers will be given before each vaccination. Be sure to read this information carefully each time. This sheet may change frequently.  Overdosage: If you think you have taken too much of this  medicine contact a poison control center or emergency room at once.  NOTE: This medicine is only for you. Do not share this medicine with others.  What if I miss a dose?  It is important not to miss your dose. Call your health care provider if you are unable to keep an appointment.  What may interact with this medicine?  · certain medicines that thin your blood  · chemotherapy or radiation therapy  · medicines that lower your chance of fighting infection  · passive antibody therapy (monoclonal antibodies or convalescent plasma) for a COVID-19 infection  · steroid medicines like prednisone or cortisone  This list may not describe all possible interactions. Give your health care provider a list of all the medicines, herbs, non-prescription drugs, or dietary supplements you use. Also tell them if you smoke, drink alcohol, or use illegal drugs. Some items may interact with your medicine.  What should I watch for while using this medicine?  Visit your health care provider regularly.  If you have received passive antibody therapy (also known as monoclonal antibodies or convalescent plasma) for a COVID-19 infection, wait at least 90 days after receiving the antibody therapy before getting this vaccine. If you received your first dose of vaccine and get passive antibody therapy before your second dose is due, wait at least 90 days from the antibody therapy before getting your second dose of this vaccine.  This vaccine, like all vaccines, may not fully protect everyone. Continue to follow all guidelines to prevent exposure including:  · Wearing a mask  · Staying 6 feet away from other people  · Avoiding crowds  · Washing hands often  What side effects may I notice from receiving this medicine?  Side effects that you should report to your doctor or health care professional as soon as possible:  · allergic reactions (skin rash, itching or hives; swelling of the face, lips, or tongue; dizziness or weakness)  · fast  heartbeat  · trouble breathing  Side effects that usually do not require medical attention (report these to your doctor or health care professional if they continue or are bothersome):  · chills  · fever  · headache  · joint pain  · muscle pain  · nausea  · pain, redness, or irritation at site where injected  · swollen lymph nodes in the same arm as injection  · tiredness  This list may not describe all possible side effects. Call your doctor for medical advice about side effects. You may report side effects to FDA at 8-285-DGS-2895.  Where should I keep my medicine?  This vaccine is only given by a health care provider. It will not be stored at home.  NOTE: This sheet is a summary. It may not cover all possible information. If you have questions about this medicine, talk to your doctor, pharmacist, or health care provider.  © 2021 Elsevier/Gold Standard (2021-01-19 17:15:38)

## 2021-09-09 ENCOUNTER — PRIOR AUTHORIZATION (OUTPATIENT)
Dept: FAMILY MEDICINE CLINIC | Facility: CLINIC | Age: 31
End: 2021-09-09

## 2021-09-15 ENCOUNTER — OFFICE VISIT (OUTPATIENT)
Dept: FAMILY MEDICINE CLINIC | Facility: CLINIC | Age: 31
End: 2021-09-15

## 2021-09-15 VITALS
DIASTOLIC BLOOD PRESSURE: 84 MMHG | BODY MASS INDEX: 32.49 KG/M2 | RESPIRATION RATE: 20 BRPM | OXYGEN SATURATION: 98 % | TEMPERATURE: 97 F | SYSTOLIC BLOOD PRESSURE: 128 MMHG | HEIGHT: 67 IN | HEART RATE: 84 BPM | WEIGHT: 207 LBS

## 2021-09-15 DIAGNOSIS — R07.9 CHEST PAIN, UNSPECIFIED TYPE: Primary | ICD-10-CM

## 2021-09-15 DIAGNOSIS — R06.09 DOE (DYSPNEA ON EXERTION): ICD-10-CM

## 2021-09-15 DIAGNOSIS — K21.9 CHRONIC GERD: ICD-10-CM

## 2021-09-15 DIAGNOSIS — R21 RASH AND OTHER NONSPECIFIC SKIN ERUPTION: ICD-10-CM

## 2021-09-15 PROCEDURE — 99214 OFFICE O/P EST MOD 30 MIN: CPT | Performed by: NURSE PRACTITIONER

## 2021-09-15 RX ORDER — FAMOTIDINE 20 MG/1
20 TABLET, FILM COATED ORAL 2 TIMES DAILY
Qty: 60 TABLET | Refills: 2 | Status: SHIPPED | OUTPATIENT
Start: 2021-09-15 | End: 2021-10-26

## 2021-09-15 RX ORDER — CETIRIZINE HYDROCHLORIDE 10 MG/1
10 TABLET ORAL DAILY
Qty: 30 TABLET | Refills: 0 | Status: SHIPPED | OUTPATIENT
Start: 2021-09-15 | End: 2021-11-09

## 2021-09-15 NOTE — PROGRESS NOTES
Follow Up Office Note     Patient Name: Meliton Lagos  : 1990   MRN: [id number]     Chief Complaint:    Chief Complaint   Patient presents with   • Chest Pain     follow up       History of Present Illness: Meliton Lagos is a 31 y.o. male who presents today s/p ER visit at  for c/o chest pain. Patient had Covid-19 seven months ago. He recently had his first Covid-19 vaccination (Moderna) approximately 3 weeks ago. He states that he began having chest pain and SOA 1-2 days after receiving the vaccination.This prompted him to go to [place] [facility] at the time where he had a negative cardiac workup. Patient was seen by me for follow-up 21. I consulted with Dr. De Jesus at the time regarding patient complaints/condition and he was kind enough to examine patient. Patient symptoms were believed to be related to his history of severe GERD and Jackson's esophagus. Patient advised to take pantoprazole 40 mg BID and follow-up with gastroenterology. Patient provided with contact information for his gastroenterologist (Dr. Parrish) and he stated that he would call himself to schedule an appointment with gastroenterology.  Since his last office visit, patient became concerned that his chest pain and shortness of breath persisted and he called 911 who transported him to  ER for further evaluation on 9/10/21. His cardiac workup was again unremarkable. His D-dimer, chest xray and troponins were negative. He was subsequently discharged home and advised to f/u with his PCP.  Patient states that he has not scheduled an appointment with Dr. Mobley yet.    Patient also has a new complaint of mild rash on his chest and back for the past week. He describes it as mildly itchy. He denies exposure to any known irritants or allergens.    Subjective      Review of Systems:   Review of Systems   Constitutional: Positive for fatigue. Negative for activity change, appetite change, chills, diaphoresis, fever  "and unexpected weight change.   HENT: Negative.    Respiratory: Positive for chest tightness and shortness of breath (with exertion). Negative for cough, choking, wheezing and stridor.    Cardiovascular: Positive for chest pain. Negative for palpitations and leg swelling.   Gastrointestinal: Negative.    Skin: Positive for rash (on chest and back).   Neurological: Negative.         Past Medical History:   Past Medical History:   Diagnosis Date   • Jackson's esophagus    • GERD (gastroesophageal reflux disease)          Medications:     Current Outpatient Medications:   •  pantoprazole (PROTONIX) 40 MG EC tablet, Take 1 tablet by mouth 2 (two) times a day., Disp: 60 tablet, Rfl: 2  •  saccharomyces boulardii (Florastor) 250 MG capsule, Take 1 capsule by mouth 2 (Two) Times a Day., Disp: 60 each, Rfl: 0  •  cetirizine (zyrTEC) 10 MG tablet, Take 1 tablet by mouth Daily., Disp: 30 tablet, Rfl: 0  •  famotidine (Pepcid) 20 MG tablet, Take 1 tablet by mouth 2 (Two) Times a Day., Disp: 60 tablet, Rfl: 2    Allergies:   No Known Allergies      Objective     Physical Exam:  Vital Signs:   Vitals:    09/15/21 1114   BP: 128/84   Pulse: 84   Resp: 20   Temp: 97 °F (36.1 °C)   SpO2: 98%   Weight: 93.9 kg (207 lb)   Height: 170 cm (66.93\")   PainSc: 0-No pain     Body mass index is 32.49 kg/m².     Physical Exam  Vitals and nursing note reviewed.   Constitutional:       General: He is not in acute distress.     Appearance: Normal appearance. He is well-developed. He is not ill-appearing, toxic-appearing or diaphoretic.   HENT:      Head: Normocephalic and atraumatic.   Neck:      Thyroid: No thyromegaly.      Vascular: No carotid bruit.   Cardiovascular:      Rate and Rhythm: Normal rate and regular rhythm.      Heart sounds: Normal heart sounds, S1 normal and S2 normal. No murmur heard.     Pulmonary:      Effort: Pulmonary effort is normal. No respiratory distress.      Breath sounds: Normal breath sounds. No stridor. No " decreased breath sounds, wheezing, rhonchi or rales.   Musculoskeletal:      Right lower leg: No edema.      Left lower leg: No edema.   Lymphadenopathy:      Cervical: No cervical adenopathy.   Skin:     General: Skin is warm and dry.      Findings: Rash present.      Comments: Maculopapular rash on chest and back.    Neurological:      General: No focal deficit present.      Mental Status: He is alert and oriented to person, place, and time.   Psychiatric:         Mood and Affect: Mood normal.         Behavior: Behavior normal. Behavior is cooperative.         Thought Content: Thought content normal.         Judgment: Judgment normal.         Assessment / Plan      Assessment/Plan:   Diagnoses and all orders for this visit:    1. Chest pain, unspecified type (Primary)  -     Ambulatory Referral to [facility]    2. MEYER (dyspnea on exertion)  -     Ambulatory Referral to [facility]    3. Chronic GERD  Assessment & Plan:  GERD symptoms unchanged. Continue pantoprazole 40 mg BID. Begin Pepcid 20 mg BID.   Continue with plan to schedule f/u appointment with gastroenterology.    Orders:  -     famotidine (Pepcid) 20 MG tablet; Take 1 tablet by mouth 2 (Two) Times a Day.  Dispense: 60 tablet; Refill: 2    4. Rash and other nonspecific skin eruption  -     cetirizine (zyrTEC) 10 MG tablet; Take 1 tablet by mouth Daily.  Dispense: 30 tablet; Refill: 0       Follow Up:   PRN and at next scheduled appointment(s) with PCP.    Discussed the nature of the medical condition(s) risks, complications, implications, management, safe and proper use of medications. Encouraged medication compliance, and keeping scheduled follow up appointments with me and any other providers.      I spent 30 minutes caring for Ali on this date of service. This time includes time spent by me in the following activities:preparing for the visit, reviewing tests, performing a medically appropriate  examination and/or evaluation , counseling and educating the patient/family/caregiver, ordering medications, tests, or procedures, referring and communicating with other health care professionals  and documenting information in the medical record.    RTC if symptoms fail to improve, to ER if symptoms worsen.      JAY León  [facility]       Please note that portions of this note may have been completed with a voice recognition program. Efforts were made to edit the dictations, but occasionally words are mistranscribed.

## 2021-09-16 ENCOUNTER — OFFICE VISIT (OUTPATIENT)
Dept: CARDIOLOGY | Facility: HOSPITAL | Age: 31
End: 2021-09-16

## 2021-09-16 ENCOUNTER — HOSPITAL ENCOUNTER (OUTPATIENT)
Dept: CARDIOLOGY | Facility: HOSPITAL | Age: 31
Discharge: HOME | End: 2021-09-16

## 2021-09-16 VITALS
SYSTOLIC BLOOD PRESSURE: 117 MMHG | TEMPERATURE: 97.8 F | HEART RATE: 82 BPM | DIASTOLIC BLOOD PRESSURE: 81 MMHG | BODY MASS INDEX: 40.08 KG/M2 | HEIGHT: 60 IN | RESPIRATION RATE: 20 BRPM | WEIGHT: 204.13 LBS | OXYGEN SATURATION: 98 %

## 2021-09-16 DIAGNOSIS — R07.9 CHEST PAIN, UNSPECIFIED TYPE: Primary | ICD-10-CM

## 2021-09-16 DIAGNOSIS — R06.02 SHORTNESS OF BREATH: ICD-10-CM

## 2021-09-16 DIAGNOSIS — R07.9 CHEST PAIN, UNSPECIFIED TYPE: ICD-10-CM

## 2021-09-16 LAB
QT INTERVAL: 388 MS
QTC INTERVAL: 433 MS

## 2021-09-16 PROCEDURE — 99214 OFFICE O/P EST MOD 30 MIN: CPT | Performed by: NURSE PRACTITIONER

## 2021-09-16 PROCEDURE — 93005 ELECTROCARDIOGRAM TRACING: CPT | Performed by: NURSE PRACTITIONER

## 2021-09-16 PROCEDURE — 93010 ELECTROCARDIOGRAM REPORT: CPT | Performed by: INTERNAL MEDICINE

## 2021-09-16 NOTE — PATIENT INSTRUCTIONS
- Office will call to schedule testing    - Office will send message or call with testing results    - Cardiology will call to schedule an appointment.

## 2021-09-16 NOTE — PROGRESS NOTES
"Chief Complaint  Chest Pain and Follow-up    Subjective    History of Present Illness {CC  Problem List  Visit  Diagnosis   Encounters  Notes  Medications  Labs  Result Review Imaging  Media :23}     Meliton Lagos, 31 y.o. male with COVID-19 infection 2/2021, GERD, chest pain presents to [facility] for Chest Pain and Follow-up.    Patient recently evaluated at [facility] and  emergency department for chest pain and shortness of breath; reported as overall normal cardiac testing.  Patient symptoms began 2 days after receiving his first Moderna COVID-19 vaccine. He reports some dyspnea and muscle aches during COVID-19 infection. Patient was referred to heart and valve clinic by PCP with recommendation for testing.     Chest pain symptom onset was 28 days ago, and symptoms have been intermittent since that time. Symptoms include sharp and burning pain in substernal left chest, also described left arm. Pain relieved by resting and lying down. The patient endorses intermittent dyspnea, palpitations 3 days after vaccine but improved. The patient denies leg swelling, recurrent palpitations/ rapid heart beat, and syncope. Previous cardiac workup includes: normal TTE and stress test 5/2020. Family history for premature cardiac disease includes: none.  Patient is a non-smoker.    He reports these symptoms were not present before his recent COVID-19 vaccine. He was active with no anginal pain. He is worried about his ongoing symptoms after his recent COVID-19 vaccine.      Objective     Vital Signs:   Vitals:    09/16/21 0952   BP: 117/81   BP Location: Left arm   Patient Position: Sitting   Cuff Size: Adult   Pulse: 82   Resp: 20   Temp: 97.8 °F (36.6 °C)   TempSrc: Temporal   SpO2: 98%   Weight: 92.6 kg (204 lb 2 oz)   Height: 66.9 cm (26.35\")     Body mass index is 206.7 kg/m².  Physical Exam  Vitals and nursing note reviewed.   Constitutional:       Appearance: Normal " appearance.   HENT:      Head: Normocephalic.   Eyes:      Extraocular Movements: Extraocular movements intact.   Neck:      Vascular: No carotid bruit.   Cardiovascular:      Rate and Rhythm: Normal rate and regular rhythm.      Pulses: Normal pulses.      Heart sounds: Normal heart sounds, S1 normal and S2 normal. No murmur heard.     Pulmonary:      Effort: Pulmonary effort is normal. No respiratory distress.      Breath sounds: Normal breath sounds.   Musculoskeletal:      Cervical back: Neck supple.      Right lower leg: No edema.      Left lower leg: No edema.   Skin:     General: Skin is warm and dry.   Neurological:      General: No focal deficit present.      Mental Status: He is alert.   Psychiatric:         Mood and Affect: Mood normal.         Behavior: Behavior normal.         Thought Content: Thought content normal.        Data Reviewed:{ Labs  Result Review  Imaging  Med Tab  Media :23}     ECG 12 Lead (09/16/2021 10:48)  Treadmill Stress Test (05/22/2020 15:11)  Adult Transthoracic Echo Complete W/ Cont if Necessary Per Protocol (05/22/2020 16:03)  CT Angiogram Chest (05/04/2020 14:08)  XR Chest 1 View (08/03/2020 00:58)  CT Chest Pulmonary Embolism (08/03/2020 01:36)      Assessment and Plan {CC Problem List  Visit Diagnosis  ROS  Review (Popup)  Health Maintenance  Quality  BestPractice  Medications  SmartSets  SnapShot Encounters  Media :23}     1. Chest pain, unspecified type  -Recent onset after COVID-19 vaccine.  Recently with 2 emergency department work-ups with reported normal testing results.  No anginal symptoms prior to vaccine when he was active.  -Recent treadmill stress test 5/2020 with no evidence of ischemia  -Adult Transthoracic Echo Complete W/ Cont if Necessary Per Protocol; Future  -ECG 12 Lead; Future  -Ambulatory Referral to Cardiology    2. Shortness of breath  -Intermittent dyspnea after recent COVID-19 vaccine.  -Previous TTE 5/2020 with normal LV systolic  function, trace mitral and tricuspid regurgitation  -Given his new onset of symptoms after recent COVID-19 vaccine will repeat TTE  - Adult Transthoracic Echo Complete W/ Cont if Necessary Per Protocol; Future  - ECG 12 Lead; Future  - Ambulatory Referral to Cardiology        Follow Up {Instructions Charge Capture  Follow-up Communications :23}     Return if symptoms worsen or fail to improve.    Patient was given instructions and counseling regarding his condition or for health maintenance advice. Please see specific information pulled into the AVS if appropriate.  Patient was instructed to call the [facility] with any questions, concerns, or worsening symptoms.    *Please note that portions of this note were completed with a voice recognition program. Efforts were made to edit the dictations, but occasionally words are mistranscribed.

## 2021-09-18 PROBLEM — R21 RASH AND NONSPECIFIC SKIN ERUPTION: Status: ACTIVE | Noted: 2021-09-18

## 2021-09-18 NOTE — ASSESSMENT & PLAN NOTE
GERD symptoms unchanged. Continue pantoprazole 40 mg BID. Begin Pepcid 20 mg BID.   Continue with plan to schedule f/u appointment with gastroenterology.   DISCHARGE

## 2021-09-18 NOTE — PATIENT INSTRUCTIONS
Chest Wall Pain  Chest wall pain is pain in or around the bones and muscles of your chest. Sometimes, an injury causes this pain. Excessive coughing or overuse of arm and chest muscles may also cause chest wall pain. Sometimes, the cause may not be known. This pain may take several weeks or longer to get better.  Follow these instructions at home:  Managing pain, stiffness, and swelling    · If directed, put ice on the painful area:  ? Put ice in a plastic bag.  ? Place a towel between your skin and the bag.  ? Leave the ice on for 20 minutes, 2-3 times per day.    Activity  · Rest as told by your health care provider.  · Avoid activities that cause pain. These include any activities that use your chest muscles or your abdominal and side muscles to lift heavy items. Ask your health care provider what activities are safe for you.  General instructions    · Take over-the-counter and prescription medicines only as told by your health care provider.  · Do not use any products that contain nicotine or tobacco, such as cigarettes, e-cigarettes, and chewing tobacco. These can delay healing after injury. If you need help quitting, ask your health care provider.  · Keep all follow-up visits as told by your health care provider. This is important.    Contact a health care provider if:  · You have a fever.  · Your chest pain becomes worse.  · You have new symptoms.  Get help right away if:  · You have nausea or vomiting.  · You feel sweaty or light-headed.  · You have a cough with mucus from your lungs (sputum) or you cough up blood.  · You develop shortness of breath.  These symptoms may represent a serious problem that is an emergency. Do not wait to see if the symptoms will go away. Get medical help right away. Call your local emergency services (911 in the [place] Do not drive yourself to the hospital.  Summary  · Chest wall pain is pain in or around the bones and muscles of your chest.  · Depending on the cause, it may be  "treated with ice, rest, medicines, and avoiding activities that cause pain.  · Contact a health care provider if you have a fever, worsening chest pain, or new symptoms.  · Get help right away if you feel light-headed or you develop shortness of breath. These symptoms may be an emergency.  This information is not intended to replace advice given to you by your health care provider. Make sure you discuss any questions you have with your health care provider.  Document Revised: 06/20/2019 Document Reviewed: 06/20/2019  [employer] Patient Education © 2021 [employer]    [employer] 2021 (pp. 213-216). [place] [employer].\">   Gastroesophageal Reflux Disease, Adult  Gastroesophageal reflux (LMAONT) happens when acid from the stomach flows up into the tube that connects the mouth and the stomach (esophagus). Normally, food travels down the esophagus and stays in the stomach to be digested. However, when a person has LAMONT, food and stomach acid sometimes move back up into the esophagus. If this becomes a more serious problem, the person may be diagnosed with a disease called gastroesophageal reflux disease (GERD). GERD occurs when the reflux:  · Happens often.  · Causes frequent or severe symptoms.  · Causes problems such as damage to the esophagus.  When stomach acid comes in contact with the esophagus, the acid may cause soreness (inflammation) in the esophagus. Over time, GERD may create small holes (ulcers) in the lining of the esophagus.  What are the causes?  This condition is caused by a problem with the muscle between the esophagus and the stomach (lower esophageal sphincter, or LES). Normally, the LES muscle closes after food passes through the esophagus to the stomach. When the LES is weakened or abnormal, it does not close properly, and that allows food and stomach acid to go back up into the esophagus.  The LES can be weakened by certain dietary substances, medicines, and medical conditions, " including:  · Tobacco use.  · Pregnancy.  · Having a hiatal hernia.  · Alcohol use.  · Certain foods and beverages, such as coffee, chocolate, onions, and peppermint.  What increases the risk?  You are more likely to develop this condition if you:  · Have an increased body weight.  · Have a connective tissue disorder.  · Use NSAID medicines.  What are the signs or symptoms?  Symptoms of this condition include:  · Heartburn.  · Difficult or painful swallowing.  · The feeling of having a lump in the throat.  · A bitter taste in the mouth.  · Bad breath.  · Having a large amount of saliva.  · Having an upset or bloated stomach.  · Belching.  · Chest pain. Different conditions can cause chest pain. Make sure you see your health care provider if you experience chest pain.  · Shortness of breath or wheezing.  · Ongoing (chronic) cough or a night-time cough.  · Wearing away of tooth enamel.  · Weight loss.  How is this diagnosed?  Your health care provider will take a medical history and perform a physical exam. To determine if you have mild or severe GERD, your health care provider may also monitor how you respond to treatment. You may also have tests, including:  · A test to examine your stomach and esophagus with a small camera (endoscopy).  · A test that measures the acidity level in your esophagus.  · A test that measures how much pressure is on your esophagus.  · A barium swallow or modified barium swallow test to show the shape, size, and functioning of your esophagus.  How is this treated?  The goal of treatment is to help relieve your symptoms and to prevent complications. Treatment for this condition may vary depending on how severe your symptoms are. Your health care provider may recommend:  · Changes to your diet.  · Medicine.  · Surgery.  Follow these instructions at home:  Eating and drinking    · Follow a diet as recommended by your health care provider. This may involve avoiding foods and drinks such  as:  ? Coffee and tea (with or without caffeine).  ? Drinks that contain alcohol.  ? Energy drinks and sports drinks.  ? Carbonated drinks or sodas.  ? Chocolate and cocoa.  ? Peppermint and mint flavorings.  ? Garlic and onions.  ? Horseradish.  ? Spicy and acidic foods, including peppers, chili powder, allan powder, vinegar, hot sauces, and barbecue sauce.  ? Citrus fruit juices and citrus fruits, such as oranges, stephanie, and limes.  ? Tomato-based foods, such as red sauce, chili, salsa, and pizza with red sauce.  ? Fried and fatty foods, such as donuts, french fries, potato chips, and high-fat dressings.  ? High-fat meats, such as hot dogs and fatty cuts of red and white meats, such as rib eye steak, sausage, ham, and horvath.  ? High-fat dairy items, such as whole milk, butter, and cream cheese.  · Eat small, frequent meals instead of large meals.  · Avoid drinking large amounts of liquid with your meals.  · Avoid eating meals during the 2-3 hours before bedtime.  · Avoid lying down right after you eat.  · Do not exercise right after you eat.    Lifestyle    · Do not use any products that contain nicotine or tobacco, such as cigarettes, e-cigarettes, and chewing tobacco. If you need help quitting, ask your health care provider.  · Try to reduce your stress by using methods such as yoga or meditation. If you need help reducing stress, ask your health care provider.  · If you are overweight, reduce your weight to an amount that is healthy for you. Ask your health care provider for guidance about a safe weight loss goal.    General instructions  · Pay attention to any changes in your symptoms.  · Take over-the-counter and prescription medicines only as told by your health care provider. Do not take aspirin, ibuprofen, or other NSAIDs unless your health care provider told you to do so.  · Wear loose-fitting clothing. Do not wear anything tight around your waist that causes pressure on your abdomen.  · Raise (elevate)  the head of your bed about 6 inches (15 cm).  · Avoid bending over if this makes your symptoms worse.  · Keep all follow-up visits as told by your health care provider. This is important.  Contact a health care provider if:  · You have:  ? New symptoms.  ? Unexplained weight loss.  ? Difficulty swallowing or it hurts to swallow.  ? Wheezing or a persistent cough.  ? A hoarse voice.  · Your symptoms do not improve with treatment.  Get help right away if you:  · Have pain in your arms, neck, jaw, teeth, or back.  · Feel sweaty, dizzy, or light-headed.  · Have chest pain or shortness of breath.  · Vomit and your vomit looks like blood or coffee grounds.  · Faint.  · Have stool that is bloody or black.  · Cannot swallow, drink, or eat.  Summary  · Gastroesophageal reflux happens when acid from the stomach flows up into the esophagus. GERD is a disease in which the reflux happens often, causes frequent or severe symptoms, or causes problems such as damage to the esophagus.  · Treatment for this condition may vary depending on how severe your symptoms are. Your health care provider may recommend diet and lifestyle changes, medicine, or surgery.  · Contact a health care provider if you have new or worsening symptoms.  · Take over-the-counter and prescription medicines only as told by your health care provider. Do not take aspirin, ibuprofen, or other NSAIDs unless your health care provider told you to do so.  · Keep all follow-up visits as told by your health care provider. This is important.  This information is not intended to replace advice given to you by your health care provider. Make sure you discuss any questions you have with your health care provider.  Document Revised: 06/26/2019 Document Reviewed: 06/26/2019  [employer] Patient Education © 2021 [employer]    Nonspecific Chest Pain, Adult  Chest pain can be caused by many different conditions. It can be caused by a condition that is life-threatening and  requires treatment right away. It can also be caused by something that is not life-threatening. If you have chest pain, it can be hard to know the difference, so it is important to get help right away to make sure that you do not have a serious condition.  Some life-threatening causes of chest pain include:  · Heart attack.  · A tear in the body's main blood vessel (aortic dissection).  · Inflammation around your heart (pericarditis).  · A problem in the lungs, such as a blood clot (pulmonary embolism) or a collapsed lung (pneumothorax).  Some non life-threatening causes of chest pain include:  · Heartburn.  · Anxiety or stress.  · Damage to the bones, muscles, and cartilage that make up your chest wall.  · Pneumonia or bronchitis.  · Shingles infection (varicella-zoster virus).  Chest pain can feel like:  · Pain or discomfort on the surface of your chest or deep in your chest.  · Crushing, pressure, aching, or squeezing pain.  · Burning or tingling.  · Dull or sharp pain that is worse when you move, cough, or take a deep breath.  · Pain or discomfort that is also felt in your back, neck, jaw, shoulder, or arm, or pain that spreads to any of these areas.  Your chest pain may come and go. It may also be constant. Your health care provider will do lab tests and other studies to find the cause of your pain. Treatment will depend on the cause of your chest pain.  Follow these instructions at home:  Medicines  · Take over-the-counter and prescription medicines only as told by your health care provider.  · If you were prescribed an antibiotic, take it as told by your health care provider. Do not stop taking the antibiotic even if you start to feel better.  Lifestyle    · Rest as directed by your health care provider.  · Do not use any products that contain nicotine or tobacco, such as cigarettes and e-cigarettes. If you need help quitting, ask your health care provider.  · Do not drink alcohol.  · Make healthy lifestyle  choices as recommended. These may include:  ? Getting regular exercise. Ask your health care provider to suggest some activities that are safe for you.  ? Eating a heart-healthy diet. This includes plenty of fresh fruits and vegetables, whole grains, low-fat (lean) protein, and low-fat dairy products. A dietitian can help you find healthy eating options.  ? Maintaining a healthy weight.  ? Managing any other health conditions you have, such as high blood pressure (hypertension) or diabetes.  ? Reducing stress, such as with yoga or relaxation techniques.    General instructions  · Pay attention to any changes in your symptoms. Tell your health care provider about them or any new symptoms.  · Avoid any activities that cause chest pain.  · Keep all follow-up visits as told by your health care provider. This is important. This includes visits for any further testing if your chest pain does not go away.  Contact a health care provider if:  · Your chest pain does not go away.  · You feel depressed.  · You have a fever.  Get help right away if:  · Your chest pain gets worse.  · You have a cough that gets worse, or you cough up blood.  · You have severe pain in your abdomen.  · You faint.  · You have sudden, unexplained chest discomfort.  · You have sudden, unexplained discomfort in your arms, back, neck, or jaw.  · You have shortness of breath at any time.  · You suddenly start to sweat, or your skin gets clammy.  · You feel nausea or you vomit.  · You suddenly feel lightheaded or dizzy.  · You have severe weakness, or unexplained weakness or fatigue.  · Your heart begins to beat quickly, or it feels like it is skipping beats.  These symptoms may represent a serious problem that is an emergency. Do not wait to see if the symptoms will go away. Get medical help right away. Call your local emergency services (911 in the [place] Do not drive yourself to the hospital.  Summary  · Chest pain can be caused by a condition that is  serious and requires urgent treatment. It may also be caused by something that is not life-threatening.  · If you have chest pain, it is very important to see your health care provider. Your health care provider may do lab tests and other studies to find the cause of your pain.  · Follow your health care provider's instructions on taking medicines, making lifestyle changes, and getting emergency treatment if symptoms become worse.  · Keep all follow-up visits as told by your health care provider. This includes visits for any further testing if your chest pain does not go away.  This information is not intended to replace advice given to you by your health care provider. Make sure you discuss any questions you have with your health care provider.  Document Revised: 06/20/2019 Document Reviewed: 06/20/2019  [employer] Patient Education © 2021 [employer]    Shortness of Breath, Adult  Shortness of breath is when a person has trouble breathing enough air or when a person feels like she or he is having trouble breathing in enough air. Shortness of breath could be a sign of a medical problem.  Follow these instructions at home:    · Pay attention to any changes in your symptoms.  · Do not use any products that contain nicotine or tobacco, such as cigarettes, e-cigarettes, and chewing tobacco.  · Do not smoke. Smoking is a common cause of shortness of breath. If you need help quitting, ask your health care provider.  · Avoid things that can irritate your airways, such as:  ? Mold.  ? Dust.  ? Air pollution.  ? Chemical fumes.  ? Things that can cause allergy symptoms (allergens), if you have allergies.  · Keep your living space clean and free of mold and dust.  · Rest as needed. Slowly return to your usual activities.  · Take over-the-counter and prescription medicines only as told by your health care provider. This includes oxygen therapy and inhaled medicines.  · Keep all follow-up visits as told by your health care  provider. This is important.  Contact a health care provider if:  · Your condition does not improve as soon as expected.  · You have a hard time doing your normal activities, even after you rest.  · You have new symptoms.  Get help right away if:  · Your shortness of breath gets worse.  · You have shortness of breath when you are resting.  · You feel light-headed or you faint.  · You have a cough that is not controlled with medicines.  · You cough up blood.  · You have pain with breathing.  · You have pain in your chest, arms, shoulders, or abdomen.  · You have a fever.  · You cannot walk up stairs or exercise the way that you normally do.  These symptoms may represent a serious problem that is an emergency. Do not wait to see if the symptoms will go away. Get medical help right away. Call your local emergency services (911 in the [place] Do not drive yourself to the hospital.  Summary  · Shortness of breath is when a person has trouble breathing enough air. It can be a sign of a medical problem.  · Avoid things that irritate your lungs, such as smoking, pollution, mold, and dust.  · Pay attention to changes in your symptoms and contact your health care provider if you have a hard time completing daily activities because of shortness of breath.  This information is not intended to replace advice given to you by your health care provider. Make sure you discuss any questions you have with your health care provider.  Document Revised: 05/20/2019 Document Reviewed: 05/20/2019  [employer] Patient Education © 2021 [employer]    Rash, Adult  A rash is a change in the color of your skin. A rash can also change the way your skin feels. There are many different conditions and factors that can cause a rash. Some rashes may disappear after a few days, but some may last for a few weeks. Common causes of rashes include:  · Viral infections, such as:  ? Colds.  ? Measles.  ? Hand, foot, and mouth disease.  · Bacterial infections,  such as:  ? Scarlet fever.  ? Impetigo.  · Fungal infections, such as Candida.  · Allergic reactions to food, medicines, or skin care products.  Follow these instructions at home:  The goal of treatment is to stop the itching and keep the rash from spreading. Pay attention to any changes in your symptoms. Follow these instructions to help with your condition:  Medicine  Take or apply over-the-counter and prescription medicines only as told by your health care provider. These may include:  · Corticosteroid creams to treat red or swollen skin.  · Anti-itch lotions.  · Oral allergy medicines (antihistamines).  · Oral corticosteroids for severe symptoms.    Skin care  · Apply cool compresses to the affected areas.  · Do not scratch or rub your skin.  · Avoid covering the rash. Make sure the rash is exposed to air as much as possible.  Managing itching and discomfort  · Avoid hot showers or baths, which can make itching worse. A cold shower may help.  · Try taking a bath with:  ? Epsom salts. Follow  instructions on the packaging. You can get these at your local pharmacy or grocery store.  ? Baking soda. Pour a small amount into the bath as told by your health care provider.  ? Colloidal oatmeal. Follow  instructions on the packaging. You can get this at your local pharmacy or grocery store.  · Try applying baking soda paste to your skin. Stir water into baking soda until it reaches a paste-like consistency.  · Try applying calamine lotion. This is an over-the-counter lotion that helps to relieve itchiness.  · Keep cool and out of the sun. Sweating and being hot can make itching worse.  General instructions    · Rest as needed.  · Drink enough fluid to keep your urine pale yellow.  · Wear loose-fitting clothing.  · Avoid scented soaps, detergents, and perfumes. Use gentle soaps, detergents, perfumes, and other cosmetic products.  · Avoid any substance that causes your rash. Keep a journal to help  "track what causes your rash. Write down:  ? What you eat.  ? What cosmetic products you use.  ? What you drink.  ? What you wear. This includes jewelry.  · Keep all follow-up visits as told by your health care provider. This is important.    Contact a health care provider if:  · You sweat at night.  · You lose weight.  · You urinate more than normal.  · You urinate less than normal, or you notice that your urine is a darker color than usual.  · You feel weak.  · You vomit.  · Your skin or the whites of your eyes look yellow (jaundice).  · Your skin:  ? Tingles.  ? Is numb.  · Your rash:  ? Does not go away after several days.  ? Gets worse.  · You are:  ? Unusually thirsty.  ? More tired than normal.  · You have:  ? New symptoms.  ? Pain in your abdomen.  ? A fever.  ? Diarrhea.  Get help right away if you:  · Have a fever and your symptoms suddenly get worse.  · Develop confusion.  · Have a severe headache or a stiff neck.  · Have severe joint pains or stiffness.  · Have a seizure.  · Develop a rash that covers all or most of your body. The rash may or may not be painful.  · Develop blisters that:  ? Are on top of the rash.  ? Grow larger or grow together.  ? Are painful.  ? Are inside your nose or mouth.  · Develop a rash that:  ? Looks like purple pinprick-sized spots all over your body.  ? Has a \"bull's eye\" or looks like a target.  ? Is not related to sun exposure, is red and painful, and causes your skin to peel.  Summary  · A rash is a change in the color of your skin. Some rashes disappear after a few days, but some may last for a few weeks.  · The goal of treatment is to stop the itching and keep the rash from spreading.  · Take or apply over-the-counter and prescription medicines only as told by your health care provider.  · Contact a health care provider if you have new or worsening symptoms.  · Keep all follow-up visits as told by your health care provider. This is important.  This information is not " intended to replace advice given to you by your health care provider. Make sure you discuss any questions you have with your health care provider.  Document Revised: 04/10/2020 Document Reviewed: 07/22/2019  [employer] Patient Education © 2021 [employer]  Cetirizine tablets  What is this medicine?  CETIRIZINE (se TI ra zeen) is an antihistamine. This medicine is used to treat or prevent symptoms of allergies. It is also used to help reduce itchy skin rash and hives.  This medicine may be used for other purposes; ask your health care provider or pharmacist if you have questions.  COMMON BRAND NAME(S): All Day Allergy, Allergy Relief, Zyrtec, Zyrtec Hives Relief  What should I tell my health care provider before I take this medicine?  They need to know if you have any of these conditions:  · kidney disease  · liver disease  · an unusual or allergic reaction to cetirizine, hydroxyzine, other medicines, foods, dyes, or preservatives  · pregnant or trying to get pregnant  · breast-feeding  How should I use this medicine?  Take this medicine by mouth with a glass of water. Follow the directions on the prescription label. You can take this medicine with food or on an empty stomach. Take your medicine at regular times. Do not take more often than directed. You may need to take this medicine for several days before your symptoms improve.  Talk to your pediatrician regarding the use of this medicine in children. Special care may be needed. While this drug may be prescribed for children as young as 6 years of age for selected conditions, precautions do apply.  Overdosage: If you think you have taken too much of this medicine contact a poison control center or emergency room at once.  NOTE: This medicine is only for you. Do not share this medicine with others.  What if I miss a dose?  If you miss a dose, take it as soon as you can. If it is almost time for your next dose, take only that dose. Do not take double or extra  doses.  What may interact with this medicine?  · alcohol  · certain medicines for anxiety or sleep  · narcotic medicines for pain  · other medicines for colds or allergies  This list may not describe all possible interactions. Give your health care provider a list of all the medicines, herbs, non-prescription drugs, or dietary supplements you use. Also tell them if you smoke, drink alcohol, or use illegal drugs. Some items may interact with your medicine.  What should I watch for while using this medicine?  Visit your doctor or health care professional for regular checks on your health. Tell your doctor if your symptoms do not improve.  You may get drowsy or dizzy. Do not drive, use machinery, or do anything that needs mental alertness until you know how this medicine affects you. Do not stand or sit up quickly, especially if you are an older patient. This reduces the risk of dizzy or fainting spells.  Your mouth may get dry. Chewing sugarless gum or sucking hard candy, and drinking plenty of water may help. Contact your doctor if the problem does not go away or is severe.  What side effects may I notice from receiving this medicine?  Side effects that you should report to your doctor or health care professional as soon as possible:  · allergic reactions like skin rash, itching or hives, swelling of the face, lips, or tongue  · changes in vision or hearing  · fast or irregular heartbeat  · trouble passing urine or change in the amount of urine  Side effects that usually do not require medical attention (report to your doctor or health care professional if they continue or are bothersome):  · dizziness  · dry mouth  · irritability  · sore throat  · stomach pain  · tiredness  This list may not describe all possible side effects. Call your doctor for medical advice about side effects. You may report side effects to FDA at 2-056-FDA-6099.  Where should I keep my medicine?  Keep out of the reach of children.  Store at  room temperature between 15 and 30 degrees C (59 and 86 degrees F). Throw away any unused medicine after the expiration date.  NOTE: This sheet is a summary. It may not cover all possible information. If you have questions about this medicine, talk to your doctor, pharmacist, or health care provider.  © 2021 [employer]/Gold Standard (2016-01-12 13:44:42)  Famotidine tablets or gelcaps  What is this medicine?  FAMOTIDINE (fa CECILIA ti dean) is a type of antihistamine that blocks the release of stomach acid. It is used to treat stomach or intestinal ulcers. It can also relieve heartburn from acid reflux.  This medicine may be used for other purposes; ask your health care provider or pharmacist if you have questions.  COMMON BRAND NAME(S): Heartburn Relief, Pepcid, Pepcid AC, Pepcid AC Maximum Strength, Zantac  What should I tell my health care provider before I take this medicine?  They need to know if you have any of these conditions:  · kidney or liver disease  · trouble swallowing  · an unusual or allergic reaction to famotidine, other medicines, foods, dyes, or preservatives  · pregnant or trying to get pregnant  · breast-feeding  How should I use this medicine?  Take this medicine by mouth with a glass of water. Follow the directions on the prescription label. If you only take this medicine once a day, take it at bedtime. Take your doses at regular intervals. Do not take your medicine more often than directed.  Talk to your pediatrician regarding the use of this medicine in children. Special care may be needed.  Overdosage: If you think you have taken too much of this medicine contact a poison control center or emergency room at once.  NOTE: This medicine is only for you. Do not share this medicine with others.  What if I miss a dose?  If you miss a dose, take it as soon as you can. If it is almost time for your next dose, take only that dose. Do not take double or extra doses.  What may interact with this  medicine?  · delavirdine  · itraconazole  · ketoconazole  This list may not describe all possible interactions. Give your health care provider a list of all the medicines, herbs, non-prescription drugs, or dietary supplements you use. Also tell them if you smoke, drink alcohol, or use illegal drugs. Some items may interact with your medicine.  What should I watch for while using this medicine?  Tell your doctor or health care professional if your condition does not start to get better or if it gets worse. Finish the full course of tablets prescribed, even if you feel better.  Do not take with aspirin, ibuprofen or other antiinflammatory medicines. These can make your condition worse.  Do not smoke cigarettes or drink alcohol. These cause irritation in your stomach and can increase the time it will take for ulcers to heal.  If you get black, tarry stools or vomit up what looks like coffee grounds, call your doctor or health care professional at once. You may have a bleeding ulcer.  This medicine may cause a decrease in vitamin B12. You should make sure that you get enough vitamin B12 while you are taking this medicine. Discuss the foods you eat and the vitamins you take with your health care professional.  What side effects may I notice from receiving this medicine?  Side effects that you should report to your doctor or health care professional as soon as possible:  · agitation, nervousness  · confusion  · hallucinations  · skin rash, itching  Side effects that usually do not require medical attention (report to your doctor or health care professional if they continue or are bothersome):  · constipation  · diarrhea  · dizziness  · headache  This list may not describe all possible side effects. Call your doctor for medical advice about side effects. You may report side effects to FDA at 3-158-HJS-7283.  Where should I keep my medicine?  Keep out of the reach of children.  Store at room temperature between 15 and 30  degrees C (59 and 86 degrees F). Do not freeze. Throw away any unused medicine after the expiration date.  NOTE: This sheet is a summary. It may not cover all possible information. If you have questions about this medicine, talk to your doctor, pharmacist, or health care provider.  © 2021 [employer]/Gold Standard (2018-08-03 13:17:50)

## 2021-09-30 ENCOUNTER — HOSPITAL ENCOUNTER (OUTPATIENT)
Dept: CARDIOLOGY | Facility: HOSPITAL | Age: 31
Discharge: HOME | End: 2021-09-30
Admitting: NURSE PRACTITIONER

## 2021-09-30 VITALS — BODY MASS INDEX: 32.02 KG/M2 | WEIGHT: 204 LBS | HEIGHT: 67 IN

## 2021-09-30 DIAGNOSIS — R07.9 CHEST PAIN, UNSPECIFIED TYPE: ICD-10-CM

## 2021-09-30 DIAGNOSIS — R06.02 SHORTNESS OF BREATH: ICD-10-CM

## 2021-09-30 LAB
ASCENDING AORTA: 2.9 CM
BH CV ECHO MEAS - AO MAX PG (FULL): 0.91 MMHG
BH CV ECHO MEAS - AO MAX PG: 3 MMHG
BH CV ECHO MEAS - AO MEAN PG (FULL): 0.8 MMHG
BH CV ECHO MEAS - AO MEAN PG: 2.1 MMHG
BH CV ECHO MEAS - AO ROOT AREA (BSA CORRECTED): 1.7
BH CV ECHO MEAS - AO ROOT AREA: 8.9 CM^2
BH CV ECHO MEAS - AO ROOT DIAM: 3.5 CM
BH CV ECHO MEAS - AO V2 MAX: 92.3 CM/SEC
BH CV ECHO MEAS - AO V2 MEAN: 68.5 CM/SEC
BH CV ECHO MEAS - AO V2 VTI: 21.9 CM
BH CV ECHO MEAS - ASC AORTA: 2.9 CM
BH CV ECHO MEAS - AVA(I,A): 2.8 CM^2
BH CV ECHO MEAS - AVA(I,D): 2.8 CM^2
BH CV ECHO MEAS - AVA(V,A): 2.8 CM^2
BH CV ECHO MEAS - AVA(V,D): 2.8 CM^2
BH CV ECHO MEAS - BSA(HAYCOCK): 2.1 M^2
BH CV ECHO MEAS - BSA: 2 M^2
BH CV ECHO MEAS - BZI_BMI: 32 KILOGRAMS/M^2
BH CV ECHO MEAS - BZI_METRIC_HEIGHT: 170.2 CM
BH CV ECHO MEAS - BZI_METRIC_WEIGHT: 92.5 KG
BH CV ECHO MEAS - EDV(CUBED): 170.6 ML
BH CV ECHO MEAS - EDV(MOD-SP2): 83.4 ML
BH CV ECHO MEAS - EDV(MOD-SP4): 123 ML
BH CV ECHO MEAS - EDV(TEICH): 150.3 ML
BH CV ECHO MEAS - EF(CUBED): 85.9 %
BH CV ECHO MEAS - EF(MOD-BP): 60.5 %
BH CV ECHO MEAS - EF(MOD-SP2): 57.9 %
BH CV ECHO MEAS - EF(MOD-SP4): 64.5 %
BH CV ECHO MEAS - EF(TEICH): 78.8 %
BH CV ECHO MEAS - ESV(CUBED): 24.1 ML
BH CV ECHO MEAS - ESV(MOD-SP2): 35.1 ML
BH CV ECHO MEAS - ESV(MOD-SP4): 43.7 ML
BH CV ECHO MEAS - ESV(TEICH): 31.9 ML
BH CV ECHO MEAS - FS: 47.9 %
BH CV ECHO MEAS - IVS/LVPW: 1.3
BH CV ECHO MEAS - IVSD: 0.9 CM
BH CV ECHO MEAS - LA DIMENSION: 3.9 CM
BH CV ECHO MEAS - LA/AO: 1.2
BH CV ECHO MEAS - LAD MAJOR: 5.2 CM
BH CV ECHO MEAS - LAT PEAK E' VEL: 16.2 CM/SEC
BH CV ECHO MEAS - LATERAL E/E' RATIO: 5.5
BH CV ECHO MEAS - LV DIASTOLIC VOL/BSA (35-75): 60.3 ML/M^2
BH CV ECHO MEAS - LV IVRT: 0.07 SEC
BH CV ECHO MEAS - LV MASS(C)D: 160.4 GRAMS
BH CV ECHO MEAS - LV MASS(C)DI: 78.7 GRAMS/M^2
BH CV ECHO MEAS - LV MAX PG: 2.1 MMHG
BH CV ECHO MEAS - LV MEAN PG: 1.3 MMHG
BH CV ECHO MEAS - LV SYSTOLIC VOL/BSA (12-30): 21.4 ML/M^2
BH CV ECHO MEAS - LV V1 MAX: 72.3 CM/SEC
BH CV ECHO MEAS - LV V1 MEAN: 52.8 CM/SEC
BH CV ECHO MEAS - LV V1 VTI: 17.3 CM
BH CV ECHO MEAS - LVIDD: 5.5 CM
BH CV ECHO MEAS - LVIDS: 2.9 CM
BH CV ECHO MEAS - LVLD AP2: 9 CM
BH CV ECHO MEAS - LVLD AP4: 9 CM
BH CV ECHO MEAS - LVLS AP2: 7.7 CM
BH CV ECHO MEAS - LVLS AP4: 7.2 CM
BH CV ECHO MEAS - LVOT AREA (M): 3.5 CM^2
BH CV ECHO MEAS - LVOT AREA: 3.6 CM^2
BH CV ECHO MEAS - LVOT DIAM: 2.1 CM
BH CV ECHO MEAS - LVPWD: 0.68 CM
BH CV ECHO MEAS - MED PEAK E' VEL: 13.5 CM/SEC
BH CV ECHO MEAS - MEDIAL E/E' RATIO: 6.5
BH CV ECHO MEAS - MV A MAX VEL: 53.8 CM/SEC
BH CV ECHO MEAS - MV DEC SLOPE: 683.5 CM/SEC^2
BH CV ECHO MEAS - MV DEC TIME: 0.19 SEC
BH CV ECHO MEAS - MV E MAX VEL: 88.3 CM/SEC
BH CV ECHO MEAS - MV E/A: 1.6
BH CV ECHO MEAS - MV MAX PG: 3.1 MMHG
BH CV ECHO MEAS - MV MEAN PG: 0.95 MMHG
BH CV ECHO MEAS - MV P1/2T MAX VEL: 95.4 CM/SEC
BH CV ECHO MEAS - MV P1/2T: 40.9 MSEC
BH CV ECHO MEAS - MV V2 MAX: 87.6 CM/SEC
BH CV ECHO MEAS - MV V2 MEAN: 42.7 CM/SEC
BH CV ECHO MEAS - MV V2 VTI: 21.2 CM
BH CV ECHO MEAS - MVA P1/2T LCG: 2.3 CM^2
BH CV ECHO MEAS - MVA(P1/2T): 5.4 CM^2
BH CV ECHO MEAS - MVA(VTI): 2.9 CM^2
BH CV ECHO MEAS - PA ACC TIME: 0.14 SEC
BH CV ECHO MEAS - PA MAX PG: 3.1 MMHG
BH CV ECHO MEAS - PA PR(ACCEL): 18 MMHG
BH CV ECHO MEAS - PA V2 MAX: 88.6 CM/SEC
BH CV ECHO MEAS - PI END-D VEL: 106.9 CM/SEC
BH CV ECHO MEAS - RAP SYSTOLE: 3 MMHG
BH CV ECHO MEAS - RVSP: 24 MMHG
BH CV ECHO MEAS - SI(AO): 96.2 ML/M^2
BH CV ECHO MEAS - SI(CUBED): 71.8 ML/M^2
BH CV ECHO MEAS - SI(LVOT): 30.3 ML/M^2
BH CV ECHO MEAS - SI(MOD-SP2): 23.7 ML/M^2
BH CV ECHO MEAS - SI(MOD-SP4): 38.9 ML/M^2
BH CV ECHO MEAS - SI(TEICH): 58 ML/M^2
BH CV ECHO MEAS - SV(AO): 196.3 ML
BH CV ECHO MEAS - SV(CUBED): 146.5 ML
BH CV ECHO MEAS - SV(LVOT): 61.7 ML
BH CV ECHO MEAS - SV(MOD-SP2): 48.3 ML
BH CV ECHO MEAS - SV(MOD-SP4): 79.3 ML
BH CV ECHO MEAS - SV(TEICH): 118.3 ML
BH CV ECHO MEAS - TAPSE (>1.6): 1.8 CM
BH CV ECHO MEAS - TR MAX PG: 21 MMHG
BH CV ECHO MEAS - TR MAX VEL: 229 CM/SEC
BH CV ECHO MEASUREMENTS AVERAGE E/E' RATIO: 5.95
BH CV VAS BP RIGHT ARM: NORMAL MMHG
BH CV XLRA - RV BASE: 3.3 CM
BH CV XLRA - RV LENGTH: 6.5 CM
BH CV XLRA - RV MID: 2 CM
BH CV XLRA - TDI S': 13.5 CM/SEC
IVRT: 74 MSEC
LEFT ATRIUM VOLUME INDEX: 18.7 ML/M^2
LEFT ATRIUM VOLUME: 38.2 ML

## 2021-09-30 PROCEDURE — 93306 TTE W/DOPPLER COMPLETE: CPT

## 2021-09-30 PROCEDURE — 93306 TTE W/DOPPLER COMPLETE: CPT | Performed by: INTERNAL MEDICINE

## 2021-10-26 ENCOUNTER — OFFICE VISIT (OUTPATIENT)
Dept: CARDIOLOGY | Facility: CLINIC | Age: 31
End: 2021-10-26

## 2021-10-26 VITALS
SYSTOLIC BLOOD PRESSURE: 110 MMHG | OXYGEN SATURATION: 98 % | HEART RATE: 76 BPM | BODY MASS INDEX: 31.33 KG/M2 | DIASTOLIC BLOOD PRESSURE: 64 MMHG | HEIGHT: 67 IN | WEIGHT: 199.6 LBS

## 2021-10-26 DIAGNOSIS — R07.89 CHEST TIGHTNESS: Primary | ICD-10-CM

## 2021-10-26 PROCEDURE — 99213 OFFICE O/P EST LOW 20 MIN: CPT | Performed by: INTERNAL MEDICINE

## 2021-10-26 NOTE — PROGRESS NOTES
[facility]  Office visit  Meliton Lagos  1990    There is no work phone number on file.    VISIT DATE:  10/26/2021    PCP: Sanna Herr, JAY  [address]  [facility] [address]    CC:  Chief Complaint   Patient presents with   • Chest pain, unspecified type   • Shortness of Breath       Previous cardiac studies and procedures:  May 2020 exercise treadmill test:  · The exercise ECG stress test was negative for clinical and ECG evidence of myocardial ischemia. The [facility] treadmill score was 4.8. The patient had sudden onset dyspnea at the 4:46 minute latoya and had to stop the study. He had an exaggerated heart rate response to stress. With stopping at this time he also had a severely decreased exercise capacity, but technically reached his target heart rate.  · Impressions are consistent with a low risk stress test.    September 2021 TTE  · Left ventricular ejection fraction appears to be 61 - 65%. Left ventricular systolic function is normal.  · Left ventricular diastolic function was normal.  · Estimated right ventricular systolic pressure from tricuspid regurgitation is normal (<35 mmHg).  · No significant structural or functional valvular disease.      ASSESSMENT:   Diagnosis Plan   1. Chest tightness         PLAN:  Likely transient pericarditis following maternal Covid vaccination.  Gradually improving.  No evidence of myocarditis.  Recommending ibuprofen 800 mg p.o. as needed for recurrent chest discomfort.  Offered reassurance that his symptoms should gradually resolve.  Unclear whether would be wise for him to receive his second COVID-19 vaccination, he did have a previous natural Covid infection approximately 6 months before receiving his first Covid vaccine.  He is currently very hesitant to receive his second dose and I am in agreement.  Letter written to provide to his school and employment.    Subjective  Episodes of left precordial chest  "discomfort have been gradually improving.  Currently only occurring once per week.  Lasting for 1 to 2 hours as long as he rest.  No associated shortness of breath.  Has begun to exercise again without limitation.  Blood pressures running less than 120/80 mmHg.  Still has evidence of a prominent local reaction to his maternal vaccination on his right shoulder, no erythema but does have focal soft tissue edema.    PHYSICAL EXAMINATION:  Vitals:    10/26/21 1418   BP: 110/64   BP Location: Right arm   Patient Position: Sitting   Pulse: 76   SpO2: 98%   Weight: 90.5 kg (199 lb 9.6 oz)   Height: 170.2 cm (67\")     General Appearance:    Alert, cooperative, no distress, appears stated age   Head:    Normocephalic, without obvious abnormality, atraumatic   Eyes:    conjunctiva/corneas clear   Nose:   Nares normal, septum midline, mucosa normal, no drainage   Throat:   Lips, teeth and gums normal   Neck:   Supple, symmetrical, trachea midline, no carotid    bruit or JVD   Lungs:     Clear to auscultation bilaterally, respirations unlabored   Chest Wall:    No tenderness or deformity    Heart:    Regular rate and rhythm, S1 and S2 normal, no murmur, rub   or gallop, normal carotid impulse bilaterally without bruit.   Abdomen:     Soft, non-tender   Extremities:   Extremities normal, atraumatic, no cyanosis or edema   Pulses:   2+ and symmetric all extremities   Skin:   Skin color, texture, turgor normal, no rashes or lesions       Diagnostic Data:  Procedures  Lab Results   Component Value Date    CHLPL 183 09/25/2019    TRIG 200 (H) 09/25/2019    HDL 56 09/25/2019     Lab Results   Component Value Date    GLUCOSE 111 (H) 08/03/2020    BUN 18 08/26/2021    CREATININE 0.84 08/26/2021     08/26/2021    K 4.7 08/26/2021     08/26/2021    CO2 26.5 08/26/2021     No results found for: HGBA1C  Lab Results   Component Value Date    WBC 6.04 08/26/2021    HGB 15.5 08/26/2021    HCT 46.5 08/26/2021     08/26/2021 "       Allergies  No Known Allergies    Current Medications    Current Outpatient Medications:   •  cetirizine (zyrTEC) 10 MG tablet, Take 1 tablet by mouth Daily. (Patient taking differently: Take 10 mg by mouth As Needed.), Disp: 30 tablet, Rfl: 0  •  pantoprazole (PROTONIX) 40 MG EC tablet, Take 1 tablet by mouth 2 (two) times a day. (Patient taking differently: Take 40 mg by mouth Daily.), Disp: 60 tablet, Rfl: 2          ROS  ROS      SOCIAL HX  Social History     Socioeconomic History   • Marital status:    Tobacco Use   • Smoking status: Former Smoker     Packs/day: 1.00     Years: 5.00     Pack years: 5.00     Types: Cigarettes     Start date: 2012     Quit date:      Years since quittin.8   • Smokeless tobacco: Never Used   Vaping Use   • Vaping Use: Never used   Substance and Sexual Activity   • Alcohol use: Yes     Comment: occas   • Drug use: No   • Sexual activity: Yes     Partners: Female     Birth control/protection: None       FAMILY HX  Family History   Problem Relation Age of Onset   • No Known Problems Mother    • No Known Problems Father    • Heart attack Maternal Grandfather    • Heart disease Maternal Grandfather    • No Known Problems Paternal Grandmother    • No Known Problems Paternal Grandfather              Dereck Rodriguez III, MD, [facility]

## 2021-11-09 ENCOUNTER — OFFICE VISIT (OUTPATIENT)
Dept: FAMILY MEDICINE CLINIC | Facility: CLINIC | Age: 31
End: 2021-11-09

## 2021-11-09 VITALS
HEART RATE: 78 BPM | RESPIRATION RATE: 20 BRPM | BODY MASS INDEX: 31.08 KG/M2 | TEMPERATURE: 97 F | HEIGHT: 67 IN | SYSTOLIC BLOOD PRESSURE: 112 MMHG | DIASTOLIC BLOOD PRESSURE: 74 MMHG | OXYGEN SATURATION: 98 % | WEIGHT: 198 LBS

## 2021-11-09 DIAGNOSIS — G43.009 MIGRAINE WITHOUT AURA AND WITHOUT STATUS MIGRAINOSUS, NOT INTRACTABLE: Primary | ICD-10-CM

## 2021-11-09 DIAGNOSIS — K21.9 CHRONIC GERD: Chronic | ICD-10-CM

## 2021-11-09 DIAGNOSIS — M79.10 MYALGIA: ICD-10-CM

## 2021-11-09 DIAGNOSIS — M25.50 ARTHRALGIA, UNSPECIFIED JOINT: ICD-10-CM

## 2021-11-09 DIAGNOSIS — R53.82 CHRONIC FATIGUE: ICD-10-CM

## 2021-11-09 PROCEDURE — 86431 RHEUMATOID FACTOR QUANT: CPT | Performed by: NURSE PRACTITIONER

## 2021-11-09 PROCEDURE — 36415 COLL VENOUS BLD VENIPUNCTURE: CPT | Performed by: NURSE PRACTITIONER

## 2021-11-09 PROCEDURE — 99214 OFFICE O/P EST MOD 30 MIN: CPT | Performed by: NURSE PRACTITIONER

## 2021-11-09 RX ORDER — PANTOPRAZOLE SODIUM 40 MG/1
40 TABLET, DELAYED RELEASE ORAL DAILY
Qty: 30 TABLET | Refills: 2 | Status: SHIPPED | OUTPATIENT
Start: 2021-11-09 | End: 2021-12-13

## 2021-11-09 RX ORDER — SUMATRIPTAN 50 MG/1
50 TABLET, FILM COATED ORAL ONCE
Qty: 9 TABLET | Refills: 0 | Status: SHIPPED | OUTPATIENT
Start: 2021-11-09 | End: 2021-12-13

## 2021-11-09 NOTE — PROGRESS NOTES
"     Follow Up Office Note     Patient Name: Meliton Lagos  : 1990   MRN: [id number]     Chief Complaint:    Chief Complaint   Patient presents with   • Generalized Body Aches       History of Present Illness: Meliton Lagos is a 31 y.o. male who presents today with complaint of headache which is worse after intercourse, pain in neck, arthralgias, myalgias since last month.  Patient has chronic migraines.  He states his headaches are becoming more frequent and lasting longer.  Patient has tried over-the-counter NSAIDs for pain with little improvement in his symptoms.  Patient is also complaining of worsening of chronic fatigue with associated symptoms of muscle and joint pain.      Subjective      Review of Systems:   Review of Systems   Constitutional: Positive for fatigue. Negative for appetite change, chills, diaphoresis, fever and unexpected weight change.   Respiratory: Negative.    Cardiovascular: Negative.    Gastrointestinal: Negative.    Musculoskeletal: Positive for arthralgias and myalgias.   Neurological: Positive for headaches. Negative for dizziness, tremors, seizures, syncope, facial asymmetry, speech difficulty, weakness, light-headedness and numbness.        Past Medical History:   Past Medical History:   Diagnosis Date   • Jackson's esophagus    • GERD (gastroesophageal reflux disease)          Medications:     Current Outpatient Medications:   •  pantoprazole (PROTONIX) 40 MG EC tablet, Take 1 tablet by mouth Daily., Disp: 30 tablet, Rfl: 2  •  SUMAtriptan (Imitrex) 50 MG tablet, Take 1 tablet by mouth 1 (One) Time for 1 dose. Take one tablet at onset of headache. May repeat dose one time in 2 hours if headache not relieved., Disp: 9 tablet, Rfl: 0    Allergies:   No Known Allergies      Objective     Physical Exam:  Vital Signs:   Vitals:    21 1429   BP: 112/74   Pulse: 78   Resp: 20   Temp: 97 °F (36.1 °C)   SpO2: 98%   Weight: 89.8 kg (198 lb)   Height: 170.2 cm (67\") "   PainSc:   4     Body mass index is 31.01 kg/m².     Physical Exam  Vitals and nursing note reviewed.   Constitutional:       General: He is not in acute distress.     Appearance: Normal appearance. He is well-developed. He is not ill-appearing, toxic-appearing or diaphoretic.   HENT:      Head: Normocephalic and atraumatic.   Cardiovascular:      Rate and Rhythm: Normal rate and regular rhythm.   Pulmonary:      Effort: Pulmonary effort is normal. No respiratory distress.      Breath sounds: Normal breath sounds. No stridor. No wheezing.   Abdominal:      General: There is no distension.      Palpations: Abdomen is soft.      Tenderness: There is no abdominal tenderness. There is no guarding or rebound.   Musculoskeletal:      Cervical back: Normal.      Thoracic back: Normal.      Lumbar back: Normal.   Skin:     General: Skin is warm and dry.   Neurological:      General: No focal deficit present.      Mental Status: He is alert and oriented to person, place, and time.   Psychiatric:         Mood and Affect: Mood normal.         Behavior: Behavior normal. Behavior is cooperative.         Thought Content: Thought content normal.         Judgment: Judgment normal.         Assessment / Plan      Assessment/Plan:   Diagnoses and all orders for this visit:    1. Migraine without aura and without status migrainosus, not intractable (Primary)  Assessment & Plan:  Headaches are worsening.  Medication changes per orders.        Orders:  -     SUMAtriptan (Imitrex) 50 MG tablet; Take 1 tablet by mouth 1 (One) Time for 1 dose. Take one tablet at onset of headache. May repeat dose one time in 2 hours if headache not relieved.  Dispense: 9 tablet; Refill: 0    2. Chronic fatigue  Assessment & Plan:  Patient complains of chronic fatigue which is worsening.   Laboratory studies per orders.  Further recommendation after laboratory evaluation.    Orders:  -     CBC & Differential  -     Comprehensive Metabolic Panel  -      TSH  -     Vitamin D 25 Hydroxy  -     HIV-1 / O / 2 Ag / Antibody 4th Generation  -     Vitamin B12    3. Myalgia  -     Rheumatoid Factor, Quant    4. Arthralgia, unspecified joint  -     Rheumatoid Factor, Quant    5. Chronic GERD  Assessment & Plan:  GERD symptoms stable and controlled on current medication.  Continue pantoprazole 40 mg/day.  Patient states insurance will not pay for twice daily dosing of pantoprazole.  Patient may continue Pepcid twice daily.    Orders:  -     pantoprazole (PROTONIX) 40 MG EC tablet; Take 1 tablet by mouth Daily.  Dispense: 30 tablet; Refill: 2         Follow Up:   PRN and at next scheduled appointment(s) with PCP.    Discussed the nature of the medical condition(s) risks, complications, implications, management, safe and proper use of medications. Encouraged medication compliance, and keeping scheduled follow up appointments with me and any other providers.      RTC if symptoms fail to improve, to ER if symptoms worsen.      JAY León  [facility] [place]

## 2021-11-11 ENCOUNTER — TELEPHONE (OUTPATIENT)
Dept: FAMILY MEDICINE CLINIC | Facility: CLINIC | Age: 31
End: 2021-11-11

## 2021-11-11 PROBLEM — R53.82 CHRONIC FATIGUE: Chronic | Status: ACTIVE | Noted: 2019-09-25

## 2021-11-11 PROBLEM — G43.009 MIGRAINE WITHOUT AURA AND WITHOUT STATUS MIGRAINOSUS, NOT INTRACTABLE: Chronic | Status: ACTIVE | Noted: 2019-09-26

## 2021-11-11 PROBLEM — R53.82 CHRONIC FATIGUE: Status: ACTIVE | Noted: 2019-09-25

## 2021-11-11 LAB
25(OH)D3+25(OH)D2 SERPL-MCNC: 21.7 NG/ML (ref 30–100)
ALBUMIN SERPL-MCNC: 5.1 G/DL (ref 3.5–5.2)
ALBUMIN/GLOB SERPL: 2.1 G/DL
ALP SERPL-CCNC: 77 U/L (ref 39–117)
ALT SERPL-CCNC: 64 U/L (ref 1–41)
AST SERPL-CCNC: 30 U/L (ref 1–40)
BASOPHILS # BLD AUTO: 0.05 10*3/MM3 (ref 0–0.2)
BASOPHILS NFR BLD AUTO: 1.1 % (ref 0–1.5)
BILIRUB SERPL-MCNC: 1.6 MG/DL (ref 0–1.2)
BUN SERPL-MCNC: 19 MG/DL (ref 6–20)
BUN/CREAT SERPL: 20 (ref 7–25)
CALCIUM SERPL-MCNC: 10 MG/DL (ref 8.6–10.5)
CHLORIDE SERPL-SCNC: 102 MMOL/L (ref 98–107)
CO2 SERPL-SCNC: 26.2 MMOL/L (ref 22–29)
CREAT SERPL-MCNC: 0.95 MG/DL (ref 0.76–1.27)
EOSINOPHIL # BLD AUTO: 0.08 10*3/MM3 (ref 0–0.4)
EOSINOPHIL NFR BLD AUTO: 1.7 % (ref 0.3–6.2)
ERYTHROCYTE [DISTWIDTH] IN BLOOD BY AUTOMATED COUNT: 12.5 % (ref 12.3–15.4)
GLOBULIN SER CALC-MCNC: 2.4 GM/DL
GLUCOSE SERPL-MCNC: 100 MG/DL (ref 65–99)
HCT VFR BLD AUTO: 47.6 % (ref 37.5–51)
HGB BLD-MCNC: 16.6 G/DL (ref 13–17.7)
HIV 1+2 AB+HIV1 P24 AG SERPL QL IA: NON REACTIVE
IMM GRANULOCYTES # BLD AUTO: 0.02 10*3/MM3 (ref 0–0.05)
IMM GRANULOCYTES NFR BLD AUTO: 0.4 % (ref 0–0.5)
LYMPHOCYTES # BLD AUTO: 1.41 10*3/MM3 (ref 0.7–3.1)
LYMPHOCYTES NFR BLD AUTO: 29.8 % (ref 19.6–45.3)
MCH RBC QN AUTO: 29.4 PG (ref 26.6–33)
MCHC RBC AUTO-ENTMCNC: 34.9 G/DL (ref 31.5–35.7)
MCV RBC AUTO: 84.2 FL (ref 79–97)
MONOCYTES # BLD AUTO: 0.34 10*3/MM3 (ref 0.1–0.9)
MONOCYTES NFR BLD AUTO: 7.2 % (ref 5–12)
NEUTROPHILS # BLD AUTO: 2.83 10*3/MM3 (ref 1.7–7)
NEUTROPHILS NFR BLD AUTO: 59.8 % (ref 42.7–76)
NRBC BLD AUTO-RTO: 0.2 /100 WBC (ref 0–0.2)
PLATELET # BLD AUTO: 273 10*3/MM3 (ref 140–450)
POTASSIUM SERPL-SCNC: 5.2 MMOL/L (ref 3.5–5.2)
PROT SERPL-MCNC: 7.5 G/DL (ref 6–8.5)
RBC # BLD AUTO: 5.65 10*6/MM3 (ref 4.14–5.8)
SODIUM SERPL-SCNC: 137 MMOL/L (ref 136–145)
TSH SERPL DL<=0.005 MIU/L-ACNC: 1.23 UIU/ML (ref 0.27–4.2)
VIT B12 SERPL-MCNC: 635 PG/ML (ref 211–946)
WBC # BLD AUTO: 4.73 10*3/MM3 (ref 3.4–10.8)

## 2021-11-11 NOTE — TELEPHONE ENCOUNTER
Caller: Meliton Lagos    Relationship: Self    Best call back number: [phone]    Caller requesting test results: YES    What test was performed: LABS  When was the test performed:11/09/21    Where was the test performed: AT OFFICE    Additional notes:

## 2021-11-11 NOTE — ASSESSMENT & PLAN NOTE
GERD symptoms stable and controlled on current medication.  Continue pantoprazole 40 mg/day.  Patient states insurance will not pay for twice daily dosing of pantoprazole.  Patient may continue Pepcid twice daily.

## 2021-11-11 NOTE — PATIENT INSTRUCTIONS
ÇáÕÏÇÚ ÇáäÕÝí ÇáãÒãä  Chronic Migraine Headache  ÇáÕÏÇÚ ÇáäÕÝí äæÚ ãä ÇáÕÏÇÚö ÃÔÏÉ ÞæÉ ãä Ãí ÕÏÇÚ ÂÎÑ æíÙåÑ ÈÕæÑÉ ãÝÇÌÆÉ ÚäåÇ. ÃãÇ ÇáÚÑÖ ÇáããíÒ ááÕÏÇÚ ÇáäÕÝí Ýåæ Ãáãõ ÔÏíÏ äÇÈÖ íÍÏË ÚÇÏÉð Ýí ÌÇäÈ æÇÍÏ ãä ÇáÑÃÓö. æíÔÊÏ åÐÇ ÇáÃáã ÚÇÏÉ ãÚ ÇáäÔÇØ.  íãßä Ãä íÓÈÈ ÇáÕÏÇÚ ÇáäÕÝí ÇáÛËíÇä æÇáÊÞíÄ æÇáÍÓÇÓíÉ ááÖæÁ æÇáÕæÊ æÊÛíÑÇÊ Ýí ÇáÑÄíÉ. æíÓãì ÇáÕÏÇÚ ÇáäÕÝí ÇáÐí íÐåÈ æíÚæÏ ÇáÕÏÇÚ ÇáäÕÝí ÇáãÊßÑÑ. ÃãÇ ÇáÕÏÇÚ ÇáäÕÝí ÇáãÒãä Ýåæ ÇáÐí íÊßÑÑ áãÏÉ 15 íæãðÇ Úáì ÇáÃÞá Ýí ÇáÔåÑ æáãÏÉ ÊÒíÏ Úä 3 ÃÔåÑ. äÇÞÔ ãÚ ãÞÏã ÇáÑÚÇíÉ ÇáÕÍíÉ ÇáÚæÇãá ÇáÊí ÞÏ ÊÄÏí áÍÏæË (ÊÍÝíÒ) ÇáÕÏÇÚ ÇáäÕÝí áÏíß.  ãÇ ÃÓÈÇÈ Êáß ÇáÍÇáÉ¿  ÇáÓÈÈ ÇáÝÚáí áåÐå ÇáÍÇáÉ ÛíÑ ãÚÑæÝ. æãÚ Ðáß¡ íãßä Ãä íÍÏË ÇáÕÏÇÚ ÇáäÕÝí ÚäÏ ÇÓÊËÇÑÉ ÃÚÕÇÈ ÇáãÎ æÅÝÑÇÒåÇ ãæÇÏ ßíãíÇÆíÉ ÊÓÈÈ ÇáÊåÇÈ ÇáÃæÚíÉ ÇáÏãæíÉ. ããÇ íÓÈÈ ÃáãðÇ.  ãä ÇáÚæÇãá ÇáÊí íãßä Ãä ÊÍÝÒ ÇáÕÏÇÚ ÇáäÕÝí Ãæ ÊÓÈÈå:  • ÇáÊÏÎíä.  • ÃØÚãÉ æãÔÑæÈÇÊ ãÚíäÉ¡ ãËá:  ? ÇáÌÈä ÇáãÚÊÞ.  ? ÇáÔæßæáÇÊÉ.  ? ÇáßÍæáíÇÊ.  ? ÇáßÇÝííä.  ? ÇáÃØÚãÉ æÇáãÔÑæÈÇÊ ÇáÊí ÊÍÊæí Úáì äÊÑÇÊ Ãæ ÌáæÊæãÇÊ Ãæ ÃÓÈÇÑÊíã Ãæ ÛáæÊÇãÇÊ ÃÍÇÏí ÇáÕæÏíæã (MSG) Ãæ ÇáÊíÑÇãíä.  • ÇáÃÏæíÉ¡ ãËá ÍÈæÈ ãäÚ ÇáÍãá Ãæ ÈÚÖ ÃÏæíÉ ÖÛØ ÇáÏã.  ÊÊÖãä ÇáÃãæÑ ÇáÃÎÑì ÇáÊí ÞÏ ÊÓÈÈ ÇáÕÏÇÚ ÇáäÕÝí:  • ÇáÍíÖ.  • ÇáÊæÊÑ ÇáÚÇØÝí.  • ÞáÉ Çáäæã Ãæ Çáäæã ÃßËÑ ãä ÇááÇÒã.  • ÅÚíÇÁ (ÅÌåÇÏ).  • ÇáÃÖæÇÁ ÇáÓÇØÚÉ Ãæ ÇáÃÕæÇÊ ÇáÚÇáíÉ.  • ÇáÑæÇÆÍ ÇáäÝÇÐÉ.  • ÊÛíÑÇÊ ÇáØÞÓ æÇáÇÑÊÝÇÚÇÊ ÇáÚÇáíÉ.  ãÇ ÚæÇãá ÒíÇÏÉ ÇáÎØÑ¿  ÞÏ ÊÒíÏ ÇáÚæÇãá ÇáÊÇáíÉ ãä ÇÍÊãÇáÇÊ ÅÕÇÈÊß ÈÇáÕÏÇÚ ÇáäÕÝí ÇáãÒãä:  • ÇáÅÕÇÈÉ ÞÈá Ðáß ÈÇáÕÏÇÚ ÇáäÕÝí Ãæ æÌæÏ ÍÇáÇÊ ÅÕÇÈÉ ÈÇáÕÏÇÚ ÇáäÕÝí Ýí ÇáÚÇÆáÉ.  • ÇáÅÕÇÈÉ ÈãÔßáÉ äÝÓíÉ¡ ãËá ÇáÇßÊÆÇÈ Ãæ ÇáÞáÞ.  • ÊäÇæá ßËíÑ ãä ÇáÃÏæíÉ ÇáãÓßäÉ ááÃáã.  • ÇáãÚÇäÇÉ ãä ãÔßáÇÊ Ýí Çáäæã.  • ÇáÅÕÇÈÉ ÈãÑÖ ÇáÞáÈ Ãæ ÇáÓßÑí Ãæ ÇáÓãäÉ.  ãÇ ÚáÇãÇÊ Êáß ÇáÍÇáÉ Ãæ ÃÚÑÇÖåÇ¿  ÊÎÊáÝ ÃÚÑÇÖ ÇáÕÏÇÚ ÇáäÕÝí ãä ÔÎÕ áÂÎÑ æÞÏ ÊÔãá:  • Ãáã Úáì åíÆÉ äÈÖÇÊ.  • Ãáã¡ æíßæä ÚÇÏÉ Ýí ÌÇäÈ æÇÍÏ ãä ÇáÑÃÓ. æÝí ÈÚÖ ÇáÍÇáÇÊ¡ ÞÏ íßæä ÇáÃáã Úáì ÌÇäÈí ÇáÑÃÓ ÇáÇËäíä Ãæ Íæá ÇáÑÃÓ Ãæ ÇáÚäÞ.  • Ãáã ÔÏíÏ íãäÚß ãä ÃÏÇÁ ÇáÃäÔØÉ ÇáíæãíÉ.  • Ãáã íÔÊÏ ãÚ ÇáäÔÇØ ÇáÈÏäí.  • ÇáÛËíÇä  Ãæ ÇáÞíÁ Ãæ ßáÇåãÇ.  • Ãáã ÚäÏ ÇáÊÚÑÖ áÃÖæÇÁ ÈÑÇÞÉ Ãæ ÖæÖÇÁ ÚÇáíÉ Ãæ äÔÇØ Þæí.  • ÍÓÇÓíÉ ÚÇãÉ ÊÌÇå ÇáÃÖæÇÁ ÇáÓÇØÚÉ Ãæ ÇáÖæÖÇÁ ÇáÚÇáíÉ Ãæ ÇáÑæÇÆÍ ÇáÞæíÉ.  • ÇáÏæÇÑ.  ãä ÚáÇãÇÊ ÊÍæá ÇáÕÏÇÚ ÇáäÕÝí Åáì ÍÇáÉ ãÒãäÉ¡ ÒíÇÏÉ ÚÏÏ äæÈÇÊå. æÇáÕÏÇÚ ÇáäÕÝí íßæä ãÒãäðÇ ÅÐÇ ßÇä íÊßÑÑ áãÏÉ 15 íæãðÇ Úáì ÇáÃÞá Ýí ÇáÔåÑ æáãÏÉ ÊÒíÏ Úä 3 ÃÔåÑ.  ßíÝ ÊõÔÎÕ åÐå ÇáÍÇáÉ¿  ÊõÔÎÕ åÐå ÇáÍÇáÉ ÛÇáÈðÇ ÇÚÊãÇÏðÇ Úáì:  • ÇáÃÚÑÇÖ ÇáÊí ÊÚÇäí ãäåÇ æÊÇÑíÎß ÇáØÈí.  • ÝÍÕ ÈÏäí.  ßÐáß ÞÏ ÊõÌÑì áß ÃíÖðÇ ÇÎÊÈÇÑÇÊ¡ ÈãÇ Ýí Ðáß:  • ÝÍÕ ÇáãÎ ÈÇáÃÔÚÉ ÇáãÞØÚíÉ ÇáãÍæÓÈÉ (CT) Ãæ ÇáÑäíä ÇáãÛäÇØíÓí (MRI). æåÐÇä ÇáÝÍÕÇä áÇ íãßä ÇáÇÚÊãÇÏ ÚáíåãÇ áÊÔÎíÕ ÇáÕÏÇÚ ÇáäÕÝí¡ æáßäåãÇ íÓÇÚÏÇä Úáì ÇÓÊÈÚÇÏ ÌãíÚ ÇáÍÇáÇÊ ÇáÃÎÑì ÇáÊí ÊõÓÈÈ ÇáÕÏÇÚ.  • ÇáÍÕæá Úáì ÚíäÉ ãä ÓÇÆá ÇáäÎÇÚ ÇáÔæßí (ÈÒá ÞØäí) æÊÍáíáå (ÊÍáíá ÇáÓÇÆá ÇáÏãÇÛí ÇáäÎÇÚí¡ Ãæ ÊÍáíá ÇáÓÇÆá ÇáäÎÇÚí (CSF)).  • ÝÍæÕ ÇáÏã.  ßíÝ ÊõÚÇáÌ åÐå ÇáÍÇáÉ¿  ÊõÚÇáÌ åÐå ÇáÍÇáÉ ÈÇáÃÓÇáíÈö ÇáÊÇáíÉ:  • ÃÏæíÉ. æÇáÊí ÈÏæÑåÇ ÊÝíÏ Ýí:  ? ÊÎÝíÝ ÇáÃáã æÇáÛËíÇä.  ? ÇáæÞÇíÉ ãä ÇáÕÏÇÚ ÇáäÕÝí.  • ÇáÊÛííÑ Ýí ÃÓáæÈ ÍíÇÊß¡ ãËá ÇáÊÛííÑ Ýí äÙÇãß ÇáÛÐÇÆí Ãæ ÚÇÏÇÊ Çáäæã.  • ÇáÚáÇÌ ÇáÓáæßí. æÑÈãÇ íÔãá Ðáß:  ? ÇáÊÏÑíÈ Úáì ÇáÇÓÊÑÎÇÁ.  ? ÑÏæÏ ÇáÝÚá ÇáÍíæíÉ. æåÐÇ ÚáÇÌ íÚáãß ÇáÇÓÊÑÎÇÁ æÇáÊÍßã Ýí ÇáãÎ áÎÝÖ ãÚÏá ÖÑÈÇÊ ÇáÞáÈ æÇáÊÍßã Ýí ÇáÊäÝÓ.  ? ÇáÚáÇÌ ÇáÅÏÑÇßí ÇáÓáæßí (CBT). æåæ ÃÍÏ ÃäæÇÚ ÇáÇÓÊÔÇÑÇÊ ÇáäÝÓíÉ. æíÓÇÚÏß Ðáß Ýí ÊÍÏíÏ ÇáÃåÏÇÝ æãÊÇÈÚÉ ÇáÊÛííÑÇÊ ÇáÊí ÊäÝÐåÇ.  • ÇáæÎÒ ÇáÅÈÑí.  • ÇÓÊÎÏÇã ÌåÇÒ áÊäÈíå ÇáÃÚÕÇÈ ÈäÈÖÇÊ ßåÑÈÇÆíÉ¡ ããÇ íãßä Ãä íÎÝÝ ÇáÃáã (ÚáÇÌ ÇáÊÚÏíá ÇáÚÕÈí).  • ÇáÌÑÇÍÉ ÅÐÇ áã ÊäÌÍ ÃÓÇáíÈ ÇáÚáÇÌ ÇáÃÎÑì.  ÇÊÈÚ ÇáÊÚáíãÇÊ ÇáÊÇáíÉ Ýí ÇáãäÒá:  ÇáÃÏæíÉ  • ÊäÇæá ÇáÃÏæíÉ æÝÞ ãÇ íÎÈÑß Èå ãÞÏã ÇáÑÚÇíÉ ÇáÕÍíÉ¡ ÃßÇäÊ ÊõÕÑÝ ÈæÕÝÉ ØÈíÉ Ãæ Ïæä æÕÝÉ ØÈíÉ.  • ÇÓÃá ãÞÏã ÇáÑÚÇíÉ ÇáÕÍíÉ ÇáãÊÇÈÚ áß ÚãÇ ÅÐÇ ßÇä ÇáÏæÇÁ ÇáãæÕæÝ áß íÊØáÈ ãäß ÊÌäÈ ÇáÞíÇÏÉ Ãæ ÇÓÊÎÏÇã ÇáÂáÇÊ.  äãØ ÇáÍíÇÉ    • áÇ ÊÊäÇæá ãäÊÌÇÊ ÊÍÊæí Úáì ÇáäíßæÊíä Ãæ ÇáÊÈÛ ãËá ÇáÓÌÇÆÑ æÇáÓÌÇÆÑ ÇáÅáßÊÑæäíÉ æÊÈÛ ÇáãÖÛ. æÇÓÊÔÑ ãÞÏã ÇáÑÚÇíÉ ÇáÕÍíÉ ÅÐÇ ÇÍÊÌÊ Åáì ÇáãÓÇÚÏÉ ááÅÞáÇÚ Úä ÇáÊÏÎíä.  • áÇ ÊÊäÇæá ÇáãÔÑæÈÇÊ  ÇáßÍæáíÉ äåÇÆíðÇ.  • ÇÍÑÕ Úáì Çáäæã 7-9 ÓÇÚÇÊ ßá áíáÉ¡ Ãæ áÚÏÏ ÇáÓÇÚÇÊ ÇáÐí íæÕí Èå ãÞÏã ÇáÑÚÇíÉ ÇáÕÍíÉ ÇáãÊÇÈÚ áß.  • ÇÈÍË Úä ÃÓÇáíÈ ááÊÛáÈ Úáì ÇáÖÛæØ ÇáÚÕÈíÉ ãËá ÊãÇÑíä ÇáÊÃãá Ãæ ÇáÊäÝÓ ÇáÚãíÞ Ãæ ÇáíæÛÇ.  • ÍÇÝÙ Úáì æÒäß Ýí ÇáÍÏæÏ ÇáÕÍíÉ. ÇÓÊÔÑ ãÞÏã ÇáÑÚÇíÉ ÇáÕÍíÉ ÅÐÇ ßäÊ Ýí ÍÇÌÉ Åáì ÇáãÓÇÚÏÉ Ýí ÅäÞÇÕ æÒäß.  • ãÇÑÓ ÇáÊãÇÑíä ÈÇäÊÙÇã. ÍÇæá ÇáæÕæá Åáì 150 ÏÞíÞÉ ßá ÃÓÈæÚ ãä ÇáÊãÑíäÇÊ ãÊæÓØÉ ÇáÔÏÉ¡ ãËá ÇáãÔí Ãæ ÑßæÈ ÇáÏÑÇÌÇÊ Ãæ ÇáíæÌÇ¡ Ãæ 75 ÏÞíÞÉ ãä ÇáÃäÔØÉ ÇáÞæíÉ. æãä ÇáÊãÇÑíä ÇáÞæíÉ ÇáÌÑí æÈÑÇãÌ ÇáÊãÇÑíä ÇáÊí ÊÔãá ÃßËÑ ãä ãÌãæÚÉ ãä ÇáÚÖáÇÊ æÇáÓÈÇÍÉ.  ÊÚáíãÇÊ ÚÇãÉ    • ÇÍÊÝÙ ÈÓÌá áßÊÇÈÉ ãÓÈÈÇÊ ÇáÕÏÇÚ ÍÊì ÊÊãßä ãä ÊÌäÈ åÐå ÇáãËíÑÇÊ. **Úáì ÓÈíá ÇáãËÇá¡ ÓÌá ÇáÂÊí:  ? ÇáÃØÚãÉ æÇáãÔÑæÈÇÊ ÇáÊí ÊÊäÇæáåÇ.  ? ÇáÝÊÑÇÊ ÇáÊí ÊãÖíåÇ Ýí Çáäæã.  ? ÌãíÚ ÇáÊÛíøõíÑÇÊ Ýí ÇáäÙÇã ÇáÛÐÇÆí Ãæ ÇáÃÏæíÉ.  • íäÕÍ ÈÇáÇÓÊáÞÇÁ Ýí ÛÑÝÉ ãÙáãÉ æåÇÏÆÉ ÚäÏ ÇáÅÕÇÈÉ ÈÕÏÇÚ äÕÝí.  • ÍÇæá æÖÚ ãäÔÝÉ ÈÇÑÏÉ Úáì ÑÃÓß ÚäÏ ÇáÅÕÇÈÉ ÈÇáÕÏÇÚ ÇáäÕÝí.  • ÎÝÝ ãä ÇáÅÖÇÁÉ¡ ÅÐÇ ßÇäÊ ÇáÅÖÇÁÉ ÇáÓÇØÚÉ ÊÒÚÌß Ãæ ÊÒíÏ ãä ÔÏÉ ÕÏÇÚß ÇáäÕÝí.  • ÇáÊÒã ÈÌãíÚ ãæÇÚíÏ ÇáãÊÇÈÚÉ æÝÞ ÊæÌíåÇÊ ãÞÏã ÇáÑÚÇíÉ ÇáÕÍíÉ. æåÐÇ ÃãÑ ãåã.  ãÕÇÏÑ ááÍÕæá Úáì ÇáãÒíÏ ãä ÇáãÚáæãÇÊ:  • ÊÍÇáÝ ÑÚÇíÉ ãÑÖì ÇáÕÏÇÚ æÇáÕÏÇÚ ÇáäÕÝí (CHAMPþ): [email or web address]  • ÇáãÄÓÓÉ ÇáÃãÑíßíÉ ááÕÏÇÚ ÇáäÕÝí ÇáÃãÑíßí (American Migraine Foundationþ): [email or web address]  • ÇáãÄÓÓÉ ÇáæØäíÉ ááÕÏÇÚ (National Headache Foundation): [email or web address]  ÇÊÕá ÈãÞÏãö ÇáÑÚÇíÉ ÇáÕÍíÉ Ýí ÇáÍÇáÇÊ ÇáÊÇáíÉ:  • ÚÏã ÊÍÓä ÇáÃáã ÑÛã ÊäÇæá ÇáÃÏæíÉ.  • ÇÓÊãÑÇÑ ÊßÑÑ äæÈÇÊ ÇáÕÏÇÚ ÇáäÕÝí ÑÛã ÊäÇæá ÇáÃÏæíÉ.  ÇØáÈ ÇáãÓÇÚÏÉ ÝæÑðÇ Ýí ÇáÍÇáÇÊ ÇáÊÇáíÉ:  • ÇÔÊÏÇÏ ÇáÕÏÇÚ ÇáäÕÝí¡ æÚÏã ÌÏæì ÇáÚáÇÌ.  • ÅÕÇÈÊß ÈÊÕáÈ Ýí ÇáÚäÞ æÍãì.  • ÝÞÏÇäß ááÑÄíÉ.  • ÇáÔÚæÑ ÈÖÚÝ Ýí ÇáÚÖáÇÊ Ãæ ÝÞÏÇä ÇáÞÏÑÉ Úáì ÇáÊÍßã Ýí ÇáÚÖáÇÊ.  • ÈÏÁ ÝÞÏÇä ÇáÊæÇÒä Ãæ ÇáãÚÇäÇÉ ãä ÕÚæÈÉ Ýí ÇáãÔí.  • ÇáÔÚæÑ ÈÃäß Úáì æÔß ÇáÅÛãÇÁ Ãæ ÊÚÑÖß ááÃÛãÇÁ.  • ÈÏÁ ÇáÊÚÑÖ áÕÏÇÚ ÔÏíÏ ãÝÇÌÆ æÛíÑ ãÊæÞÚ.  • ÇáÅÕÇÈÉ ÈäæÈÉ ãÑÖíÉ.  ãáÎÕ  • ÚÇÏÉð ãÇ íßæä ÇáÕÏÇÚ ÇáäÕÝí ÃÞæì æÃßËÑ ãÝÇÌÃÉ ãä ÃäæÇÚ ÇáÕÏÇÚ ÇáÃÎÑì.  ÃãÇ ÇáÚÑÖ ÇáããíÒ ááÕÏÇÚ ÇáäÕÝí Ýåæ Ãáãõ ÔÏíÏ äÇÈÖ íÍÏË ÚÇÏÉð Ýí ÌÇäÈ æÇÍÏ ãä ÇáÑÃÓö.  • æíÓãì ÇáÕÏÇÚ ÇáäÕÝí ÇáÐí íÐåÈ æíÚæÏ ÇáÕÏÇÚ ÇáäÕÝí ÇáãÊßÑÑ. ÃãÇ ÇáÕÏÇÚ ÇáäÕÝí ÇáãÒãä Ýåæ ÇáÐí íÊßÑÑ áãÏÉ 15 íæãðÇ Ýí ÇáÔåÑ æáãÏÉ ÊÒíÏ Úä 3 ÃÔåÑ.  • ÞÏ ÊÄÏí ÈÚÖ ÇáÚæÇãá Åáì ÍÏæË ÇáÕÏÇÚ ÇáäÕÝí ãËá ÞáÉ Çáäæã Ãæ ÇáÅÝÑÇØ Ýí Çáäæã æÇáÊÏÎíä æÈÚÖ ÇáÃØÚãÉ æÇáãÔÑæÈÇÊ ÇáßÍæáíÉ æÇáÖÛæØ ÇáäÝÓíÉ æÈÚÖ ÇáÃÏæíÉ.  • ÞÏ ÊÔãá ÎØÉ ÚáÇÌß ÇáÃÏæíÉ æÇáÊÛííÑ Ýí ÃÓáæÈ ÍíÇÊß æÇáÚáÇÌ ÇáÓáæßí.  áíÓ ÇáåÏÝ ãä åÐå ÇáãÚáæãÇÊ Ãä Êßæä ÈÏíáÇð ááÅÑÔÇÏÇÊ ÇáÊí íÞÏãåÇ ãæÝÑ ÇáÑÚÇíÉ ÇáÕÍíÉ. ÊÃßÏ ãä ãäÇÞÔÉ ÃíÉ ÃÓÆáÉ ÊÏæÑ Ýí Ðåäß ãÚ ãæÝÑ ÇáÑÚÇíÉ ÇáÕÍíÉ.þ  Document Revised: 04/05/2021 Document Reviewed: 04/05/2021  [employer] Patient Education © 2021 [employer]  ãÑÖ ÇáÌÒÑ ÇáãÚÏí ÇáãÑíÆí áÏì ÇáÈÇáÛíä  Gastroesophageal Reflux Disease, Adult    íÚäí LAMONT (ÇáÇÑÊÌÇÚ ÇáãÚÏí ÇáãÑíÆí) ÊÏÝÞ ÇáÍãÖ ãä ãÚÏÊß Åáì ÇáÃäÈæÈ ÇáÐí íÕá ÇáÝã ÈÇáãÚÏÉ (ÇáãÑíÁ). ÝÇáØÚÇã íäÊÞá ÚÇÏÉð Åáì ÇáÃÓÝá ÎáÇá ÇáãÑíÁ æíÈÞì Ýí ÇáãÚÏÉ áíõåÖã. áßä ÚäÏ ÍÏæË ÇáÇÑÊÌÇÚ ÇáãÚÏí ÇáãÑíÆí¡ ÝÅä ÇáØÚÇã æÍãÖ ÇáãÚÏÉ íÑÊÝÚÇä áÃÚáì æíÏÎáÇä ÇáãÑíÁ. ÃãÇ ÅÐÇ ÊÝÇÞãÊ ÇáãÔßáÉ¡ ÝÞÏ ÊõÔÎÕ ÇáÍÇáÉ Úáì ÅäåÇ ãÑÖ ÇáÇÑÊÌÇÚ ÇáãÚÏí ÇáãÑíÆí. Ðáß ÅÐÇ ßÇä ÇáÇÑÊÌÇÚ:  • ßËíÑ ÇáÍÏæË.   • íÓÈÈ ÃÚÑÇÖðÇ ãÊßÑÑÉ Ãæ ÔÏíÏÉ.   • íÓÈÈ ãÔÇßá ãËá ÊáÝ ÈÇáãÑíÁ.  ÝÚäÏãÇ íáÇãÓ ÇáÍãÖ ÇáãÑíÁ¡ ÝÅäå ÞÏ íÓÈÈ ÃáãðÇ (ÇáÊåÇÈðÇ) Ýí ÇáãÑíÁ. æÈãÑæÑ ÇáæÞÊ¡ íãßä Ãä íÓÈÈ ãÑÖ ÇáÇÑÊÌÇÚ ÇáãÚÏí ÇáãÑíÆí ÙåæÑ ÝÊÍÇÊ ÕÛíÑÉ (ÞÑÍ) Úáì ÈØÇäÉ ÇáãÑíÁ.  ãÇ ÃÓÈÇÈ ÇáÍÇáÉ¿  ÊÍÏË åÐå ÇáÍÇáÉ ÈÓÈÈ ãÔßáÉ Ýí ÇáÚÖáÇÊ Èíä ÇáãÑíÁ æÇáãÚÏÉ (ÇáãÕÑÉ ÇáãÑíÆíÉ ÇáÓÝáíÉ¡ Ãæ LES). æÊäÛáÞ ÚÖáÉ ÇáãÕÑÉ ÇáãÑíÆíÉ ÇáÓÝáíÉ ÚÇÏÉð ÈÚÏ ãÑæÑ ÇáØÚÇã ãä ÎáÇá ÇáãÑíÁ Åáì ÇáãÚÏÉ. áßä ÚäÏãÇ ÊÖÚÝ ÚÖáÉ ÇáãÕÑÉ ÇáãÑíÆíÉ ÇáÓÝáíÉ Ãæ ÊÕÈÍ ÛíÑ ØÈíÚíÉ¡ ÝÅäåÇ áÇ ÊäÛáÞ ÇäÛÇáÇð ÓáíãðÇ¡ ããÇ íÓãÍ ÈÕÚæÏ ÇáØÚÇã æÍãÖ ÇáãÚÏÉ Åáì ÇáãÑíÁ.  æíãßä Ãä ÊÖÚÝ ÚÖáÉ ÇáãÕÑÉ ÇáãÑíÆíÉ ÇáÓÝáíÉ ãä ÎáÇá ãæÇÏ ÛÐÇÆíÉ ãÚíäÉ æÃÏæíÉ æÍÇáÇÊ ØÈíÉ¡ ÈãÇ Ýí Ðáß:  • ÇÓÊÎÏÇã ÇáÊÈÛ.  • ÇáÍãá.  • ÝÊÞ ÍÌÇÈí.  • ÊäÇæá ÇáßÍæáíÇÊ.  • ÈÚÖ ÇáãÃßæáÇÊ æÇáãÔÑæÈÇÊ ÇáãÚíäÉ¡ ãËá ÇáÞåæÉ  æÇáÔíßæáÇÊÉ æÇáÈÕá æÇáäÚäÇÚ.  ãÇ ÚæÇãá ÒíÇÏÉ ÇáÎØÑ¿  íÒÏÇÏ ÇÍÊãÇá ÇáÅÕÇÈÉ ÈåÐå ÇáÍÇáÉ Ýí ÇáÍÇáÇÊ ÇáÊÇáíÉ:  • ÒíÇÏÉ ÇáæÒä.  • ÇáÅÕÇÈÉ ÈÇÖØÑÇÈ íÄËÑ Ýí ÇáÃäÓÌÉ ÇáÖÇãÉ.  • ÊäÇæá NSAID (ãÖÇÏÇÊ ÇáÇáÊåÇÈ ÇááÇÓÊíÑæíÏíÉ).  ãÇ ÚáÇãÇÊ ÇáÍÇáÉ Ãæ ÃÚÑÇÖåÇ¿  ÊÔãá ÃÚÑÇÖ åÐå ÇáÍÇáÉ ãÇ íáí:  • ÍÑÞÉ Ýí Ýã ÇáãÚÏÉ.  • ÕÚæÈÉ Ãæ Ãáã Ýí ÇáÈáÛ.  • ÇáÔÚæÑ ÈæÌæÏ ÌáØÉ Ýí ÇáÍáÞ.  • ÇáÅÍÓÇÓ ÈØÚã ãÑÇÑÉ Ýí ÇáÝã.  • ãÔßáÇÊ Ýí ÇáÊäÝÓ.  • æÌæÏ ßãíÉ ßÈíÉ ãä ÇááÚÇÈ.  • ÇáÅÕÇÈÉ ÈÇÖØÑÇÈÇÊ Ýí ÇáãÚÏÉ Ãæ ÇäÊÝÇÎ.  • ÊÌÔÄ.  • Ãáã ÈÇáÕÏÑ. æíãßä Ãä íÍÏË Ãáã ÇáÕÏÑ ÈÓÈÈ ÚÏÉ ÍÇáÇÊ ãÎÊáÝÉ. áÐá íÌÈ ÇáÍÑÕ Úáì ãÑÇÌÚÉ ãÞÏã ÇáÑÚÇíÉ ÇáÕÍíÉ ÇáãÊÇÈÚ áß ÅÐÇ ßäÊ ÊÔÚÑ ÈÃáã Ýí ÇáÕÏÑ.  • ÖíÞ ÇáäÝÓ ææÌæÏ ÕÝíÑ ÚäÏ ÇáÊäÝÓ.  • ÓÚÇá ãÓÊãÑ (ãÒãä) Ãæ ÓÚÇá Ýí ÃæÞÇÊ Çááíá.  • Èáì ãíäÇÁ ÇáÃÓäÇä.  • ÝÞÏÇä ÇáæÒä.  ßíÝ ÊõÔÎÕ åÐå ÇáÍÇáÉ¿  íÊæáì ãÞÏã ÇáÑÚÇíÉ ÇáÕÍíÉ ÇáÎÇÕ Èß ÈÊÓÌíá ÇáÊÇÑíÎ ÇáãÑÖí æÅÌÑÇÁ ÝÍÕ ØÈí. áÊÍÏíÏ ãÇ ÅÐÇ ßäÊ ÊÚÇäí ãä ãÑÖ ÇáÌÒÑ ÇáãÚÏí ÇáãÑíÆí ÎÝíÝ Ãæ ÍÇÏ¡ ÞÏ íÍÊÇÌ ãÞÏã ÇáÑÚÇíÉ ÇáÕÍíÉ Åáì ãÑÇÞÈÉ ÇÓÊÌÇÈÊß ááÚáÇÌ. ßãÇ ÞÏ ÊõÌÑì áß ÃíÖðÇ ÝÍæÕ¡ ÈãÇ Ýí Ðáß:  • ÇÎÊÈÇÑ ÝÍÕ ÇáãÚÏÉ æÇáãÑíÁ ÈÇÓÊÎÏÇã ÌåÇÒ Èå ßÇãíÑÇ ÕÛíÑÉ (ÊäÙíÑ ÏÇÎáí).  • ÇÎÊÈÇÑ áÞíÇÓ ãÓÊæì ÇáÍãæÖÉ Ýí ÇáãÑíÁ.  • ÇÎÊÈÇÑ áÞíÇÓ ãÏì ÇáÖÛØ ÇáãæÌæÏ Úáì ÇáãÑíÁ.  • ÝÍÕ ÈáÚ ÇáÈÇÑíæã Ãæ ÈáÚ ÇáÈÇÑíæã ÇáãÚÏá áÝÍÕ Ôßá ÇáãÑíÁ æÍÌãå æßÝÇÁÉ Úãáå.  ßíÝ ÊõÚÇáÌ åÐå ÇáÍÇáÉ¿  íßãä ÇáåÏÝ ãä ÇáÚáÇÌ Ýí ãÓÇÚÏÊß Úáì ÊÎÝíÝ ÇáÃÚÑÇÖ æÇáæÞÇíÉ ãä ÇáãÖÇÚÝÇÊ. ÊÎÊáÝ ØÑíÞÉ ÚáÇÌ åÐå ÇáÍÇáÉ ÇÚÊãÇÏðÇ Úáì ãÏì ÍÏÉ ÇáÃÚÑÇÖ. æÞÏ íæÕí ãÞÏã ÇáÑÚÇíÉ ÇáÕÍíÉ ÈãÇ íáí:  • ÊÛííÑÇÊ Ýí äÙÇãß ÇáÛÐÇÆí.  • ÇáÃÏæíÉ.  • ÇáÌÑÇÍÉ.  íÌÈ ÇÊÈÇÚ åÐå ÇáÊÚáíãÇÊ Ýí ÇáãäÒá:  ÇáÃßá æÇáÔÑÈ    • íÌÈ ÇÊÈÇÚ ÇáäÙÇã ÇáÛÐÇÆí ÇáÐí ÃæÕì Èå ãõÞÏøöã ÇáÑÚÇíÉ ÇáÕÍíÉ ÇáãÊÇÈÚ áß. æåæ ãÇ ÞÏ íÔãá ÊÌäÈ ÈÚÖ ÇáãÃßæáÇÊ æÇáãÔÑæÈÇÊ¡ ãËá:  ? ÇáÞåæÉ æÇáÔÇí (ÈÇáßÇÝííä Ãæ ÇáÎÇáííä ãäå).  ? ÇáãÔÑæÈÇÊ ÇáãÍÊæíÉ Úáì ßÍæá.  ? ãÔÑæÈÇÊ ÇáØÇÞÉ æÇáãÔÑæÈÇÊ ÇáÑíÇÖíÉ.  ? ÇáãÔÑæÈÇÊ ÇáÛÇÒíÉ Ãæ ÇáÕæÏÇ.  ? ÇáÔæßæáÇÊÉ æÇáßÇßÇæ.  ? äßåÇÊ ÇáÝáÝá æÇáäÚäÇÚ.  ? ÇáËæã æÇáÈÕá.  ? ÇáÝÌá ÇáÍÇÑ.  ? ÇáÃØÚãÉ ÇáÍÑíÝÉ Ãæ ÇáÍãÖíÉ¡ æãä ÈíäåÇ  ÇáÈåÇÑÇÊ æãÓÍæÞ ÇáÝáÝá ÇáÍÇÑ æãÓÍæÞ ÇáßÇÑí æÇáÎá æÇáÕáÕÉ ÇáÍÇÑÉ æÕáÕÉ ÇáÈÇÑÈßíæ.  ? ÚÕÇÆÑ ÇáÝæÇßå ÇáÍãÖíÉº ãËá ÇáÈÑÊÞÇá æÇááíãæä æÇááíãæä ÇáÍÇãÖ.  ? ÇáÃØÚãÉ ÇáÊí ÊÚÊãÏ Úáì ÇáØãÇØã ãËá ÇáÕáÕÉ æÇáÝáÝá ÇáÍÇÑ æÇáÓáØÉ æÇáÈíÊÒÇ ÇáãÒæÏÉ ÈÇáÕáÕÉ ÇáÍãÑÇÁ.  ? ÇáÃØÚãÉ ÇáãÞáíÉ æÇáÛäíÉ ÈÇáÏåæä¡ ãËá ÇáÏæäÇÊÓ æÇáÈØÇØÓ ÇáãÞáíÉ æÔÑÇÆÍ ÇáÈØÇØÓ æÇáÊÊÈíáÇÊ ÚÇáíÉ ÇáÏåæä.  ? ÇááÍæã ÚÇáíÉ ÇáÏåæä¡ ãËá ÇáåæÊ ÏæÌ æÇááÍæã ÇáÍãÑÇÁ æÇáÈíÖÇÁ ÇáÛäíÉ ÈÇáÏåæä¡ ãËá ÔÑÇÆÍ ÇáÖáæÚ æÇáÓæÓíÓ æÇáßÝá æÇááÍã ÇáãÞÏÏ.  ? ãäÊÌÇÊ ÇáÃáÈÇä ÚÇáíÉ ÇáÏåæä¡ ãËá ÇáÍáíÈ ßÇãá ÇáÏÓã æÇáÒÈÏ æÇáÌÈä ÇáßÑíãí.  • íÌÈ ÊäÇæá æÌÈÇÊ ÕÛíÑÉ æãÊßÑÑÉ ÈÏáÇð ãä ÇáæÌÈÇÊ ÇáßÈíÑÉ.  • íÌÈ ÊÌäÈ ÔÑÈ ßãíÇÊ ßÈíÑÉ ãä ÇáÓæÇÆá ãÚ ÇáæÌÈÇÊ.  • íÌÈ ÊÌäÈ ÊäÇæá æÌÈÇÊ ÎáÇá 2-3 ÓÇÚÇÊ ÞÈá Çáäæã.  • íÌÈ ÊÌäÈ ÇáÇÓÊáÞÇÁ ãÈÇÔÑÉ ÈÚÏ ÇáÃßá.  • ýíÊÚíä ÚÏã ããÇÑÓÉ ÇáÊãÑíäÇÊ ÇáÑíÇÖíÉ ÈÚÏ ÇáÃßá ãÈÇÔÑÉ.  äãØ ÇáÍíÇÉ    • íÊÚíä ÚÏã ÇÓÊÎÏÇã ãäÊÌÇÊ ÊÍÊæí Úáì ÇáäíßæÊíä Ãæ ÇáÊÈÛ¡ ãËá ÇáÓÌÇÆÑ æÇáÓÌÇÆÑ ÇáÅáßÊÑæäíÉ æÊÈÛ ÇáãÖÛ. íãßä ÇÓÊÔÇÑÉ ãÞÏã ÇáÑÚÇíÉ ÇáÕÍíÉ ÅÐÇ ÇÍÊÌÊ Åáì ÇáãÓÇÚÏÉ ááÅÞáÇÚ Úä ÇáÊÏÎíä.  • íÌÈ ãÍÇæáÉ ÇáÊÞáíá ãä ÇáÖÛæØ ÇáÊí ÊÊÚÑÖ áåÇ æãÓÊæì ÇáÊæÊÑ áÏíß ÈÇÊÈÇÚ æÓÇÆá ãËá ããÇÑÓÉ ÇáíæÌÇ Ãæ ÇáÊÃãá. ÃãÇ Ýí ÍÇáÉ ÇáÍÇÌÉ áãÓÇÚÏÉ áÎÝÖ ãÓÊæì ÇáÅÌåÇÏ¡ Ýíãßä ÇÓÊÔÇÑÉ ãõÞÏã ÇáÑÚÇíÉ ÇáÕÍíÉ.  • Ýí ÍÇáÉ ÒíÇÏÉ ÇáæÒä¡ ÝíÌÈ ÎÝÖ ÇáæÒä Åáì ÇáæÒä ÇáÕÍí. íÌÈ ÇÓÊÔÇÑÉ æØáÈ ÊæÌíåÇÊ ãÞÏã ÇáÑÚÇíÉ ÇáÕÍíÉ Íæá ßíÝíÉ ÎÝÖ ÇáæÒä ÈØÑíÞÉ ÕÍíÉ æÂãäÉ.  ÊÚáíãÇÊ ÚÇãÉ  • íÊÚíä ÇáÇäÊÈÇå áÌãíÚ ÇáÊÛíÑÇÊ ÇáÊí ÊØÑÃ Úáì ÇáÃÚÑÇÖ ÇáÊí ÊÚÇäíåÇ.  • íÊÚíä ÊäÇæá ÇáÃÏæíÉ ÇáÊí ÊõÕÑÝ ÈæÕÝÉ ØÈíÉ Ãæ Ïæä æÕÝÉ ØÈíÉ æÝÞðÇ áãÇ íÎÈÑßö Èå ãÞÏã ÇáÑÚÇíÉ ÇáÕÍíÉ ÇáÎÇÕ Èß. íÊÚíä ÚÏã ÊäÇæá ÇáÃÓÈÑíä Ãæ ÇáÃíÈæÈÑæÝíä Ãæ ÛíÑå ãä ãÖÇÏÇÊ ÇáÇáÊåÇÈÇÊ ÇááÇÓÊíÑæíÏíÉ ÅáÇ ÈãæÇÝÞÉ ãÞÏã ÇáÑÚÇíÉ.  • íÌÈ ÇÑÊÏÇÁ ãáÇÈÓ æÇÓÚÉ. æíÌÈ ÚÏã ÇÑÊÏÇÁ ÔíÁ ÖíÞ Íæá ÇáÎÕÑ ÞÏ íÖÛØ Úáì ÇáãÚÏÉ.  • íÌÈ ÑÝÚ (ÅÚáÇÁ) ãÞÏãÉ ÝÑÇÔß áãÓÇÝÉ 6 ÈæÕÇÊ ÈÇáÊÞÑíÈ (15 Óã).  • ßÐáß ÊÌäÈ ÇáÇäÍäÇÁ ÅÐÇ ßÇä íÄÏí Åáì ÒíÇÏÉ ÔÏÉ ÇáÃÚÑÇÖ.  • íÊÚíä ÇáÇáÊÒÇã ÈÌãíÚ ãæÇÚíÏ ÇáãÊÇÈÚÉ æÝÞðÇ áÊæÌíåÇÊ ãÞÏã ÇáÑÚÇíÉ ÇáÕÍíÉ. ÝåÐÇ ÃãÑ ãåã.  íÌÈ ÇáÇÊÕÇá  ÈãÞÏã ÇáÑÚÇíÉ ÇáÕÍíÉ Ýí ÇáÍÇáÇÊ ÇáÊÇáíÉ:  • Ýí ÍÇáÉ ÇáÅÕÇÈÉ ÈÃí ããÇ íáí:  ? ÃÚÑÇÖ ÌÏíÏÉ.  ? ÝÞÏÇä ÇáæÒä Ïæä ÓÈÈ ãÝåæã.  ? ÕÚæÈÉ Ýí ÇáÈáÚ Ãæ Ãáã ÚäÏ ÇáÈáÚ.  ? æÌæÏ ÕæÊ ÃÒíÒ ÚäÏ ÇáÊäÝÓ Ãæ ÓÚÇá ãÓÊãÑ.  ? ÈÍÉ Ýí ÇáÕæÊ.  • ÚÏã ÇáÔÚæÑ ÈÊÍÓä ãÚ ÊáÞí ÇáÚáÇÌ.  íÊÚíä ØáÈ ÇáãÓÇÚÏÉ ÝæÑðÇ Ýí ÇáÍÇáÇÊ ÇáÊÇáíÉ:  • ÇáÔÚæÑ ÈÃáã Ýí ÇáÐÑÇÚíä Ãæ ÇáÚäÞ Ãæ ÇáÝß Ãæ ÇáÃÓäÇä Ãæ ÇáÙåÑ.  • ÇáÊÚÑÞ¡ Ãæ ÇáÔÚæÑ ÈÏæÇÑ Ãæ ÏæÎÉ.  • ÇáÅÕÇÈÉ ÈÃáã Ýí ÇáÕÏÑ Ãæ ÖíÞ Ýí ÇáÊäÝÓ.  • ÇáÊÞíÄ ÞíÆðÇ íÔÈå ÇáÏã Ãæ ÊÝá ÇáÞåæÉ.  • ÇáÅÛãÇÁ.  • ÅÎÑÇÌ ÈÑÇÒ ãÏãã Ãæ ÃÓæÏ.  • ÚÏã ÇáÞÏÑÉ Úáì ÇáÈáÚ Ãæ ÇáÔÑÈ Ãæ ÇáÃßá.  ãáÎÕ  • íÍÏË ÇÑÊÌÇÚ ÇáãÚÏÉ ÇáãÑíÆí ÚäÏ ÕÚæÏ ÇáÍãÖ ãä ÇáãÚÏÉ Åáì ÏÇÎá ÇáãÑíÁ. ãÑÖ ÇáÇÑÊÌÇÚ ÇáãÚÏí ÇáãÑíÆí ÍÇáÉ íÍÏË ÝíåÇ ÇáÇÑÊÌÇÚ ßËíÑðÇ¡ Ãæ íÓÈÈ ÃÚÑÇÖðÇ ãÊßÑÑÉ Ãæ ÔÏíÏÉ¡ Ãæ íÓÈÈ ãÔÇßá ãËá ÊáÝ ÈÇáãÑíÁ.  • ÊÎÊáÝ ØÑíÞÉ ÚáÇÌ åÐå ÇáÍÇáÉ ÇÚÊãÇÏðÇ Úáì ãÏì ÍÏÉ ÇáÃÚÑÇÖ. æÞÏ íæÕí ãÞÏã ÇáÑÚÇíÉ ÇáÕÍíÉ ÈÅÌÑÇÁ ÊÛííÑÇÊ Ýí ÇáäÙÇã ÇáÛÐÇÆí æäãØ ÇáÍíÇÉ¡ Ãæ íÕÝ ÏæÇÁð¡ Ãæ íæÕí ÈÅÌÑÇÁ ÇáÌÑÇÍÉ.  • íÌÈ ÇáÇÊÕÇá ÈãõÞÏã ÇáÑÚÇíÉ ÇáÕÍíÉ ÅÐÇ ÙåÑÊ Úáíß ÃÚÑÇÖ ÌÏíÏÉ Ãæ ÒÇÏÊ ÔÏÉ ÇáÃÚÑÇÖ ÇáÊí ÊÚÇäí ãäåÇ.  • íÊÚíä ÊäÇæá ÇáÃÏæíÉ ÇáÊí ÊõÕÑÝ ÈæÕÝÉ ØÈíÉ Ãæ Ïæä æÕÝÉ ØÈíÉ æÝÞðÇ áãÇ íÎÈÑßö Èå ãÞÏã ÇáÑÚÇíÉ ÇáÕÍíÉ ÇáÎÇÕ Èß. íÊÚíä ÚÏã ÊäÇæá ÇáÃÓÈÑíä Ãæ ÇáÃíÈæÈÑæÝíä Ãæ ÛíÑå ãä ãÖÇÏÇÊ ÇáÇáÊåÇÈÇÊ ÇááÇÓÊíÑæíÏíÉ ÅáÇ ÈãæÇÝÞÉ ãÞÏã ÇáÑÚÇíÉ.  • íÊÚíä ÇáÇáÊÒÇã ÈÌãíÚ ãæÇÚíÏ ÇáãÊÇÈÚÉ æÝÞðÇ áÊæÌíåÇÊ ãÞÏã ÇáÑÚÇíÉ ÇáÕÍíÉ. ÝåÐÇ ÃãÑ ãåã.  áíÓ ÇáåÏÝ ãä åÐå ÇáãÚáæãÇÊ Ãä Êßæä ÈÏíáÇð ááÅÑÔÇÏÇÊ ÇáÊí íÞÏãåÇ ãæÝÑ ÇáÑÚÇíÉ ÇáÕÍíÉ. ÊÃßÏ ãä ãäÇÞÔÉ ÃíÉ ÃÓÆáÉ ÊÏæÑ Ýí Ðåäß ãÚ ãæÝÑ ÇáÑÚÇíÉ ÇáÕÍíÉ.þ  Document Revised: 07/26/2019 Document Reviewed: 07/26/2019  Elsevier Patient Education © 2021 [employer]  Sumatriptan tablets  ãÇ åÐÇ ÇáÏæÇÁ¿  íÓÊÚãá ÓæãÇÊÑíÈÊÇä áÚáÇÌ ÍÇáÇÊ ÇáÕÏÇÚ ÇáäÕÝí Ýí ÍÇáÉ æÌæÏ åÇáÉ Ãæ ÈÏæäåÇ.  ÇáåÇáÉ åí ÔÚæÑ ÛÑíÈ Ãæ ÊÔæÔ Ýí ÇáÑÄíÉ ããÇ íäÈåß ÈæÞæÚ ÇáäæÈÉ.  áÇ íÓÊÎÏã ááæÞÇíÉ ãä ÇáÕÏÇÚ ÇáäÕÝí.  íãßä ÇÓÊÎÏÇã åÐÇ ÇáÏæÇÁ áÃÛÑÇÖ ÃÎÑìº ÇÓÃá ãÞÏã ÇáÑÚÇíÉ ÇáÕÍíÉ Ãæ ÇáÕíÏáí ÅÐÇ ßÇäÊ áÏíß ÃÓÆáÉ.  ÃÓãÇÁ ÇáÚáÇãÇÊ ÇáÊÌÇÑíÉ  ÇáãÚÑæÝÉ:þ Imitrex, Migraine Pack  ãÇ åí ÇáÃÔíÇÁ ÇáÊí íÌÈ Ãä ÃÎÈÑ ÈåÇ ãÞÏã ÇáÑÚÇíÉ ÇáÕÍíÉ ÞÈá ÊäÇæá åÐÇ ÇáÏæÇÁ¿  åã ÈÍÇÌÉ Åáí ãÚÑÝÉ ãÇ ÅÐÇ ßäÊ ãÕÇÈðÇ ÈÃí ãä åÐå ÇáÍÇáÇÊ ÇáÂä:  • ãÏÎä ÊÈÛ  • ãÔÇßá ÇáÏæÑÉ ÇáÏãæíÉ Ýí ÃÕÇÈÚ ÇáíÏíä æÇáÞÏãíä  • ÇáÓßÑ  • ÃãÑÇÖ ÇáÞáÈ  • ÇÑÊÝÇÚ ÖÛØ ÇáÏã  • ÇÑÊÝÇÚ ÇáßæáÓÊÑæá  • ÊÇÑíÎ ãÚ ÚÏã ÇäÊÙÇã ÖÑÈÇÊ ÇáÞáÈ  • ÓßÊÉ ÏãÇÛíÉ ÓÇÈÞÉ  • ÃãÑÇÖ Çáßáì  • ÃãÑÇÖ ÇáßÈÏ  • ãÔÇßá ÇáãÚÏÉ Ãæ ÇáÃãÚÇÁ  • ÑÏ ÝÚá ÛíÑ ÚÇÏí Ãæ ÍÓÇÓíÉ ÖÏ ÓæãÇÊÑíÈÊÇä  • ÑÏ ÝÚá ÛíÑ ÚÇÏí Ãæ ÍÓÇÓíÉ ÖÏ ÃÏæíÉ ÃÎÑì  • ÑÏ ÝÚá ÛíÑ ÚÇÏí Ãæ ÍÓÇÓíÉ ÖÏ ÇáÃØÚãÉ Ãæ ÇáÃÕÈÇÛ Ãæ ÇáãæÇÏ ÇáÍÇÝÙÉ  • ÍÇãá Ãæ ÊÓÚíä ááÍãá  • ÇáÑÖÇÚÉ ÇáØÈíÚíÉ  ßíÝ íÌÈ Úáíø ÇÓÊÚãÇá åÐÇ ÇáÏæÇÁ¿  ÊäÇæá åÐÇ ÇáÏæÇÁ ÈÇáÝã ãÚ ßæÈ ãä ÇáãÇÁ.  ÇÊøóÈÚ ÇáÊÚáíãÇÊ ÇáãæÌæÏÉ Úáì ÇáÚÈæÉ Ãæ ãáÕÞ ÇáæÕÝÉ ÇáØÈíÉ.  áÇ ÊÊäÇæá åÐÇ ÇáÏæÇÁ ÃßËÑ ãä ÇáãÑÇÊ ÇáãæÕæÝÉ.  ÊÍÏË Åáí ØÈíÈ ÇáÃØÝÇá ÈÔÃä ÇÓÊÚãÇá åÐÇ ÇáÏæÇÁ ááÃØÝÇá.  ÞÏ Êßæä åäÇß ÍÇÌÉ Åáì ÚäÇíÉ ÎÇÕÉ.  ÇáÌÑÚÉ ÇáÒÇÆÏÉ : ÅÐÇ ÇÚÊÞÏÊ Ãäß ÊäÇæáÊ ßãíÉ ßÈíÑÉ ÌÏðÇ ãä åÐÇ ÇáÏæÇÁ¡ ÇÊÕá ÈãÑßÒ ÇáÊÍßã Ýí ÇáÓãæã Ãæ ÛÑÝÉ ÇáØæÇÑÆ ÝæÑðÇ.  ãáÇÍÙÉ: íÓÊÎÏã åÐÇ ÇáÏæÇÁ ãä ÃÌáß ÃäÊ ÝÞØ. áÇ ÊÔÇÑß ÂÎÑíä ãÚß Ýí ÊäÇæá åÐÇ ÇáÏæÇÁ.  ãÇÐÇ áæ ÊÑßÊ (äÓíÊ) ÌÑÚÉ¿  áÇ íäØÈÞ Ðáß.  åÐÇ ÇáÏæÇÁ ÛíÑ ãÎÕÕ ááÇÓÊÚãÇá ÇáãäÊÙã.  ãÇ åí ÇáÃÔíÇÁ ÇáÊí ÞÏ ÊÊÝÇÚá ãÚ åÐÇ ÇáÏæÇÁ¿  áÇ ÊäÇæá åÐÇ ÇáÏæÇÁ ãÚ Ãí ããÇ íáí:  • ÃÏæíÉ ãÚíäÉ ááÕÏÇÚ ÇáäÕÝí ãËá ÇáãæÊÑíÈÊÇä Ãæ ÅáíÊÑíÈÊÇä Ãæ ÝÑæÝÇÊÑíÈÊÇä Ãæ äÇÑÇÊÑíÈÊÇä Ãæ ÑíÒÇÊÑíÈÊÇä Ãæ ÓæãÇÊÑíÈÊÇä Ãæ ÒæáãíÊÑíÈÊÇä  • ÇÑÌæÊ ÇáÃáßæáÇíÏÇÊ ãËá ÏíåíÏÑæÇíÑÌæÊÇãíä æÅÑÌæäæÝíä æÃÑÌæÊÇãíä æãíËáíÑÌæäæÝÇíä  • ÇáÃÏæíÉ ÇáÊí ÊÓãì ãæÇäÚ ÇáÇßÓÏíÒ ÇáÃãíäí ÇáÃÍÇÏí MAOI ãËá äÇÑÏíá æÈÇÑäíÊ æãÇÑÈáÇä æÃáÏíÈÑíá æßÇÑÈßÓ.  åÐÇ ÇáÏæÇÁ íãßä Ãä íÊÝÇÚá ÃíÖðÇ ãÚ ÇáÃÏæíÉ ÇáÊÇáíÉ:  • ÈÚÖ ÃÏæíÉ ÚáÇÌ ÇáÞáÞ Ãæ ÇáÇßÊÆÇÈ Ãæ ÇáÇÖØÑÇÈÇÊ ÇáäÝÓíÉ  åÐå ÇáÞÇÆãÉ ÞÏ áÇ ÊÕÝ ßá ÇáÊÝÇÚáÇÊ ÇáãÍÊãáÉ. Þã ÈÅÚØÇÁ ãÞÏã ÇáÑÚÇíÉ ÇáÕÍíÉ ÞÇÆãÉ Èßá ÇáÃÏæíÉ Ãæ ÇáÃÚÔÇÈ Ãæ ÇáÃÏæíÉ ÈÏæä æÕÝÉ Ãæ ÇáãßãáÇÊ ÇáÛÐÇÆíÉ ÇáÊí ÊÓÊÎÏãåÇ. ÃÎÈÑåã ÃíÖðÇ ÅÐÇ ßäÊ ÊÏÎä Ãæ ÊÔÑÈ ÇáßÍæá Ãæ ÊÓÊÎÏã ÚÞÇÑÇÊ ÛíÑ ÞÇäæäíÉ. ÞÏ ÊÊÝÇÚá ÈÚÖ ÇáÚäÇÕÑ ãÚ  ÏæÇÆß.  ãÇ ÇáÐí íÌÈ Úáíø ÊÑÞÈå ÚäÏ ÇÓÊÚãÇá åÐÇ ÇáÏæÇÁ¿  Þã ÈÒíÇÑÉ ØÈíÈß Ãæ ÃÎÕÇÆí ÇáÑÚÇíÉ ÇáÕÍíÉ áãÑÇÞÈß ÊÍÓäß ÈÇäÊÙÇã.  ÃÎÈÑ ØÈíÈß Ãæ ÃÎÕÇÆí ÇáÑÚÇíÉ ÇáÕÍíÉ ÅÐÇ áã ÊÈÏÃ ÃÚÑÇÖß Ýí ÇáÊÍÓä Ãæ ÅÐÇ ÇÒÏÇÏÊ ÓæÁðÇ.  ÞÏ ÊÔÚÑ ÈÇáäÚÇÓ æÇáÏæÇÑ.  áÇ ÊÞæÏ ÇáÓíÇÑÉ Ãæ ÊÓÊÎÏã ÂáÇÊ Ãæ ÊÝÚá Ãí ÔíÁ íÍÊÇÌ Åáì ÊíÞÙ ÇáÐåä ÍÊì ÊÚÑÝ ßíÝ íÄËÑ Úáíß ÇáÏæÇÁ.  áÇ ÊÞÝ ÈÓÑÚÉ Ãæ ÊÌáÓ¡ ÎÇÕÉ ÅÐÇ ßäÊ ãä ÇáãÑÖì ßÈÇÑ ÇáÓä.  ÝåÐÇ íÞáá ãä ÎØÑ ÇáÔÚæÑ ÈÇáÏæÇÑ Ãæ ÇáÅÛãÇÁ.  ÞÏ íÊÏÇÎá ÇáßÍæá ãÚ ÊÃËíÑ åÐÇ ÇáÏæÇÁ.  ÇÊÕá ØÈíÈß Ãæ ÃÎÕÇÆí ÇáÑÚÇíÉ ÇáÕÍíÉ Úáì ÇáÝæÑ ÅÐÇ ßÇä áÏíß Ãí ÊÛíÑÇÊ Ýí ÇáÑÄíÉ.  ÅÐÇ ÊäÇæáÊ ÃÏæíÉ áÚáÇÌ ÇáÕÏÇÚ ÇáäÕÝí áãÏÉ 10 ÃíÇã Ãæ ÃßËÑ Ýí ÇáÔåÑ¡ ÝÞÏ ÊÓæÁ ÍÇáÉ ÇáÕÏÇÚ ÇáäÕÝí áÏíß.  ÃäÔÆ ãÝßÑÉ íæãíÉ ãÏæøóä ÈåÇ ÃíÇã ÇáÕÏÇÚ æÇÓÊÎÏÇã ÇáÏæÇÁ.  ÇÊÕá ÈØÈíÈß Ãæ ÃÎÕÇÆí ÇáÑÚÇíÉ ÇáÕÍíÉ ÅÐÇ ÃÕÈÍÊ äæÈÇÊ ÇáÕÏÇÚ ÇáäÕÝí ÃßËÑ ÊßÑÇÑðÇ.  ãÇ åí ÇáÂËÇÑ ÇáÌÇäÈíÉ ÇáÊí íãßä Ãä ÃáÇÍÙåÇ ÚäÏ ÊáÞí åÐÇ ÇáÏæÇÁ¿  ÇáÂËÇÑ ÇáÌÇäÈíÉ ÇáÊí íÌÈ Ãä ÊÈáÛ ØÈíÈß Ãæ ÃÎÕÇÆí ÇáÑÚÇíÉ ÇáÕÍíÉ ÈåÇ Ýí ÃÞÑÈ æÞÊ ããßä:  • ÇáÍÓÇÓíÉ ãËá ÇáØÝÍ ÇáÌáÏí æÇáÍßÉ æÇáÇÑÊßÇÑíÇ (ÇáÔÑí) ææÑã ÇáæÌå Ãæ ÇáÔÝÇå Ãæ ÇááÓÇä.  • ÊÛíÑÇÊ Ýí ÇáÑÄíÉ  • Ãáã Ãæ ÖíÞ Ýí ÇáÕÏÑ  • ÚáÇãÇÊ æÃÚÑÇÖ ÇáÊÛíÑ ÇáÎØíÑ Ýí ÖÑÈÇÊ ÇáÞáÈ Ãæ äÓÞ ÏÞÇÊ ÇáÞáÈ¡ ãËá Ãáã ÇáÕÏÑº ÇáÏæÇÑº ÓÑÚÉ¡ ÚÏã ÇäÊÙÇã äÈÖÇÊ ÇáÞáÈº ÇáÎÝÞÇäº ÇáÅÍÓÇÓ ÈÇáÖÚÝ Ãæ ÎÝÉ ÇáÑÃÓº ÇáÓÞæØº ãÔÇßá ÇáÊäÝÓ  • ÚáÇãÇÊ æÃÚÑÇÖ ÇáÓßÊÉ ÇáÏãÇÛíÉ ãËá ÇáÊÛíÑÇÊ Ýí ÇáÑÄíÉº ÇáÇÑÊÈÇßº ÕÚæÈÉ ÇáÊÍÏË Ãæ ÇáÝåãº ÇáÕÏÇÚ ÇáÍÇÏº ÇáÊäãíá Ãæ ÇáÖÚÝ ÇáãÝÇÌÆ ááæÌå Ãæ ÇáÐÑÇÚ Ãæ ÇáÓÇÞº ÕÚæÈÉ ÇáãÔí¡ ÇáÏæÇÑ¡ ÝÞÏÇä ÇáÊæÇÒä Ãæ ÇáÊäÇÓÞ  • ÚáÇãÇÊ æÃÚÑÇÖ ãÊáÇÒãÉ ÇáÓíÑæÊæäíä¡ ãËá ÓÑÚÉ ÇáÛÖÈº ÇáÇÑÊÈÇßº ÇáÅÓåÇáº ÓÑÚÉ Ãæ ÚÏã ÇäÊÙÇã ÖÑÈÇÊ ÇáÞáÈº ÇäÞÈÇÖ ÇáÚÖáÇÊº ÊíÈÓ ÇáÚÖáÇÊº ÇáÕÚæÈÉ Ýí ÇáÓíÑº ÇáÊÚÑÞº ÇáÍãì ÇáãÑÊÝÚÉº äæÈÇÊ ÇáÊÔäÌº ÇáÞÔÚÑíÑÉº ÇáÞíÁ  ÇáÂËÇÑ ÇáÌÇäÈíÉ ÇáÊí áÇ ÊÊØáÈ ÑÚÇíÉ ØÈíÉ (ÃÈáÛ ØÈíÈß Ãæ ÃÎÕÇÆí ÇáÑÚÇíÉ ÇáÕÍíÉ ÅÐÇ ÇÓÊãÑÊ Ãæ ßÇäÊ ãËíÑÉ ááÞáÞ):  • ÅÓåÇá  • ÇáÏæÇÑ  • ÇáÔÚæÑ ÈÇáäÚÇÓ  • ÌÝÇÝ ÇáÝã  • ÇáÕÏÇÚ  • ÛËíÇä Ãæ ÞíÁ  • Ãáã Ãæ Êäãíá Ãæ æÎÒ Ýí ÇáíÏíä Ãæ ÇáÞÏãíä  • Ãáã ÇáãÚÏÉ  åÐå ÇáÞÇÆãÉ ÞÏ áÇ ÊÕÝ ßá ÇáÂËÇÑ  ÇáÌÇäÈíÉ ÇáãÍÊãáÉ. ÇÊÕá ÈØÈíÈß ááÇÓÊÔÇÑÉ ÇáØÈíÉ Úä ÇáÂËÇÑ ÇáÌÇäÈíÉ. íãßäß ÇáÅÈáÇÛ Úä ÇáÂËÇÑ ÇáÌÇäÈíÉ áÅÏÇÑÉ ÇáÃÛÐíÉ æÇáÃÏæíÉ ÇáÃãÑíßíÉ (FDA) Úáì ÇáÑÞã ý.[phone]  Ãíä íÌÈ Úáíø ÇáÇÍÊÝÇÙ ÈÏæÇÆí¿  íÍÝÙ ÈÚíÏðÇ Úä ãÊäÇæá ÇáÃØÝÇá.  íÎÒä Ýí ÏÑÌÉ ÍÑÇÑÉ ÇáÛÑÝÉ Èíä 2 æ30 ÏÑÌÉ ãÆæíÉ (36 æ86 ÝåÑäåÇíÊ).  ÊÎáÕ ãä Ãí ÏæÇÁ ÛíÑ ãÓÊÎÏã ÈÚÏ ÊÇÑíÎ ÇäÊåÇÁ ÇáÕáÇÍíÉ ÇáãØÈæÚ Úáì ÇáãáÕÞ Ãæ ÇáÚÈæÉ.  ãáÇÍÙÉ: åÐå ÇáÕÍíÝÉ ÚÈÇÑÉ Úä ãáÎÕ: ÞÏ áÇ íÛØí åÐÇ ÇáãáÎøóÕ ßá ÇáãÚáæãÇÊ ÇáããßäÉ. ÅÐÇ ßÇäÊ áÏíß Ãí ÃÓÆáÉ Úä åÐÇ ÇáÏæÇÁ¡ ÊÍÏøË Åáì ÇáØÈíÈ Ãæ ÇáÕíÏáí Ãæ ãÞÏã ÇáÑÚÇíÉ ÇáÕÍíÉ.    © 2021 Elsevier/Gold Standard ([id number] 00:00:00)  Pantoprazole tablets  ãÇ åÐÇ ÇáÏæÇÁ¿  ÈÇäÊæÈÑÇÒæá íãäÚ ÅäÊÇÌ ÇáÃÍãÇÖ Ýí ÇáãÚÏÉ.  íÓÊÎÏã áÚáÇÌ ãÑÖ ÇÑÊÌÇÚ ÇáÍãÖ ááãÑÆ æÇáÊåÇÈ ÇáãÑíÁ æãÊáÇÒãÉ ÒæáíäÌÑ- ÃáíÓæä.  íãßä ÇÓÊÎÏÇã åÐÇ ÇáÏæÇÁ áÃÛÑÇÖ ÃÎÑìº ÇÓÃá ãÞÏã ÇáÑÚÇíÉ ÇáÕÍíÉ Ãæ ÇáÕíÏáí ÅÐÇ ßÇäÊ áÏíß ÃÓÆáÉ.  ÃÓãÇÁ ÇáÚáÇãÇÊ ÇáÊÌÇÑíÉ ÇáãÚÑæÝÉ:þ Protonix  ãÇ åí ÇáÃÔíÇÁ ÇáÊí íÌÈ Ãä ÃÎÈÑ ÈåÇ ãÞÏã ÇáÑÚÇíÉ ÇáÕÍíÉ ÞÈá ÊäÇæá åÐÇ ÇáÏæÇÁ¿  åã ÈÍÇÌÉ Åáí ãÚÑÝÉ ãÇ ÅÐÇ ßäÊ ãÕÇÈðÇ ÈÃí ãä åÐå ÇáÍÇáÇÊ ÇáÂä:  • ÃãÑÇÖ ÇáßÈÏ  • ÇäÎÝÇÖ ãÓÊæíÇÊ ÇáãÇÛäÓíæã Ýí ÇáÏã  • ÇáÐÆÈÉ  • ÑÏ ÝÚá ÛíÑ ÚÇÏí Ãæ ÍÓÇÓíÉ ÖÏ ÇæãíÈÑÇÒæá Ãæ áÇäÓæÈÑÇÒæá Ãæ ÈÇäÊæÈÑÇÒæá Ãæ áÇÈíÈÑÇÒæá  • ÑÏ ÝÚá ÛíÑ ÚÇÏí Ãæ ÍÓÇÓíÉ ÖÏ ÇáÃÏæíÉ ÇáÃÎÑì Ãæ ÇáÃØÚãÉ Ãæ ÇáÃÕÈÇÛ Ãæ ÇáãæÇÏ ÇáÍÇÝÙÉ  • ÍÇãá Ãæ ÊÓÚíä ááÍãá  • ÇáÑÖÇÚÉ ÇáØÈíÚíÉ  ßíÝ íÌÈ Úáíø ÇÓÊÚãÇá åÐÇ ÇáÏæÇÁ¿  ÊäÇæá åÐÇ ÇáÏæÇÁ Úä ØÑíÞ ÇáÝã.  ÇÈÊáÚ åÐÇ ÇáÏæÇÁ ÈÇáßÇãá ãÚ ßæÈ ãÇÁ.  ÇÊøóÈÚ ÇáÊÚáíãÇÊ ÇáãæÌæÏÉ Úáì ÇáÚÈæÉ Ãæ ãáÕÞ ÇáæÕÝÉ ÇáØÈíÉ.  áÇ ÊßÓÑ Ãæ ÊÓÍÞ Ãæ ÊãÖÛ åÐÇ ÇáÏæÇÁ.  ÊäÇæá åÐÇ ÇáÏæÇÁ Úáì ÝÊÑÇÊ ãäÊÙãÉ.  áÇ ÊÊäÇæá åÐÇ ÇáÏæÇÁ ÃßËÑ ãä ÇáãÑÇÊ ÇáãæÕæÝÉ.  ÊÍÏË Åáí ØÈíÈ ÇáÃØÝÇá ÈÔÃä ÇÓÊÚãÇá åÐÇ ÇáÏæÇÁ ááÃØÝÇá.  Ýí Ííä Ãä åÐÇ ÇáÏæÇÁ íãßä Ãä íæÕÝ ááÃØÝÇá ÇáÐíä íÈáÛæä ãä ÇáÚãÑ 5 ÓäæÇÊ ÝÃßÈÑ áÚáÇÌ ÇáÍÇáÇÊ ÇáãÍÏÏÉ¡ ÅáÇ Ãäå íáÒã ÇÊÎÇÐ ÇáÇÍÊíÇØÇÊ ÇááÇÒãÉ.  ÇáÌÑÚÉ ÇáÒÇÆÏÉ : ÅÐÇ ÇÚÊÞÏÊ Ãäß ÊäÇæáÊ ßãíÉ ßÈíÑÉ ÌÏðÇ ãä åÐÇ ÇáÏæÇÁ¡ ÇÊÕá ÈãÑßÒ ÇáÊÍßã Ýí ÇáÓãæã Ãæ ÛÑÝÉ ÇáØæÇÑÆ ÝæÑðÇ.  ãáÇÍÙÉ:  íÓÊÎÏã åÐÇ ÇáÏæÇÁ ãä ÃÌáß ÃäÊ ÝÞØ. áÇ ÊÔÇÑß ÂÎÑíä ãÚß Ýí ÊäÇæá åÐÇ ÇáÏæÇÁ.  ãÇÐÇ áæ ÊÑßÊ (äÓíÊ) ÌÑÚÉ¿  ÅÐÇ ÝÇÊÊß ÌÑÚÉ¡ ÊäÇæáåÇ Ýí ÃÞÑÈ æÞÊ ããßä.  ÅÐÇ ßÇä åÐÇ æÞÊ ÌÑÚÊß ÇáÊÇáíÉ ÊÞÑíÈðÇ ¡ ÝÊäÇæá Êáß ÇáÌÑÚÉ ÝÞØ.  áÇ ÊÊäÇæá ÌÑÚÊíä Ãæ ÌÑÚÉ ÒÇÆÏÉ.  ãÇ åí ÇáÃÔíÇÁ ÇáÊí ÞÏ ÊÊÝÇÚá ãÚ åÐÇ ÇáÏæÇÁ¿  áÇ ÊäÇæá åÐÇ ÇáÏæÇÁ ãÚ Ãí ããÇ íáí:  • ÇÊÇÒäÇÝíÑ  • äíáÝíäÇÝíÑ  åÐÇ ÇáÏæÇÁ íãßä Ãä íÊÝÇÚá ÃíÖðÇ ãÚ ÇáÃÏæíÉ ÇáÊÇáíÉ:  • ÃãÈíÓíáíä  • ÏíáÇÝíÑÏíä  • ÅÑáæÊíäíÈ  • ÃãáÇÍ ÇáÍÏíÏ  • ÈÚÖ ÃÏæíÉ ÚÏæì ÇáÝØÑíÇÊ ãËá ÝáßæäÇÒæá æÇíÊÑÇßæäÇÒæá æßíÊæßæäÇÒæá æÝæÑíßæäÇÒæá  • ãíËæÊÑíßÓíÊ  • ãíßæÝíäæáÇÊ ãæÝíÊíá  • æÇÑÝÇÑíä  åÐå ÇáÞÇÆãÉ ÞÏ áÇ ÊÕÝ ßá ÇáÊÝÇÚáÇÊ ÇáãÍÊãáÉ. Þã ÈÅÚØÇÁ ãÞÏã ÇáÑÚÇíÉ ÇáÕÍíÉ ÞÇÆãÉ Èßá ÇáÃÏæíÉ Ãæ ÇáÃÚÔÇÈ Ãæ ÇáÃÏæíÉ ÈÏæä æÕÝÉ Ãæ ÇáãßãáÇÊ ÇáÛÐÇÆíÉ ÇáÊí ÊÓÊÎÏãåÇ. ÃÎÈÑåã ÃíÖðÇ ÅÐÇ ßäÊ ÊÏÎä Ãæ ÊÔÑÈ ÇáßÍæá Ãæ ÊÓÊÎÏã ÚÞÇÑÇÊ ÛíÑ ÞÇäæäíÉ. ÞÏ ÊÊÝÇÚá ÈÚÖ ÇáÚäÇÕÑ ãÚ ÏæÇÆß.  ãÇ ÇáÐí íÌÈ Úáíø ÊÑÞÈå ÚäÏ ÇÓÊÚãÇá åÐÇ ÇáÏæÇÁ¿  ÞÏ íÓÊÛÑÞ ÇáÃãÑ ÚÏÉ ÃíÇã ÞÈá ÊÍÓä Ãáã ÇáãÚÏÉ.  ÇÊÕá ÈØÈíÈß Ãæ ÃÎÕÇÆí ÇáÑÚÇíÉ ÇáÕÍíÉ ÅÐÇ áã ÊÈÏÃ ÍÇáÊß Ýí ÇáÊÍÓä Ãæ ÅÐÇ ÇÒÏÇÏÊ ÓæÁðÇ.  åÐÇ ÇáÏæÇÁ ÞÏ íÓÈÈ ÑÏæÏ ÝÚá ÎØíÑÉ Úáì ÇáÌáÏ.  æíãßä Ãä ÊÍÏË ÈÚÏ ÃÓÇÈíÚ Åáì ÔåæÑ ãä ÈÏÁ ÇáÏæÇÁ.  ÇÊÕá ÈØÈíÈß Ãæ ÃÎÕÇÆí ÇáÑÚÇíÉ ÇáÕÍíÉ Úáì ÇáÝæÑ ÅÐÇ áÇÍÙÊ Íãøì Ãæ ÃÚÑÇÖðÇ ÊÔÈå ÃÚÑÇÖ ÇáÅäÝáæäÒÇ ãÚ ØÝÍ.  ÞÏ íßæä ÇáØÝÍ ÃÍãÑ Ãæ ÃÑÌæÇäí Çááæä Ëã íÊÍæá Åáì ÈËæÑ Ãæ ÊÞÔÑ ááÌáÏ.  Ãæ ÞÏ ÊáÇÍÙ ØÝÍðÇ ÃÍãÑ Çááæä ãÚ ÊæÑã Ýí ÇáæÌå Ãæ ÇáÔÝÊíä Ãæ ÇáÛÏÏ ÇááíãÝÇæíÉ Ýí ÑÞÈÊß Ãæ ÊÍÊ ÐÑÇÚíß.  ÓæÝ ÊÍÊÇÌ Åáì ÅÌÑÇÁ ÇÎÊÈÇÑÇÊ ãäÊÙãÉ ááÏã ÃËäÇÁ ÊäÇæáß áåÐÇ ÇáÏæÇÁ.  ÞÏ íÊÓÈÈ åÐÇ ÇáÏæÇÁ Ýí ÇäÎÝÇÖ äÓÈÉ ÝíÊÇãíä È 12 Ýí ÇáÌÓã.  æíäÈÛí Úáíß Ãä ÊÊÃßÏ ãä ÍÕæáß Úáì ßãíÉ ßÇÝíÉ ãä ÝíÊÇãíä È 12 ÃËäÇÁ ÊäÇæá åÐÇ ÇáÏæÇÁ.  äÇÞÔ ÇáÃØÚãÉ æÇáÝíÊÇãíäÇÊ ÇáÊí ÊÊäÇæáåÇ ãÚ ØÈíÈß Ãæ ÃÎÕÇÆí ÇáÑÚÇíÉ ÇáÕÍíÉ.  ãÇ åí ÇáÂËÇÑ ÇáÌÇäÈíÉ ÇáÊí íãßä Ãä ÃáÇÍÙåÇ ÚäÏ ÊáÞí åÐÇ ÇáÏæÇÁ¿  ÇáÂËÇÑ ÇáÌÇäÈíÉ ÇáÊí íÌÈ Ãä ÊÈáÛ ØÈíÈß Ãæ ÃÎÕÇÆí ÇáÑÚÇíÉ ÇáÕÍíÉ ÈåÇ Ýí ÃÞÑÈ æÞÊ ããßä:  • ÇáÍÓÇÓíÉ ãËá ÇáØÝÍ ÇáÌáÏí æÇáÍßÉ æÇáÇÑÊßÇÑíÇ (ÇáÔÑí) ææÑã ÇáæÌå Ãæ ÇáÔÝÇå Ãæ ÇááÓÇä.  • Ãáã Ýí  ÇáÚÙÇã Ãæ ÇáãÝÇÕá Ãæ ÇáÚÖáÇÊ  • ÕÚæÈÉ ÇáÊäÝÓ  • Ãáã Ãæ ÖíÞ Ýí ÇáÕÏÑ  • Èæá ÃÕÝÑ ÛÇãÞ Ãæ Èäí  • ÇáÏæÇÑ  • äÈÖÇÊ ÇáÞáÈ ÇáÓÑíÚÉ Ãæ ÛíÑ ÇáãäÊÙãÉ  • ÇáÔÚæÑ ÈÇáÏæÇÑ Ãæ ÇáÅÛãÇÁ  • ÇáÍãøì Ãæ ÇÍÊÞÇä ÇáÍáÞ  • ÊÞáÕÇÊ Ãæ ÊÔäÌÇÊ ÇáÚÖáÇÊ  • ÇáÎÝÞÇä  • ØÝÍ Úáì ÇáÎÏíä Ãæ ÇáÐÑÇÚíä íÒÏÇÏ ÓæÁðÇ Ýí ÇáÔãÓ  • ÇÍãÑÇÑ Ãæ ÊÍæÕá Ãæ ÊÞÔÑ Ãæ ÊÑåá Ýí ÇáÌáÏ¡ ÈãÇ Ýí Ðáß ãäØÞÉ ÏÇÎá ÇáÝã  • äæÈÇÊ (äæÈÇÊ ÊÔäÌ)  • ÓáÇÆá ÇáãÚÏÉ  • ÇÑÊÌÇÝ  • äÒíÝ Ãæ ßÏãÇÊ ÛíÑ ãÚÊÇÏÉ  • ÖÚÝ Ãæ ÅÌåÇÏ ÛíÑ ãÚÊÇÏ  • ÇÕÝÑÇÑ ÇáÚíäíä Ãæ ÇáÌáÏ  ÇáÂËÇÑ ÇáÌÇäÈíÉ ÇáÊí áÇ ÊÊØáÈ ÑÚÇíÉ ØÈíÉ (ÃÈáÛ ØÈíÈß Ãæ ÃÎÕÇÆí ÇáÑÚÇíÉ ÇáÕÍíÉ ÅÐÇ ÇÓÊãÑÊ Ãæ ßÇäÊ ãËíÑÉ ááÞáÞ):  • ÅãÓÇß  • ÅÓåÇá  • ÌÝÇÝ ÇáÝã  • ÇáÕÏÇÚ  • ÇáÛËíÇä  åÐå ÇáÞÇÆãÉ ÞÏ áÇ ÊÕÝ ßá ÇáÂËÇÑ ÇáÌÇäÈíÉ ÇáãÍÊãáÉ. ÇÊÕá ÈØÈíÈß ááÇÓÊÔÇÑÉ ÇáØÈíÉ Úä ÇáÂËÇÑ ÇáÌÇäÈíÉ. íãßäß ÇáÅÈáÇÛ Úä ÇáÂËÇÑ ÇáÌÇäÈíÉ áÅÏÇÑÉ ÇáÃÛÐíÉ æÇáÃÏæíÉ ÇáÃãÑíßíÉ (FDA) Úáì ÇáÑÞã [phone]  Ãíä íÌÈ Úáíø ÇáÇÍÊÝÇÙ ÈÏæÇÆí¿  íÍÝÙ ÈÚíÏðÇ Úä ãÊäÇæá ÇáÃØÝÇá.  íÎÒä Ýí ÏÑÌÉ ÍÑÇÑÉ ÇáÛÑÝÉ Èíä 15 æ30 ÏÑÌÉ ãÆæíÉ (59 æ86 ÝåÑäåÇíÊ).  íÍÝÙ ÈÚíÏðÇ Úä ÇáÑØæÈÉ æÇáÖæÁ.  ÊÎáÕ ãä Ãí ÏæÇÁ ÛíÑ ãÓÊÎÏã ÈÚÏ ÊÇÑíÎ ÇäÊåÇÁ ÇáÕáÇÍíÉ ÇáãØÈæÚ Úáì ÇáãáÕÞ Ãæ ÇáÚÈæÉ.  ãáÇÍÙÉ: åÐå ÇáÕÍíÝÉ ÚÈÇÑÉ Úä ãáÎÕ: ÞÏ áÇ íÛØí åÐÇ ÇáãáÎøóÕ ßá ÇáãÚáæãÇÊ ÇáããßäÉ. ÅÐÇ ßÇäÊ áÏíß Ãí ÃÓÆáÉ Úä åÐÇ ÇáÏæÇÁ¡ ÊÍÏøË Åáì ÇáØÈíÈ Ãæ ÇáÕíÏáí Ãæ ãÞÏã ÇáÑÚÇíÉ ÇáÕÍíÉ.    © 2021 [employer]/Gold Standard (2020-05-18 00:00:00)  ÂáÇã ÇáãÝÇÕá  Joint Pain    Ãáã ÇáãÝÇÕá áå ÃÓÈÇÈ ßËíÑÉ. æíÒæá åÐÇ ÇáÃáã ÚÇÏÉ ÚäÏ ÇÊÈÇÚ ÅÑÔÇÏÇÊ ãõÞÏøöã ÇáÑÚÇíÉ ÇáÕÍíÉ ÇáãÊÚáÞÉ ÈÊÎÝíÝ ÇáÃáã Ýí ÇáãäÒá. æãÚ Ðáß ÞÏ íäÌã Ãáã ÇáãÝÇÕá Úä ÍÇáÇÊ ÊÊØáÈ ÇáãÒíÏ ãä ÇáÚáÇÌ. ÊÔãá ÇáÃÓÈÇÈ ÇáÔÇÆÚÉ áÂáÇã ÇáãÝÇÕá ãÇ íáí:  • ÇáßÏãÇÊ Ýí ãäØÞÉ ÇáãÝÕá.  • ÅÕÇÈÉ äÇÊÌÉ Úä ÊßÑÇÑ ÍÑßÇÊ ãÚíäÉ ßËíÑðÇ ÌÏðÇ.  • ÊÏåæÑ ÍÇáÉ ÇáãÝÕá ÈÓÈÈ ÇáÊÞÏã Ýí ÇáÚãÑ.  • Êßøæä ÈáæÑÇÊ ÍãÖ ÇáíæÑíß Ýí ÇáãÝÕá (ÇáäÞÑÓ).  • ÇáÊåÇÈ ÇáãÝÕá.  • ÈÚÖ ÃäæÇÚ ÇáÊåÇÈ ÇáãÝÇÕá ÇáÃÎÑì.  • ÚÏæì Ýí ÇáãÝÕá Ãæ ÇáÚÙã.  ÞÏ íÕÝ áß ãÞÏã ÇáÑÚÇíÉ ÇáÕÍíÉ ãÓßäðÇ ááÃáã Ãæ íæÕíß ÈæÖÚ ÃÏÇÉ ÏÇÚã ãËá ÖãÇÏÉ ãÑäÉ Ãæ ÍãÇáÉ Ãæ ÌÈíÑÉ. ÅÐÇ ÇÓÊãÑ  Ãáã ÇáãÝÇÕá áÏíß¡ ÝÞÏ ÊÍÊÇÌ Åáì ÅÌÑÇÁ ÝÍæÕÇÊ ãÚãáíÉ Ãæ ÇÎÊÈÇÑÇÊ ÊÕæíÑíÉ áÊÔÎíÕ ÓÈÈ Ãáã ÇáãÝÇÕá.  ÇÊÈÚ ÇáÊÚáíãÇÊ ÇáÊÇáíÉ Ýí ÇáãäÒáö:  ÇáÓíØÑÉ Úáì ÇáÃáã æÇáÊíÈÓ æÇáÊæÑã      • ÖÚ ËáÌðÇ Úáì ÇáãäØÞÉ ÇáãÄáãÉ¡ ÅÐÇ ÃõÑÔÏÊ áÐáß. æíßæä Ðáß ÈÇÊÈÇÚ ÇáÎØæÇÊ ÇáÊÇáíÉ:  ? ÅÐÇ ßäÊ ÊÓÊÎÏã ÖãÇÏÉ ãÑäÉ Ãæ ÍãÇáÉ Ãæ ÌÈíÑÉ ãä ÇáäæÚ ÇáÐí íãßä ÎáÚå¡ ÝÇÎáÚåÇ æÝÞ ÅÑÔÇÏÇÊ ÇáØÈíÈ.  ? ÖÚ ÇáËáÌ Ýí ßíÓ ÈáÇÓÊíß.  ? æÖÚ ãäÔÝÉ Èíä ÌáÏß æÇáßíÓ.  ? ÇÊÑß ÇáËáÌ áãÏÉ 20 ÏÞíÞÉº æßÑÑ Ðáß ÈãÚÏá 2-3 ãÑÇÊ íæãíðÇ.  ? ÃÒá ÇáËáÌ ÅÐÇ ÊÍæá áæä ÈÔÑÊß Åáì ÇáÃÍãÑ ÇáÝÇÊÍ. ÝåÐÇ ÃãÑ ãåã ÌÏðÇ. æÅÐÇ ßäÊ áÇ ÊÓÊØíÚ ÇáÔÚæÑ ÈÇáÃáã Ãæ ÇáÍÑÇÑÉ Ãæ ÇáÈÑæÏÉ¡ ÝÅäß ÃßËÑ ÚÑÖÉ áÍÏæË ÅÕÇÈÉ Ýí ÇáãßÇä ÇáÐí ÊÖÚ Úáíå ÇáËáÌ.  • ÍÑøß ÃÕÇÈÚ íÏíß Ãæ ÞÏãíß ÈÇÓÊãÑÇÑ áÊÞáíá ÇáÊíÈÓ æÇáÊæÑã.  • ÇÑÝÚ ÇáãäØÞÉ ÇáãÕÇÈÉ ÝæÞ ãÓÊæì ÇáÞáÈ Ýí ÃËäÇÁ ÇáÌáæÓ Ãæ ÇáÇÓÊáÞÇÁ.  ÅÐÇ ÃæÕÇß ãõÞÏøöã ÇáÑÚÇíÉ ÇáÕÍíÉ ÇáãÊÇÈÚ áß ÈÊÏÝÆÉ ÇáãäØÞÉ ÇáãÄáãÉ¡ ÝÇáÊÒã ÈÊÚáíãÇÊå Ýí åÐÇ ÇáÔÃä. ÇÓÊÎÏã ãÕÏÑ ÇáÍÑÇÑÉ ÇáÐí íæÕöí Èå ãÞÏã ÇáÑÚÇíÉ ÇáÕÍíÉ ãËá ÇáÍÔæÉ ÇáãÑØÈÉ Ãæ æÓÇÏÉ ÇáÊÏÝÆÉ.  • ßÐáß ÖÚ ãäÔÝÉ Èíä ÇáÌáÏ æãÕÏÑ ÇáÍÑÇÑÉ.  • ÇÊÑß ãÕÏÑ ÇáÊÏÝÆÉ Úáì ÇáãæÖÚ áãÏÉ ÊÊÑÇæÍ ãä 20-30 ÏÞíÞÉ.  • ÃÒá ãÕÏÑ ÇáÍÑÇÑÉ ÅÐÇ ÊÍæá áæä ÈÔÑÊß Åáì ÇáÃÍãÑ ÇáÝÇÊÍ. æåÐÇ ãÄÔÑ ãåã ááÛÇíÉ ÅÐÇ ßÇä íÊÚÐÑ Úáíß ÇáÔÚæÑ ÈÇáÃáã Ãæ ÇáÍÑÇÑÉ Ãæ ÇáÈÑæÏÉ. ÝÞÏ íÊÒÇíÏ ÎØÑ ÇáÅÕÇÈÉ ÈÍÑÞ.  ÇáäÔÇØ  • ÇÍÑÕ Úáì äíá ÞÓØ ãä ÇáÑÇÍÉ ÍÓÈ ÅÑÔÇÏÇÊ ãÞÏã ÇáÑÚÇíÉ ÇáÕÍíÉ ÇáãÊÇÈÚ áß. áÇ ÊÝÚá ÔíÆðÇ íÓÈÈ ÇáÃáã Ãæ íÒíÏå.  • ÇÈÏÃ Ýí ããÇÑÓÉ ÊãÇÑíä Ãæ ÊãÏíÏ ÇáãäØÞÉ ÇáãÕÇÈÉ æÝÞ ÊæÌíåÇÊ ãõÞÏøöã ÇáÑÚÇíÉ ÇáÕÍíÉ.  • æÊßæä ãÚÇæÏÉ ãÒÇæáÉ ÃäÔØÊß ÇáÚÇÏíÉ æÝÞ ÊÚáíãÇÊ ãõÞÏøöã ÇáÑÚÇíÉ ÇáÕÍíÉ. ÇÓÊÔÑ ãÞÏã ÇáÑÚÇíÉ ÇáÕÍíÉ Úä ÇáÃäÔØÉ ÇáÂãäÉ áß.  ÅÐÇ ßäÊ ÊÖÚ ÖãÇÏÉ ãÑäÉ Ãæ ÍãÇáÉ Ãæ ÌÈíÑÉ:  • ÇÑÊÏö ÇáÖãÇÏÉ ÇáãÑäÉ Ãæ ÇáÍãÇáÉ Ãæ ÇáÌÈíÑÉ ÍÓÈ ÊæÌíåÇÊ ãõÞÏøöã ÇáÑÚÇíÉ ÇáÕÍíÉ. æáÇ ÊÎáÚåÇ ÅáÇ æÝÞ ÊæÕíÇÊå.  • Þáá ÅÍßÇã ÇáÖãÇÏÉ Ãæ ÇáÍãÇáÉ Ãæ ÇáÌÈíÑÉ ÅÐÇ ÔÚÑÊ ÈÊäãíá Ýí ÃÕÇÈÚ íÏíß Ãæ ÞÏãíß ÃÓÝá ÇáãÝÕá ÇáãÕÇÈ Ãæ áÇÍÙÊ ÚáíåÇ ÈÑæÏÉ æÒÑÞÉ.  • æÍÇÝÙ ÚáíåÇ äÙíÝÉ.  • ÇÓÊÝÓÑ ãä ãÞÏã ÇáÑÚÇíÉ ÇáÕÍíÉ Úä ÅãßÇäíÉ ÎáÚ ÇáÖãÇÏÉ Ãæ ÇáÌÈíÑÉ Ãæ  ÇáÍãÇáÉ ÞÈá ÇáÇÓÊÍãÇã.  • ÅÐÇ ßÇäÊ ÇáÖãÇÏÉ Ãæ ÇáÍãÇáÉ Ãæ ÇáÌÈíÑÉ ÛíÑ ãÞÇæãÉ ááãÇÁ:  ? ÊÌäÈ ÇáÈáá.  ? æÛØåÇ ÈÛáÇÝ áÇ íÓãÍ ÈÏÎæá ÇáãÇÁ ÚäÏ ÇáÇÓÊÍãÇã Ýí ÍæÖ ÇáÇÓÊÍãÇã Ãæ ÊÍÊ ÇáÏÔ.  ÊÚáíãÇÊ ÚÇãÉ  • ÞÏ íÔãá ÇáÚáÇÌõ ÃÏæíÉ ááÃáã æÇáÇáÊåÇÈ ÊÄÎÐ Úä ØÑíÞ ÇáÝã Ãæ ÊõÏåä Úáì ÇáÌáÏ. ÊäÇæá ÇáÃÏæíÉ æÝÞ ãÇ íÎÈÑß Èå ãÞÏã ÇáÑÚÇíÉ ÇáÕÍíÉ¡ ÃßÇäÊ ÊõÕÑÝ ÈæÕÝÉ ØÈíÉ Ãæ Ïæä æÕÝÉ ØÈíÉ.  • áÇ ÊÊäÇæá ãäÊÌÇÊ ÊÍÊæí Úáì ÇáäíßæÊíä Ãæ ÇáÊÈÛ ãËá ÇáÓÌÇÆÑ æÇáÓÌÇÆÑ ÇáÅáßÊÑæäíÉ æÊÈÛ ÇáãÖÛ. æÇÓÊÔÑ ãÞÏã ÇáÑÚÇíÉ ÇáÕÍíÉ ÅÐÇ ÇÍÊÌÊ Åáì ÇáãÓÇÚÏÉ ááÅÞáÇÚ Úä ÇáÊÏÎíä.  • ÇáÊÒã ÈÌãíÚ ÒíÇÑÇÊ ÇáãÊÇÈÚÉ. æåÐÇ ÃãÑ ãåã.  ÇÊÕá ÈãÞÏã ÇáÑÚÇíÉ ÇáÕÍíÉ Ýí ÇáÍÇáÇÊ ÇáÊÇáíÉ:  • ßÇä ÇáÃáã íÒÏÇÏ ÓæÁðÇ æáÇ íÊÍÓä ãÚ ÇáÏæÇÁ.  • ÚÏã ÊÍÓä ÂáÇã ÇáãÝÇÕá ÇáÊí ÊÚÇäíåÇ Ýí ÛÖæä 3 ÃíÇã.  • ÇÒÏÇÏ ÔÚæÑß ÈÇáßÏãÉ Ãæ ÇáÊæÑã.  • ÇáÍãí.  • ÅÐÇ ÝÞÏÊ 10 ÃÑØÇá (4.5 ßíáæÌÑÇãÇÊ) Ãæ ÃßËÑ Ïæä Ãä ÊÍÇæá Ðáß.  ÇØáÈ ÇáãÓÇÚÏÉ ÝæÑðÇ Ýí ÇáÍÇáÇÊ ÇáÊÇáíÉ:  • ßäÊ áÇ ÊÓÊØíÚ ÊÍÑíß ÇáãÝÕá.  • ÚÇäíÊ ãä ÎÏÑ Ýí ÃÕÇÈÚ íÏíß Ãæ ÞÏãíß Ãæ ÔÚÑÊ ÈÊäãíá Ãæ áÇÍÙÊ ÚáíåÇ ÈÑæÏÉ æÒÑÞÉ.  • ÚÇäíÊ ãä Íõãøì ãÚ ÇÍãÑÇÑ æÊæÑã æÏÝÁ ãäØÞÉ ÇáãÝÕá.  ãáÎÕ  • Ãáã ÇáãÝÇÕá áå ÃÓÈÇÈ ßËíÑÉ.  • ÞÏ íÕÝ áß ãÞÏã ÇáÑÚÇíÉ ÇáÕÍíÉ ãÓßäðÇ ááÃáã Ãæ íæÕíß ÈÇÓÊÎÏÇã ÃÏÇÉ ÏÇÚãÉ ãËá ÖãÇÏÉ ãÑäÉ Ãæ ÍãÇáÉ Ãæ ÌÈíÑÉ.  • ÅÐÇ ÇÓÊãÑ Ãáã ÇáãÝÇÕá áÏíß¡ ÝÞÏ ÊÍÊÇÌ Åáì ÇáÎÖæÚ áÝÍæÕÇÊ áÊÔÎíÕ ÓÈÈ Ãáã ÇáãÝÇÕá.  • ÊäÇæá ÇáÃÏæíÉ æÝÞ ãÇ íÎÈÑß Èå ãÞÏã ÇáÑÚÇíÉ ÇáÕÍíÉ¡ ÃßÇäÊ ÊõÕÑÝ ÈæÕÝÉ ØÈíÉ Ãæ Ïæä æÕÝÉ ØÈíÉ.  áíÓ ÇáåÏÝ ãä åÐå ÇáãÚáæãÇÊ Ãä Êßæä ÈÏíáÇð ááÅÑÔÇÏÇÊ ÇáÊí íÞÏãåÇ ãæÝÑ ÇáÑÚÇíÉ ÇáÕÍíÉ. ÊÃßÏ ãä ãäÇÞÔÉ ÃíÉ ÃÓÆáÉ ÊÏæÑ Ýí Ðåäß ãÚ ãæÝÑ ÇáÑÚÇíÉ ÇáÕÍíÉ.þ  Document Revised: 06/15/2021 Document Reviewed: 06/15/2021  [employer] Patient Education © 2021 [employer]    Chronic Fatigue Syndrome  Chronic fatigue syndrome (CFS) is a condition that causes extreme tiredness (fatigue). This condition is also known as myalgic encephalomyelitis (ME). The fatigue in CFS does not improve with rest, and it gets worse with physical or mental activity. Several other symptoms may occur along with  fatigue.  Symptoms may come and go, but they generally last for months. Sometimes, CFS gets better over time. In other cases, it can be a lifelong condition. There is no cure, but there are many possible treatments. You will need to work with your health care providers to find a treatment plan that works best for you.  What are the causes?  The cause of this condition is not known. CFS may be caused by a combination of things. Possible causes include:  · An infection.  · An abnormal body defense system (abnormal immune system).  · Low blood pressure.  · Poor diet.  · Living with a lot of physical or emotional stress.  What increases the risk?  The following factors may make you more likely to develop this condition:  · Being female.  · Being 40-50 years old.  · Having a family history of CFS.  What are the signs or symptoms?  The main symptom of this condition is fatigue that is severe enough to interfere with day-to-day activities. This fatigue does not get better with rest, and it gets worse with physical or mental activity. There are eight other major symptoms of CFS:  · Discomfort and lack of energy (malaise) that lasts more than 24 hours after physical activity.  · Sleep that does not relieve fatigue (unrefreshing sleep).  · Short-term memory loss or confusion.  · Joint pain without redness or swelling.  · Muscle aches.  · Headaches.  · Painful and swollen glands (lymph nodes) in the neck or under the arms.  · Sore throat.  Other symptoms can include:  · Cramps in the abdomen, constipation, or diarrhea (irritable bowel syndrome).  · Chills and night sweats.  · Vision changes.  · Dizziness and mental confusion (brain fog).  · Clumsiness.  · Sensitivity to food, noise, or odors.  · Mood swings, depression, or anxiety attacks.  How is this diagnosed?  There are no tests that can diagnose this condition. Your health care provider will make the diagnosis based on:  · Your symptoms and medical history.  · A physical  exam and a mental health exam.  · Tests to rule out other conditions. It is important to make sure that your symptoms are not caused by another medical condition. Tests may include lab tests or X-rays.  For your health care provider to diagnose CFS:  · You must have had fatigue for at least 6 straight months.  · Fatigue must be your first symptom, and it must be severe enough to interfere with day-to-day activities.  · You must also have at least four of the eight other major symptoms of CFS.  · There must be no other cause found for the fatigue.  How is this treated?  There is no cure for CFS. The condition affects everyone differently. You will need to work with your team of health care providers to find the best treatments for your symptoms. Your team may include your primary care provider, physical and exercise therapists, and mental health therapists. Treatment may include:  · Having a regular bedtime routine to help improve your sleep.  · Avoiding caffeine, alcohol, and tobacco or nicotine products.  · Doing light exercise and stretching during the day. You may also want to try movement exercises, such as yoga or marilyn chi.  · Taking medicines to help you sleep or to relieve joint or muscle pain.  · Learning and practicing relaxation techniques, such as deep breathing and muscle relaxation.  · Using memory aids or doing brainteasers to improve memory and concentration.  · Getting care for your body and mental well-being, such as:  ? Seeing a mental health therapist to evaluate and treat depression, if necessary.  ? Cognitive behavioral therapy (CBT). This therapy changes the way you think or act in response to the fatigue. This may help improve how you feel.  ? Trying massage therapy and acupuncture.  Follow these instructions at home:  Eating and drinking    · Avoid caffeine and alcohol.  · Avoid heavy meals in the evening.  · Eat a healthy diet that includes foods such as vegetables, fruits, fish, and lean  meats.    Activity  · Rest as told by your health care provider.  · Avoid fatigue by pacing yourself during the day and getting enough sleep at night.  · Exercise regularly, as told by your health care provider.  · Go to bed and get up at the same time every day.  Lifestyle  · Ask your health care provider whether you should keep a diary. Your health care provider will tell you what information to write in the diary. This may include when you have fatigue and how medicines and other behaviors or treatments help to reduce the fatigue.  · Consider joining a CFS support group.  · Avoid stress and use stress-reducing techniques that you learn in therapy.  General instructions    · Take over-the-counter and prescription medicines only as told by your health care provider.  · Do not use herbal or dietary supplements unless they are approved by your health care provider.  · Maintain a healthy weight.  · Do not use any products that contain nicotine or tobacco, such as cigarettes, e-cigarettes, and chewing tobacco. If you need help quitting, ask your health care provider.  · Keep all follow-up visits as told by your health care provider. This is important.    Where to find more information  Get more information or find a support group near you at one of these links:  · American Myalgic Encephalomyelitis and Chronic Fatigue Syndrome Society: ammes.org  · Centers for Disease Control and Prevention: www.cdc.gov  Contact a health care provider if:  · Your symptoms do not get better or they get worse.  · You feel angry, guilty, anxious, or depressed.  Get help right away if:  · You have thoughts of self-harm.  If you ever feel like you may hurt yourself or others, or have thoughts about taking your own life, get help right away. Go to your nearest emergency department or:  · Call your local emergency services (911 in the [place]  · Call a suicide crisis helpline, such as the National Suicide Prevention Lifeline at 1-427.607.2673.  This is open 24 hours a day in the [place]  · Text the Crisis Text Line at 431652 (in the [place]  Summary  · Chronic fatigue syndrome (CFS) is a condition that causes extreme tiredness (fatigue). This fatigue does not improve with rest, and it gets worse with physical or mental activity.  · There is no cure for CFS. The condition affects everyone differently. You will need to work with your team of health care providers to find the best treatments for your symptoms.  · Exercise regularly, as told by your health care provider. Avoid stress and use stress-reducing techniques that you learn in therapy.  · Contact a health care provider if your symptoms do not get better or they get worse.  This information is not intended to replace advice given to you by your health care provider. Make sure you discuss any questions you have with your health care provider.  Document Revised: 11/30/2020 Document Reviewed: 11/30/2020  [employer] Patient Education © 2021 [employer]

## 2021-11-11 NOTE — ASSESSMENT & PLAN NOTE
Patient complains of chronic fatigue which is worsening.   Laboratory studies per orders.  Further recommendation after laboratory evaluation.

## 2021-11-13 LAB
RHEUMATOID FACT SERPL-ACNC: <10 IU/ML (ref 0–13.9)
WRITTEN AUTHORIZATION: NORMAL

## 2021-12-13 ENCOUNTER — OFFICE VISIT (OUTPATIENT)
Dept: FAMILY MEDICINE CLINIC | Facility: CLINIC | Age: 31
End: 2021-12-13

## 2021-12-13 VITALS
HEART RATE: 86 BPM | OXYGEN SATURATION: 98 % | HEIGHT: 67 IN | DIASTOLIC BLOOD PRESSURE: 70 MMHG | BODY MASS INDEX: 32.62 KG/M2 | SYSTOLIC BLOOD PRESSURE: 112 MMHG | RESPIRATION RATE: 18 BRPM | WEIGHT: 207.8 LBS | TEMPERATURE: 98 F

## 2021-12-13 DIAGNOSIS — E55.9 VITAMIN D DEFICIENCY: ICD-10-CM

## 2021-12-13 DIAGNOSIS — R79.89 ELEVATED LFTS: ICD-10-CM

## 2021-12-13 DIAGNOSIS — K21.9 CHRONIC GERD: Primary | Chronic | ICD-10-CM

## 2021-12-13 DIAGNOSIS — R10.11 RIGHT UPPER QUADRANT ABDOMINAL PAIN: ICD-10-CM

## 2021-12-13 DIAGNOSIS — R17 ELEVATED BILIRUBIN: ICD-10-CM

## 2021-12-13 DIAGNOSIS — R53.82 CHRONIC FATIGUE: Chronic | ICD-10-CM

## 2021-12-13 PROCEDURE — 36415 COLL VENOUS BLD VENIPUNCTURE: CPT | Performed by: NURSE PRACTITIONER

## 2021-12-13 PROCEDURE — 99214 OFFICE O/P EST MOD 30 MIN: CPT | Performed by: NURSE PRACTITIONER

## 2021-12-13 PROCEDURE — 80053 COMPREHEN METABOLIC PANEL: CPT | Performed by: NURSE PRACTITIONER

## 2021-12-13 RX ORDER — ERGOCALCIFEROL 1.25 MG/1
50000 CAPSULE ORAL WEEKLY
Qty: 5 CAPSULE | Refills: 3 | Status: SHIPPED | OUTPATIENT
Start: 2021-12-13 | End: 2022-03-17

## 2021-12-13 RX ORDER — OMEPRAZOLE 40 MG/1
40 CAPSULE, DELAYED RELEASE ORAL DAILY
Qty: 30 CAPSULE | Refills: 0 | Status: SHIPPED | OUTPATIENT
Start: 2021-12-13 | End: 2022-03-10

## 2021-12-13 NOTE — ASSESSMENT & PLAN NOTE
GERD symptoms worsening recently.  Discontinue pantoprazole.  Will change prescription to omeprazole.

## 2021-12-13 NOTE — PROGRESS NOTES
Follow Up Office Note     Patient Name: Meliton Lagos  : 1990   MRN: [id number]     Chief Complaint:    Chief Complaint   Patient presents with   • Abdominal Pain   • Fatigue       History of Present Illness: Meliton Lagos is a 31 y.o. male who presents today with complaint of intermittent right upper quadrant pain x1 month.  Patient is also complaining of worsening of chronic GERD symptoms.  He states that he has been off of his pantoprazole for the past month as his insurance would not cover this medication.  Additionally, patient is here for follow-up for elevated liver enzymes and elevated bilirubin which were noted on his most recent labs from 21. Patient has surgical history significant for cholecystectomy approximately 3-1/2 years ago.  Patient had an EGD per Dr. Mobley in  of this year.  Lastly, patient is complaining of worsening of chronic fatigue.  He states he has started taking over-the-counter vitamin D 1000 units/day due to vitamin D deficiency, however he does not feel that the over-the-counter vitamin D is helping his symptoms as much as when he was taking the prescription strength vitamin D.  He is requesting to restart prescription strength vitamin D weekly.    Comprehensive Metabolic Panel (2021 15:58)  PATHOLOGY - SCAN - PATHOLOGY/[facility]//2021 (2021)  ENDOSCOPY - SCAN - ENDOSCOPY/[facility]/2021 (2021)    Subjective      Review of Systems:   Review of Systems   Constitutional: Positive for fatigue. Negative for activity change, appetite change, chills, diaphoresis, fever and unexpected weight change.   Respiratory: Negative.    Cardiovascular: Negative.    Gastrointestinal: Positive for abdominal distention and abdominal pain. Negative for blood in stool, constipation, diarrhea, nausea and vomiting.   Genitourinary: Negative.    Musculoskeletal: Negative for back pain and myalgias.   Skin: Negative for color  "change and rash.   Neurological: Negative.         Past Medical History:   Past Medical History:   Diagnosis Date   • Jackson's esophagus    • GERD (gastroesophageal reflux disease)          Medications:     Current Outpatient Medications:   •  omeprazole (priLOSEC) 40 MG capsule, Take 1 capsule by mouth Daily. Take 30 minutes 1st meal of the day, Disp: 30 capsule, Rfl: 0  •  vitamin D (ERGOCALCIFEROL) 1.25 MG (03239 UT) capsule capsule, Take 1 capsule by mouth 1 (One) Time Per Week., Disp: 5 capsule, Rfl: 3    Allergies:   No Known Allergies      Objective     Physical Exam:  Vital Signs:   Vitals:    12/13/21 1527   BP: 112/70   Pulse: 86   Resp: 18   Temp: 98 °F (36.7 °C)   TempSrc: Infrared   SpO2: 98%   Weight: 94.3 kg (207 lb 12.8 oz)   Height: 170.2 cm (67\")   PainSc:   7   PainLoc: Abdomen     Body mass index is 32.55 kg/m².     Physical Exam  Vitals and nursing note reviewed.   Constitutional:       General: He is not in acute distress.     Appearance: Normal appearance. He is well-developed. He is not ill-appearing, toxic-appearing or diaphoretic.   HENT:      Head: Normocephalic and atraumatic.   Cardiovascular:      Rate and Rhythm: Normal rate and regular rhythm.   Pulmonary:      Effort: Pulmonary effort is normal. No respiratory distress.      Breath sounds: Normal breath sounds. No stridor. No wheezing.   Abdominal:      General: Bowel sounds are decreased. There is distension.      Palpations: Abdomen is soft.      Tenderness: There is no abdominal tenderness. There is no guarding or rebound.   Skin:     General: Skin is warm and dry.      Coloration: Skin is not jaundiced.   Neurological:      General: No focal deficit present.      Mental Status: He is alert and oriented to person, place, and time.   Psychiatric:         Mood and Affect: Mood normal.         Behavior: Behavior normal. Behavior is cooperative.         Thought Content: Thought content normal.         Judgment: Judgment normal. "         Assessment / Plan      Assessment/Plan:   Diagnoses and all orders for this visit:    1. Chronic GERD (Primary)  Assessment & Plan:  GERD symptoms worsening recently.  Discontinue pantoprazole.  Will change prescription to omeprazole.      Orders:  -     omeprazole (priLOSEC) 40 MG capsule; Take 1 capsule by mouth Daily. Take 30 minutes 1st meal of the day  Dispense: 30 capsule; Refill: 0    2. Elevated bilirubin  Assessment & Plan:  Laboratory studies per orders.  Further recommendation after laboratory evaluation.    Orders:  -     Comprehensive Metabolic Panel    3. Elevated LFTs  Assessment & Plan:  Laboratory studies per orders.  Further recommendation after laboratory evaluation.    Orders:  -     Comprehensive Metabolic Panel    4. Right upper quadrant abdominal pain  Assessment & Plan:  Laboratory studies per orders.  Further recommendation after laboratory evaluation.  Should symptoms fail to improve and/or worsen will recommend diagnostic imaging and/or referral to gastroenterology.    Orders:  -     Comprehensive Metabolic Panel    5. Vitamin D deficiency  Assessment & Plan:  Discontinue over-the-counter vitamin D.  Prescription strength vitamin D 50,000 units/week ordered.  Will recheck vitamin D in 3 months.    Orders:  -     vitamin D (ERGOCALCIFEROL) 1.25 MG (45734 UT) capsule capsule; Take 1 capsule by mouth 1 (One) Time Per Week.  Dispense: 5 capsule; Refill: 3    6. Chronic fatigue  Assessment & Plan:  Fatigue worsening recently.  We will discontinue over-the-counter vitamin D and to begin trial of prescription strength vitamin D.       Follow Up:   PRN and at next scheduled appointment(s) with PCP.    Discussed the nature of the medical condition(s) risks, complications, implications, management, safe and proper use of medications. Encouraged medication compliance, and keeping scheduled follow up appointments with me and any other providers.      RTC if symptoms fail to improve, to ER if  symptoms worsen.      JAY León  [facility] [place]

## 2021-12-13 NOTE — ASSESSMENT & PLAN NOTE
Discontinue over-the-counter vitamin D.  Prescription strength vitamin D 50,000 units/week ordered.  Will recheck vitamin D in 3 months.

## 2021-12-13 NOTE — ASSESSMENT & PLAN NOTE
Fatigue worsening recently.  We will discontinue over-the-counter vitamin D and to begin trial of prescription strength vitamin D.

## 2021-12-13 NOTE — ASSESSMENT & PLAN NOTE
Laboratory studies per orders.  Further recommendation after laboratory evaluation.  Should symptoms fail to improve and/or worsen will recommend diagnostic imaging and/or referral to gastroenterology.

## 2021-12-13 NOTE — PATIENT INSTRUCTIONS
Ãáã ÇáÈØä áÏì ÇáÈÇáÛíä  Abdominal Pain, Adult  ÞÏ íÍÏË Ãáã ÇáÈØä áÃÓÈÇÈ ßËíÑÉ. áÇ íßæä Ãáã ÇáÈØä ÎØíÑðÇ Ýí ÇáÛÇáÈ æÊÊÍÓä ÇáÍÇáÉ Ïæä ÚáÇÌ Ãæ Úä ØÑíÞ ÇáÚáÇÌ Ýí ÇáãäÒá. æãÚ Ðáß¡ ÝÃÍíÇäðÇ ãÇ íßæä Ãáã ÇáÈØä ÎØíÑðÇ.  ÓíÑÇÌÚ ãÞÏã ÇáÑÚÇíÉ ÇáÕÍíÉ ÇáãÊÇÈÚ áß ÊÇÑíÎß ÇáãÑÖí æíÌÑí áß ÝÍÕðÇ ØÈíðÇ áÊÍÏíÏ ÓÈÈ Ãáã ÇáÈØä.  ÇÊÈÚ ÇáÊÚáíãÇÊ ÇáÊÇáíÉ Ýí ÇáãäÒá:    ÇáÃÏæíÉ  • ÊäÇæá ÇáÃÏæíÉ ÇáÊí ÊõÕÑÝ ÈæÕÝÉ ØÈíÉ Ãæ Ïæä æÕÝÉ ØÈíÉ æÝÞ ãÇ íÎÈÑß Èå ãÞÏã ÇáÑÚÇíÉ ÇáÕÍíÉ ÇáÎÇÕ Èß.  • áÇ ÊÊäÇæá ÏæÇÁð ãáíäðÇ ÅáÇ ÅÐÇ æÕÝå áß ãÞÏã ÇáÑÚÇíÉ ÇáÕÍíÉ ÇáãÊÇÈÚ áß.  ÊÚáíãÇÊ ÚÇãÉ  • íÌÈ ÇáÇäÊÈÇå áÍÏæË Ãí ÊÛííÑÇÊ Ýí ÍÇáÊß.  • ÇÔÑÈ ßãíÇÊ ßÇÝíÉ ãä ÇáÓæÇÆá áíÙá áæä Èæáß ÃÕÝÑðÇ ÈÇåÊðÇ.  • ÇáÊÒã ÈÌãíÚ ãæÇÚíÏ ÇáãÊÇÈÚÉ æÝÞ ÊæÌíåÇÊ ãÞÏã ÇáÑÚÇíÉ ÇáÕÍíÉ. æåÐÇ ÃãÑ ãåã.  ÇÊÕá ÈãÞÏã ÇáÑÚÇíÉ ÇáÕÍíÉ Ýí ÇáÍÇáÇÊ ÇáÊÇáíÉ:  • ÊÛíøõÑ Ãáã ÇáÈØä áÏíß Ãæ ÒíÇÏÉ ÔÏÊå.  • áÇ ÊÍÓ ÈÇáÌæÚ Ãæ Ãäß ÊÝÞÏ æÒäß Ïæä ãÍÇæáÉ Ðáß.  • ÅÕÇÈÊß ÈÅãÓÇß Ãæ ÅÓåÇá áÃßËÑ ãä 2-3 ÃíÇã.  • ÇáÔÚæÑ ÈÇáÃáã ÚäÏ ÇáÊÈæá Ãæ ÇáÊÈÑÒ.  • íæÞÙß Ãáã ÇáÈØä Ýí ÃËäÇÁ Çáäæã.  • ÒíÇÏÉ ÔÏÉ ÇáÃáã áÏíß ÚäÏ ÊäÇæá ÇáæÌÈÇÊ Ãæ ÈÚÏ ÇáÃßá Ãæ ÚäÏ ÊäÇæá ãÃßæáÇÊ ãõÚíóøäÉ.  • ÊÞíÄß æÚÏã ÞÏÑÊß Úáì ÇáÇÍÊÝÇÙ ÈÃí ÔíÁ Ýí ÌæÝß.  • ÇáÅÕÇÈÉ ÈÍãì.  • æÌæÏ Ïã Ýí Èæáß.  ÇØáÈ ÇáãÓÇÚÏÉ ÝæÑðÇ Ýí ÇáÍÇáÇÊ ÇáÊÇáíÉ:  • ÚÏã ÒæÇá ÇáÃáã ÈÇáÓÑÚÉ ÇáÊí ÊæÞÚåÇ ãÞÏã ÇáÑÚÇíÉ ÇáÕÍíÉ.  • ÚÏã ÊæÞÝ ÇáÞíÁ.  • ÇáÔÚæÑ ÈÇáÃáã ÝÞØ Ýí ÃÌÒÇÁ ãä ÇáÈØä ãËá ÇáÌÇäÈ ÇáÃíãä Ãæ ÇáÌÒÁ ÇáÃíÓÑ ÇáÓÝáí ãä ÇáÈØä. ÞÏ íäÊÌ ÇáÃáã Ýí ÇáÌÇäÈ ÇáÃíãä ãä ÇáÈØä Úä ÇáÊåÇÈ ÇáÒÇÆÏÉ ÇáÏæÏíÉ.  • ÅÎÑÇÌ ÈÑÇÒ ãÎÊáØ ÈÇáÏã Ãæ ÃÓæÏ Çááæä Ãæ ÃÔÈå ÈÇáÞØÑÇä.  • ÇáãÚÇäÇÉ ãä Ãáã ÔÏíÏ Ãæ ÊÞáÕÇÊ¡ Ãæ ÇäÊÝÇÎ Ýí ÇáÈØä.  • ÇáÅÕÇÈÉ ÈÚáÇãÇÊ ÇáÌÝÇÝ ãËá:  ? Èæá ÏÇßä Ãæ ßãíÉ ÞáíáÉ ãä ÇáÈæá Ãæ ÚÏã æÌæÏ Èæá.  ? ÊÔÞÞ ÇáÔÝÊíä.  ? ÌÝÇÝ ÇáÝã.  ? ÇáÚíäÇä ÇáÛÇÆÑÊÇä.  ? ÇáäÚÇÓ.  ? Çáæåä æÇáÖÚÝ.  • ÇáÅÕÇÈÉ ÈÕÚæÈÉ Ýí ÇáÊäÝÓ Ãæ Ãáã Ýí ÇáÕÏÑ.  ãáÎÕ  • áÇ íßæä Ãáã ÇáÈØä ÎØíÑðÇ Ýí ÇáÛÇáÈ æÊÊÍÓä ÇáÍÇáÉ Ïæä ÚáÇÌ Ãæ Úä ØÑíÞ ÇáÚáÇÌ Ýí ÇáãäÒá. æãÚ Ðáß¡ ÝÃÍíÇäðÇ ãÇ íßæä Ãáã ÇáÈØä ÎØíÑðÇ.  • íÌÈ ÇáÇäÊÈÇå áÍÏæË Ãí ÊÛííÑÇÊ Ýí ÍÇáÊß.  • ÊäÇæá ÇáÃÏæíÉ  ÇáÊí ÊõÕÑÝ ÈæÕÝÉ ØÈíÉ Ãæ Ïæä æÕÝÉ ØÈíÉ æÝÞ ãÇ íÎÈÑß Èå ãÞÏã ÇáÑÚÇíÉ ÇáÕÍíÉ ÇáÎÇÕ Èß.  • ÇÊÕá ÈãõÞÏã ÇáÑÚÇíÉ ÇáÕÍíÉ ÅÐÇ ÊÛíÑ Ãáã ÇáÈØä ÇáÐí ÊÔÚÑ Èå Ãæ ÒÇÏÊ ÔÏÊå.  • ÇØáÈ ÇáãÓÇÚÏÉ ÝæÑðÇ Ýí ÍÇáÉ ÅÕÇÈÊß ÈÃáã ÔÏíÏ Ýí ÇáÈØä Ãæ ÊÞáÕÇÊ Ãæ ÇäÊÝÇÎ.  áíÓ ÇáåÏÝ ãä åÐå ÇáãÚáæãÇÊ Ãä Êßæä ÈÏíáÇð ááÅÑÔÇÏÇÊ ÇáÊí íÞÏãåÇ ãæÝÑ ÇáÑÚÇíÉ ÇáÕÍíÉ. ÊÃßÏ ãä ãäÇÞÔÉ ÃíÉ ÃÓÆáÉ ÊÏæÑ Ýí Ðåäß ãÚ ãæÝÑ ÇáÑÚÇíÉ ÇáÕÍíÉ.þ  Document Revised: 05/22/2020 Document Reviewed: 05/22/2020  Elsevier Patient Education © 2021 ASSURED INFORMATION SECURITY Inc.  ãÑÖ ÇáÌÒÑ ÇáãÚÏí ÇáãÑíÆí áÏì ÇáÈÇáÛíä  Gastroesophageal Reflux Disease, Adult    íÚäí LAMONT (ÇáÇÑÊÌÇÚ ÇáãÚÏí ÇáãÑíÆí) ÊÏÝÞ ÇáÍãÖ ãä ãÚÏÊß Åáì ÇáÃäÈæÈ ÇáÐí íÕá ÇáÝã ÈÇáãÚÏÉ (ÇáãÑíÁ). ÝÇáØÚÇã íäÊÞá ÚÇÏÉð Åáì ÇáÃÓÝá ÎáÇá ÇáãÑíÁ æíÈÞì Ýí ÇáãÚÏÉ áíõåÖã. áßä ÚäÏ ÍÏæË ÇáÇÑÊÌÇÚ ÇáãÚÏí ÇáãÑíÆí¡ ÝÅä ÇáØÚÇã æÍãÖ ÇáãÚÏÉ íÑÊÝÚÇä áÃÚáì æíÏÎáÇä ÇáãÑíÁ. ÃãÇ ÅÐÇ ÊÝÇÞãÊ ÇáãÔßáÉ¡ ÝÞÏ ÊõÔÎÕ ÇáÍÇáÉ Úáì ÅäåÇ ãÑÖ ÇáÇÑÊÌÇÚ ÇáãÚÏí ÇáãÑíÆí. Ðáß ÅÐÇ ßÇä ÇáÇÑÊÌÇÚ:  • ßËíÑ ÇáÍÏæË.   • íÓÈÈ ÃÚÑÇÖðÇ ãÊßÑÑÉ Ãæ ÔÏíÏÉ.   • íÓÈÈ ãÔÇßá ãËá ÊáÝ ÈÇáãÑíÁ.  ÝÚäÏãÇ íáÇãÓ ÇáÍãÖ ÇáãÑíÁ¡ ÝÅäå ÞÏ íÓÈÈ ÃáãðÇ (ÇáÊåÇÈðÇ) Ýí ÇáãÑíÁ. æÈãÑæÑ ÇáæÞÊ¡ íãßä Ãä íÓÈÈ ãÑÖ ÇáÇÑÊÌÇÚ ÇáãÚÏí ÇáãÑíÆí ÙåæÑ ÝÊÍÇÊ ÕÛíÑÉ (ÞÑÍ) Úáì ÈØÇäÉ ÇáãÑíÁ.  ãÇ ÃÓÈÇÈ ÇáÍÇáÉ¿  ÊÍÏË åÐå ÇáÍÇáÉ ÈÓÈÈ ãÔßáÉ Ýí ÇáÚÖáÇÊ Èíä ÇáãÑíÁ æÇáãÚÏÉ (ÇáãÕÑÉ ÇáãÑíÆíÉ ÇáÓÝáíÉ¡ Ãæ LES). æÊäÛáÞ ÚÖáÉ ÇáãÕÑÉ ÇáãÑíÆíÉ ÇáÓÝáíÉ ÚÇÏÉð ÈÚÏ ãÑæÑ ÇáØÚÇã ãä ÎáÇá ÇáãÑíÁ Åáì ÇáãÚÏÉ. áßä ÚäÏãÇ ÊÖÚÝ ÚÖáÉ ÇáãÕÑÉ ÇáãÑíÆíÉ ÇáÓÝáíÉ Ãæ ÊÕÈÍ ÛíÑ ØÈíÚíÉ¡ ÝÅäåÇ áÇ ÊäÛáÞ ÇäÛÇáÇð ÓáíãðÇ¡ ããÇ íÓãÍ ÈÕÚæÏ ÇáØÚÇã æÍãÖ ÇáãÚÏÉ Åáì ÇáãÑíÁ.  æíãßä Ãä ÊÖÚÝ ÚÖáÉ ÇáãÕÑÉ ÇáãÑíÆíÉ ÇáÓÝáíÉ ãä ÎáÇá ãæÇÏ ÛÐÇÆíÉ ãÚíäÉ æÃÏæíÉ æÍÇáÇÊ ØÈíÉ¡ ÈãÇ Ýí Ðáß:  • ÇÓÊÎÏÇã ÇáÊÈÛ.  • ÇáÍãá.  • ÝÊÞ ÍÌÇÈí.  • ÊäÇæá ÇáßÍæáíÇÊ.  • ÈÚÖ ÇáãÃßæáÇÊ æÇáãÔÑæÈÇÊ ÇáãÚíäÉ¡ ãËá ÇáÞåæÉ æÇáÔíßæáÇÊÉ æÇáÈÕá æÇáäÚäÇÚ.  ãÇ ÚæÇãá ÒíÇÏÉ ÇáÎØÑ¿  íÒÏÇÏ ÇÍÊãÇá ÇáÅÕÇÈÉ ÈåÐå ÇáÍÇáÉ Ýí ÇáÍÇáÇÊ ÇáÊÇáíÉ:  • ÒíÇÏÉ ÇáæÒä.  • ÇáÅÕÇÈÉ ÈÇÖØÑÇÈ íÄËÑ Ýí ÇáÃäÓÌÉ ÇáÖÇãÉ.  • ÊäÇæá NSAID (ãÖÇÏÇÊ ÇáÇáÊåÇÈ ÇááÇÓÊíÑæíÏíÉ).  ãÇ  ÚáÇãÇÊ ÇáÍÇáÉ Ãæ ÃÚÑÇÖåÇ¿  ÊÔãá ÃÚÑÇÖ åÐå ÇáÍÇáÉ ãÇ íáí:  • ÍÑÞÉ Ýí Ýã ÇáãÚÏÉ.  • ÕÚæÈÉ Ãæ Ãáã Ýí ÇáÈáÛ.  • ÇáÔÚæÑ ÈæÌæÏ ÌáØÉ Ýí ÇáÍáÞ.  • ÇáÅÍÓÇÓ ÈØÚã ãÑÇÑÉ Ýí ÇáÝã.  • ãÔßáÇÊ Ýí ÇáÊäÝÓ.  • æÌæÏ ßãíÉ ßÈíÉ ãä ÇááÚÇÈ.  • ÇáÅÕÇÈÉ ÈÇÖØÑÇÈÇÊ Ýí ÇáãÚÏÉ Ãæ ÇäÊÝÇÎ.  • ÊÌÔÄ.  • Ãáã ÈÇáÕÏÑ. æíãßä Ãä íÍÏË Ãáã ÇáÕÏÑ ÈÓÈÈ ÚÏÉ ÍÇáÇÊ ãÎÊáÝÉ. áÐá íÌÈ ÇáÍÑÕ Úáì ãÑÇÌÚÉ ãÞÏã ÇáÑÚÇíÉ ÇáÕÍíÉ ÇáãÊÇÈÚ áß ÅÐÇ ßäÊ ÊÔÚÑ ÈÃáã Ýí ÇáÕÏÑ.  • ÖíÞ ÇáäÝÓ ææÌæÏ ÕÝíÑ ÚäÏ ÇáÊäÝÓ.  • ÓÚÇá ãÓÊãÑ (ãÒãä) Ãæ ÓÚÇá Ýí ÃæÞÇÊ Çááíá.  • Èáì ãíäÇÁ ÇáÃÓäÇä.  • ÝÞÏÇä ÇáæÒä.  ßíÝ ÊõÔÎÕ åÐå ÇáÍÇáÉ¿  íÊæáì ãÞÏã ÇáÑÚÇíÉ ÇáÕÍíÉ ÇáÎÇÕ Èß ÈÊÓÌíá ÇáÊÇÑíÎ ÇáãÑÖí æÅÌÑÇÁ ÝÍÕ ØÈí. áÊÍÏíÏ ãÇ ÅÐÇ ßäÊ ÊÚÇäí ãä ãÑÖ ÇáÌÒÑ ÇáãÚÏí ÇáãÑíÆí ÎÝíÝ Ãæ ÍÇÏ¡ ÞÏ íÍÊÇÌ ãÞÏã ÇáÑÚÇíÉ ÇáÕÍíÉ Åáì ãÑÇÞÈÉ ÇÓÊÌÇÈÊß ááÚáÇÌ. ßãÇ ÞÏ ÊõÌÑì áß ÃíÖðÇ ÝÍæÕ¡ ÈãÇ Ýí Ðáß:  • ÇÎÊÈÇÑ ÝÍÕ ÇáãÚÏÉ æÇáãÑíÁ ÈÇÓÊÎÏÇã ÌåÇÒ Èå ßÇãíÑÇ ÕÛíÑÉ (ÊäÙíÑ ÏÇÎáí).  • ÇÎÊÈÇÑ áÞíÇÓ ãÓÊæì ÇáÍãæÖÉ Ýí ÇáãÑíÁ.  • ÇÎÊÈÇÑ áÞíÇÓ ãÏì ÇáÖÛØ ÇáãæÌæÏ Úáì ÇáãÑíÁ.  • ÝÍÕ ÈáÚ ÇáÈÇÑíæã Ãæ ÈáÚ ÇáÈÇÑíæã ÇáãÚÏá áÝÍÕ Ôßá ÇáãÑíÁ æÍÌãå æßÝÇÁÉ Úãáå.  ßíÝ ÊõÚÇáÌ åÐå ÇáÍÇáÉ¿  íßãä ÇáåÏÝ ãä ÇáÚáÇÌ Ýí ãÓÇÚÏÊß Úáì ÊÎÝíÝ ÇáÃÚÑÇÖ æÇáæÞÇíÉ ãä ÇáãÖÇÚÝÇÊ. ÊÎÊáÝ ØÑíÞÉ ÚáÇÌ åÐå ÇáÍÇáÉ ÇÚÊãÇÏðÇ Úáì ãÏì ÍÏÉ ÇáÃÚÑÇÖ. æÞÏ íæÕí ãÞÏã ÇáÑÚÇíÉ ÇáÕÍíÉ ÈãÇ íáí:  • ÊÛííÑÇÊ Ýí äÙÇãß ÇáÛÐÇÆí.  • ÇáÃÏæíÉ.  • ÇáÌÑÇÍÉ.  íÌÈ ÇÊÈÇÚ åÐå ÇáÊÚáíãÇÊ Ýí ÇáãäÒá:  ÇáÃßá æÇáÔÑÈ    • íÌÈ ÇÊÈÇÚ ÇáäÙÇã ÇáÛÐÇÆí ÇáÐí ÃæÕì Èå ãõÞÏøöã ÇáÑÚÇíÉ ÇáÕÍíÉ ÇáãÊÇÈÚ áß. æåæ ãÇ ÞÏ íÔãá ÊÌäÈ ÈÚÖ ÇáãÃßæáÇÊ æÇáãÔÑæÈÇÊ¡ ãËá:  ? ÇáÞåæÉ æÇáÔÇí (ÈÇáßÇÝííä Ãæ ÇáÎÇáííä ãäå).  ? ÇáãÔÑæÈÇÊ ÇáãÍÊæíÉ Úáì ßÍæá.  ? ãÔÑæÈÇÊ ÇáØÇÞÉ æÇáãÔÑæÈÇÊ ÇáÑíÇÖíÉ.  ? ÇáãÔÑæÈÇÊ ÇáÛÇÒíÉ Ãæ ÇáÕæÏÇ.  ? ÇáÔæßæáÇÊÉ æÇáßÇßÇæ.  ? äßåÇÊ ÇáÝáÝá æÇáäÚäÇÚ.  ? ÇáËæã æÇáÈÕá.  ? ÇáÝÌá ÇáÍÇÑ.  ? ÇáÃØÚãÉ ÇáÍÑíÝÉ Ãæ ÇáÍãÖíÉ¡ æãä ÈíäåÇ ÇáÈåÇÑÇÊ æãÓÍæÞ ÇáÝáÝá ÇáÍÇÑ æãÓÍæÞ ÇáßÇÑí æÇáÎá æÇáÕáÕÉ ÇáÍÇÑÉ æÕáÕÉ ÇáÈÇÑÈßíæ.  ? ÚÕÇÆÑ ÇáÝæÇßå ÇáÍãÖíÉº ãËá ÇáÈÑÊÞÇá æÇááíãæä æÇááíãæä ÇáÍÇãÖ.  ? ÇáÃØÚãÉ ÇáÊí ÊÚÊãÏ Úáì ÇáØãÇØã ãËá ÇáÕáÕÉ æÇáÝáÝá ÇáÍÇÑ æÇáÓáØÉ  æÇáÈíÊÒÇ ÇáãÒæÏÉ ÈÇáÕáÕÉ ÇáÍãÑÇÁ.  ? ÇáÃØÚãÉ ÇáãÞáíÉ æÇáÛäíÉ ÈÇáÏåæä¡ ãËá ÇáÏæäÇÊÓ æÇáÈØÇØÓ ÇáãÞáíÉ æÔÑÇÆÍ ÇáÈØÇØÓ æÇáÊÊÈíáÇÊ ÚÇáíÉ ÇáÏåæä.  ? ÇááÍæã ÚÇáíÉ ÇáÏåæä¡ ãËá ÇáåæÊ ÏæÌ æÇááÍæã ÇáÍãÑÇÁ æÇáÈíÖÇÁ ÇáÛäíÉ ÈÇáÏåæä¡ ãËá ÔÑÇÆÍ ÇáÖáæÚ æÇáÓæÓíÓ æÇáßÝá æÇááÍã ÇáãÞÏÏ.  ? ãäÊÌÇÊ ÇáÃáÈÇä ÚÇáíÉ ÇáÏåæä¡ ãËá ÇáÍáíÈ ßÇãá ÇáÏÓã æÇáÒÈÏ æÇáÌÈä ÇáßÑíãí.  • íÌÈ ÊäÇæá æÌÈÇÊ ÕÛíÑÉ æãÊßÑÑÉ ÈÏáÇð ãä ÇáæÌÈÇÊ ÇáßÈíÑÉ.  • íÌÈ ÊÌäÈ ÔÑÈ ßãíÇÊ ßÈíÑÉ ãä ÇáÓæÇÆá ãÚ ÇáæÌÈÇÊ.  • íÌÈ ÊÌäÈ ÊäÇæá æÌÈÇÊ ÎáÇá 2-3 ÓÇÚÇÊ ÞÈá Çáäæã.  • íÌÈ ÊÌäÈ ÇáÇÓÊáÞÇÁ ãÈÇÔÑÉ ÈÚÏ ÇáÃßá.  • ýíÊÚíä ÚÏã ããÇÑÓÉ ÇáÊãÑíäÇÊ ÇáÑíÇÖíÉ ÈÚÏ ÇáÃßá ãÈÇÔÑÉ.  äãØ ÇáÍíÇÉ    • íÊÚíä ÚÏã ÇÓÊÎÏÇã ãäÊÌÇÊ ÊÍÊæí Úáì ÇáäíßæÊíä Ãæ ÇáÊÈÛ¡ ãËá ÇáÓÌÇÆÑ æÇáÓÌÇÆÑ ÇáÅáßÊÑæäíÉ æÊÈÛ ÇáãÖÛ. íãßä ÇÓÊÔÇÑÉ ãÞÏã ÇáÑÚÇíÉ ÇáÕÍíÉ ÅÐÇ ÇÍÊÌÊ Åáì ÇáãÓÇÚÏÉ ááÅÞáÇÚ Úä ÇáÊÏÎíä.  • íÌÈ ãÍÇæáÉ ÇáÊÞáíá ãä ÇáÖÛæØ ÇáÊí ÊÊÚÑÖ áåÇ æãÓÊæì ÇáÊæÊÑ áÏíß ÈÇÊÈÇÚ æÓÇÆá ãËá ããÇÑÓÉ ÇáíæÌÇ Ãæ ÇáÊÃãá. ÃãÇ Ýí ÍÇáÉ ÇáÍÇÌÉ áãÓÇÚÏÉ áÎÝÖ ãÓÊæì ÇáÅÌåÇÏ¡ Ýíãßä ÇÓÊÔÇÑÉ ãõÞÏã ÇáÑÚÇíÉ ÇáÕÍíÉ.  • Ýí ÍÇáÉ ÒíÇÏÉ ÇáæÒä¡ ÝíÌÈ ÎÝÖ ÇáæÒä Åáì ÇáæÒä ÇáÕÍí. íÌÈ ÇÓÊÔÇÑÉ æØáÈ ÊæÌíåÇÊ ãÞÏã ÇáÑÚÇíÉ ÇáÕÍíÉ Íæá ßíÝíÉ ÎÝÖ ÇáæÒä ÈØÑíÞÉ ÕÍíÉ æÂãäÉ.  ÊÚáíãÇÊ ÚÇãÉ  • íÊÚíä ÇáÇäÊÈÇå áÌãíÚ ÇáÊÛíÑÇÊ ÇáÊí ÊØÑÃ Úáì ÇáÃÚÑÇÖ ÇáÊí ÊÚÇäíåÇ.  • íÊÚíä ÊäÇæá ÇáÃÏæíÉ ÇáÊí ÊõÕÑÝ ÈæÕÝÉ ØÈíÉ Ãæ Ïæä æÕÝÉ ØÈíÉ æÝÞðÇ áãÇ íÎÈÑßö Èå ãÞÏã ÇáÑÚÇíÉ ÇáÕÍíÉ ÇáÎÇÕ Èß. íÊÚíä ÚÏã ÊäÇæá ÇáÃÓÈÑíä Ãæ ÇáÃíÈæÈÑæÝíä Ãæ ÛíÑå ãä ãÖÇÏÇÊ ÇáÇáÊåÇÈÇÊ ÇááÇÓÊíÑæíÏíÉ ÅáÇ ÈãæÇÝÞÉ ãÞÏã ÇáÑÚÇíÉ.  • íÌÈ ÇÑÊÏÇÁ ãáÇÈÓ æÇÓÚÉ. æíÌÈ ÚÏã ÇÑÊÏÇÁ ÔíÁ ÖíÞ Íæá ÇáÎÕÑ ÞÏ íÖÛØ Úáì ÇáãÚÏÉ.  • íÌÈ ÑÝÚ (ÅÚáÇÁ) ãÞÏãÉ ÝÑÇÔß áãÓÇÝÉ 6 ÈæÕÇÊ ÈÇáÊÞÑíÈ (15 Óã).  • ßÐáß ÊÌäÈ ÇáÇäÍäÇÁ ÅÐÇ ßÇä íÄÏí Åáì ÒíÇÏÉ ÔÏÉ ÇáÃÚÑÇÖ.  • íÊÚíä ÇáÇáÊÒÇã ÈÌãíÚ ãæÇÚíÏ ÇáãÊÇÈÚÉ æÝÞðÇ áÊæÌíåÇÊ ãÞÏã ÇáÑÚÇíÉ ÇáÕÍíÉ. ÝåÐÇ ÃãÑ ãåã.  íÌÈ ÇáÇÊÕÇá ÈãÞÏã ÇáÑÚÇíÉ ÇáÕÍíÉ Ýí ÇáÍÇáÇÊ ÇáÊÇáíÉ:  • Ýí ÍÇáÉ ÇáÅÕÇÈÉ ÈÃí ããÇ íáí:  ? ÃÚÑÇÖ ÌÏíÏÉ.  ? ÝÞÏÇä ÇáæÒä Ïæä ÓÈÈ ãÝåæã.  ? ÕÚæÈÉ Ýí ÇáÈáÚ Ãæ Ãáã ÚäÏ ÇáÈáÚ.  ? æÌæÏ ÕæÊ ÃÒíÒ ÚäÏ ÇáÊäÝÓ Ãæ ÓÚÇá ãÓÊãÑ.  ? ÈÍÉ Ýí  ÇáÕæÊ.  • ÚÏã ÇáÔÚæÑ ÈÊÍÓä ãÚ ÊáÞí ÇáÚáÇÌ.  íÊÚíä ØáÈ ÇáãÓÇÚÏÉ ÝæÑðÇ Ýí ÇáÍÇáÇÊ ÇáÊÇáíÉ:  • ÇáÔÚæÑ ÈÃáã Ýí ÇáÐÑÇÚíä Ãæ ÇáÚäÞ Ãæ ÇáÝß Ãæ ÇáÃÓäÇä Ãæ ÇáÙåÑ.  • ÇáÊÚÑÞ¡ Ãæ ÇáÔÚæÑ ÈÏæÇÑ Ãæ ÏæÎÉ.  • ÇáÅÕÇÈÉ ÈÃáã Ýí ÇáÕÏÑ Ãæ ÖíÞ Ýí ÇáÊäÝÓ.  • ÇáÊÞíÄ ÞíÆðÇ íÔÈå ÇáÏã Ãæ ÊÝá ÇáÞåæÉ.  • ÇáÅÛãÇÁ.  • ÅÎÑÇÌ ÈÑÇÒ ãÏãã Ãæ ÃÓæÏ.  • ÚÏã ÇáÞÏÑÉ Úáì ÇáÈáÚ Ãæ ÇáÔÑÈ Ãæ ÇáÃßá.  ãáÎÕ  • íÍÏË ÇÑÊÌÇÚ ÇáãÚÏÉ ÇáãÑíÆí ÚäÏ ÕÚæÏ ÇáÍãÖ ãä ÇáãÚÏÉ Åáì ÏÇÎá ÇáãÑíÁ. ãÑÖ ÇáÇÑÊÌÇÚ ÇáãÚÏí ÇáãÑíÆí ÍÇáÉ íÍÏË ÝíåÇ ÇáÇÑÊÌÇÚ ßËíÑðÇ¡ Ãæ íÓÈÈ ÃÚÑÇÖðÇ ãÊßÑÑÉ Ãæ ÔÏíÏÉ¡ Ãæ íÓÈÈ ãÔÇßá ãËá ÊáÝ ÈÇáãÑíÁ.  • ÊÎÊáÝ ØÑíÞÉ ÚáÇÌ åÐå ÇáÍÇáÉ ÇÚÊãÇÏðÇ Úáì ãÏì ÍÏÉ ÇáÃÚÑÇÖ. æÞÏ íæÕí ãÞÏã ÇáÑÚÇíÉ ÇáÕÍíÉ ÈÅÌÑÇÁ ÊÛííÑÇÊ Ýí ÇáäÙÇã ÇáÛÐÇÆí æäãØ ÇáÍíÇÉ¡ Ãæ íÕÝ ÏæÇÁð¡ Ãæ íæÕí ÈÅÌÑÇÁ ÇáÌÑÇÍÉ.  • íÌÈ ÇáÇÊÕÇá ÈãõÞÏã ÇáÑÚÇíÉ ÇáÕÍíÉ ÅÐÇ ÙåÑÊ Úáíß ÃÚÑÇÖ ÌÏíÏÉ Ãæ ÒÇÏÊ ÔÏÉ ÇáÃÚÑÇÖ ÇáÊí ÊÚÇäí ãäåÇ.  • íÊÚíä ÊäÇæá ÇáÃÏæíÉ ÇáÊí ÊõÕÑÝ ÈæÕÝÉ ØÈíÉ Ãæ Ïæä æÕÝÉ ØÈíÉ æÝÞðÇ áãÇ íÎÈÑßö Èå ãÞÏã ÇáÑÚÇíÉ ÇáÕÍíÉ ÇáÎÇÕ Èß. íÊÚíä ÚÏã ÊäÇæá ÇáÃÓÈÑíä Ãæ ÇáÃíÈæÈÑæÝíä Ãæ ÛíÑå ãä ãÖÇÏÇÊ ÇáÇáÊåÇÈÇÊ ÇááÇÓÊíÑæíÏíÉ ÅáÇ ÈãæÇÝÞÉ ãÞÏã ÇáÑÚÇíÉ.  • íÊÚíä ÇáÇáÊÒÇã ÈÌãíÚ ãæÇÚíÏ ÇáãÊÇÈÚÉ æÝÞðÇ áÊæÌíåÇÊ ãÞÏã ÇáÑÚÇíÉ ÇáÕÍíÉ. ÝåÐÇ ÃãÑ ãåã.  áíÓ ÇáåÏÝ ãä åÐå ÇáãÚáæãÇÊ Ãä Êßæä ÈÏíáÇð ááÅÑÔÇÏÇÊ ÇáÊí íÞÏãåÇ ãæÝÑ ÇáÑÚÇíÉ ÇáÕÍíÉ. ÊÃßÏ ãä ãäÇÞÔÉ ÃíÉ ÃÓÆáÉ ÊÏæÑ Ýí Ðåäß ãÚ ãæÝÑ ÇáÑÚÇíÉ ÇáÕÍíÉ.þ  Document Revised: 07/26/2019 Document Reviewed: 07/26/2019  [employer] Patient Education © 2021 [employer]  ÇÎÊíÇÑÇÊ ÇáØÚÇã áãÑÖ ÇáÇÑÊÌÇÚ ÇáãÚÏí ÇáãÑíÆí áÏì ÇáÈÇáÛíä  Food Choices for Gastroesophageal Reflux Disease, Adult  ÚäÏãÇ ÊÚÇäí ãä ÇáÇÑÊÌÇÚ ÇáãÚÏí ÇáãÑíÆí (GERD)¡ Êßæä ÇáÃØÚãÉ ÇáÊí ÊÊäÇæáåÇ æÚÇÏÇÊ ÊäÇæá ÇáØÚÇã ãåãÉ ááÛÇíÉ. æíãßä Ãä íÓÇÚÏ ÇÎÊíÇÑ ÇáÃÛÐíÉ ÇáÕÍíÍÉ Úáì ÊÎÝíÝ ÇáÇÑÊÌÇÚ ÇáãÚÏí ÇáãÑíÆí (GERD). áÐÇ íãßäß ÇáÇÓÊÚÇäÉ ÈãÊÎÕÕ Ýí ÇáÍãíÉ æÇáÊÛÐíÉ áãÓÇÚÏÊß Úáì ÇÎÊíÇÑ ÃÛÐíÉ ÕÍíÉ.  ãÇ ÇáäÕÇÆÍ áÇÊÈÇÚ åÐå ÇáÎØÉ¿  íÌÈ ÞÑÇÁÉ ãáÕÞÇÊ ÇáãÚáæãÇÊ ÇáÛÐÇÆíÉ  • ÇÞÑÃ ÇáãáÕÞ ÇáÎÇÕ ÈÇáÃØÚãÉ ÐÇÊ ÇáäÓÈ ÇáãäÎÝÖÉ ãä ÇáÏåæä ÇáãÔÈÚÉ. ÇáÃØÚãÉ ÇáÊí ÊÍÊæí Úáì ÃÞá ãä  5% ãä ÇáÞíãÉ ÇáíæãíÉ (DV) ãä ÇáÏåæä æ0 ÛÑÇã ãä ÇáÏåæä ÇáÊÞÇÈáíÉ ÞÏ ÊÓÇÚÏ Ýí ÃÚÑÇÖß.  ÇáØåí  • íÌÈ Øåí ÇáØÚÇã ãÓÊÎÏãðÇ ØÑÞðÇ ÃÎÑì ÈÎáÇÝ ÇáÞáí. æÞÏ íÔãá Ðáß ÇáÊÍãíÕ Ãæ ÇáÊÈÎíÑ Ãæ ÇáÔæÇÁ Ãæ ÇáÛáí. åÐå åí ÌãíÚ ÇáØÑÞ ÇáÊí áÇ ÊÍÊÇÌ Åáì ÇáßËíÑö ãä ÇáÏåæä ááØåí.  • ÍÇæá ÇÓÊÎÏÇã ÇáÃÚÔÇÈ ÞáíáÉ ÇáÊæÇÈá æÇáÍãæÖÉ áÅÖÇÝÉ äßåÇÊ.  ÊÎØíØ ÇáæÌÈÇÊ    • ÊäÇæá äÙÇãðÇ ÛÐÇÆíðÇ ÕÍíðÇ íÍÊæí Úáì ßãíÇÊ ÞáíáÉ ãä ÇáÏåæä ãËá ÇáÎÖÑÇæÇÊ æÇáÝÇßåÉ æÇáÍÈæÈ ÇáßÇãáÉ¡ æãäÊÌÇÊ ÃáÈÇä ãäÎÝÖÉ ÇáÏåæä¡ æÇááÍæã ÇáÎÇáíÉ ãä ÇáÏåæä æÇáÃÓãÇß æÇáÏÌÇÌ.  • ÊäÇæá æÌÈÇÊ ÕÛíÑÉ æãÊßÑÑÉ ÈÏáÇð ãä ÊäÇæá ËáÇË æÌÈÇÊ ßÈíÑÉ ßá íæã. ÊäÇæá æÌÈÇÊß ÈÈØÁ¡ ÌÇáÓðÇ ãÓÊÑÎíðÇ. ÊÌäÈ ÇáÇäÍäÇÁ Ãæ ÇáÇÓÊáÞÇÁ áãÏÉ 2-3 ÓÇÚÇÊ ÈÚÏ ÊäÇæá ÇáØÚÇã.  • ÇÍÑÕ Úáì ÊÞáíá ÊäÇæá ÇáÃØÚãÉ ÚÇáíÉ ÇáÏåæä ãËá ÇááÍæã ÚÇáíÉ ÇáÏåæä Ãæ ÇáÃØÚãÉ ÇáãÞáíÉ.  • ÇÍÑÕ Úáì ÇáÊÞáíá ãä ÊäÇæá ÇáÒíæÊ æÇáÒÈÏÉ Åáì ÃÞá 8 ãáÇÚÞ ÕÛíÑÉ ßá íæã.  • ÊÌäÈ ãÇ íáí:  ? ÇáÃØÚãÉ ÇáÊí ÊÓÈÈ ÇáÃÚÑÇÖ. íÎÊáÝ Ðáß ãä ÔÎÕ áÂÎÑ. ÇÍÊÝÙ ÈÓÌá íæãí áÊÊÈÚ ÇáÃØÚãÉ ÇáÊí ÊÓÈÈ ÇáÃÚÑÇÖ.  ? ÇáßÍæáíÇÊ.  ? ÔÑÈ ßãíÇÊ ßÈíÑÉ ãä ÇáÓæÇÆá ãÚ ÇáæÌÈÇÊ.  ? ÊäÇæá æÌÈÇÊ ÎáÇá 2-3 ÓÇÚÇÊ ÞÈá Çáäæã.  äãØ ÇáÍíÇÉ  • ÍÇÝÙ Úáì æÒäß Ýí ÇáÍÏæÏ ÇáÕÍíÉ. æÇÓÊÔÑ ãÞÏã ÇáÑÚÇíÉ ÇáÕÍíÉ ãÇ ÇáæÒä ÇáÕÍí ÈÇáäÓÈÉ áß. ÅÐÇ ßäÊ ÈÍÇÌÉ Åáì ÝÞÏÇä ÇáæÒä¡ ÝÊÚÇæä ãÚ ãÞÏã ÇáÑÚÇíÉ ÇáÕÍíÉ áÝÞÏ ÇáæÒä ÈØÑíÞÉ ÂãäÉ.  • ãÇÑÓ ÇáÊãÇÑíä Úáì ÇáÃÞá 30 ÏÞíÞÉ Ýí 5 ÃíÇã Ãæ ÃßËÑ ßá ÃÓÈæÚ¡ Ãæ æÝÞ ÅÑÔÇÏÇÊ ãÞÏã ÇáÑÚÇíÉ ÇáÕÍíÉ.  • ÊÌäÈ ÇÑÊÏÇÁ ÇáãáÇÈÓ ÇáÖíÞÉ Íæá ÇáÎÕÑ æÇáÕÏÑ.  • áÇ ÊÓÊÎÏã ãäÊÌÇÊ ÊÍÊæí Úáì ÇáäíßæÊíä Ãæ ÇáÊÈÛ ãËá ÇáÓÌÇÆÑ æÇáÓÌÇÆÑ ÇáÅáßÊÑæäíÉ æÊÈÛ ÇáãÖÛ. íãßäß ÇÓÊÔÇÑÉ ãÞÏã ÇáÑÚÇíÉ ÇáÕÍíÉ ÅÐÇ ßäÊ ÇÍÊÌÊ Åáì ÇáãÓÇÚÏÉ ááÅÞáÇÚ Úä ÇáÊÏÎíä.  • ÇÍÑÕ Úáì ÑÝÚ ÑÃÓ ÇáÓÑíÑ ÚäÏ Çáäæã. ÇÓÊÎÏã ÍÔæÉ ÊÍÊ ÇáÝÑÇÔ Ãæ ÖÚ ÔíÆðÇ ãÇ ÊÍÊ ÅØÇÑ ÇáÓÑíÑ áÑÝÚ ÑÃÓ ÇáÓÑíÑ.  ãÇ ÇáÃØÚãÉ ÇáÊí íÌÈ Ãä ÃÊäÇæáåÇ¿    ÊäÇæá äÙÇãðÇ ÛÐÇÆíðÇ ÕÍíðÇ ãÊæÇÒäðÇ ãä ÇáÝæÇßå æÇáÎÖÑæÇÊ æÇáÍÈæÈ ÇáßÇãáÉ¡ æãäÊÌÇÊ ÇáÃáÈÇä ãäÎÝÖÉ ÇáÏåæä¡ æÇááÍæã ÇáÎÇáíÉ ãä ÇáÏåæä æÇáÃÓãÇß æÇáÏæÇÌä. íÎÊáÝ ßá ÔÎÕ Úä ÇáÂÎÑ. ÝÇáÃØÚãÉ ÇáÊí ÑÈãÇ ÊÓÈÈ ÃÚÑÇÖ áÏì ÔÎÕ ãÇ ÞÏ áÇ ÊËíÑ ÃÚÑÇÖ äåÇÆíðÇ áÏì  ÔÎÕ ÂÎÑ. ÇÚãá ãÚ ãÞÏãö ÇáÑÚÇíÉ ÇáÕÍíÉ áÊÍÏíÏ ÇáÃØÚãÉ ÇáÂãäÉ áß.  ÑÈãÇ áÇ ÊãËá ÇáÃÕäÇÝ ÇáãÈíäÉ ÃÚáÇå ÇáÞÇÆãÉ ÇáßÇãáÉ ãä ÇáãÃßæáÇÊ æÇáãÔÑæÈÇÊ ÇáÊí íãßäß ÊäÇæáåÇ. áÐÇ ÇÓÊÔÑ ÇÎÊÕÇÕí ÇáÊÛÐíÉ áãÚÑÝÉ ÇáãÒíÏ ãä ÇáãÚáæãÇÊ.  ãÇ ÇáÃØÚãÉ ÇáæÇÌÈ ÊÌäÈåÇ¿  ÞÏ íõÓÇÚÏ ÇáÍÏ ãä ÈÚÖ åÐå ÇáÃØÚãÉ Ýí ÇáÓíØÑÉö Úáì ÃÚÑÇÖ ãÑÖ ÇáÇÑÊÌÇÚ ÇáãÚÏí ÇáãÑíÆí (GERD). æíÎÊáÝ ßá ÔÎÕ Úä ÇáÂÎÑ. ÇØáÈ ãä ãõÞÏøöã ÇáÑÚÇíÉ ÇáÕÍíÉ ÇáÎÇÕ Èß ãÓÇÚÏÊß Úáì ÊÍÏíÏ ÇáÃØÚãÉ ÇáÊí íÌÈ ÊÌäÈåÇ ÈÔßá ãÍÏÏ¡ Åä æÌÏÊ.  ÇáÝæÇßå  ÌãíÚ ÇáÝæÇßå ÇáãõÌåÒÉ ÈÏåæä ãÖÇÝÉ. ßá ÝæÇßå ÊõÓÈÈ ÇáÃÚÑÇÖ. ÈÇáäÓÈÉ áÈÚÖ ÇáÃÔÎÇÕ¡ ÞÏ íÔÊãá Ðáß Úáì ÇáÝæÇßå ÇáÍãÖíÉ ãËá ÇáÈÑÊÞÇá æÇáÌÑíÈ ÝÑæÊ æÇáÃäÇäÇÓ æÇááíãæä.  ÇáÎÖÑÇæÇÊ  ÇáÎÖÑÇæÇÊ ÇáãÞáíÉ ÞáíðÇ ÔÏíÏðÇ. ÇáÈØÇØÓ ÇáãÞáíÉ. ÌãíÚ ÇáÎÖÑæÇÊ ÇáãõÌåÒÉ ÈÏåæä ãÖÇÝÉ. ÌãíÚ ÇáÎÖÑÇæÇÊ ÇáÊí ÊõÓÈÈ ÇáÃÚÑÇÖ. ÈÇáäÓÈÉ áÈÚÖ ÇáÃÔÎÇÕ¡ ÞÏ íÔãá Ðáß Úáì ÇáØãÇØãö æãäÊÌÇÊ ÇáØãÇØã æÇáÝáÝá ÇáÍÇÑ æÇáÈÕá æÇáËæã æÇáÝÌá ÇáÍÇÑ.  ÇáÍÈæÈ  ãÚÌäÇÊ Ãæ ãÎÈæÒÇÊ ÓÑíÚÉ ÊÍÊæí Úáì Ïåæä ãÖÇÝÉ.  ÇááÍæã æÇáÈÑæÊíäÇÊ ÇáÃÎÑì  ÇááÍæã ÇáÊí ÊÍÊæí Úáì ßãíÇÊ ßÈíÑÉö ãä ÇáÏåæä ãËá ÇááÍã ÇáÈÞÑí ÚÇáí ÇáÏåæä æáÍã ÇáÎäÒíÑ æÇáåæÊ ÏæÌ æÇáÖáæÚ æÝÎÐ ÇáÎäÒíÑ ÇáããáÍ æÇáÓÌÞ æÇáÓáÇãí æáÍã ÇáÎäÒíÑ ÇáãÞÏÏ. ÇááÍæã Ãæ ÇáÈÑæÊíäÇÊ ÇáãÞáíÉ¡ ÈãÇ Ýí Ðáß ÇáÃÓãÇß ÇáãÞáíÉ æÇáÏÌÇÌ ÇáãÞáí. ÒÈÏÉ ÇáãßÓÑÇÊ æÇáÈäÏÞ.  ãäÊÌÇÊ ÇáÃáÈÇä  ÇááÈä ÇáßÇãá æáÈä ÇáÔæßæáÇÊÉ. ÇáßÑíã ÇáÍÇãÖ. ÇáßÑíã. ÇáãËáÌÇÊ (Ice cream). ßÑíãÉ ÇáÌÈä. ãíáß Ôíß.  ÇáÏåæä æÇáÒíæÊ  ÇáÒÈÏÉ. ÇáÓãä. ÇáÔæÑÊäÌ (Ïåä äÈÇÊí ÃÈíÖ íÓÊÎÏã Ýí ÇáãÚÌäÇÊ æÇáÍáæíÇÊ). ÇáÓãä ÇáÍíæÇäí.  ÇáãÔÑæÈÇÊ  ÇáÞåæÉ æÇáÔÇí ÇáãÒæÏíä ÈÇáßÇÝííä Ãæ ÇáÎÇáííä ãäå. ÇáãÔÑæÈÇÊ ÇáÛÇÒíÉ. ãÔÑæÈÇÊ ÇáÕæÏÇ. ãÔÑæÈÇÊ ÇáØÇÞÉ. ÚÕÇÆÑ ÇáÝÇßåÉ ÇáãÕäæÚÉ ãä ÇáÝæÇßå ÇáÍãÖíÉ (ãËá ÇáÈÑÊÞÇá Ãæ ÇáÌÑíÈ ÝÑæÊ). ÚÕíÑ ÇáØãÇØã. ÇáãÔÑæÈÇÊ ÇáßÍæáíÉ.  ÇáÍáæíÇÊ æÇáÍáæì  ÇáÔæßæáÇÊÉ æÇáßÇßÇæ. ÇáÏæäÇÊ.  ÇáÊæÇÈá æÇáÈåÇÑÇÊ  ÇáÝáÝá. ÇáäÚäÇÚ æÇáäÚäÇÚ ÇáÓäÈáí. ÇáãáÍ ÇáãÖÇÝ. ßá ÇáÊæÇÈá Ãæ ÇáÃÚÔÇÈ Ãæ ÇáÈåÇÑÇÊ ÇáÊí ÊÓÈÈ ÇáÃÚÑÇÖ. ÞÏ íÔÊãá Ðáß Úáì ÇáßÇÑí Ãæ ÇáÕáÕÉ ÇáÍÇÑÉ Ãæ ÇáÓáØÇÊ ÇáÞÇÆãÉ Úáì ÇáÎá Ýí ÍÇáÉ ÈÚÖ ÇáÃÔÎÇÕ.  ÑÈãÇ áÇ ÊÔãá ÇáÃÕäÇÝ ÇáãÈíäÉ ÃÚáÇå  ÇáÞÇÆãÉ ÇáßÇãáÉ ãä ÇáãÃßæáÇÊ æÇáãÔÑæÈÇÊ ÇáÊí íäÈÛí Úáíß ÊÌäÈåÇ. áÐÇ ÇÓÊÔÑ ÇÎÊÕÇÕí ÇáÊÛÐíÉ áãÚÑÝÉ ÇáãÒíÏ ãä ÇáãÚáæãÇÊ.  ÝíãÇ íáí ÈÚÖ ÇáÃÓÆáÉ áØÑÍåÇ Úáì ãÞÏã ÇáÑÚÇíÉ ÇáÕÍíÉ  ÚÇÏÉð ãÇ Êßæä ÊÛííÑÇÊ ÇáäÙÇã ÇáÛÐÇÆí æäãØ ÇáÍíÇÉ åí ÇáÎØæÇÊ ÇáÃæáì ÇáÊí íÌÑí ÇÊÎÇÐåÇ ááÓíØÑÉ Úáì ÃÚÑÇÖ ãÑÖ ÇáÇÑÊÌÇÚ ÇáãÚÏí ÇáãÑíÆí (GERD). ÝÅÐÇ áã íÍÓä ÇáäÙÇãõ ÇáÛÐÇÆí æÊÛííÑÇÊ äãØ ÇáÍíÇÉ ÃÚÑÇÖß¡ ÝÊÍÏË ãÚ ãÞÏã ÇáÑÚÇíÉ ÇáÕÍíÉ Íæá ÊäÇæá ÇáÃÏæíÉ.  ãÕÇÏÑ ááÍÕæá Úáì ÇáãÒíÏ ãä ÇáãÚáæãÇÊ  • [employer] Functional Gastrointestinal Disorders (ÇáãÄÓÓÉ ÇáÏæáíÉ áÇÖØÑÇÈÇÊ æÙÇÆÝ ÇáÌåÇÒ ÇáåÖãí): [email or web address]  ãáÎÕ  • ÚäÏãÇ ÊÚÇäí ãä ÇáÇÑÊÌÇÚ ÇáãÚÏí ÇáãÑíÆí (GERD)¡ Êßæä ÇáÃØÚãÉ ÇáÊí ÊÊäÇæáåÇ æÚÇÏÇÊ ÊäÇæá ÇáØÚÇã ÃãÑ ãåã ááÛÇíÉ Ýí ÊÎÝíÝ ÇáÇÑÊÌÇÚ ÇáãÚÏí ÇáãÑíÆí.  • ÊäÇæá æÌÈÇÊ ÕÛíÑÉ æãÊßÑÑÉ ÈÏáÇð ãä ÊäÇæá ËáÇË æÌÈÇÊ ßÈíÑÉ ßá íæã. ÊäÇæá æÌÈÇÊß ÈÈØÁ¡ ÌÇáÓðÇ ãÓÊÑÎíðÇ. ÊÌäÈ ÇáÇäÍäÇÁ Ãæ ÇáÇÓÊáÞÇÁ áãÏÉ 2-3 ÓÇÚÇÊ ÈÚÏ ÊäÇæá ÇáØÚÇã.  • ÇÍÑÕ Úáì ÇáÊÞáíá ãä ÇáÃØÚãÉ ÚÇáíÉ ÇáÏåæä ãËá ÇááÍæã ÚÇáíÉ ÇáÏåæä Ãæ ÇáÃØÚãÉ ÇáãÞáíÉ.  áíÓ ÇáåÏÝ ãä åÐå ÇáãÚáæãÇÊ Ãä Êßæä ÈÏíáÇð ááÅÑÔÇÏÇÊ ÇáÊí íÞÏãåÇ ãæÝÑ ÇáÑÚÇíÉ ÇáÕÍíÉ. ÊÃßÏ ãä ãäÇÞÔÉ ÃíÉ ÃÓÆáÉ ÊÏæÑ Ýí Ðåäß ãÚ ãæÝÑ ÇáÑÚÇíÉ ÇáÕÍíÉ.þ  Document Revised: 12/29/2020 Document Reviewed: 12/29/2020  Else[employer] Patient Education © 2021 [employer]  äÞÕ ÝíÊÇãíä Ï  Vitamin D Deficiency  íõÞÕÏ ÈäÞÕ ÝíÊÇãíä Ï ÇäÎÝÇÖ ãÓÊæíÇÊå Ýí ÇáÌÓã Úä ÇáãÓÊæíÇÊ ÇáãØáæÈÉ. ÝÝíÊÇãíä Ï ãåã ááÌÓã áÃÓÈÇÈ ßËíÑÉ:  • ãÓÇÚÏÉ ÇáÌÓã Úáì ÇãÊÕÇÕ ãÚÏäíä ãä ÇáãÚÇÏä ÇáåÇãÉ¡ æåãÇ ÇáßÇáÓíæã æÇáÝæÓÝæÑ.  • áå ÏæÑ Ýí ÕÍÉ ÇáÚÙÇã.  • íÓÇÚÏ Úáì ÇáæÞÇíÉ ãä ÈÚÖ ÇáÃãÑÇÖ¡ ãËá ÇáÓßÑí æÇáÊÕáÈ ÇáãÊÚÏÏ.  • áå ÏæÑ Ýí ÃÏÇÁ ÇáÚÖáÇÊ áæÙÇÆÝåÇ¡ ÈãÇ Ýí Ðáß ÃÏÇÁ ÇáÞáÈ áæÙÇÆÝå.  æÅÐÇ æÕá äÞÕ ÝíÊÇãíä Ï Åáì ÏÑÌÉ ÔÏíÏÉ¡ ÝÅäå ÞÏ íÄÏí Åáì ÍÇáÉ ÊÕÈÍ ÝíåÇ ÇáÚÙÇã áíäÉ. æÊÓãì åÐå ÇáÍÇáÉ áíä ÇáÚÙÇã áÏì ÇáÈÇáÛíä. æÊõÚÑÝ ÈÇáßÓÇÍ áÏì ÇáÃØÝÇá.  ãÇ ÃÓÈÇÈ ÇáÍÇáÉ¿  ÑÈãÇ Êßæä åÐå ÇáÍÇáÉ äÇÌãÉ Úä:  • ÚÏã ÊäÇæá ãÇ íßÝí ãä ÇáÃØÚãÉ ÇáÊí ÊÍÊæí Úáì ÝíÊÇãíä Ï.  • ÚÏã ÇáÊÚÑÖ áÃÔÚÉ ÇáÔãÓ áãÏÉ ßÇÝíÉ.  • ÇáÅÕÇÈÉ ÈÈÚÖ  ÃãÑÇÖ ÇáÌåÇÒ ÇáåÖãí ÇáÊí ÊÕÚÈ Úáì ÇáÌÓã ÇãÊÕÇÕ ÝíÊÇãíä Ï¡ æÊÔãá ãÑÖ ßÑæä æÇáÊåÇÈ ÇáÈäßÑíÇÓ ÇáãÒãä æÇáÊáíÝ ÇáßíÓí.  • ÇáÎÖæÚ áÌÑÇÍÉ áÇÓÊÆÕÇá ÌÒÁ ãä ÇáãÚÏÉ Ãæ ÇáÃãÚÇÁ ÇáÏÞíÞÉ.  • ÇáÅÕÇÈÉ ÈãÑÖ ãÒãä Ýí Çáßáì Ãæ ãÑÖ ÇáßÈÏ.  ãÇ ÚæÇãá ÒíÇÏÉ ÇáÎØÑ¿  íÒÏÇÏ ÇÍÊãÇá ÇáÅÕÇÈÉ ÈåÐå ÇáÍÇáÉ Ýí ÇáÍÇáÇÊ ÇáÊÇáíÉ:  • ßÈÇÑ ÇáÓä.  • ÚÏã ÞÖÇÁ æÞÊ ßÇÝò Ýí ÇáåæÇÁ ÇáØáÞ.  • ÇáÚíÔ Ýí ãäÔÃÉ ÑÚÇíÉ ØæíáÉ ÇáãÏì.  • ÓÈÞ ÇáÊÚÑÖ áßÓæÑ Ýí ÇáÚÙÇã.  • ÖÚÝ Ãæ ÊÑÞÞ ÇáÚÙÇã (åÔÇÔÉ ÇáÚÙÇã).  • æÌæÏ ãÑÖ Ãæ ÍÇáÉ ÊÛíÑ ãä ØÑíÞÉ ÇãÊÕÇÕ ÇáÌÓã áÝíÊÇãíä Ï.  • ÇáÈÔÑÉ ÇáÏÇßäÉ.  • ÊäÇæá ÃÏæíÉ ãÚíäÉ¡ ãËá ÇáÃÏæíÉ ÇáÇÓÊÑæíÏíÉ Ãæ ÈÚÖ ÃÏæíÉ äæÈÇÊ ÇáÊÔäÌ.  • ÒíÇÏÉ ÇáæÒä Ãæ ÇáÈÏÇäÉ.  ãÇ ÚáÇãÇÊ ÇáÍÇáÉ Ãæ ÃÚÑÇÖåÇ¿  áÇ ÊÙåÑ ÃÚÑÇÖ äåÇÆíðÇ ÚäÏãÇ íßæä äÞÕ ÝíÊÇãíä Ï ÈÓíØðÇ. ÃãÇ ÅÐÇ ßÇäÊ ÇáÍÇáÉ ÔÏíÏÉ¡ ÝÞÏ ÊÔãá ÇáÃÚÑÇÖ:  • Ãáã ÇáÚÙÇã.  • Ãáã ÚÖáí.  • ßËÑÉ ÇáÓÞæØ.  • ßÓÑ ÇáÚÙÇã äÊíÌÉ ÅÕÇÈÉ ØÝíÝÉ.  ßíÝ ÊõÔÎÕ åÐå ÇáÍÇáÉ¿  íãßä ÊÔÎíÕ åÐå ÇáÍÇáÉ ÈÊÍÇáíá ÇáÏã. ßãÇ ÞÏ íÊã ÅÌÑÇÁ ÝÍæÕ ÇáÊÕæíÑ ãËá ÇáÃÔÚÉ ÇáÓíäíÉ áÇßÊÔÇÝ Ãí ÊÛíÑÇÊ Ýí ÇáÚÙÇã.  ßíÝ ÊõÚÇáÌ åÐå ÇáÍÇáÉ¿  ÞÏ íÎÊáÝ ÚáÇÌ åÐå ÇáÍÇáÉ Úáì ÍÓÈ ÓÈÈåÇ. æÊÔãá ÎíÇÑÇÊ ÇáÚáÇÌ:  • ÊäÇæá ÇáãßãáÇÊ ÇáÛÐÇÆíÉ ÇáÊí ÊÍÊæí Úáì ÝíÊÇãíä (Ï). æÓíæÖÍ áß ãÞÏã ÇáÑÚÇíÉ ÇáÕÍíÉ ÇáÌÑÚÉ ÇáãäÇÓÈÉ áß.  • ÊäÇæá ãßãáÇÊ ÇáßÇáÓíæã. æÓíæÖÍ áß ãÞÏã ÇáÑÚÇíÉ ÇáÕÍíÉ ÇáÌÑÚÉ ÇáãäÇÓÈÉ áß.  ÇÊÈÚ åÐå ÇáÊÚáíãÇÊ Ýí ÇáãäÒá:  ÇáÃßá æÇáÔÑÈ    • ÊäÇæá ÇáÃØÚãÉ ÇáÊí ÊÍÊæí Úáì ÝíÊÇãíä Ï¡ æãäåÇ:  ? ÊäÇæá ÇáãäÊÌÇÊ ÇáãÖÇÝ ÅáíåÇ ÝíÊÇãíä Ï ãËá ÇáÍÈæÈ Ãæ ÇáÚÕÇÆÑ. ãËá ãäÊÌÇÊ ÇáÃáÈÇä æÍÈæÈ ÇáÅÝØÇÑ æÇáÚÕÇÆÑ. ÑÇÌÚ ÇáãáÕÞ Úáì ÇáÚÈæÉ áãÚÑÝÉ åá ÇáØÚÇã ãÖÇÝ Åáíå ÇáÝíÊÇãíä Ãã áÇ.  ? ÇáÃÓãÇß ÇáÏåäíÉ¡ ãËá ÇáÓáãæä Ãæ Óãß ÇáÊÑæÊ.  ? ÇáÈíÖ.  ? ÇáãÍÇÑ.  ? ÇáÝØÑ (ÚíÔ ÇáÛÑÇÈ).  ÞÏ áÇ ÊãËá ÇáÃÕäÇÝ ÇáãÈíäÉ ÃÚáÇå ÇáÞÇÆãÉ ÇáßÇãáÉ ãä ÇáãÃßæáÇÊ æÇáãÔÑæÈÇÊ ÇáãæÕì ÈåÇ. áÐÇ íÌÈ ÇÓÊÔÇÑÉ ÇÎÊÕÇÕí ÇáÊÛÐíÉ áãÚÑÝÉ ÇáãÒíÏ ãä ÇáãÚáæãÇÊ.  ÊÚáíãÇÊ ÚÇãÉ  • áÇ ÊÊäÇæá ÇáÃÏæíÉ æÇáãßãáÇÊ ÇáÛÐÇÆíÉ ÅáÇ ÍÓÈ ÅÑÔÇÏÇÊ ãõÞÏã ÇáÑÚÇíÉ ÇáÕÍíÉ.  • ÊÚÑÖ áÃÔÚÉ ÇáÔãÓ ÈÇäÊÙÇã ãÚ ÇáÇáÊÒÇã ÈÊÚáíãÇÊ ÇáÓáÇãÉ.  • áÇ ÊÓÊÎÏã ÃÓÑÉ ÇáÊÔãÓ.  • ÍÇÝÙ Úáì æÒäß Ýí  ÇáÍÏæÏ ÇáÕÍíÉ. æÇÌÊåÏ áÅäÞÇÕ æÒäß ÅÐÇ áÒã ÇáÃãÑ.  • ÇáÊÒã ÈÌãíÚ ãæÇÚíÏ ÇáãÊÇÈÚÉ æÝÞ ÊæÌíåÇÊ ãÞÏã ÇáÑÚÇíÉ ÇáÕÍíÉ. ÝåÐÇ ÃãÑ ãåã.  ßíÝ íõãßä ÇáæÞÇíÉ ãä Ðáß¿  íãßäß ÇáÍÕæá Úáì ÝíÊÇãíä Ï ãä ÇáãÕÇÏÑ ÇáÊÇáíÉ:  • ÊäÇæá ÇáÃØÚãÉ ÇáÊí ÊÍÊæí¡ Ýí ÕæÑÊåÇ ÇáØÈíÚíÉ¡ Úáì ÝíÊÇãíä Ï.  • ÊäÇæá ÇáÃØÚãÉ æÇáãÔÑæÈÇÊ ÇáãÖÇÝ ÅáíåÇ ÝíÊÇãíä Ï¡ ãËá ÍÈæÈ ÇáÅÝØÇÑ æÇáÚÕÇÆÑ æãäÊÌÇÊ ÇáÃáÈÇä (ÈãÇ Ýí Ðáß ÇáÍáíÈ).  • ÊäÇæá ãßãáÇÊ ÝíÊÇãíä Ï Ãæ ãßãáÇÊ ÇáÝíÊÇãíäÇÊ ÇáãÊÚÏÏÉ ÇáÊí ÊÍÊæí Úáì ÝíÊÇãíä Ï.  • ÇáÊÚÑÖ ááÔãÓ. ÝÌÓãß íäÊÌ ÝíÊÇãíä Ï ÈÔßá ØÈíÚí ÚäÏãÇ íÊÚÑÖ ÇáÌáÏ áÖæÁ ÇáÔãÓ. æíÍæá ÇáÌÓã ÖæÁ ÇáÔãÓ Åáì Ôßá ãä ÃÔßÇá ÇáÝíÊÇãíä ÇáÊí íãßäå ÇÓÊÎÏÇãåÇ.  ÇÊÕá ÈãÞÏã ÇáÑÚÇíÉ ÇáÕÍíÉ Ýí ÍÇáÉ:  • ÚÏã ÒæÇá ÇáÃÚÑÇÖ.  • ÔÚæÑ ÈÇáÛËíÇä Ãæ ÇáÊÞíÄ.  • ÞáÉ ÚÏÏ ãÑÇÊ ÇáÊÈÑÒ Úä ÇáãÚÊÇÏ Ãæ ÇáÅÕÇÈÉ ÈÇáÅãÓÇß.  ãáÎÕ  • ÇáãÞÕæÏ ÈäÞÕ ÝíÊÇãíä Ï ÞáÉ ãÓÊæíÇÊå Ýí ÇáÌÓã Úä ÇáãØáæÈ.  • ÝíÊÇãíä Ï ãåã áÌÓãß ááÍÝÇÙ Úáì ÕÍÉ ÇáÚÙÇã ææÙÇÆÝ ÇáÚÖáÇÊ¡ ßãÇ ÞÏ íÓÇÚÏ Ýí ÇáæÞÇíÉ ãä ÈÚÖ ÇáÃãÑÇÖ.  • ÇáÚáÇÌ ÇáÃÓÇÓí áäÞÕ ÝíÊÇãíä Ï ÊäÇæá ÇáãßãáÇÊ ÇáÛÐÇÆíÉ. æÓíæÖÍ áß ãÞÏã ÇáÑÚÇíÉ ÇáÕÍíÉ ÇáÌÑÚÉ ÇáãäÇÓÈÉ áß.  • íãßäß ÇáÍÕæá Úáì ÝíÊÇãíä Ï ÈÊäÇæá ÇáÃØÚãÉ ÇáÊí ÊÍÊæí Úáì ÝíÊÇãíä Ï¡ Ãæ ÈÇáÊÚÑÖ ááÔãÓ¡ Ãæ ÊäÇæá ãßãá ÛÐÇÆí ÝíÊÇãíä Ï Ãæ ãßãá ãÊÚÏÏ ÇáÝíÊÇãíäÇÊ íÍÊæí Úáì ÝíÊÇãíä Ï.  áíÓ ÇáåÏÝ ãä åÐå ÇáãÚáæãÇÊ Ãä Êßæä ÈÏíáÇð ááÅÑÔÇÏÇÊ ÇáÊí íÞÏãåÇ ãæÝÑ ÇáÑÚÇíÉ ÇáÕÍíÉ. ÊÃßÏ ãä ãäÇÞÔÉ ÃíÉ ÃÓÆáÉ ÊÏæÑ Ýí Ðåäß ãÚ ãæÝÑ ÇáÑÚÇíÉ ÇáÕÍíÉ.þ  Document Revised: 09/17/2019 Document Reviewed: 09/17/2019  [employer] Patient Education © 2021 [employer]  Omeprazole capsules (sprinkle caps) - Rx  ãÇ åÐÇ ÇáÏæÇÁ¿  ÇæãíÈÑÇÒæá íãäÚ ÅäÊÇÌ ÇáÃÍãÇÖ Ýí ÇáãÚÏÉ.  íÓÊÎÏã áÚáÇÌ ÇÑÊÌÇÚ ÇáÍãÖ ááãÑÆ æÞÑÍ ãÚíäÉ Ýí ÇáãÚÏÉ æÈßÊíÑíÇ ãÚíäÉ Ýí ÇáãÚÏÉ æÇáÊåÇÈ ÇáãÑíÁ æãÊáÇÒãÉ ÒæáíäÌÑ- ÃáíÓæä.  íÓÊÎÏã ÃíÖðÇ áÚáÇÌ ÍÇáÇÊ ÃÎÑì ÊÓÈÈ ÒíÇÏÉ ÃÍãÇÖ ÇáãÚÏÉ ÈäÓÈÉ ßÈíÑÉ.  íãßä ÇÓÊÎÏÇã åÐÇ ÇáÏæÇÁ áÃÛÑÇÖ ÃÎÑìº ÇÓÃá ãÞÏã ÇáÑÚÇíÉ ÇáÕÍíÉ Ãæ ÇáÕíÏáí ÅÐÇ ßÇäÊ áÏíß ÃÓÆáÉ.  ÃÓãÇÁ ÇáÚáÇãÇÊ ÇáÊÌÇÑíÉ ÇáãÚÑæÝÉ:þ Prilosec  ãÇ åí ÇáÃÔíÇÁ ÇáÊí íÌÈ Ãä ÃÎÈÑ ÈåÇ ãÞÏã ÇáÑÚÇíÉ ÇáÕÍíÉ ÞÈá ÊäÇæá  åÐÇ ÇáÏæÇÁ¿  åã ÈÍÇÌÉ Åáí ãÚÑÝÉ ãÇ ÅÐÇ ßäÊ ãÕÇÈðÇ ÈÃí ãä åÐå ÇáÍÇáÇÊ ÇáÂä:  • ÃãÑÇÖ ÇáßÈÏ  • ÇäÎÝÇÖ ãÓÊæíÇÊ ÇáãÇÛäÓíæã Ýí ÇáÏã  • ÇáÐÆÈÉ  • ÑÏ ÝÚá ÛíÑ ÚÇÏí Ãæ ÍÓÇÓíÉ ÖÏ ÇæãíÈÑÇÒæá  • ÑÏ ÝÚá ÛíÑ ÚÇÏí Ãæ ÍÓÇÓíÉ ÖÏ ÃÏæíÉ ÃÎÑì  • ÑÏ ÝÚá ÛíÑ ÚÇÏí Ãæ ÍÓÇÓíÉ ÖÏ ÇáÃØÚãÉ Ãæ ÇáÃÕÈÇÛ Ãæ ÇáãæÇÏ ÇáÍÇÝÙÉ  • ÍÇãá Ãæ ÊÓÚíä ááÍãá  • ÇáÑÖÇÚÉ ÇáØÈíÚíÉ  ßíÝ íÌÈ Úáíø ÇÓÊÚãÇá åÐÇ ÇáÏæÇÁ¿  ÊäÇæá åÐÇ ÇáÏæÇÁ ÈÇáÝã ãÚ ßæÈ ãä ÇáãÇÁ.  ÇÊøóÈÚ ÇáÊÚáíãÇÊ ÇáãæÌæÏÉ Úáì ÇáÚÈæÉ Ãæ ãáÕÞ ÇáæÕÝÉ ÇáØÈíÉ.  áÇ ÊÞØÚ Ãæ ÊÓÍÞ Ãæ ÊãÖÛ åÐÇ ÇáÏæÇÁ.  ÇÈÊáÚ ßÇãáðÇ.  íãßäß ÝÊÍ ÇáßÈÓæáÉ ææÖÚ ÇáãÍÊæíÇÊ Ýí ãáÚÞÉ æÇÍÏÉ ßÈíÑÉ ãä ÕáÕÉ ÇáÊÝÇÍ.  ÇÈÊáÚ ÇáÏæÇÁ æÚÕíÑ ÇáÊÝÇÍ Úáì ÇáÝæÑ.  áÇ ÊãÖÛ ÇáÏæÇÁ Ãæ ÚÕíÑ ÇáÊÝÇÍ.  ÊäÇæá åÐÇ ÇáÏæÇÁ ÞÈá ÊäÇæá æÌÈÊß.  ÊäÇæá åÐÇ ÇáÏæÇÁ Úáì ÝÊÑÇÊ ãäÊÙãÉ.  áÇ ÊÊäÇæá åÐÇ ÇáÏæÇÁ ÃßËÑ ãä ÇáãÑÇÊ ÇáãæÕæÝÉ.  áÇ ÊæÞÝ ÇÓÊÚãÇá åÐÇ ÇáÏæÇÁ ÅáÇ ÅÐÇ äÕÍ ØÈíÈß ÈÐáß.  ÓíÞæã ÇáÕíÏáí ÈÅÚØÇÆß Ïáíá ØÈí ÎÇÕ ÚäÏ ÕÑÝ ßá æÕÝÉ æÚäÏ ÅÚÇÏÉ æÕÑÝåÇ.  ÊÃßÏ ãä ÞÑÇÁÉ åÐå ÇáãÚáæãÇÊ ÈÚäÇíÉ Ýí ßá ãÑÉ.  ÊÍÏË Åáí ØÈíÈ ÇáÃØÝÇá ÈÔÃä ÇÓÊÚãÇá åÐÇ ÇáÏæÇÁ ááÃØÝÇá.  Ýí Ííä Ãä åÐÇ ÇáÏæÇÁ íãßä Ãä íæÕÝ ááÃØÝÇá ÇáÐíä íÈáÛæä ãä ÇáÚãÑ 2 ÓäæÇÊ ÝÃßÈÑ áÚáÇÌ ÇáÍÇáÇÊ ÇáãÍÏÏÉ¡ ÅáÇ Ãäå íáÒã ÇÊÎÇÐ ÇáÇÍÊíÇØÇÊ ÇááÇÒãÉ.  ÇáÌÑÚÉ ÇáÒÇÆÏÉ : ÅÐÇ ÇÚÊÞÏÊ Ãäß ÊäÇæáÊ ßãíÉ ßÈíÑÉ ÌÏðÇ ãä åÐÇ ÇáÏæÇÁ¡ ÇÊÕá ÈãÑßÒ ÇáÊÍßã Ýí ÇáÓãæã Ãæ ÛÑÝÉ ÇáØæÇÑÆ ÝæÑðÇ.  ãáÇÍÙÉ: íÓÊÎÏã åÐÇ ÇáÏæÇÁ ãä ÃÌáß ÃäÊ ÝÞØ. áÇ ÊÔÇÑß ÂÎÑíä ãÚß Ýí ÊäÇæá åÐÇ ÇáÏæÇÁ.  ãÇÐÇ áæ ÊÑßÊ (äÓíÊ) ÌÑÚÉ¿  ÅÐÇ ÝÇÊÊß ÌÑÚÉ¡ ÊäÇæáåÇ Ýí ÃÞÑÈ æÞÊ ããßä.  ÅÐÇ ßÇä åÐÇ æÞÊ ÌÑÚÊß ÇáÊÇáíÉ ÊÞÑíÈðÇ ¡ ÝÇÓÊÎÏã Êáß ÇáÌÑÚÉ ÝÞØ.  áÇ ÊÊäÇæá ÌÑÚÊíä Ãæ ÌÑÚÉ ÒÇÆÏÉ.  ãÇ åí ÇáÃÔíÇÁ ÇáÊí ÞÏ ÊÊÝÇÚá ãÚ åÐÇ ÇáÏæÇÁ¿  áÇ ÊäÇæá åÐÇ ÇáÏæÇÁ ãÚ Ãí ããÇ íáí:  • ÇÊÇÒäÇÝíÑ  • ßáæÈíÏæÌÑíá  • äíáÝíäÇÝíÑ  • ÑíáÈíÝíÑíä  åÐÇ ÇáÏæÇÁ íãßä Ãä íÊÝÇÚá ÃíÖðÇ ãÚ ÇáÃÏæíÉ ÇáÊÇáíÉ:  • ÃÏæíÉ ãÖÇÏÉ ááÝØÑíÇÊ ãËá ÇíÊÑÇßæäÇÒæá æßíÊæßæäÇÒæá æÝæÑíßæäÇÒæá  • ãÖÇÏÇÊ ÝíÑæÓÇÊ ãÚíäÉ áÚáÇÌ ÝíÑæÓ äÞÕ ÇáãäÇÚÉ ÇáÈÔÑíÉ Ãæ ÇáÊåÇÈ ÇáßÈÏ  • ÈÚÖ ÇáÃÏæíÉ ÇáÊí ÊÚÇáÌ Ãæ ÊãäÚ ÊÌáØÇÊ ÇáÏã ãËá  æÇÑÝÇÑíä.  • ÓíáÇÓÊæÒíá  • ÓíÊÇáæÈÑÇã  • ÓíßáæÓÈæÑíä  • ÏÇÓÇÊíäíÈ  • ÏíÌæßÓíä  • ÏíÓáÝíÑÇã  • ãÏÑÇÊ ÇáÈæá  • ÅÑáæÊíäíÈ  • ãßãáÇÊ ÇáÍÏíÏ  • ÈÚÖ ÃÏæíÉ ÚáÇÌ ÇáÞáÞ æÇáÐÚÑ æááäæã ãËá ÏíÇÒíÈÇã  • ÈÚÖ ÃÏæíÉ ÚáÇÌ ÇáäæÈÇÊ ãËá ßÇÑÈÇãÇÒíÈíä æÝíäæÈÇÑÈíÊÇá æÝíäíÊæíä  • ãíËæÊÑíßÓíÊ  • ãíßæÝíäæáÇÊ ãæÝíÊíá  • äíáæÊíäíÈ  • ÑíÝÇãÈíÓíä  • äÈÊÉ ÓÇäÊ ÌæäÒ  • ÊÇßÑæáíãæÓ  • ÝíÊÇãíä È 12  åÐå ÇáÞÇÆãÉ ÞÏ áÇ ÊÕÝ ßá ÇáÊÝÇÚáÇÊ ÇáãÍÊãáÉ. Þã ÈÅÚØÇÁ ãÞÏã ÇáÑÚÇíÉ ÇáÕÍíÉ ÞÇÆãÉ Èßá ÇáÃÏæíÉ Ãæ ÇáÃÚÔÇÈ Ãæ ÇáÃÏæíÉ ÈÏæä æÕÝÉ Ãæ ÇáãßãáÇÊ ÇáÛÐÇÆíÉ ÇáÊí ÊÓÊÎÏãåÇ. ÃÎÈÑåã ÃíÖðÇ ÅÐÇ ßäÊ ÊÏÎä Ãæ ÊÔÑÈ ÇáßÍæá Ãæ ÊÓÊÎÏã ÚÞÇÑÇÊ ÛíÑ ÞÇäæäíÉ. ÞÏ ÊÊÝÇÚá ÈÚÖ ÇáÚäÇÕÑ ãÚ ÏæÇÆß.  ãÇ ÇáÐí íÌÈ Úáíø ÊÑÞÈå ÚäÏ ÇÓÊÚãÇá åÐÇ ÇáÏæÇÁ¿  Þã ÈÒíÇÑÉ ØÈíÈß Ãæ ÃÎÕÇÆí ÇáÑÚÇíÉ ÇáÕÍíÉ áãÑÇÞÈß ÊÍÓäß ÈÇäÊÙÇã.  ÃÎÈÑ ØÈíÈß Ãæ ÃÎÕÇÆí ÇáÑÚÇíÉ ÇáÕÍíÉ ÅÐÇ áã ÊÈÏÃ ÃÚÑÇÖß Ýí ÇáÊÍÓä Ãæ ÅÐÇ ÇÒÏÇÏÊ ÓæÁðÇ.  ÓæÝ ÊÍÊÇÌ Åáì ÅÌÑÇÁ ÇÎÊÈÇÑÇÊ åÇãÉ ááÏã ÃËäÇÁ ÊäÇæáß áåÐÇ ÇáÏæÇÁ.  ÞÏ íÊÓÈÈ åÐÇ ÇáÏæÇÁ Ýí ÇäÎÝÇÖ äÓÈÉ ÝíÊÇãíä È 12 Ýí ÇáÌÓã.  æíäÈÛí Úáíß Ãä ÊÊÃßÏ ãä ÍÕæáß Úáì ßãíÉ ßÇÝíÉ ãä ÝíÊÇãíä È 12 ÃËäÇÁ ÊäÇæá åÐÇ ÇáÏæÇÁ.  äÇÞÔ ÇáÃØÚãÉ æÇáÝíÊÇãíäÇÊ ÇáÊí ÊÊäÇæáåÇ ãÚ ØÈíÈß Ãæ ÃÎÕÇÆí ÇáÑÚÇíÉ ÇáÕÍíÉ.  ãÇ åí ÇáÂËÇÑ ÇáÌÇäÈíÉ ÇáÊí íãßä Ãä ÃáÇÍÙåÇ ÚäÏ ÊáÞí åÐÇ ÇáÏæÇÁ¿  ÇáÂËÇÑ ÇáÌÇäÈíÉ ÇáÊí íÌÈ Ãä ÊÈáÛ ØÈíÈß Ãæ ÃÎÕÇÆí ÇáÑÚÇíÉ ÇáÕÍíÉ ÈåÇ Ýí ÃÞÑÈ æÞÊ ããßä:  • ÇáÍÓÇÓíÉ ãËá ÇáØÝÍ ÇáÌáÏí æÇáÍßÉ æÇáÇÑÊßÇÑíÇ (ÇáÔÑí) ææÑã ÇáæÌå Ãæ ÇáÔÝÇå Ãæ ÇááÓÇä.  • Ãáã ÇáÚÙÇã  • ÕÚæÈÉ ÇáÊäÝÓ  • ÇáÍãøì Ãæ ÇÍÊÞÇä ÇáÍáÞ  • ÃæÌÇÚ Ãæ ÂáÇã ÇáãÝÇÕá  • ØÝÍ Úáì ÇáÎÏíä Ãæ ÇáÐÑÇÚíä íÒÏÇÏ ÓæÁðÇ Ýí ÇáÔãÓ  • ÇÍãÑÇÑ Ãæ ÊÍæÕá Ãæ ÊÞÔÑ Ãæ ÊÑåá Ýí ÇáÌáÏ¡ ÈãÇ Ýí Ðáß ãäØÞÉ ÏÇÎá ÇáÝã  • ÅÓåÇá ÍÇÏ  • ÚáÇãÇÊ æÃÚÑÇÖ ÅÕÇÈÉ Çáßáì¡ ãËá ÕÚæÈÉ ÇáÊÈæá Ãæ ÊÛíÑÇÊ Ýí ßãíÉ ÇáÈæá  • ÚáÇãÇÊ æÃÚÑÇÖ ÇäÎÝÇÖ ãÓÊæíÇÊ ÇáãÇÛäÓíæã ãËá ÊÔäÌÇÊ ÇáÚÖáÇÊº Ãæ ÂáÇã ÇáÚÖáÇÊº Ãæ ÖÚÝ ÇáÚÖáÇÊº Ãæ ÇáÇÑÊÌÇÝº äæÈÇÊ ÊÔäÌíÉº Ãæ ÓÑÚÉ ÖÑÈÇÊ ÇáÞáÈ Ãæ ÚÏã ÇäÊÙÇãåÇ  • ÓáÇÆá ÇáãÚÏÉ  • äÒíÝ Ãæ ßÏãÇÊ ÛíÑ ãÚÊÇÏÉ  ÇáÂËÇÑ ÇáÌÇäÈíÉ ÇáÊí áÇ ÊÊØáÈ ÑÚÇíÉ ØÈíÉ (ÃÈáÛ ØÈíÈß Ãæ ÃÎÕÇÆí ÇáÑÚÇíÉ ÇáÕÍíÉ ÅÐÇ ÇÓÊãÑÊ Ãæ ßÇäÊ  ãËíÑÉ ááÞáÞ):  • ÅÓåÇá  • ÌÝÇÝ ÇáÝã  • ÛÇÒÇÊ  • ÇáÕÏÇÚ  • ÇáÛËíÇä  • Ãáã ÇáãÚÏÉ  åÐå ÇáÞÇÆãÉ ÞÏ áÇ ÊÕÝ ßá ÇáÂËÇÑ ÇáÌÇäÈíÉ ÇáãÍÊãáÉ. ÇÊÕá ÈØÈíÈß ááÇÓÊÔÇÑÉ ÇáØÈíÉ Úä ÇáÂËÇÑ ÇáÌÇäÈíÉ. íãßäß ÇáÅÈáÇÛ Úä ÇáÂËÇÑ ÇáÌÇäÈíÉ áÅÏÇÑÉ ÇáÃÛÐíÉ æÇáÃÏæíÉ ÇáÃãÑíßíÉ (FDA) Úáì ÇáÑÞã [phone]  Ãíä íÌÈ Úáíø ÇáÇÍÊÝÇÙ ÈÏæÇÆí¿  íÍÝÙ ÈÚíÏðÇ Úä ãÊäÇæá ÇáÃØÝÇá.  íÎÒä Ýí ÏÑÌÉ ÍÑÇÑÉ ÇáÛÑÝÉ Èíä 15 æ30 ÏÑÌÉ ãÆæíÉ (59 æ86 ÝåÑäåÇíÊ).  íÍÝÙ ÈÚíÏðÇ Úä ÇáÑØæÈÉ æÇáÖæÁ.  ÊÎáÕ ãä Ãí ÏæÇÁ ÛíÑ ãÓÊÎÏã ÈÚÏ ÊÇÑíÎ ÇäÊåÇÁ ÇáÕáÇÍíÉ ÇáãØÈæÚ Úáì ÇáãáÕÞ Ãæ ÇáÚÈæÉ.  ãáÇÍÙÉ: åÐå ÇáÕÍíÝÉ ÚÈÇÑÉ Úä ãáÎÕ: ÞÏ áÇ íÛØí åÐÇ ÇáãáÎøóÕ ßá ÇáãÚáæãÇÊ ÇáããßäÉ. ÅÐÇ ßÇäÊ áÏíß Ãí ÃÓÆáÉ Úä åÐÇ ÇáÏæÇÁ¡ ÊÍÏøË Åáì ÇáØÈíÈ Ãæ ÇáÕíÏáí Ãæ ãÞÏã ÇáÑÚÇíÉ ÇáÕÍíÉ.    © 2021 [employer] (2021-04-22 00:00:00)

## 2021-12-14 LAB
ALBUMIN SERPL-MCNC: 4.7 G/DL (ref 3.5–5.2)
ALBUMIN/GLOB SERPL: 1.7 G/DL
ALP SERPL-CCNC: 70 U/L (ref 39–117)
ALT SERPL W P-5'-P-CCNC: 38 U/L (ref 1–41)
ANION GAP SERPL CALCULATED.3IONS-SCNC: 10.6 MMOL/L (ref 5–15)
AST SERPL-CCNC: 20 U/L (ref 1–40)
BILIRUB SERPL-MCNC: 1 MG/DL (ref 0–1.2)
BUN SERPL-MCNC: 22 MG/DL (ref 6–20)
BUN/CREAT SERPL: 27.5 (ref 7–25)
CALCIUM SPEC-SCNC: 9.7 MG/DL (ref 8.6–10.5)
CHLORIDE SERPL-SCNC: 101 MMOL/L (ref 98–107)
CO2 SERPL-SCNC: 24.4 MMOL/L (ref 22–29)
CREAT SERPL-MCNC: 0.8 MG/DL (ref 0.76–1.27)
GFR SERPL CREATININE-BSD FRML MDRD: 111 ML/MIN/1.73
GFR SERPL CREATININE-BSD FRML MDRD: 135 ML/MIN/1.73
GLOBULIN UR ELPH-MCNC: 2.7 GM/DL
GLUCOSE SERPL-MCNC: 86 MG/DL (ref 65–99)
POTASSIUM SERPL-SCNC: 4.6 MMOL/L (ref 3.5–5.2)
PROT SERPL-MCNC: 7.4 G/DL (ref 6–8.5)
SODIUM SERPL-SCNC: 136 MMOL/L (ref 136–145)

## 2022-02-14 ENCOUNTER — TELEPHONE (OUTPATIENT)
Dept: FAMILY MEDICINE CLINIC | Facility: CLINIC | Age: 32
End: 2022-02-14

## 2022-02-14 NOTE — TELEPHONE ENCOUNTER
Caller: Meliton Lagos    Relationship to patient: Self    Best call back number: [phone]    Type of visit: OV    Requested date: ASAP    If rescheduling, when is the original appointment: 02/17/2022    Additional notes:  PATIENT FEELS BAD AND HAS BEEN DEALING WITH THE HEADACHE AND PAIN FOR 5 DAYS AND WOULD LIKE TO BE SEEN SOONER THAN Thursday. PLEASE ADVISE AND CALL PATIENT BACK

## 2022-02-15 ENCOUNTER — LAB (OUTPATIENT)
Dept: LAB | Facility: HOSPITAL | Age: 32
End: 2022-02-15

## 2022-02-15 ENCOUNTER — OFFICE VISIT (OUTPATIENT)
Dept: FAMILY MEDICINE CLINIC | Facility: CLINIC | Age: 32
End: 2022-02-15

## 2022-02-15 VITALS
WEIGHT: 210 LBS | BODY MASS INDEX: 32.96 KG/M2 | RESPIRATION RATE: 20 BRPM | DIASTOLIC BLOOD PRESSURE: 82 MMHG | SYSTOLIC BLOOD PRESSURE: 124 MMHG | TEMPERATURE: 97 F | OXYGEN SATURATION: 98 % | HEART RATE: 101 BPM | HEIGHT: 67 IN

## 2022-02-15 DIAGNOSIS — M79.18 MUSCULOSKELETAL PAIN: ICD-10-CM

## 2022-02-15 DIAGNOSIS — J01.10 ACUTE NON-RECURRENT FRONTAL SINUSITIS: Primary | ICD-10-CM

## 2022-02-15 DIAGNOSIS — R51.9 ACUTE NONINTRACTABLE HEADACHE, UNSPECIFIED HEADACHE TYPE: ICD-10-CM

## 2022-02-15 PROCEDURE — U0004 COV-19 TEST NON-CDC HGH THRU: HCPCS | Performed by: NURSE PRACTITIONER

## 2022-02-15 PROCEDURE — 99213 OFFICE O/P EST LOW 20 MIN: CPT | Performed by: NURSE PRACTITIONER

## 2022-02-15 RX ORDER — CYCLOBENZAPRINE HCL 10 MG
TABLET ORAL
Qty: 30 TABLET | Refills: 0 | Status: SHIPPED | OUTPATIENT
Start: 2022-02-15 | End: 2022-03-10

## 2022-02-15 RX ORDER — PANTOPRAZOLE SODIUM 40 MG/1
TABLET, DELAYED RELEASE ORAL
COMMUNITY
Start: 2022-01-26 | End: 2022-03-10 | Stop reason: SDUPTHER

## 2022-02-15 RX ORDER — AMOXICILLIN 875 MG/1
875 TABLET, COATED ORAL 2 TIMES DAILY
Qty: 20 TABLET | Refills: 0 | Status: SHIPPED | OUTPATIENT
Start: 2022-02-15 | End: 2022-02-25

## 2022-02-16 LAB — SARS-COV-2 RNA NOSE QL NAA+PROBE: NOT DETECTED

## 2022-02-17 ENCOUNTER — TELEPHONE (OUTPATIENT)
Dept: FAMILY MEDICINE CLINIC | Facility: CLINIC | Age: 32
End: 2022-02-17

## 2022-02-17 NOTE — TELEPHONE ENCOUNTER
Called pt to tell him that if his chest pain still hurt to go to the the ER and that if it feels like like heart burn to use the over the OTC product called Pepcid. Pt understood what I was saying without an . Pt also told me to send the otc product name  to his My Chart so that he can get it..

## 2022-02-17 NOTE — TELEPHONE ENCOUNTER
Pt called and stated that the antibiotic he was prescribed is causing burning in his chest. Would like a call back

## 2022-02-18 PROBLEM — R51.9 ACUTE NONINTRACTABLE HEADACHE: Status: ACTIVE | Noted: 2022-02-18

## 2022-02-18 PROBLEM — J01.10 ACUTE NON-RECURRENT FRONTAL SINUSITIS: Status: ACTIVE | Noted: 2022-02-18

## 2022-02-18 NOTE — PATIENT INSTRUCTIONS
ÇáÕÏÇÚ ÇáäÕÝí  Migraine Headache  ÇáÕÏÇÚ ÇáäÕÝí Ãáãõ ãÝÇÌÆ æãÈÑÍ Ýí ÌÇäÈ æÇÍÏ ãä ÇáÑÃÓ Ãæ ÇáÌÇäÈíä ãÚðÇ. æÞÏ íÓÈÈ ÇáÕÏÇÚ ÇáäÕÝí ÃÚÑÇÖðÇ ÃÎÑì ãËá ÇáÛËíÇä æÇáÞíÁ æÇáÍÓÇÓíÉ ááÖæÁ æÇáÖæÖÇÁ. íãßä Ãä ÊÓÊãÑ äæÈÉ ÇáÕÏÇÚ ÇáäÕÝí ãä 4 ÓÇÚÇÊ Åáì 3 ÃíÇã. äÇÞÔ ãÚ ØÈíÈß ÇáÚæÇãá ÇáÊí ÞÏ ÊÄÏí áÍÏæË (ÊÍÝíÒ) ÇáÕÏÇÚ ÇáäÕÝí áÏíß.  ãÇ ÃÓÈÇÈ åÐå ÇáÍÇáÉ¿  ÇáÓÈÈ ÇáÝÚáí áåÐå ÇáÍÇáÉ ÛíÑ ãÚÑæÝ. æãÚ Ðáß¡ íãßä Ãä íÍÏË ÇáÕÏÇÚ ÇáäÕÝí ÚäÏ ÇÓÊËÇÑÉ ÃÚÕÇÈ ÇáãÎ æÅÝÑÇÒåÇ ãæÇÏ ßíãíÇÆíÉ ÊÓÈÈ ÇáÊåÇÈ ÇáÃæÚíÉ ÇáÏãæíÉ. æíõÓÈÈ Ðáß ÇáÃáã. ãä ÇáÚæÇãá ÇáÊí ÞÏ ÊËíÑ åÐå ÇáÍÇáÉ Ãæ ÊÓÈÈåÇ:  • ÊäÇæá ÇáßÍæáíÇÊ.  • ÇáÊÏÎíä.  • ÊäÇæá ÃÏæíÉ ãËá:  ? ÃÏæíÉ ÚáÇÌ Ãáã ÇáÕÏÑ (äÊÑæÌáÓÑíä).  ? ÍÈæÈ ÊäÙíã ÇáäÓá.  ? ÇáÇÓÊÑæÌíä.  ? ÈÚÖ ÃäæÇÚ ÃÏæíÉ ÚáÇÌ ÖÛØ ÇáÏã.  • ÇáÃØÚãÉ æÇáãÔÑæÈÇÊ ÇáÊí ÊÍÊæí Úáì ÇáäÊÑÇÊ Ãæ ÇáÌáæÊæãÇÊ Ãæ ÇáÃÓÈÇÑÊíã Ãæ ÇáÊíÑÇãíä. ßÐáß ÞÏ ÊÓËÇÑ åÐå ÇáÍÇáÉ äÊíÌÉ ÈÚÖ ÃäæÇÚ ÇáÌÈä ÇáÞÏíã Ãæ ÇáÔæßæáÇÊÉ Ãæ ÇáÃØÚãÉ Ãæ ÇáãÔÑæÈÇÊ ÇáÊí ÊÍÊæí Úáì ÇáßÇÝííä.  • ããÇÑÓÉ ÇáäÔÇØ ÇáÈÏäí.  ãä ÇáÃãæÑ ÇáÃÎÑì ÇáÊí ÞÏ ÊÓÈÈ ÇáÕÏÇÚ ÇáäÕÝí:  • ÇáÍíÖ.  • ÇáÍãá.  • ÇáÌæÚ.  • ÇáÅÌåÇÏ.  • ÞáÉ Çáäæã Ãæ Çáäæã ÃßËÑ ãä ÇááÇÒã.  • ÇáÊÛííÑÇÊ ÇáãäÇÎíÉ.  • ÇáÅÚíÇÁ.  ãÇ ÚæÇãá ÒíÇÏÉ ÇáÎØÑ¿  ÞÏ ÊÒíÏ ÇáÚæÇãá ÇáÊÇáíÉ ãä ãÎÇØÑÉ ÅÕÇÈÊß ÈÇáÕÏÇÚ ÇáäÕÝí:  • ÇáÅäÊãÇÁ áãÑÍáÉ ÚãÑíÉ ãÚíäÉ. ÝÇáÕÏÇÚ ÇáäÕÝí ÃßËÑ ÇäÊÔÇÑðÇ Ýí Óä 25-55 ÚÇãðÇ.  • Ãä Êßæäí ÃäËì.  • æÌæÏ ÍÇáÇÊ ÕÏÇÚ äÕÝí Ýí ÇáÚÇÆáÉ.  • Ãä Êßæä ÞæÞÇÒíðÇ.  • ÇáÅÕÇÈÉ ÈãÔßáÉ äÝÓíÉ¡ ãËá ÇáÇßÊÆÇÈ Ãæ ÇáÞáÞ.  • ÇáÓãäÉ.  ãÇ ÚáÇãÇÊ ÇáÍÇáÉ Ãæ ÃÚÑÇÖåÇ¿  ÊÊãËá ÇáÃÚÑÇÖ ÇáÑÆíÓíÉ áåÐå ÇáÍÇáÉ Ýí Ãáã äÇÈÖ. æåÐÇ ÇáÃáã ÞÏ:  • íÕíÈ Ãí ÌÒÁ Ýí ÇáÑÃÓ¡ ãËá ÃÍÏ ÇáÌæÇäÈ Ãæ ÇáÌÇäÈíä.  • ÅÚÇÞÉ ããÇÑÓÊß áÃäÔØÊß ÇáíæãíÉ.  • ÒíÇÏÉ ÍÏÊå ãÚ ããÇÑÓÉ ÇáäÔÇØ ÇáÈÏäí.  • ÒíÇÏÉ ÍÏÊå ãÚ ÇáÊÚÑÖ Åáì ÇáÃÖæÇÁ ÇáÓÇØÚÉ Ãæ ÇáÖæÖÇÁ ÇáÕÇÎÈÉ.  æÑÈãÇ ÊÔãá ÇáÃÚÑÇÖ ÇáÃÎÑì ãÇ íáí:  • ÇáÛËíÇä.  • ÇáÞíÁ.  • ÇáÏæÇÑ.  • ÍÓÇÓíÉ ÚÇãÉ ÊÌÇå ÇáÃÖæÇÁ ÇáÓÇØÚÉ Ãæ ÇáÖæÖÇÁ ÇáÚÇáíÉ Ãæ ÇáÑæÇÆÍ ÇáÞæíÉ.   ÞÏ ÊÔÚÑ ÈÈÚÖ ÇáÚáÇãÇÊ ÇáÊí ÊÔíÑ áÞÑÈ ÍÏæË äæÈÉ ÇáÕÏÇÚ ÇáäÕÝí (ÃæÑÉ) ÞÈá ÍÏæËåÇ. æÞÏ ÊÊÖãä ÇáÃæÑÉ:  • ÑÄíÉ ÃÖæÇÁ æÇãÖÉ Ãæ ÙåæÑ ÈÞÚ  ÚãíÇÁ.  • ÑÄíÉ äÞÇØ ÈÑÇÞÉ Ãæ åÇáÇÊ Ãæ ÎØæØ ãÊÚÑÌÉ.  • ÇáÅÕÇÈÉ ÈÇáÑÄíÉ ÇáäÝÞíÉ Ãæ ÊÔæÔ ÇáÅÈÕÇÑ.  • ÇáÔÚæÑ ÈÇáæÎÒ Ãæ ÇáÊäãíá.  • ÇáÔÚæÑ ÈÕÚæÈÉ Ýí ÇáßáÇã.  • ÇáÔÚæÑ ÈÖÚÝ Ýí ÇáÚÖáÇÊ.  ÈÚÖ ÇáäÇÓ íÊÚÑÖæä áÃÚÑÇÖ ãÇ ÈÚÏ ÇäÊåÇÁ äæÈÉ ÇáÕÏÇÚ ÇáäÕÝí (ãÑÍáÉ ãÇ ÈÚÏ ÇáÕÏÇÚ)¡ ãËá:  • ÔÚæÑ ÈÇáÊÚÈ.  • ãÚÇäÇÉ ÕÚæÈÉ Ýí ÇáÊÑßíÒ.  ßíÝ ÊõÔÎÕ åÐå ÇáÍÇáÉ¿  íÍÑí ÊÔÎíÕ ÇáÕÏÇÚ ÇáäÕÝí æÝÞ:  • ÇáÃÚÑÇÖ ÇáÙÇåÑÉ.  • ÝÍÕ ÈÏäí.  • ÝÍæÕÇÊ¡ ãËá:   ? ÝÍÕ ÇáÑÃÓ ÈÇáÃÔÚÉ ÇáãÞØÚíÉ ÇáãÍæÓÈÉ (CT) Ãæ ÇáÊÕæíÑ ÈÇáÑäíä ÇáãÛäÇØíÓí (MRI). ÊÓÇÚÏ åÐå ÇáÝÍæÕ ÇáÊÕæíÑíÉ Úáì ÇÓÊÈÚÇÏ ÇáÃÓÈÇÈ ÇáÃÎÑì ááÕÏÇÚ.  ? ÓÍÈ ÚíäÉ ãä ÓÇÆá ÇáäÎÇÚ ÇáÔæßí (ÇáÈÒá ÇáÞØäí) æÊÍáíáåÇ (ÊÍáíá ÇáÓÇÆá ÇáÏãÇÛí ÇáäÎÇÚí Ãæ ÊÍáíá ÇáÓÇÆá ÇáäÎÇÚí (CSF)).  ßíÝ ÊõÚÇáÌ åÐå ÇáÍÇáÉ¿  íãßä ãÚÇáÌÉ åÐå ÇáÍÇáÉ ÈÇáÃÏæíÉ ÇáÊí:  • ÊÓßä ÇáÃáã.  • ÊÎÝÝ ãä ÇáÛËíÇä.  • ÊÞí ãä ÍÏæË ÇáÕÏÇÚ ÇáäÕÝí.  ÞÏ íÔãá ÚáÇÌ åÐå ÇáÍÇáÉ ÃíÖðÇ:  • ÇáæÎÒ ÇáÅÈÑí.  • ÅÌÑÇÁ ÊÛííÑÇÊ Úáì ÃÓáæÈ ÇáÍíÇÉ ãËá ÊÌäÈ ÇáÃØÚãÉ ÇáÊí ÊÓÈÈ äæÈÇÊ ÇáÕÏÇÚ ÇáäÕÝí.  • ÑÏæÏ ÇáÝÚá ÇáÍíæíÉ.  • ÇáÚáÇÌ ÇáãÚÑÝí ÇáÓáæßí (CBT).  ÇÊÈÚ åÐå ÇáÊÚáíãÇÊ Ýí ÇáãäÒá:  ÇáÃÏæíÉ  • ÊäÇæá ÇáÃÏæíÉ ÇáÊí ÊõÕÑÝ ÈæÕÝÉ ØÈíÉ Ãæ Ïæä æÕÝÉ ØÈíÉ æÝÞ ãÇ íÎÈÑß Èå ãÞÏã ÇáÑÚÇíÉ ÇáÕÍíÉ ÇáÎÇÕ Èß.  • ÇÓÃá ãÞÏã ÇáÑÚÇíÉ ÇáÕÍíÉ ÚãÇ ÅÐÇ ßÇä ÇáÏæÇÁ ÇáãæÕæÝ áß:  ? íÓáÊÒã ãäß ÊÌäÈ ÇáÞíÇÏÉ Ãæ ÇÓÊÎÏÇã ÇáÂáÇÊ ÇáËÞíáÉ.  ? íãßä Ãä íÓÈÈ ÇáÅãÓÇß. ÝÑÈãÇ ÊÍÊÇÌ Åáì ÇÊÎÇÐ åÐå ÇáÅÌÑÇÁÇÊ ááæÞÇíÉ ãä ÇáÅãÓÇß Ãæ ÚáÇÌå Ýí ÍÇáÉ ÍÏæËå:  ? ÇÔÑÈ ßãíÇÊ ßÇÝíÉ ãä ÇáÓæÇÆá áíÙá áæä Èæáß ÃÕÝÑðÇ ÈÇåÊðÇ.  ? ÊäÇæá ÃÏæíÉ Ïæä æÕÝÉ ØÈíÈ Ãæ ÈæÕÝÉ ØÈíÈ.  ? ÊäÇæá ÇáÃØÚãÉ ÇáÛäíÉ ÈÇáÃáíÇÝ ãËá ÇáÈÞæáíÇÊ æÇáÍÈæÈ ÇáßÇãáÉ æÇáÝæÇßå æÇáÎÖÑÇæÇÊ ÇáØÇÒÌÉ.  ? Þáá ÊäÇæá ÇáÃØÚãÉ ÇáÊí ÊÍÊæí Úáì äÓÈ ÚÇáíÉ ãä ÇáÏåæä æÇáÓßÑíÇÊ ÇáãÕäÚÉ ãËá ÇáÃØÚãÉ ÇáãÞáíÉ Ãæ ÇáÍáæíÇÊ.  äãØ ÇáÍíÇÉ  • íÊÚíä ÚÏã ÊäÇæá ÇáßÍæá äåÇÆíðÇ.  • áÇ ÊÓÊÎÏã ãäÊÌÇÊ ÊÍÊæí Úáì ÇáäíßæÊíä Ãæ ÇáÊÈÛ¡ ãËá ÇáÓÌÇÆÑ æÇáÓÌÇÆÑ ÇáÅáßÊÑæäíÉ æÊÈÛ ÇáãÖÛ. æÇÓÊÔÑ ãÞÏã ÇáÑÚÇíÉ ÇáÕÍíÉ ÅÐÇ ÇÍÊÌÊ Åáì ÇáãÓÇÚÏÉ ááÅÞáÇÚ Úä ÇáÊÏÎíä.  • ÃÍÕá Úáì 8 ÓÇÚÇÊ Úáì ÇáÃÞá ãä Çáäæã ßá áíáÉ.  • ÇÈÍË Úä ÃÓÇáíÈ ááÊÛáÈ Úáì ÇáÖÛæØ  ÇáÚÕÈíÉ ãËá ÊãÇÑíä ÇáÊÃãá Ãæ ÇáÊäÝÓ ÇáÚãíÞ Ãæ ÇáíæÛÇ.  ÊÚáíãÇÊ ÚÇãÉ      • ÇÍÑÕ Úáì ÊÓÌíá ãáÇÍÙÇÊß ááæÞæÝ Úáì ãÇ ÞÏ íÕíÈß ÈÇáÕÏÇÚö ÇáäÕÝí. **Úáì ÓÈíá ÇáãËÇá¡ ÓÌá ÇáÂÊí:  ? ÇáÃØÚãÉ æÇáãÔÑæÈÇÊ ÇáÊí ÊÊäÇæáåÇ.  ? ÇáÝÊÑÇÊ ÇáÊí ÊãÖíåÇ Ýí Çáäæã.  ? ÌãíÚ ÇáÊÛíøõíÑÇÊ Ýí ÇáäÙÇã ÇáÛÐÇÆí Ãæ ÇáÃÏæíÉ.  • ÚäÏ ÇáÊÚÑÖ áäæÈÉ ÕÏÇÚ äÕÝí:  ? ÊÌäÈ ÇáÃãæÑ ÇáÊí ÊÒíÏ ãä ÍÏÉ ÇáÃÚÑÇÖ¡ ãËá ÇáÃÖæÇÁ ÇáÓÇØÚÉ.  ? ÞÏ íÓÇÚÏß ÇáÇÓÊáÞÇÁ Ýí ÍÌÑÉ ãÙáãÉ åÇÏÆÉ.  ? áÇ ÊÞÏ Ãæ ÊÓÊÎÏã ÇáãÇßíäÇÊ ÇáËÞíáÉ.  ? ÇÓÃá ãÞÏã ÇáÑÚÇíÉ ÇáÕÍíÉ Úä ÇáÃäÔØÉ ÇáÂãäÉ áß Ýí ÃËäÇÁ ÊÚÑÖß áäæÈÉ ÕÏÇÚ äÕÝí.  • ÇáÊÒã ÈÌãíÚ ãæÇÚíÏ ÇáãÊÇÈÚÉ æÝÞ ÊæÌíåÇÊ ãÞÏã ÇáÑÚÇíÉ ÇáÕÍíÉ. ÝåÐÇ ÃãÑ ãåã.  ÇÊÕá ÈãÞÏã ÇáÑÚÇíÉ ÇáÕÍíÉ Ýí ÇáÍÇáÇÊ ÇáÊÇáíÉ:  • ÙåæÑ ÃÚÑÇÖ áäæÈÇÊ ÇáÕÏÇÚ ÇáäÕÝí ãÎÊáÝÉ Úä ÇáÃÚÑÇÖ ÇáÊí ÊÕíÈß ÚÇÏÉ Ãæ ÃßËÑ ÔÏÉ.  • ÇáÅÕÇÈÉ ÈÇáÕÏÇÚ áÃßËÑ ãä 15 íæãðÇ Ýí ÔåÑ æÇÍÏ.  ÇØáÈ ÇáãÓÇÚÏÉ Úáì ÇáÝæÑ Ýí ÇáÍÇáÇÊ ÇáÊÇáíÉ:  • æÕæá ÇáÕÏÇÚ ÇáäÕÝí áãÑÍáÉ ÔÏíÏÉ.  • ÇÓÊãÑÇÑ ÇáÕÏÇÚ ÇáäÕÝí áÃßËÑ ãä 72 ÓÇÚÉ.  • ÇáÅÕÇÈÉ ÈÍãì.  • ÇáÔÚæÑ ÈÊíÈÓ Ýí ÇáÚäÞ.  • ÅÕÇÈÊß ÈÇáÚãì.  • ÇáÔÚæÑ ÈÖÚÝ ÇáÚÖáÇÊ Ãæ ÇáÚÌÒ Úä ÇáÊÍßã ÈåÇ.  • ÈÏÃÊ ÊÝÞÏ ÊæÇÒäß ÈßËÑÉ.  • ÔÚæÑß ÈÕÚæÈÉ ÇáÓíÑ.  • ÅÕÇÈÊß ÇáÅÛãÇÁ.  • ÇáÅÕÇÈÉ ÈäæÈÉ ãÑÖíÉ.  ãáÎÕ  • ÇáÕÏÇÚ ÇáäÕÝí Ãáãõ ãÝÇÌÆ æãÈÑÍ Ýí ÌÇäÈ æÇÍÏ ãä ÇáÑÃÓ Ãæ ÇáÌÇäÈíä ãÚðÇ. æÞÏ íÓÈÈ ÇáÕÏÇÚ ÇáäÕÝí ÃÚÑÇÖ ÃÎÑì ãËá ÇáÛËíÇä æÇáÞíÁ æÇáÍÓÇÓíÉ ááÖæÁ æÇáÖæÖÇÁ.  • íãßä ãÚÇáÌÉ åÐå ÇáÍÇáÉ ÈÇáÃÏæíÉ æÅÌÑÇÁ ÊÛííÑÇÊ Ýí ÃÓáæÈ ÇáÍíÇÉ. æÞÏ ÊÍÊÇÌ ßÐáß Åáì ÊÌäÈ ÇáÚæÇãá ÇáÊí ÊËíÑ ÇáÕÏÇÚ ÇáäÕÝí.  • ÇÍÑÕ Úáì ÊÓÌíá ãáÇÍÙÇÊß ááæÞæÝ Úáì ãÇ ÞÏ íÕíÈß ÈÇáÕÏÇÚö ÇáäÕÝí.  • ÇÊÕá ÈãõÞÏøöã ÇáÑÚÇíÉ ÇáÕÍíÉ ÇáãÊÇÈÚ áß ÅÐÇ ÊÚÑÖÊ ááÕÏÇÚ áÃßËÑ ãä 15 íæãðÇ Ýí ÇáÔåÑ Ãæ ÙåÑÊ Úáíß ÃÚÑÇÖõ ãÎÊáÝÉ Ãæ ÃßËÑ ÔÏÉ ãä ÃÚÑÇÖ ÇáÕÏÇÚ ÇáäÕÝí ÇáÊí ÊÕíÈß ÚÇÏÉ.  áíÓ ÇáåÏÝ ãä åÐå ÇáãÚáæãÇÊ Ãä Êßæä ÈÏíáÇð ááÅÑÔÇÏÇÊ ÇáÊí íÞÏãåÇ ãæÝÑ ÇáÑÚÇíÉ ÇáÕÍíÉ. ÊÃßÏ ãä ãäÇÞÔÉ ÃíÉ ÃÓÆáÉ ÊÏæÑ Ýí Ðåäß ãÚ ãæÝÑ ÇáÑÚÇíÉ ÇáÕÍíÉ.þ  Document Revised: 02/21/2020 Document Reviewed: 02/21/2020  [employer] Patient Education © 2021 [employer]  ÇáÊåÇÈ ÇáÌíæÈ ÇáÃäÝíÉ áÏì ÇáÈÇáÛíä  Sinusitis,  Adult    ÇáÊåÇÈ ÇáÌíæÈ åæ ÇáÊåÇÈ íÕíÈ ÇáÌíæÈ ÇáÃäÝíÉ. ÃãÇ ÇáÌíæÈ ÇáÃäÝíÉ ÝÚÈÇÑÉ Úä ãÓÇÍÇÊ ÌæÝÇÁ Ýí ÇáÚÙÇã ÇáãÍíØÉ ÈæÌåß. æÊÞÚ ÇáÌíæÈ ÇáÃäÝíÉ Ýí ÇáÃãÇßä ÇáÊÇáíÉ:  • Íæá Úíäíß.  • Ýí ãäÊÕÝ ÇáÌÈåÉ.  • ÎáÝ ÇáÃäÝ.  • Ýí ÚÙÇã ÇáÎÏíä.  ÚÇÏÉ ãÇ íÌÑí ÊÕÑíÝ ÇáãÎÇØ ãä ÇáÌíæÈ ÇáÃäÝíÉ. ÃãÇ ÚäÏ ÅÕÇÈÉ ÇáäÓíÌ ÇáÃäÝí ÈÇáÊåÇÈ Ãæ ÊæÑã¡ ÝÞÏ íÍÊÌÒ ÇáãÎÇØ Ãæ íäÍÕÑ. æíÓãÍ Ðáß ááÈßÊÑíÇ æÇáÝíÑæÓÇÊ æÇáÝØÑíÇÊ ÈÇáäãæ ãÓÈÈÉ ÇáÚÏæì. æÊÍÏË ãÚÙã ÍÇáÇÊ ÚÏæì ÇáÌíæÈ ÇáÃäÝíÉ ÈÓÈÈ ÝíÑæÓ.  ÊÍÏË ÇáÅÕÇÈÉ ÈÇáÊåÇÈ ÇáÌíæÈ ÇáÃäÝíÉ ÓÑíÚðÇ. æÞÏ ÊÓÊãÑ ÍÊì 4 ÃÓÇÈíÚ (ÇáÊåÇÈ ÍÇÏ) Ãæ áÃßËÑ ãä 12 ÃÓÈæÚðÇ (ÇáÊåÇÈ ãÒãä). ÚÇÏÉ ãÇ ÊÊã ÇáÅÕÇÈÉ ÈÇáÊåÇÈ ÇáÌíæÈ ÇáÃäÝíÉ ÚÞÈ ÇáÊÚÑÖ ááÅÕÇÈÉ ÈÇáÈÑÏ.  ãÇ ÃÓÈÇÈ ÇáÍÇáÉ¿  ÊÍÏË åÐå ÇáÍÇáÉ ÈÓÈÈ Ãí ÔíÁ íÄÏí Åáì ÊæÑã Ýí ÇáÌíæÈ ÇáÃäÝíÉ Ãæ íãäÚ ÊÕÑíÝ ÇáãÎÇØ ãäåÇ. íÔãá Ðáß:  • ÇáÍÓÇÓíÉ.  • ÇáÑÈæ.  • ÇáÚÏæì ÇáÈßÊíÑíÉ Ãæ ÇáÝíÑæÓíÉ.  • æÌæÏ ÊÔæåÇÊ Ãæ ÇäÓÏÇÏÇÊ Ýí ÇáÃäÝ Ãæ ÇáÌíæÈ ÇáÃäÝíÉ.  • ÒÇæÆÏ ÛíÑ ØÈíÚíÉ Ýí ÇáÃäÝ (ÓáÇÆá ÃäÝíÉ).  • ÇáãáæËÇÊ ãËá ÇáãæÇÏ ÇáßíãíÇÆíÉ Ãæ ÇáãåíÌÇÊ ÇáãæÌæÏÉ Ýí ÇáåæÇÁ.  • ÚÏæì ÝØÑíÉ (áßä åÐÇ äÇÏÑ).  ãÇ ÚæÇãá ÒíÇÏÉ ÇáÎØÑ¿  íÒÏÇÏ ÇÍÊãÇá ÇáÅÕÇÈÉ ÈåÐå ÇáÍÇáÉ Ýí ÇáÍÇáÇÊ ÇáÊÇáíÉ:  • ÖÚÝ ÇáÌåÇÒ ÇáÏÝÇÚí (ÇáÌåÇÒ ÇáãäÇÚí).  • ããÇÑÓÉ ÊãÑíäÇÊ ÇáÓÈÇÍÉ Ãæ ÇáÛØÓ ÈßËÑÉ.  • ÝÑØ ÇÓÊÎÏÇã ÈÎÇÎÇÊ ÇáÃäÝ.  • ÇáÏÎÇä.  ãÇ ÚáÇãÇÊ ÇáÍÇáÉ Ãæ ÃÚÑÇÖåÇ¿  ÊÊãËá ÇáÃÚÑÇÖ ÇáÃÓÇÓíÉ áåÐå ÇáÍÇáÉ Ýí ÇáÔÚæÑ ÈÃáã æÖÛØ Íæá ÇáÌíæÈ ÇáÃäÝíÉ ÇáãÕÇÈÉ. æÊÔÊãá ÇáÃÚÑÇÖ ÇáÃÎÑì Úáì:  • ÇäÓÏÇÏ Ãæ ÇÍÊÞÇä ÇáÃäÝ.  • ÅÝÑÇÒÇÊ ÓãíßÉ ãä ÇáÃäÝ.  • ÊæÑã æÍÑÇÑÉ Ýí ÇáÌíæÈ ÇáÃäÝíÉ ÇáãÕÇÈÉ.  • ÇáÕÏÇÚ.  • Ãáã ÇáÃÓäÇä ÇáÚáæíÉ.  • ÓÚÇá ÞÏ íÒÏÇÏ ÍÏÉ Ýí ÃËäÇÁ Çááíá.  • ÊÌãÚ ÇáãÎÇØ ÇáÒÇÆÏ Ýí ÇáÍáÞ Ãæ ãÄÎÑÉ ÇáÃäÝ (ÇáÊäÞíØ ÇáÃäÝí ÇáÎáÝí).  • ÊÑÇÌÚ ÍÇÓÉ ÇáÔã æÇáÊÐæÞ.  • ÇáÅÚíÇÁ.  • ÇáÅÕÇÈÉ ÈÇáÍãì.  • ÇáÊåÇÈ ÇáÍáÞ.  • ãÔßáÇÊ Ýí ÇáÊäÝÓ.  ßíÝ íõÔÎÕ ÇáãÑÖ¿  ÊõÔÎÕ åÐå ÇáÍÇáÉ æÝÞ:  • ÇáÃÚÑÇÖ ÇáÙÇåÑÉ.  • ÇáÊÇÑíÎ ÇáØÈí.  • ÝÍÕ ÈÏäí.  • ÝÍæÕ áÊÍÏíÏ åá ÍÇáÊß ÍÇÏÉ Ãæ ãÒãäÉ. æÑÈãÇ íÔãá Ðáß:  ? ÝÍÕ ÃäÝß áÇßÊÔÇÝ æÌæÏ ÓáÇÆá ÃäÝíÉ.  ? ÝÍÕ ÇáÌíæÈ ÇáÃäÝíÉ ÈÇÓÊÎÏÇã ÌåÇÒ ãËÈÊ Èå ãÕÈÇÍ (ãäÙÇÑ  ÏÇÎáí).  ? ÇÎÊÈÇÑÇÊ ÇáÍÓÇÓíÉ Ãæ ÇáÈßÊíÑíÇ.  ? ÝÍæÕ ÊÕæíÑíÉ ãËá ÃÔÚÉ ÇáÑäíä ÇáãÛäÇØíÓí Ãæ ÇáÊÕæíÑ ÇáãÞØÚí ÇáãÍæÓÈ.  Ýí ÈÚÖ ÇáÍÇáÇÊ ÇáäÇÏÑÉ¡ ÞÏ íõÌÑì ÝÍÕ ÎÒÚÉ ÚÙãíÉ áÇÓÊÈÚÇÏ ÃäæÇÚ ÃßËÑ ÎØæÑÉ ãä ÏÇÁ ÇáÌíæÈ ÇáÃäÝíÉ ÇáÝØÑí.  ßíÝ ÊõÚÇáÌ åÐå ÇáÍÇáÉ¿  íÚÊãÏ ÚáÇÌ ÇáÊåÇÈ ÇáÌíæÈ ÇáÃäÝíÉ Úáì ÓÈÈ ÇáÍÇáÉ æãÇ ÅÐÇ ßÇäÊ ãÄÞÊÉ Ãæ ãÒãäÉ.  • ÅÐÇ ßÇä ÇáÊåÇÈ ÇáÌíæÈ ÇáÃäÝíÉ äÇÊÌ Úä ÝíÑæÓ ãÇ¡ ÝíõÝÊÑÖ Ãä ÊÒæá ÇáÃÚÑÇÖ Ïæä ÊÏÎá Ýí ÎáÇá 10 ÃíÇã. æãÚ Ðáß ÝÅä ÇáØÈíÈ ÞÏ íÕÝ áß ÃÏæíÉ ááÊÎÝíÝ ãä ÍÏÉ ÇáÃÚÑÇÖ. æãä ÈíäåÇ ãÇ íáí:  ? ÃÏæíÉ áÊÞáíá ÇáÊæÑã Ýí ÇáããÑÇÊ ÇáÃäÝíÉ (ãÖÇÏÇÊ ÇÍÊÞÇä ÇáÃäÝ ÇáãæÖÚíÉ).   ? ÃÏæíÉ ÚáÇÌ ÇáÍÓÇÓíÉ (ãÖÇÏÇÊ ÇáåíÓÊÇãíä).  ? ÑÐÇÐ íÚãá Úáì ÊÎÝíÝ ÇáÊåÇÈ ÇáÃäÝ (ÇáßæÑÊíßæÓÊíÑæíÏÇÊ ÇáÃäÝíÉ ÇáãæÖÚíÉ).  ? ÛÓæá íÓÇÚÏ Ýí ÇáÊÎáÕ ãä ÇáãÎÇØ ÇáÓãíß Ýí ÃäÝß (ÛÓæá ãÍáæá ãáÍí ááÃäÝ).  • ÅÐÇ ßÇä ÇáÊåÇÈ ÇáÌíæÈ ÇáÃäÝíÉ ÈÓÈÈ ÈßÊÑíÇ¡ ÝÑÈãÇ íäÕÍ ãÞÏã ÇáÑÚÇíÉ ÇáÕÍíÉ ÇáãÊÇÈÚ áß ÈÇáÇäÊÙÇÑ Úáì Ããá Ãä ÊåÏÃ ÇáÃÚÑÇÖ. æÊÊÍÓä ãÚÙã ÍÇáÇÊ ÇáÚÏæì ÇáÈßÊíÑíÉ Ïæä ÊäÇæá ãÖÇÏ Ííæí. ÑÈãÇ íõæÕÝ áß ÊäÇæá ãÖÇÏÇÊ ÍíæíÉ ÅÐÇ ßäÊ ÊÚÇäí ãä:  ? ÚÏæì ÔÏíÏÉ.   ? ÖÚÝ ÇáÌåÇÒ ÇáãäÇÚí.  • ÞÏ ÊÍÊÇÌ Åáì ÅÌÑÇÁ ÌÑÇÍÉ¡ ÅÐÇ ßÇä ÇáÇáÊåÇÈ ÈÓÈÈ ÖíÞ ÇáããÑÇÊ ÇáÃäÝíÉ Ãæ ÇáÓáÇÆá ÇáÃäÝíÉ.  íÌÈ ÇÊÈÇÚ åÐå ÇáÊÚáíãÇÊ Ýí ÇáãäÒá:  ÇáÃÏæíÉ  • áÇ ÊÊäÇæá Ãæ ÊÓÊÎÏã Ãæ ÊÃÎÐ ÇáÃÏæíÉ ÇáÊí ÊõÈÇÚ Ïæä æÕÝÉ ØÈíÉ æÊáß ÇáÊí ÊÈÇÚ ÈæÕÝÉ ØÈíÉ ÅáÇ ßãÇ ÃÎÈÑß ãõÞÏã ÇáÑÚÇíÉ ÇáÕÍíÉ. æÞÏ ÊÔãá ÇáÑÔÇÔÇÊ ÇáÃäÝíÉ.  • ÅÐÇ æÕÝ áß ãÞÏã ÇáÑÚÇíÉ ÇáÕÍíÉ ãÖÇÏðÇ ÍíæíðÇ¡ ÝÊäÇæáå ÍÓÈ ÅÑÔÇÏÇÊå. íÊÚíä ÚÏã ÇáÊæÞÝ Úä ÊäÇæá ÇáãÖÇÏ ÇáÍíæí ÍÊì æÅä ÊÍÓäÊ ÍÇáÊß.  ÇáÊÑæíÉ æÇáÊÑØíÈ    • íÌÈ ÔÑÈ ßãíÇÊ ßÇÝíÉ ãä ÇáÓæÇÆá áíÙá áæä Èæáß ÃÕÝÑ ÈÇåÊðÇ. íÓÇÚÏ ÇáÍÝÇÙ Úáì ÇáÇÑÊæÇÁ Úáì ÊÑÞíÞ ÇáãÎÇØ.  • ÇÓÊÎÏã ÌåÇÒ ãÑØÈ ÈÇÑÏ ááÍÝÇÙ Úáì ãÓÊæì ÇáÑØæÈÉ ÏÇÎá ãäÒáß ÃÚáì ãä 50%.  • ÇÓÊäÔÞ ÇáÈÎÇÑ áãÏÉ 10-15 ÏÞíÞÉ æßÑÑ Ðáß 3-4 ãÑÇÊ íæãíðÇ Ãæ æÝÞðÇ áÊæÌíåÇÊ ãÞÏã ÇáÑÚÇíÉ ÇáÕÍíÉ. íãßäß ÇáÞíÇã ÈÐáß Ýí ÇáÍãÇã Ýí ÃËäÇÁ ÇÓÊÎÏÇã ÏÔ ÓÇÎä.  • Þáá ãä ÊÚÑÖß ááåæÇÁ ÇáÈÇÑÏ Ãæ ÇáÌÇÝ.  ÇáÑÇÍÉ  • íÌÈ ÇáÇÓÊÑÎÇÁ æÇáÑÇÍÉ ÞÏÑ ÇáãÓÊØÇÚ.  • äã ÌÇÚáÇð ÑÃÓß ãÑÝæÚðÇ áÃÚáì (ãÑÝæÚÉ).  • íÌÈ ÇáÍÑÕ  Úáì ÇáÍÕæá Úáì ÞÏÑ ßÇÝ ãä Çáäæã ßá áíáÉ.  ÊÚáíãÇÊ ÚÇãÉ    • ÇÓÊÎÏã ãäÔÝÉ ÏÇÝÆÉ æÑØÈÉ áæÌåß ãä 3-4 ãÑÇÊ íæãíðÇ Ãæ æÝÞðÇ áÅÑÔÇÏÇÊ ãÞÏã ÇáÑÚÇíÉ ÇáÕÍíÉ. ÝåÐÇ íÓÇÚÏ Úáì ÇáÍÏ ãä ÇáÅÒÚÇÌ æÇáÊÚÈ.  • íÌÈ ÛÓá ÇáÃíÏí ÈÇáãÇÁ æÇáÕÇÈæä ßËíÑðÇ ááÍÏ ãä ÇáÊÚÑÖ ááÌÑÇËíã. ÝÅÐÇ áã íÊæÝÑ ÇáãÇÁ æÇáÕÇÈæä¡ íãßä ÇÓÊÎÏÇã ãÚÞã ÇáíÏíä.  • íõÍÙÑ ÇáÊÏÎíä. ÊÌäÈ Ãä Êßæä Úáì ãÞÑÈÉ ãä ÇáãÏÎäíä (ÇáÊÏÎíä ÇáÓáÈí).  • íÊÚíä ÇáÇáÊÒÇã ÈÌãíÚ ãæÇÚíÏ ÇáãÊÇÈÚÉ æÝÞðÇ áÊæÌíåÇÊ ãÞÏã ÇáÑÚÇíÉ ÇáÕÍíÉ. ÝåÐÇ ÃãÑ ãåã.  íÌÈ ÇáÇÊÕÇá ÈãÞÏã ÇáÑÚÇíÉ ÇáÕÍíÉ Ýí ÇáÍÇáÇÊ ÇáÊÇáíÉ:  • ÇáÅÕÇÈÉ ÈÍãì.  • ÊÝÇÞã ÇáÃÚÑÇÖ ÇáãÑÖíÉ ÇáÊí ÊÚÇäíåÇ æÇÔÊÏÊ ááÃÓæÃ.  • ÚÏã ÊÍÓä ÇáÃÚÑÇÖ ÇáÊí ÊÚÇäíåÇ Úáì ãÏì 10 ÃíÇã.  íÊÚíä ØáÈ ÇáãÓÇÚÏÉ Úáì ÇáÝæÑ Ýí ÇáÍÇáÇÊ ÇáÊÇáíÉ:  • ÇáÅÕÇÈÉ ÈÕÏÇÚ ÍÇÏ.  • ÇáÅÕÇÈÉ ÈÞíÁ ãÊæÇÕá.  • Ãáã ÔÏíÏ Ãæ ÊæÑã Íæá ÇáæÌå Ãæ ÇáÚíäíä.  • ÍÏæË ãÔßáÇÊ Ýí ÇáÑÄíÉ.  • ÇáÅÕÇÈÉ ÈÊÔæÔ Ðåäí.  • ÊÕáÈ ÇáÚäÞ.  • ÇáÅÕÇÈÉ ÈÕÚæÈÉ Ýí ÇáÊäÝÓ.  ãáÎÕ  • ÇáÊåÇÈ ÇáÌíæÈ ÇáÃäÝíÉ ÚÈÇÑÉ Úä Ãáã æÇáÊåÇÈ Ýí ÇáÌíæÈ ÇáÃäÝíÉ. ÃãÇ ÇáÌíæÈ ÇáÃäÝíÉ ÝÚÈÇÑÉ Úä ãÓÇÍÇÊ ÌæÝÇÁ Ýí ÇáÚÙÇã ÇáãÍíØÉ ÈæÌåß.  • ÊÍÏË åÐå ÇáÍÇáÉ ÈÓÈÈ ÅÕÇÈÉ ÃäÓÌÉ ÇáÃäÝ ÈÇáÇáÊåÇÈ Ãæ ÇáÊæÑã. íÍÈÓ ÇáÊæÑã ÇáãÎÇØ Ãæ íãäÚ ÊÏÝÞå. æíÓãÍ Ðáß ááÈßÊÑíÇ æÇáÝíÑæÓÇÊ æÇáÝØÑíÇÊ ÈÇáäãæ ãÓÈÈÉ ÇáÚÏæì.  • ÅÐÇ æÕÝ áß ãÞÏã ÇáÑÚÇíÉ ÇáÕÍíÉ ãÖÇÏðÇ ÍíæíðÇ¡ ÝÊäÇæáå ÍÓÈ ÅÑÔÇÏÇÊå. íÊÚíä ÚÏã ÇáÊæÞÝ Úä ÊäÇæá ÇáãÖÇÏ ÇáÍíæí ÍÊì æÅä ÊÍÓäÊ ÍÇáÊß.  • íÊÚíä ÇáÇáÊÒÇã ÈÌãíÚ ãæÇÚíÏ ÇáãÊÇÈÚÉ æÝÞðÇ áÊæÌíåÇÊ ãÞÏã ÇáÑÚÇíÉ ÇáÕÍíÉ. ÝåÐÇ ÃãÑ ãåã.  áíÓ ÇáåÏÝ ãä åÐå ÇáãÚáæãÇÊ Ãä Êßæä ÈÏíáÇð ááÅÑÔÇÏÇÊ ÇáÊí íÞÏãåÇ ãæÝÑ ÇáÑÚÇíÉ ÇáÕÍíÉ. ÊÃßÏ ãä ãäÇÞÔÉ ÃíÉ ÃÓÆáÉ ÊÏæÑ Ýí Ðåäß ãÚ ãæÝÑ ÇáÑÚÇíÉ ÇáÕÍíÉ.þ  Document Revised: 07/03/2019 Document Reviewed: 07/03/2019  [employer] Patient Education © 2021 [employer]  ÇáÕÏÇÚ ÇáÚÇã Ïæä ÓÈÈ  General Headache Without Cause  ÇáÕÏÇÚ ÚÈÇÑÉ Úä Ãáã Ãæ ÇáÔÚæÑ ÈÅäÒÚÇÌ Ýí ãäØÞÉ ãÇ Íæá ÇáÑÃÓ æãäØÞÉ ÇáÚäÞ. æÞÏ íÊÚÐÑ ÇáÊæÕá Åáì ÇáÓÈÈ ÇáãÍÏÏ ááÅÕÇÈÉ ÈÇáÕÏÇÚ. ÝËãÉ ÇáÚÏíÏ ãä ÃäæÇÚ ÇáÕÏÇÚ æßá áå ÃÓÈÇÈ ãÎÊáÝÉ. ãä Èíä ÇáÃäæÇÚ ÇáÔÇÆÚÉ ááÕÏÇÚ ãÇ íáí:  • ÕÏÇÚ ÇáÊæÊÑ.  • ÇáÕÏÇÚ  ÇáäÕÝí.  • ÇáÕÏÇÚ ÇáÚäÞæÏí.  • ÇáÕÏÇÚ Çáíæãí ÇáãÒãä.  íÌÈ ÇÊÈÇÚ åÐå ÇáÊÚáíãÇÊ Ýí ÇáãäÒá:  íÌÈ ÇáÇäÊÈÇå áÍÏæË ÊÛííÑÇÊ Ýí ÍÇáÊß. æíÌÈ ÅÈáÇÛ ãÞÏã ÇáÑÚÇíÉ ÇáÕÍíÉ ÇáãÊÇÈÚ áå ÈÐáß. íãßä Ãä íÓÇÚÏ ÇÊÈÇÚ Êáß ÇáÎØæÇÊ Úáì ÊÍÓíä ÍÇáÊß:  ÇáÓíØÑÉ Úáì ÇáÃáã      • íÊÚíä ÊäÇæá ÇáÃÏæíÉ ÇáÊí ÊõÕÑÝ ÈæÕÝÉ ØÈíÉ Ãæ Ïæä æÕÝÉ ØÈíÉ æÝÞðÇ áãÇ íÎÈÑßö Èå ãÞÏã ÇáÑÚÇíÉ ÇáÕÍíÉ ÇáÎÇÕ Èß.  • ÇáÇÓÊáÞÇÁ Ýí ÛÑÝÉ ãÙáãÉ æåÇÏÆÉ ÚäÏ ÇáÅÕÇÈÉ ÈÇáÕÏÇÚ.  • ÈäÇÁ Úáì ÊæÌíåÇÊ ãÞÏã ÇáÑÚÇíÉ ÇáÕÍíÉ¡ íõãßä æÖÚ ÇáËáÌ Úáì ãäØÞÉ ÇáÑÃÓ æÇáÚäÞ:  ? íÌÈ æÖÚ ÇáËáÌ Ýí ßíÓ ÈáÇÓÊíß.  ? æÚáíß æÖÚ ãäÔÝÉ Èíä ÌáÏß æÇáßíÓ.  ? íÌÈ ÊÑß ÇáËáÌ Úáì ÇáãäÔÝÉ áãÏÉ 20 ÏÞíÞÉ¡ æßÑÑ Ðáß 2-3 ãÑÇÊò Ýí Çáíæã.  • íãßä ÊæÌíå ÞÏÑ ãä ÇáÏÝÁ Åáì ÇáãäØÞÉ ÇáãÕÇÈÉ ÅÐÇ ÃÑÔÏÊ Åáì Ðáß. æÚáíß ÇÓÊÎÏÇã ãÕÏÑ ÇáÍÑÇÑÉ ÇáÐí íæÕöí Èå ãÞÏã ÇáÑÚÇíÉ ÇáÕÍíÉ ãËá ÇáÍÔæÉ ÇáãÑØÈÉ Ãæ æÓÇÏÉ ÇáÊÏÝÆÉ.  ? æíÌÈ æÖÚ ãäÔÝÉ Èíä ÇáÌáÏ æãÕÏÑ ÇáÍÑÇÑÉ.  ? íõÊÑß ãÕÏÑ ÇáÊÏÝÆÉ Úáì ÇáãæÖÚ áãÏÉ ÊÊÑÇæÍ ãä 20-30 ÏÞíÞÉ.  ? íÊÚíä ÅÒÇáÉ ãÕÏÑ ÇáÍÑÇÑÉ ÅÐÇ ÊÍæá áæä ÈÔÑÊß Åáì ÇáÃÍãÑ ÇáÝÇÊÍ. æåÐÇ ãÄÔÑ ãåã ááÛÇíÉ ÅÐÇ ßÇä íÊÚÐÑ Úáíß ÇáÔÚæÑ ÈÇáÃáã Ãæ ÇáÍÑÇÑÉ Ãæ ÇáÈÑæÏÉ. ÝÑÈãÇ ÊÒÏÇÏ ãÎÇØÑ ÊÚÑÖßã ááÇÍÊÑÇÞ Ýí åÐå ÇáÍÇáÉ.  • ÅÐÇ ßÇäÊ ÇáÅÖÇÁÉ ÇáÓÇØÚÉ ÊÒÚÌß Ãæ ÊÒíÏ ÕÏÇÚß ÇáäÕÝí ÓæÁðÇ¡ ÝÅäå íÌÈÈ ÊÎÝíÝ ÇáÅÖÇÁÉ.  ÇáÃßá æÇáÔÑÈ  • íÌÈ ÊäÇæá æÌÈÇÊß ÈÇäÊÙÇã.  • Ýí ÍÇáÉ ÊäÇæá ÇáãÔÑæÈÇÊ ÇáßÍæáíÉ:  ? íÌÈ ÊÞáíá ÇáßãíÉ ÇáãÊäÇæáÉ Úáì ÇáäÍæ ÇáÊÇáí:   ? ãÔÑæÈ æÇÍÏ ÈÍÏ ÃÞÕì ááäÓÇÁ.   ?  ãÔÑæÈíä ÈÍÏ ÃÞÕì ááÑÌÇá.   ? íÊÚíä ÇáÇäÊÈÇå Åáì ßãíÉ ÇáßÍæá Ýí ãÔÑæÈß. Ýí ÇáæáÇíÇÊ ÇáãÊÍÏÉ¡ íÚÇÏá ÇáãÔÑæÈ ÇáæÇÍÏ ÚÈæÉ ÓÚÉ 12 ÃæäÕÉ (355 ãáá) ãä ÇáÈíÑÉ Ãæ ßÃÓ ÓÚÉ 5 ÃæäÕÇÊ (148 ãáá) ãä ÇáäÈíÐ Ãæ ßÃÓ ÓÚÉ ÃæäÕÉ æäÕÝ (44 ãáá) ãä ÇáãÔÑæÈÇÊ ÇáßÍæáíÉ ÇáÞæíÉ.  • íÍÈ ÇáÊæÞÝ Úä ÊäÇæá ÇáãÔÑæÈÇÊ ÇáÊí ÊÍÊæí Úáì ÇáßÇÝííä Ãæ Þáá ãä ÊäÇæáåÇ.  ÊÚáíãÇÊ ÚÇãÉ    • íÍÈ ÇáÇÍÊÝÇÙ ÈÓÌá ááÕÏÇÚ ááæÞæÝ Úáì ãÇ ÞÏ íÕíÈß ÈäæÈÇÊ ÇáÕÏÇÚ. **Úáì ÓÈíá ÇáãËÇá¡ ÓÌá ÇáÂÊí:  ? ÇáÃØÚãÉ æÇáãÔÑæÈÇÊ ÇáÊí ÊÊäÇæáåÇ.  ? ÇáÝÊÑÇÊ ÇáÊí ÊãÖíåÇ Ýí Çáäæã.  ? Ãí ÊÛíøõÑ Ýí ÇáäÙÇã ÇáÛÐÇÆí Ãæ ÇáÃÏæíÉ.  • íõãßä ÊÌÑÈÉ ÇáÊÏáíß Ãæ ÊÞäíÇÊ ÇáÇÓÊÑÎÇÁ  ÇáÃÎÑì.  • íÌÈ ÇáÅÞáÇá ãä ÇáÊæÊÑ.  • íÌÈ ÇáÌáæÓ Ýí æÖÚ ãÓÊÞíã¡ æÚÏã ÔÏ ÚÖáÇÊß.  • íÊÚíä ÚÏã ÇÓÊÎÏÇã ãäÊÌÇÊ ÊÍÊæí Úáì ÇáäíßæÊíä Ãæ ÇáÊÈÛ¡ ãËá ÇáÓÌÇÆÑ æÇáÓÌÇÆÑ ÇáÅáßÊÑæäíÉ æÊÈÛ ÇáãÖÛ. íÌÈ ÇÓÊÔÇÑÉ ãÞÏã ÇáÑÚÇíÉ ÇáÕÍíÉ ÅÐÇ ÇÍÊÌÊ Åáì ÇáãÓÇÚÏÉ ááÅÞáÇÚ Úä ÇáÊÏÎíä.  • íÌÈ ßÐáß ãÇÑÓ ÇáÑíÇÖÉ ÈÇäÊÙÇã æÝÞ ÅÑÔÇÏÇÊ ãÞÏã ÇáÑÚÇíÉ ÇáÕÍíÉ.  • íÌÈ ÇáÇäÊÙÇã Ýí Çáäæã. áÐÇ íÌÈ ÇáãæÇÙÈÉ Úáì Çáäæã 7-9 ÓÇÚÇÊ ßá áíáÉ¡ Ãæ áÚÏÏ ÇáÓÇÚÇÊ ÇáÐí íæÕí Èå ãÞÏã ÇáÑÚÇíÉ ÇáÕÍíÉ ÇáãÊÇÈÚ áß.  • íÊÚíä ÇáÇáÊÒÇã ÈÌãíÚ ãæÇÚíÏ ÇáãÊÇÈÚÉ æÝÞðÇ áÊæÌíåÇÊ ãÞÏã ÇáÑÚÇíÉ ÇáÕÍíÉ. ÝåÐÇ ÃãÑ ãåã.  íÌÈ ÇáÇÊÕÇá ÈãÞÏã ÇáÑÚÇíÉ ÇáÕÍíÉ Ýí ÇáÍÇáÇÊ ÇáÊÇáíÉ:  • ÚÏã ÅãßÇäíÉ ÇáÓíØÑÉ Úáì ÇáÃÚÑÇÖ ÇáÊí áÏíß ÈæÇÓØÉ ÇáÃÏæíÉ.  • ÅÐÇ ßäÊ ÊÚÇäí ãä ÕÏÇÚ íÎÊáÝ Úä ÍÇáÇÊ ÇáÕÏÇÚ ÇáãÚÊÇÏÉ áÏíß.  • ÇáÅÕÇÈÉ ÈÇáÛËíÇä Ãæ ÇáÞíÁ.  • ÇáÅÕÇÈÉ ÈÍãì.  íÊÚíä ØáÈ ÇáãÓÇÚÏÉ Úáì ÇáÝæÑ Ýí ÇáÍÇáÇÊ ÇáÊÇáíÉ:  • ÇÔÊÏÇÏ ÍÏÉ ÇáÕÏÇÚ ÈÓÑÚÉ.  • ÊÝÇÞã ÇáÕÏÇÚ ÈÚÏ ÇáäÔÇØ ÇáÈÏäí ÇáãÊæÓØ Ãæ ÇáÚäíÝ.  • ÇáÅÕÇÈÉ ÈÍÇáÇÊ ÞíÁ ãÊßÑÑÉ.  • ÇáÔÚæÑ ÈÊíÈÓ Ýí ÇáÚäÞ.  • ÝÞÏÇäß ááÑÄíÉ.  • æÌæÏ ÕÚæÈÉ Ýí ÇáÊÍÏË.  • ÇáÔÚæÑ ÈÃáã Ýí ÇáÚíä Ãæ ÇáÃÐä.  • ÇáÔÚæÑ ÈÖÚÝ Ýí ÇáÚÖáÇÊ Ãæ ÝÞÏÇä ÇáÞÏÑÉ Úáì ÇáÊÍßã Ýí ÇáÚÖáÇÊ.  • ÝÞÏÇä ÇáÊæÇÒä Ãæ ÕÚæÈÉ ÇáãÔí.  • ÇáÔÚæÑ ÈÏæÇÑ Ãæ ÇáÅÕÇÈÉ ÈÅÛãÇÁ.  • ÇáÅÕÇÈÉ ÈÇÑÊÈÇß.  • ÇáÅÕÇÈÉ ÈäæÈÉ ãÑÖíÉ.  ãáÎÕ  • ÇáÕÏÇÚ ÚÈÇÑÉ Úä Ãáã Ãæ ÇáÔÚæÑ ÈÅäÒÚÇÌ Ýí ãäØÞÉ ãÇ Íæá ÇáÑÃÓ æãäØÞÉ ÇáÚäÞ.  • ÝËãÉ ÇáÚÏíÏ ãä ÃäæÇÚ ÇáÕÏÇÚ æßá áå ÃÓÈÇÈ ãÎÊáÝÉ. æÝí ÈÚÖ ÇáÍÇáÇÊ¡ ÞÏ íÊÚÐÑ ÊÍÏíÏ ÇáÓÈÈ.  • íÍÈ ÇáÇÍÊÝÇÙ ÈÓÌá ááÕÏÇÚ ááæÞæÝ Úáì ãÇ ÞÏ íÕíÈß ÈäæÈÇÊ ÇáÕÏÇÚ. íÌÈ ÇáÇäÊÈÇå áÍÏæË ÊÛííÑÇÊ Ýí ÍÇáÊß. æíÌÈ ÅÈáÇÛ ãÞÏã ÇáÑÚÇíÉ ÇáÕÍíÉ ÇáãÊÇÈÚ áå ÈÐáß.  • íÌÈ ÇáÇÊÕÇá ÈãÞÏã ÇáÑÚÇíÉ ÇáÕÍíÉ ÅÐÇ ßäÊ ÊÚÇäí ãä ÕÏÇÚ íÎÊáÝ Úä ÇáÕÏÇÚ ÇáãÚÊÇÏ¡ Ãæ ÅÐÇ ßÇä ÇáÏæÇÁ áÇ íÝíÏ Ýí ÊÎÝíÝ ÇáÃÚÑÇÖ.  • íÌÈ ØáÈ ÇáãÓÇÚÏÉ ÝæÑðÇ¡ ÅÐÇ ÕÇÑ ÇáÕÏÇÚ ÔÏíÏðÇ Ãæ ÊÞíÃÊ Ãæ ÝÞÏÊ ÇáÞÏÑÉ Úáì ÇáÅÈÕÇÑ Ãæ ÝÞÏÊ ÊæÇÒäß Ãæ ÃÕÇÈÊß äæÈÉ ÊÔäÌÇÊ.  áíÓ ÇáåÏÝ ãä åÐå ÇáãÚáæãÇÊ Ãä Êßæä ÈÏíáÇð ááÅÑÔÇÏÇÊ ÇáÊí íÞÏãåÇ ãæÝÑ ÇáÑÚÇíÉ ÇáÕÍíÉ. ÊÃßÏ ãä ãäÇÞÔÉ ÃíÉ ÃÓÆáÉ ÊÏæÑ Ýí Ðåäß ãÚ ãæÝÑ ÇáÑÚÇíÉ ÇáÕÍíÉ.þ  Document  Revised: 08/19/2019 Document Reviewed: 08/19/2019  PS DEPT. Patient Education © 2021 PS DEPT. Inc.  Amoxicillin capsules or tablets  ãÇ åÐÇ ÇáÏæÇÁ¿  ÃãæßÓíÓááíä ãÖÇÏ Ííæí ãä ãÌãæÚÉ ÇáÈäÓáíä.  íÓÊÎÏã áÚáÇÌ ÃäæÇÚ ãÚíäÉ ãä ÇáÚÏæì ÇáÈßÊíÑíÉ.  áä íßæä åÐÇ ÇáÏæÇÁ ÝÇÚáÇð áÚáÇÌ äÒáÇÊ ÇáÈÑÏ Ãæ ÇáÃäÝáæäÒÇ Ãæ ÇáÚÏæì ÇáÝíÑæÓíÉ ÇáÃÎÑì.  íãßä ÇÓÊÎÏÇã åÐÇ ÇáÏæÇÁ áÃÛÑÇÖ ÃÎÑìº ÇÓÃá ãÞÏã ÇáÑÚÇíÉ ÇáÕÍíÉ Ãæ ÇáÕíÏáí ÅÐÇ ßÇäÊ áÏíß ÃÓÆáÉ.  ÃÓãÇÁ ÇáÚáÇãÇÊ ÇáÊÌÇÑíÉ ÇáãÚÑæÝÉ:þ Amoxil, Moxilin, Sumox, Trimox  ãÇ åí ÇáÃÔíÇÁ ÇáÊí íÌÈ Ãä ÃÎÈÑ ÈåÇ ãÞÏã ÇáÑÚÇíÉ ÇáÕÍíÉ ÞÈá ÊäÇæá åÐÇ ÇáÏæÇÁ¿  åã ÈÍÇÌÉ Åáí ãÚÑÝÉ ãÇ ÅÐÇ ßäÊ ãÕÇÈðÇ ÈÃí ãä åÐå ÇáÍÇáÇÊ ÇáÂä:  • ÃãÑÇÖ Çáßáì  • ÑÏ ÝÚá ÛíÑ ÚÇÏí Ãæ ÍÓÇÓíÉ ÖÏ ¡ÃãæßÓíáíä Ãæ ãÖÇÏÇÊ ÇáÈäÓíáíä ÇáÍíæíÉ ÇáÃÎÑì Ãæ ãÖÇÏÇÊ ÇáÓíÝÇáæÓÈÑíä ÇáÍíæíÉ  • ÑÏ ÝÚá ÛíÑ ÚÇÏí Ãæ ÍÓÇÓíÉ ÖÏ ÇáÃÏæíÉ ÇáÃÎÑì Ãæ ÇáÃØÚãÉ Ãæ ÇáÃÕÈÇÛ Ãæ ÇáãæÇÏ ÇáÍÇÝÙÉ  • ÍÇãá Ãæ ÊÓÚíä ááÍãá  • ÇáÑÖÇÚÉ ÇáØÈíÚíÉ  ßíÝ íÌÈ Úáíø ÇÓÊÚãÇá åÐÇ ÇáÏæÇÁ¿  ÊäÇæá åÐÇ ÇáÏæÇÁ ÈÇáÝã ãÚ ßæÈ ãä ÇáãÇÁ.  ÇÊøóÈÚ ÇáÊÚáíãÇÊ ÇáãæÌæÏÉ Úáì ÇáÚÈæÉ Ãæ ãáÕÞ ÇáæÕÝÉ ÇáØÈíÉ.  íãßäß ÊäÇæá åÐÇ ÇáÏæÇÁ ãÚ ÇáØÚÇã Ãæ ÈÏæäå.  ÅÐÇ ÃÑÈß åÐÇ ÇáÏæÇÁ ãÚÏÊß¡ ÊäÇæáå ãÚ ØÚÇã.  ÊäÇæá åÐÇ ÇáÏæÇÁ Úáì ÝÊÑÇÊ ãäÊÙãÉ.  áÇ ÊÊäÇæá åÐÇ ÇáÏæÇÁ ÃßËÑ ãä ÇáãÑÇÊ ÇáãæÕæÝÉ.  Þã ÈÅäåÇÁ ÇáÝÊÑÉ ÇáßÇãáÉ ÇáÎÇÕÉ ÈåÐÇ ÇáÏæÇÁ ßãÇ åæ ãÍÏÏ ÍÊì áæ ÙääÊ Ãä ÍÇáÊß ÃÝÖá.  áÇ ÊÝæÊ ÌÑÚÇÊ Ãæ ÊÊæÞÝ Úä åÐÇ ÇáÏæÇÁ ÞÈá ãæÚÏå.  ÊÍÏË Åáí ØÈíÈ ÇáÃØÝÇá ÈÔÃä ÇÓÊÚãÇá åÐÇ ÇáÏæÇÁ ááÃØÝÇá.  Ýí Ííä Ãä åÐÇ ÇáÏæÇÁ íãßä æÕÝå áÍÇáÇÊ ãÍÏÏÉ¡ ÅáÇ Ãäå íáÒã ÇÊÎÇÐ ÇáÇÍÊíÇØÇÊ ÇááÇÒãÉ.  ÇáÌÑÚÉ ÇáÒÇÆÏÉ : ÅÐÇ ÇÚÊÞÏÊ Ãäß ÊäÇæáÊ ßãíÉ ßÈíÑÉ ÌÏðÇ ãä åÐÇ ÇáÏæÇÁ¡ ÇÊÕá ÈãÑßÒ ÇáÊÍßã Ýí ÇáÓãæã Ãæ ÛÑÝÉ ÇáØæÇÑÆ ÝæÑðÇ.  ãáÇÍÙÉ: íÓÊÎÏã åÐÇ ÇáÏæÇÁ ãä ÃÌáß ÃäÊ ÝÞØ. áÇ ÊÔÇÑß ÂÎÑíä ãÚß Ýí ÊäÇæá åÐÇ ÇáÏæÇÁ.  ãÇÐÇ áæ ÊÑßÊ (äÓíÊ) ÌÑÚÉ¿  ÅÐÇ ÝÇÊÊß ÌÑÚÉ¡ ÊäÇæáåÇ Ýí ÃÞÑÈ æÞÊ ããßä.  ÅÐÇ ßÇä åÐÇ æÞÊ ÌÑÚÊß ÇáÊÇáíÉ ÊÞÑíÈðÇ ¡ ÝÊäÇæá Êáß ÇáÌÑÚÉ ÝÞØ.  áÇ ÊÊäÇæá ÌÑÚÊíä Ãæ ÌÑÚÉ ÒÇÆÏÉ.  ãÇ åí ÇáÃÔíÇÁ ÇáÊí ÞÏ ÊÊÝÇÚá ãÚ åÐÇ ÇáÏæÇÁ¿  • ÃáæÈíÑæäíá  • ÍÈæÈ ãäÚ ÇáÍãá  • ÈÚÖ ÇáãÖÇÏÇÊ ÇáÍíæíÉ ãËá  ßáæÑÇãÝíäíßæá¡ ÅÑíËÑæãÇíÓíä¡ ÓáÝÇãíËæßÓÇÒæá¡ ÊÊÑÇÓíßáíä  • ÈÚÖ ÇáÃÏæíÉ ÇáÊí ÊÚÇáÌ Ãæ ÊãäÚ ÊÌáØÇÊ ÇáÏã ãËá æÇÑÝÇÑíä.  åÐå ÇáÞÇÆãÉ ÞÏ áÇ ÊÕÝ ßá ÇáÊÝÇÚáÇÊ ÇáãÍÊãáÉ. Þã ÈÅÚØÇÁ ãÞÏã ÇáÑÚÇíÉ ÇáÕÍíÉ ÞÇÆãÉ Èßá ÇáÃÏæíÉ Ãæ ÇáÃÚÔÇÈ Ãæ ÇáÃÏæíÉ ÈÏæä æÕÝÉ Ãæ ÇáãßãáÇÊ ÇáÛÐÇÆíÉ ÇáÊí ÊÓÊÎÏãåÇ. ÃÎÈÑåã ÃíÖðÇ ÅÐÇ ßäÊ ÊÏÎä Ãæ ÊÔÑÈ ÇáßÍæá Ãæ ÊÓÊÎÏã ÚÞÇÑÇÊ ÛíÑ ÞÇäæäíÉ. ÞÏ ÊÊÝÇÚá ÈÚÖ ÇáÚäÇÕÑ ãÚ ÏæÇÆß.  ãÇ ÇáÐí íÌÈ Úáíø ÊÑÞÈå ÚäÏ ÇÓÊÚãÇá åÐÇ ÇáÏæÇÁ¿  ÃÎÈÑ ØÈíÈß Ãæ ÃÎÕÇÆí ÇáÑÚÇíÉ ÇáÕÍíÉ ÅÐÇ áã ÊÈÏÃ ÃÚÑÇÖß Ýí ÇáÊÍÓä Ãæ ÅÐÇ ÇÒÏÇÏÊ ÓæÁðÇ.  áÇ ÊÚÇáÌ ÇáÅÓåÇá ÈÇáÃÏæíÉ ÇáãÊÇÍÉ ÈÏæä æÕÝÉ.  ÇÊÕá ÈØÈíÈß Ãæ ÃÎÕÇÆí ÇáÑÚÇíÉ ÇáÕÍíÉ ÅÐÇ ßÇä áÏíß ÅÓåÇá ÇÓÊãÑ áÃßËÑ ãä íæãíä Ãæ ÅÐÇ ßÇä ÔÏíÏðÇ æãÇÆíðÇ.  ÅÐÇ ßäÊ ãÕÇÈðÇ ÈÇáÓßÑ¡ ÝÞÏ ÊÍÕá Úáì äÊíÌÉ ÅíÌÇÈíÉ ÒÇÆÝÉ Úä ÇáÓßÑ Ýí Èæáß.  ÇÊÕá ÈØÈíÈß Ãæ ÃÎÕÇÆí ÇáÑÚÇíÉ ÇáÕÍíÉ.  ãäÚ ÇáÍãá ÞÏ áÇ íÚãá ÈÔßá ÕÍíÍ ÃËäÇÁ ÊäÇæáß áåÐÇ ÇáÏæÇÁ.  ÊÍÏøË Åáì ØÈíÈß Ãæ ÃÎÕÇÆí ÇáÑÚÇíÉ ÇáÕÍíÉ Úä ÇÓÊÎÏÇã æÓíáÉ ÅÖÇÝíÉ áãäÚ ÇáÍãá.  åÐÇ ÇáÏæÇÁ ÞÏ íÓÈÈ ÑÏæÏ ÝÚá ÎØíÑÉ Úáì ÇáÌáÏ.  æíãßä Ãä ÊÍÏË ÈÚÏ ÃÓÇÈíÚ Åáì ÔåæÑ ãä ÈÏÁ ÇáÏæÇÁ.  ÇÊÕá ÈØÈíÈß Ãæ ÃÎÕÇÆí ÇáÑÚÇíÉ ÇáÕÍíÉ Úáì ÇáÝæÑ ÅÐÇ áÇÍÙÊ Íãøì Ãæ ÃÚÑÇÖðÇ ÊÔÈå ÃÚÑÇÖ ÇáÅäÝáæäÒÇ ãÚ ØÝÍ.  ÞÏ íßæä ÇáØÝÍ ÃÍãÑ Ãæ ÃÑÌæÇäí Çááæä Ëã íÊÍæá Åáì ÈËæÑ Ãæ ÊÞÔÑ ááÌáÏ.  Ãæ ÞÏ ÊáÇÍÙ ØÝÍðÇ ÃÍãÑ Çááæä ãÚ ÊæÑã Ýí ÇáæÌå Ãæ ÇáÔÝÊíä Ãæ ÇáÛÏÏ ÇááíãÝÇæíÉ Ýí ÑÞÈÊß Ãæ ÊÍÊ ÐÑÇÚíß.  ãÇ åí ÇáÂËÇÑ ÇáÌÇäÈíÉ ÇáÊí íãßä Ãä ÃáÇÍÙåÇ ÚäÏ ÊáÞí åÐÇ ÇáÏæÇÁ¿  ÇáÂËÇÑ ÇáÌÇäÈíÉ ÇáÊí íÌÈ Ãä ÊÈáÛ ØÈíÈß Ãæ ÃÎÕÇÆí ÇáÑÚÇíÉ ÇáÕÍíÉ ÈåÇ Ýí ÃÞÑÈ æÞÊ ããßä:  • ÇáÍÓÇÓíÉ ãËá ÇáØÝÍ ÇáÌáÏí æÇáÍßÉ æÇáÇÑÊßÇÑíÇ (ÇáÔÑí) ææÑã ÇáæÌå Ãæ ÇáÔÝÇå Ãæ ÇááÓÇä.  • ÅÓåÇá Ïãæí Ãæ ãÇÆí  • ÕÚæÈÉ ÇáÊäÝÓ  • ÔÚæÑ ÈÇáÏæÇÑ¡ ÇáÇÛãÇÁ  • ÇáÍãøì  • ÇÍãÑÇÑ Ãæ ÊÍæÕá Ãæ ÊÞÔÑ Ãæ ÊÑåá Ýí ÇáÌáÏ¡ ÈãÇ Ýí Ðáß ãäØÞÉ ÏÇÎá ÇáÝã  • äæÈÇÊ (äæÈÇÊ ÊÔäÌ)  • ÚáÇãÇÊ æÃÚÑÇÖ ÅÕÇÈÉ Çáßáì¡ ãËá ÕÚæÈÉ ÇáÊÈæá Ãæ ÊÛíÑÇÊ Ýí ßãíÉ ÇáÈæá  • ÚáÇãÇÊ æÃÚÑÇÖ ÅÕÇÈÉ ÇáßÈÏ¡ ãËá ÇáÈæá Ðí Çááæä ÇáÃÕÝÑ ÇáÏÇßä Ãæ ÇáÈäíº ÇáÔÚæÑ ÇáÚÇã ÈÇáãÑÖ Ãæ ÇáÃÚÑÇÖ ÇáÔÈíåÉ ÈÇáÅäÝáæäÒÇº ÇáÈÑÇÒ Ðí Çááæä ÇáÝÇÊÍº ÝÞÏ ÇáÔåíÉº ÇáÛËíÇäº Ãáã Ýí ÇáÌÒÁ  ÇáÚáæí ÇáÃíãä ãä ÇáÈØäº ÇáÖÚÝ Ãæ ÇáÊÚÈ ÛíÑ ÇáãÚÊÇÏº ÇÕÝÑÇÑ ÇáÚíäíä Ãæ ÇáÌáÏ  • äÒíÝ Ãæ ßÏãÇÊ ÛíÑ ãÚÊÇÏÉ  • ÖÚÝ Ãæ ÅÌåÇÏ ÛíÑ ãÚÊÇÏ  ÇáÂËÇÑ ÇáÌÇäÈíÉ ÇáÊí áÇ ÊÊØáÈ ÑÚÇíÉ ØÈíÉ (ÃÈáÛ ØÈíÈß Ãæ ÃÎÕÇÆí ÇáÑÚÇíÉ ÇáÕÍíÉ ÅÐÇ ÇÓÊãÑÊ Ãæ ßÇäÊ ãËíÑÉ ááÞáÞ):  • ÇáÔÚæÑ ÈÇáÇÖØÑÇÈ  • ÇáÇÑÊÈÇß  • ÅÓåÇá  • ÇáÏæÇÑ  • ÇáÕÏÇÚ  • ÛËíÇä Ãæ ÞíÁ  • ÇÑÊÈÇß ÇáãÚÏÉ  • ÕÚæÈÉ Çáäæã  åÐå ÇáÞÇÆãÉ ÞÏ áÇ ÊÕÝ ßá ÇáÂËÇÑ ÇáÌÇäÈíÉ ÇáãÍÊãáÉ. ÇÊÕá ÈØÈíÈß ááÇÓÊÔÇÑÉ ÇáØÈíÉ Úä ÇáÂËÇÑ ÇáÌÇäÈíÉ. íãßäß ÇáÅÈáÇÛ Úä ÇáÂËÇÑ ÇáÌÇäÈíÉ áÅÏÇÑÉ ÇáÃÛÐíÉ æÇáÃÏæíÉ ÇáÃãÑíßíÉ (FDA) Úáì ÇáÑÞã [phone]  Ãíä íÌÈ Úáíø ÇáÇÍÊÝÇÙ ÈÏæÇÆí¿  íÍÝÙ ÈÚíÏðÇ Úä ãÊäÇæá ÇáÃØÝÇá.  íÎÒä Ýí ÏÑÌÉ ÍÑÇÑÉ ÇáÛÑÝÉ Èíä 20 æ25 ÏÑÌÉ ãÆæíÉ (68 æ77 ÝåÑäåÇíÊ).  ÊÎáÕ ãä Ãí ÏæÇÁ ÛíÑ ãÓÊÎÏã ÈÚÏ ÊÇÑíÎ ÇäÊåÇÁ ÇáÕáÇÍíÉ ÇáãØÈæÚ Úáì ÇáãáÕÞ Ãæ ÇáÚÈæÉ.  ãáÇÍÙÉ: åÐå ÇáÕÍíÝÉ ÚÈÇÑÉ Úä ãáÎÕ: ÞÏ áÇ íÛØí åÐÇ ÇáãáÎøóÕ ßá ÇáãÚáæãÇÊ ÇáããßäÉ. ÅÐÇ ßÇäÊ áÏíß Ãí ÃÓÆáÉ Úä åÐÇ ÇáÏæÇÁ¡ ÊÍÏøË Åáì ÇáØÈíÈ Ãæ ÇáÕíÏáí Ãæ ãÞÏã ÇáÑÚÇíÉ ÇáÕÍíÉ.    © 2021 Else[employer]/Gold Standard ([id number]:[id number])  Cyclobenzaprine tablets  ãÇ åÐÇ ÇáÏæÇÁ¿  ÓÇíßáæÈíäÒÇÈÑíä ãÑÎì ááÚÖáÇÊ.  íÓÊÚãá áãÚÇáÌÉ Ãáã æÊÞáÕÇÊ æÊíÈÓ ÇáÚÖáÇÊ.  íãßä ÇÓÊÎÏÇã åÐÇ ÇáÏæÇÁ áÃÛÑÇÖ ÃÎÑìº ÇÓÃá ãÞÏã ÇáÑÚÇíÉ ÇáÕÍíÉ Ãæ ÇáÕíÏáí ÅÐÇ ßÇäÊ áÏíß ÃÓÆáÉ.  ÃÓãÇÁ ÇáÚáÇãÇÊ ÇáÊÌÇÑíÉ ÇáãÚÑæÝÉ:þ Fexmid, Flexeril  ãÇ åí ÇáÃÔíÇÁ ÇáÊí íÌÈ Ãä ÃÎÈÑ ÈåÇ ãÞÏã ÇáÑÚÇíÉ ÇáÕÍíÉ ÞÈá ÊäÇæá åÐÇ ÇáÏæÇÁ¿  ÇáÃØÈÇÁ ÈÍÇÌÉ áãÚÑÝÉ ãÇ ÅÐÇ ßäÊ ãÕÇÈðÇ ÈÃí ãä åÐå ÇáÍÇáÇÊ:  • ÊÇÑíÎ ãÚ ÝÔá ÇáÞáÈ Ãæ äæÈÇÊ ÇáÞáÈ Ãæ ãÑÖ ÂÎÑ ãä ÃãÑÇÖ ÇáÞáÈ  • ÚÏã ÇäÊÙÇã ÖÑÈÇÊ ÇáÞáÈ  • ÃãÑÇÖ ÇáßÈÏ  • ãÔÇßá ÇáÛÏÉ ÇáÏÑÞíÉ  • ÑÏ ÝÚá ÛíÑ ÚÇÏí Ãæ ÍÓÇÓíÉ ÖÏ ÓÇíßáæÈíäÒÇÈÑíä Ãæ ÃÏæíÉ ÊÑíÓíßáíß ÇáãÖÇÏÉ ááÇßÊÆÇÈ Ãæ ÇááÇßÊæÒ  • ÑÏ ÝÚá ÛíÑ ÚÇÏí Ãæ ÍÓÇÓíÉ ÖÏ ÇáÃÏæíÉ ÇáÃÎÑì Ãæ ÇáÃØÚãÉ Ãæ ÇáÃÕÈÇÛ Ãæ ÇáãæÇÏ ÇáÍÇÝÙÉ  • ÍÇãá Ãæ ÊÓÚíä ááÍãá  • ÇáÑÖÇÚÉ ÇáØÈíÚíÉ  ßíÝ íÌÈ Úáíø ÇÓÊÚãÇá åÐÇ ÇáÏæÇÁ¿  ÊäÇæá åÐÇ ÇáÏæÇÁ ÈÇáÝã ãÚ ßæÈ ãä ÇáãÇÁ.  ÇÊøóÈÚ ÇáÊÚáíãÇÊ ÇáãæÌæÏÉ Úáì ÇáÚÈæÉ Ãæ ãáÕÞ ÇáæÕÝÉ ÇáØÈíÉ.  ÅÐÇ ÃÑÈß åÐÇ ÇáÏæÇÁ ãÚÏÊß¡ ÊäÇæáå ãÚ ÇáØÚÇã Ãæ ÇááÈä.  ÊäÇæá  åÐÇ ÇáÏæÇÁ Úáì ÝÊÑÇÊ ãäÊÙãÉ.  áÇ ÊÊäÇæá åÐÇ ÇáÏæÇÁ ÃßËÑ ãä ÇáãÑÇÊ ÇáãæÕæÝÉ.  ÊÍÏË Åáí ØÈíÈ ÇáÃØÝÇá ÈÔÃä ÇÓÊÚãÇá åÐÇ ÇáÏæÇÁ ááÃØÝÇá.  ÞÏ Êßæä åäÇß ÍÇÌÉ Åáì ÚäÇíÉ ÎÇÕÉ.  ÇáÌÑÚÉ ÇáÒÇÆÏÉ : ÅÐÇ ÇÚÊÞÏÊ Ãäß ÊäÇæáÊ ßãíÉ ßÈíÑÉ ÌÏðÇ ãä åÐÇ ÇáÏæÇÁ¡ ÇÊÕá ÈãÑßÒ ÇáÊÍßã Ýí ÇáÓãæã Ãæ ÛÑÝÉ ÇáØæÇÑÆ ÝæÑðÇ.  ãáÇÍÙÉ: íÓÊÎÏã åÐÇ ÇáÏæÇÁ ãä ÃÌáß ÃäÊ ÝÞØ. áÇ ÊÔÇÑß ÂÎÑíä ãÚß Ýí ÊäÇæá åÐÇ ÇáÏæÇÁ.  ãÇÐÇ áæ ÊÑßÊ (äÓíÊ) ÌÑÚÉ¿  ÅÐÇ ÝÇÊÊß ÌÑÚÉ¡ ÊäÇæáåÇ Ýí ÃÞÑÈ æÞÊ ããßä.  ÅÐÇ ßÇä åÐÇ æÞÊ ÌÑÚÊß ÇáÊÇáíÉ ÊÞÑíÈðÇ ¡ ÝÊäÇæá Êáß ÇáÌÑÚÉ ÝÞØ.  áÇ ÊÊäÇæá ÌÑÚÊíä Ãæ ÌÑÚÉ ÒÇÆÏÉ.  ãÇ åí ÇáÃÔíÇÁ ÇáÊí ÞÏ ÊÊÝÇÚá ãÚ åÐÇ ÇáÏæÇÁ¿  áÇ ÊäÇæá åÐÇ ÇáÏæÇÁ ãÚ Ãí ããÇ íáí:  • ÇáÃÏæíÉ ÇáÊí ÊÓãì ãæÇäÚ ÇáÇßÓÏíÒ ÇáÃãíäí ÇáÃÍÇÏí MAOI ãËá äÇÑÏíá æÈÇÑäíÊ æãÇÑÈáÇä æÃáÏíÈÑíá æßÇÑÈßÓ.  • ÃÏæíÉ ãÎÏÑÉ ááÓÚÇá  • ÓÇÝíäÇãíÏ  åÐÇ ÇáÏæÇÁ íãßä Ãä íÊÝÇÚá ÃíÖðÇ ãÚ ÇáÃÏæíÉ ÇáÊÇáíÉ:  • ÇáßÍæá  • ÈíæÈÑæÈíæä  • ãÖÇÏÇÊ ÇáåÓÊÇãíä áÚáÇÌ ÇáÍÓÇÓíÉ æÇáÓÚÇá æäÒáÇÊ ÇáÈÑÏ.  • ÈÚÖ ÃÏæíÉ ÚáÇÌ ÇáÞáÞ Ãæ ãÔÇßá Çáäæã  • ÈÚÖ ÃÏæíÉ ãÔÇßá ÇáãËÇäÉ ãËá ÇæßÓíÈíæÊíäíä æÊæáÊíÑæÏÇíä  • ÈÚÖ ÃÏæíÉ ÇáÇßÊÆÇÈ¡ ãËá ÃãíÊÑíÈÊíáíä¡ ÝáæßÓíÊíä¡ ÓíÑÊÑÇáíä  • ÈÚÖ ÃÏæíÉ ÏÇÁ ÈÇÑßäÓæä ãËá ÈäÒÊÑæÈíä Ãæ ÊÑíåíßÓíÝíäíÏÇíá  • ÈÚÖ ÃÏæíÉ ÚáÇÌ äæÈÇÊ ÇáÕÑÚ ãËá ÝíäæÈÇÑÈíÊÇá¡ ÈÑíãíÏæä  • ÈÚÖ ÃÏæíÉ ãÔÇßá ÇáãÚÏÉ ãËá ÏíÓíßáæãíä Ãæ åíæÓíÇãíä  • ÈÚÖ ÃÏæíÉ ÏæÎÉ ÇáÓÝÑ ãËá ÓßæÈæáÇãíä  • ÃÏæíÉ ÇáÊÎÏíÑ ÇáÚÇã¡ ãËá åÇáæËÇä¡ ÃíÒæÝáæÑíä¡ ãíËæßÓí ÝáæÑÇä¡ ÈÑæÈæÝæá  • ÇíÈÑÇÊÑæÈíã  • ÃÏæíÉ ÇáÊÎÏíÑ ÇáãæÖÚí¡ ãËá áÏíæßÇííä¡ ÈÑÇãæßÓíä¡ ÊÊÑÇßÇííä  • ÇáÃÏæíÉ ÇáÊí ÊÑÎí ÇáÚÖáÇÊ ááÌÑÇÍÉ  • ãÎÝÝÇÊ ÇáÃáã ÇáãÎÏÑÉ  • ÇáÝíäæËíÇÒíäÇÊ ãËá ßáæÑÈÑæãÇÒíä æãíÒæÑÏÇÒíä æÈáæßÑæÈíÑÇÒÇíä æËíæÑíÏÇÒíä  • ÝíÑÇÈÇãíá  åÐå ÇáÞÇÆãÉ ÞÏ áÇ ÊÕÝ ßá ÇáÊÝÇÚáÇÊ ÇáãÍÊãáÉ. Þã ÈÅÚØÇÁ ãÞÏã ÇáÑÚÇíÉ ÇáÕÍíÉ ÞÇÆãÉ Èßá ÇáÃÏæíÉ Ãæ ÇáÃÚÔÇÈ Ãæ ÇáÃÏæíÉ ÈÏæä æÕÝÉ Ãæ ÇáãßãáÇÊ ÇáÛÐÇÆíÉ ÇáÊí ÊÓÊÎÏãåÇ. ÃÎÈÑåã ÃíÖðÇ ÅÐÇ ßäÊ ÊÏÎä Ãæ ÊÔÑÈ ÇáßÍæá Ãæ ÊÓÊÎÏã ÚÞÇÑÇÊ ÛíÑ ÞÇäæäíÉ. ÞÏ ÊÊÝÇÚá ÈÚÖ ÇáÚäÇÕÑ ãÚ ÏæÇÆß.  ãÇ ÇáÐí íÌÈ Úáíø ÊÑÞÈå ÚäÏ ÇÓÊÚãÇá åÐÇ ÇáÏæÇÁ¿  ÃÎÈÑ ØÈíÈß Ãæ ÃÎÕÇÆí ÇáÑÚÇíÉ ÇáÕÍíÉ ÅÐÇ áã ÊÈÏÃ ÃÚÑÇÖß Ýí ÇáÊÍÓä Ãæ ÅÐÇ  ÇÒÏÇÏÊ ÓæÁðÇ.  ÞÏ ÊÔÚÑ ÈÇáäÚÇÓ æÇáÏæÇÑ.  áÇ ÊÞæÏ ÇáÓíÇÑÉ Ãæ ÊÓÊÎÏã ÂáÇÊ Ãæ ÊÝÚá Ãí ÔíÁ íÍÊÇÌ Åáì ÊíÞÙ ÇáÐåä ÍÊì ÊÚÑÝ ßíÝ íÄËÑ Úáíß ÇáÏæÇÁ.  áÇ ÊÌáÓ Ãæ ÊÞÝ ÈÓÑÚÉ¡ ÎÇÕÉ ÅÐÇ ßäÊ ãä ÇáãÑÖì ßÈÇÑ ÇáÓä.  ÝåÐÇ íÞáá ãä ÎØÑ ÇáÔÚæÑ ÈÇáÏæÇÑ Ãæ ÇáÅÛãÇÁ.  ÞÏ íÊÏÇÎá ÇáßÍæá ãÚ ÊÃËíÑ åÐÇ ÇáÏæÇÁ.  ÊÌäÈ ÇáãÔÑæÈÇÊ ÇáßÍæáíÉ.  ÞÏ ÊÊÚÑÖ ááãÒíÏ ãä ÇáÂËÇÑ ÇáÌÇäÈíÉ ÅÐÇ ßäÊ ÊÊäÇæá ÏæÇÁð ÂÎÑ íÓÈÈ ÇáäÚÇÓ ÃíÖðÇ.  ÞÏã áØÈíÈß Ãæ ÃÎÕÇÆí ÇáÑÚÇíÉ ÇáÕÍíÉ ÞÇÆãÉ ÈßÇÝÉ ÇáÃÏæíÉ ÇáÊí ÊÓÊÚãáåÇ.  ÓæÝ íÎÈÑß ØÈíÈß ÈßãíÉ ÇáÏæÇÁ ÇáÊí ÊÊäÇæáåÇ.  áÇ ÊÊäÇæá ÃßËÑ ãä ÇáÌÑÚÉ ÇáãæÕæÝÉ.  ÇÊÕá ÈÇáØæÇÑÆ ááäÌÏÉ ÅÐÇ ÊÚÑÖÊ áãÔÇßá Ýí ÇáÊäÝÓ Ãæ áäÚÇÓ ÛíÑ ãÚÊÇÏ.  ÞÏ ÊÔÚÑ ÈÌÝÇÝ Ýí Ýãß.  ãÖÛ áÈÇä ÎÇáí ãä ÇáÓßÑ Ãæ ÇãÊÕÇÕ Íáæì ÕáÈÉ æÔÑÈ ãÒíÏ ãä ÇáãÇÁ ÞÏ íÓÇÚÏ.  ÇÊÕá ÈØÈíÈß ÅÐÇ áã ÊõÍá ÇáãÔßáÉ Ãæ ÅÐÇ ßÇäÊ ÚÓíÑÉ.  ãÇ åí ÇáÂËÇÑ ÇáÌÇäÈíÉ ÇáÊí íãßä Ãä ÃáÇÍÙåÇ ÚäÏ ÊáÞí åÐÇ ÇáÏæÇÁ¿  ÇáÂËÇÑ ÇáÌÇäÈíÉ ÇáÊí íÌÈ Ãä ÊÈáÛ ØÈíÈß Ãæ ÃÎÕÇÆí ÇáÑÚÇíÉ ÇáÕÍíÉ ÈåÇ Ýí ÃÞÑÈ æÞÊ ããßä:  • ÇáÍÓÇÓíÉ ãËá ÇáØÝÍ ÇáÌáÏí æÇáÍßÉ æÇáÇÑÊßÇÑíÇ (ÇáÔÑí) ææÑã ÇáæÌå Ãæ ÇáÔÝÇå Ãæ ÇááÓÇä.  • ÕÚæÈÉ ÇáÊäÝÓ  • Ãáã ÇáÕÏÑ  • äÈÖÇÊ ÇáÞáÈ ÇáÓÑíÚÉ Ãæ ÛíÑ ÇáãäÊÙãÉ  • ÇáåáæÓÉ  • äæÈÇÊ (äæÈÇÊ ÊÔäÌ)  • ÖÚÝ Ãæ ÅÌåÇÏ ÛíÑ ãÚÊÇÏ  ÇáÂËÇÑ ÇáÌÇäÈíÉ ÇáÊí áÇ ÊÊØáÈ ÑÚÇíÉ ØÈíÉ (ÃÈáÛ ØÈíÈß Ãæ ÃÎÕÇÆí ÇáÑÚÇíÉ ÇáÕÍíÉ ÅÐÇ ÇÓÊãÑÊ Ãæ ßÇäÊ ãËíÑÉ ááÞáÞ):  • ÇáÕÏÇÚ  • ÛËíÇä Ãæ ÞíÁ  åÐå ÇáÞÇÆãÉ ÞÏ áÇ ÊÕÝ ßá ÇáÂËÇÑ ÇáÌÇäÈíÉ ÇáãÍÊãáÉ. ÇÊÕá ÈØÈíÈß ááÇÓÊÔÇÑÉ ÇáØÈíÉ Úä ÇáÂËÇÑ ÇáÌÇäÈíÉ. íãßäß ÇáÅÈáÇÛ Úä ÇáÂËÇÑ ÇáÌÇäÈíÉ áÅÏÇÑÉ ÇáÃÛÐíÉ æÇáÃÏæíÉ ÇáÃãÑíßíÉ (FDA) Úáì ÇáÑÞã [id number].[phone]  Ãíä íÌÈ Úáíø ÇáÇÍÊÝÇÙ ÈÏæÇÆí¿  íÍÝÙ ÈÚíÏðÇ Úä ãÊäÇæá ÇáÃØÝÇá.  íÎÒä Ýí ÏÑÌÉ ÍÑÇÑÉ ÇáÛÑÝÉ Èíä 15 æ30 ÏÑÌÉ ãÆæíÉ (59 æ86 ÝåÑäåÇíÊ).  ÍÇÝÙ Úáì ÇáæÚÇÁ ãÍßã ÇáÅÛáÇÞ.  ÊÎáÕ ãä Ãí ÏæÇÁ ÛíÑ ãÓÊÎÏã ÈÚÏ ÊÇÑíÎ ÇäÊåÇÁ ÇáÕáÇÍíÉ ÇáãØÈæÚ Úáì ÇáãáÕÞ Ãæ ÇáÚÈæÉ.  ãáÇÍÙÉ: åÐå ÇáÕÍíÝÉ ÚÈÇÑÉ Úä ãáÎÕ: ÞÏ áÇ íÛØí åÐÇ ÇáãáÎøóÕ ßá ÇáãÚáæãÇÊ ÇáããßäÉ. ÅÐÇ ßÇäÊ áÏíß Ãí ÃÓÆáÉ Úä åÐÇ ÇáÏæÇÁ¡ ÊÍÏøË Åáì ÇáØÈíÈ Ãæ ÇáÕíÏáí Ãæ ãÞÏã ÇáÑÚÇíÉ ÇáÕÍíÉ.    © 2021 [employer] (2020-01-09 00:00:00)

## 2022-02-18 NOTE — PROGRESS NOTES
Follow Up Office Note     Patient Name: Meliton Lagos  : 1990   MRN: [id number]     Chief Complaint:    Chief Complaint   Patient presents with   • Headache     going down neck and back       History of Present Illness: Meliton Lagos is a 31 y.o. male who presents today with complaint of headache times several days along with neck pain and facial pressure.  Patient denies fever, chills, body aches, loss of taste or smell, cough, shortness of breath.  Patient states the pain is primarily located in his forehead area but he also will have neck spasms which radiate up into his occipital area.  He denies dizziness or weakness.      Subjective      Review of Systems:   Review of Systems   Constitutional: Negative for activity change, appetite change, chills, diaphoresis, fatigue and fever.   HENT: Positive for congestion, postnasal drip, sinus pressure and sinus pain. Negative for dental problem, ear pain, facial swelling, sore throat, trouble swallowing and voice change.    Eyes: Negative.    Respiratory: Negative for cough, chest tightness, shortness of breath, wheezing and stridor.    Cardiovascular: Negative for chest pain, palpitations and leg swelling.   Gastrointestinal: Negative for abdominal pain, diarrhea, nausea and vomiting.   Genitourinary: Negative.    Musculoskeletal: Positive for myalgias and neck pain. Negative for gait problem and neck stiffness.   Neurological: Positive for headaches. Negative for dizziness, tremors, seizures, syncope, facial asymmetry, speech difficulty, weakness, light-headedness and numbness.        Past Medical History:   Past Medical History:   Diagnosis Date   • Jackson's esophagus    • GERD (gastroesophageal reflux disease)          Medications:     Current Outpatient Medications:   •  omeprazole (priLOSEC) 40 MG capsule, Take 1 capsule by mouth Daily. Take 30 minutes 1st meal of the day, Disp: 30 capsule, Rfl: 0  •  vitamin D (ERGOCALCIFEROL) 1.25 MG  "(57115 UT) capsule capsule, Take 1 capsule by mouth 1 (One) Time Per Week., Disp: 5 capsule, Rfl: 3  •  amoxicillin (AMOXIL) 875 MG tablet, Take 1 tablet by mouth 2 (Two) Times a Day for 10 days., Disp: 20 tablet, Rfl: 0  •  cyclobenzaprine (FLEXERIL) 10 MG tablet, 1/2 to one table PO Qhs prn neck muscle spasm, Disp: 30 tablet, Rfl: 0  •  pantoprazole (PROTONIX) 40 MG EC tablet, , Disp: , Rfl:     Allergies:   No Known Allergies      Objective     Physical Exam:  Vital Signs:   Vitals:    02/15/22 1354   BP: 124/82   Pulse: 101   Resp: 20   Temp: 97 °F (36.1 °C)   SpO2: 98%   Weight: 95.3 kg (210 lb)   Height: 170.2 cm (67\")   PainSc:   6     Body mass index is 32.89 kg/m².     Physical Exam  Vitals and nursing note reviewed.   Constitutional:       General: He is not in acute distress.     Appearance: Normal appearance. He is well-developed. He is not ill-appearing, toxic-appearing or diaphoretic.   HENT:      Head: Normocephalic and atraumatic.      Right Ear: External ear normal. A middle ear effusion is present. No mastoid tenderness. Tympanic membrane is bulging.      Left Ear: External ear normal. A middle ear effusion is present. No mastoid tenderness. Tympanic membrane is bulging.      Nose: Mucosal edema and congestion present.      Right Turbinates: Swollen.      Left Turbinates: Swollen.      Right Sinus: Frontal sinus tenderness present. No maxillary sinus tenderness.      Left Sinus: Frontal sinus tenderness present. No maxillary sinus tenderness.      Mouth/Throat:      Lips: Pink.      Mouth: Mucous membranes are moist.      Pharynx: Uvula midline. Posterior oropharyngeal erythema (Mild with cobblestoning) present.   Cardiovascular:      Rate and Rhythm: Normal rate and regular rhythm.      Heart sounds: Normal heart sounds.   Pulmonary:      Effort: Pulmonary effort is normal. No respiratory distress.      Breath sounds: Normal breath sounds. No stridor. No wheezing.   Musculoskeletal:      Cervical " back: Normal range of motion and neck supple. Spasms present. No swelling, edema, deformity, rigidity or torticollis. No muscular tenderness. Normal range of motion.      Thoracic back: Normal.      Lumbar back: Normal.   Lymphadenopathy:      Cervical: No cervical adenopathy.   Skin:     General: Skin is warm and dry.   Neurological:      Mental Status: He is alert and oriented to person, place, and time.   Psychiatric:         Mood and Affect: Mood normal.         Behavior: Behavior normal. Behavior is cooperative.         Thought Content: Thought content normal.         Judgment: Judgment normal.         Assessment / Plan      Assessment/Plan:   Diagnoses and all orders for this visit:    1. Acute non-recurrent frontal sinusitis (Primary)  Assessment & Plan:  Antibiotic therapy per orders.    Orders:  -     amoxicillin (AMOXIL) 875 MG tablet; Take 1 tablet by mouth 2 (Two) Times a Day for 10 days.  Dispense: 20 tablet; Refill: 0    2. Acute nonintractable headache, unspecified headache type  Assessment & Plan:  Quarantine at home per CDC guidelines pending Covid testing results.    Orders:  -     COVID-19 PCR, [facility], NP SWAB IN [place] VIRAL TRANSPORT MEDIA/ORAL SWISH 24-30 HR TAT - Swab, Nasopharynx  -     QUESTIONNAIRE SERIES    3. Musculoskeletal pain  -     cyclobenzaprine (FLEXERIL) 10 MG tablet; 1/2 to one table PO Qhs prn neck muscle spasm  Dispense: 30 tablet; Refill: 0     Follow Up:   PRN and at next scheduled appointment(s) with PCP.    Discussed the nature of the medical condition(s) risks, complications, implications, management, safe and proper use of medications. Encouraged medication compliance, and keeping scheduled follow up appointments with me and any other providers.      RTC if symptoms fail to improve, to ER if symptoms worsen.      JAY León  [facility] [place] [place]

## 2022-02-21 ENCOUNTER — TELEPHONE (OUTPATIENT)
Dept: FAMILY MEDICINE CLINIC | Facility: CLINIC | Age: 32
End: 2022-02-21

## 2022-02-21 NOTE — TELEPHONE ENCOUNTER
Caller: Meliton Lagos    Relationship: Self     Best call back number: [phone]     Who are you requesting to speak with (clinical staff, provider,  specific staff member):   CLINICAL     What was the call regarding: PATIENT STATES HE WENT TO [facility] ON 2-18-22 FOR CHEST PAINS  AND WENT BACK TO THE HOSPITAL ON 2-20-22 FOR HEADACHES   PATIENT STATES THEY GAVE HIM AZITHROMYCIN AT THE ER LAST NIGHT AND HE DID NOT START THE MEDICATION    HE WANTS TO BE ADVISED FROM HIS PHYSICIAN   THEY TOLD HIM HE HAD INFLAMMATION IN HIS HEAD   HE STATES HE IS IN PAIN WITH HIS HEADACHE   THE FIRST ANTIBIOTIC HE GOT FROM [facility]  HELPED HIM BUT HE SAID HE HAD CHEST PAINS WITH IT      Do you require a callback: YES   [facility] CONFIRMED     PATIENT WAS CALLING BACK TO SEE IF THE PHYSICIAN GOT HIS MESSAGE EARLIER   HE IS SCARED TO TAKE THE MEDIATION  PLEASE ADVISE

## 2022-03-10 ENCOUNTER — OFFICE VISIT (OUTPATIENT)
Dept: FAMILY MEDICINE CLINIC | Facility: CLINIC | Age: 32
End: 2022-03-10

## 2022-03-10 VITALS
RESPIRATION RATE: 20 BRPM | HEART RATE: 79 BPM | SYSTOLIC BLOOD PRESSURE: 126 MMHG | HEIGHT: 67 IN | WEIGHT: 208 LBS | OXYGEN SATURATION: 97 % | TEMPERATURE: 98 F | DIASTOLIC BLOOD PRESSURE: 80 MMHG | BODY MASS INDEX: 32.65 KG/M2

## 2022-03-10 DIAGNOSIS — G89.29 CHRONIC NONINTRACTABLE HEADACHE, UNSPECIFIED HEADACHE TYPE: ICD-10-CM

## 2022-03-10 DIAGNOSIS — K21.9 CHRONIC GERD: Primary | Chronic | ICD-10-CM

## 2022-03-10 DIAGNOSIS — Z91.09 ENVIRONMENTAL ALLERGIES: ICD-10-CM

## 2022-03-10 DIAGNOSIS — R51.9 CHRONIC NONINTRACTABLE HEADACHE, UNSPECIFIED HEADACHE TYPE: ICD-10-CM

## 2022-03-10 DIAGNOSIS — J02.9 SORE THROAT: ICD-10-CM

## 2022-03-10 DIAGNOSIS — E66.9 OBESITY (BMI 30.0-34.9): Chronic | ICD-10-CM

## 2022-03-10 PROBLEM — E66.811 OBESITY (BMI 30.0-34.9): Chronic | Status: ACTIVE | Noted: 2022-03-10

## 2022-03-10 PROBLEM — E66.811 OBESITY (BMI 30.0-34.9): Status: ACTIVE | Noted: 2022-03-10

## 2022-03-10 PROBLEM — J01.10 ACUTE NON-RECURRENT FRONTAL SINUSITIS: Status: RESOLVED | Noted: 2022-02-18 | Resolved: 2022-03-10

## 2022-03-10 LAB
EXPIRATION DATE: NORMAL
INTERNAL CONTROL: NORMAL
Lab: NORMAL
S PYO AG THROAT QL: NEGATIVE

## 2022-03-10 PROCEDURE — 87880 STREP A ASSAY W/OPTIC: CPT | Performed by: NURSE PRACTITIONER

## 2022-03-10 PROCEDURE — 99214 OFFICE O/P EST MOD 30 MIN: CPT | Performed by: NURSE PRACTITIONER

## 2022-03-10 RX ORDER — PANTOPRAZOLE SODIUM 40 MG/1
40 TABLET, DELAYED RELEASE ORAL DAILY
Qty: 30 TABLET | Refills: 5 | Status: SHIPPED | OUTPATIENT
Start: 2022-03-10 | End: 2022-10-24

## 2022-03-10 RX ORDER — LEVOCETIRIZINE DIHYDROCHLORIDE 5 MG/1
5 TABLET, FILM COATED ORAL EVERY EVENING
Qty: 30 TABLET | Refills: 2 | Status: SHIPPED | OUTPATIENT
Start: 2022-03-10 | End: 2022-03-21

## 2022-03-10 NOTE — PROGRESS NOTES
Follow Up Office Note     Patient Name: Meliton Lagos  : 1990   MRN: [id number]     Chief Complaint:    Chief Complaint   Patient presents with   • Headache   • Sore Throat       History of Present Illness: Meliton Lagos is a 31 y.o. male who presents today with complaint of frequent headaches times several months.  He is also complaining of of sore throat and burning sensation in chest. Patient states that he was recently seen at an urgent treatment center and prescribed a Z-Isidro however it did not help his headaches.  Patient reports that he is no longer taking any medications for his chronic GERD.  Patient has past medical history significant for Jackson's esophagitis and esophageal stricture.  Patient states he has been having some dental problems recently as well.  He states he has an appointment tomorrow with  Dentistry.      Subjective      Review of Systems:   Review of Systems   Constitutional: Positive for fatigue. Negative for activity change, appetite change, chills, diaphoresis, fever and unexpected weight change.   HENT: Positive for congestion, dental problem, postnasal drip, sinus pressure and sore throat. Negative for drooling, ear discharge, ear pain, facial swelling, nosebleeds, sinus pain, trouble swallowing and voice change.    Respiratory: Negative for cough, choking, chest tightness, shortness of breath, wheezing and stridor.    Cardiovascular: Positive for chest pain. Negative for palpitations and leg swelling.   Gastrointestinal: Negative for abdominal pain, blood in stool, diarrhea, nausea and vomiting.        Chronic GERD   Genitourinary: Negative for dysuria.   Musculoskeletal: Positive for myalgias.   Skin: Negative for rash.   Neurological: Positive for headaches. Negative for dizziness, tremors, seizures, syncope, facial asymmetry, speech difficulty, weakness, light-headedness and numbness.        Past Medical History:   Past Medical History:   Diagnosis Date  "  • Jackson's esophagus    • GERD (gastroesophageal reflux disease)          Medications:     Current Outpatient Medications:   •  pantoprazole (PROTONIX) 40 MG EC tablet, Take 1 tablet by mouth Daily., Disp: 30 tablet, Rfl: 5  •  levocetirizine (Xyzal) 5 MG tablet, Take 1 tablet by mouth Every Evening., Disp: 30 tablet, Rfl: 2  •  vitamin D (ERGOCALCIFEROL) 1.25 MG (85681 UT) capsule capsule, Take 1 capsule by mouth 1 (One) Time Per Week., Disp: 5 capsule, Rfl: 3    Allergies:   No Known Allergies      Objective     Physical Exam:  Vital Signs:   Vitals:    03/10/22 0852   BP: 126/80   Pulse: 79   Resp: 20   Temp: 98 °F (36.7 °C)   SpO2: 97%   Weight: 94.3 kg (208 lb)   Height: 170.2 cm (67\")   PainSc:   3     Body mass index is 32.58 kg/m².     Physical Exam  Vitals and nursing note reviewed.   Constitutional:       General: He is not in acute distress.     Appearance: Normal appearance. He is well-developed. He is not ill-appearing, toxic-appearing or diaphoretic.   HENT:      Head: Normocephalic and atraumatic.      Right Ear: A middle ear effusion is present. Tympanic membrane is bulging. Tympanic membrane is not erythematous.      Left Ear: A middle ear effusion is present. Tympanic membrane is bulging. Tympanic membrane is not erythematous.      Nose: Mucosal edema and congestion present.      Mouth/Throat:      Lips: Pink.      Mouth: Mucous membranes are moist.      Pharynx: Posterior oropharyngeal erythema (mild) present.   Neck:      Thyroid: No thyromegaly.   Cardiovascular:      Rate and Rhythm: Normal rate and regular rhythm.   Pulmonary:      Effort: Pulmonary effort is normal. No respiratory distress.      Breath sounds: Normal breath sounds. No stridor. No wheezing.   Musculoskeletal:      Cervical back: Normal range of motion and neck supple.   Lymphadenopathy:      Cervical: No cervical adenopathy.   Skin:     General: Skin is warm and dry.   Neurological:      General: No focal deficit present. "      Mental Status: He is alert and oriented to person, place, and time.   Psychiatric:         Mood and Affect: Mood normal.         Behavior: Behavior normal. Behavior is cooperative.         Thought Content: Thought content normal.         Judgment: Judgment normal.         Assessment / Plan      Assessment/Plan:   Diagnoses and all orders for this visit:    1. Chronic GERD (Primary)  Assessment & Plan:  GERD symptoms worsening.  Restart pantoprazole.  Referral to gastroenterology.    Orders:  -     pantoprazole (PROTONIX) 40 MG EC tablet; Take 1 tablet by mouth Daily.  Dispense: 30 tablet; Refill: 5  -     Ambulatory Referral to Gastroenterology    2. Chronic nonintractable headache, unspecified headache type  -     Ambulatory Referral to Neurology    3. Environmental allergies  -     levocetirizine (Xyzal) 5 MG tablet; Take 1 tablet by mouth Every Evening.  Dispense: 30 tablet; Refill: 2    4. Sore throat  -     POCT rapid strep A    5. Obesity (BMI 30.0-34.9)  Assessment & Plan:  Patient's (Body mass index is 32.58 kg/m².) indicates that they are obese (BMI >30) with health conditions that include GERD . Weight is stable. BMI is is above average; BMI management plan is completed. Recommend low calorie, low carb based diet program, portion control and increasing exercise.        Lab Results   Component Value Date    RAPSCRN Negative 03/10/2022       Follow Up:   PRN and at next scheduled appointment(s) with PCP.    Discussed the nature of the medical condition(s) risks, complications, implications, management, safe and proper use of medications. Encouraged medication compliance, and keeping scheduled follow up appointments with me and any other providers.      RTC if symptoms fail to improve, to ER if symptoms worsen.      JAY León  [facility] [place] [facility] [place]

## 2022-03-10 NOTE — ASSESSMENT & PLAN NOTE
Patient's (Body mass index is 32.58 kg/m².) indicates that they are obese (BMI >30) with health conditions that include GERD . Weight is stable. BMI is is above average; BMI management plan is completed. Recommend low calorie, low carb based diet program, portion control and increasing exercise.

## 2022-03-16 DIAGNOSIS — E55.9 VITAMIN D DEFICIENCY: ICD-10-CM

## 2022-03-17 RX ORDER — ERGOCALCIFEROL 1.25 MG/1
CAPSULE ORAL
Qty: 12 CAPSULE | Refills: 0 | Status: SHIPPED | OUTPATIENT
Start: 2022-03-17 | End: 2022-10-28

## 2022-03-21 ENCOUNTER — OFFICE VISIT (OUTPATIENT)
Dept: GASTROENTEROLOGY | Facility: CLINIC | Age: 32
End: 2022-03-21

## 2022-03-21 VITALS
WEIGHT: 213.6 LBS | TEMPERATURE: 98 F | HEART RATE: 81 BPM | DIASTOLIC BLOOD PRESSURE: 82 MMHG | SYSTOLIC BLOOD PRESSURE: 119 MMHG | BODY MASS INDEX: 33.45 KG/M2

## 2022-03-21 DIAGNOSIS — R06.02 SHORTNESS OF BREATH: ICD-10-CM

## 2022-03-21 DIAGNOSIS — R11.0 NAUSEA: ICD-10-CM

## 2022-03-21 DIAGNOSIS — Z87.19 HISTORY OF ESOPHAGITIS: ICD-10-CM

## 2022-03-21 DIAGNOSIS — R13.10 DYSPHAGIA, UNSPECIFIED TYPE: ICD-10-CM

## 2022-03-21 DIAGNOSIS — R07.9 CHEST PAIN, UNSPECIFIED TYPE: Primary | ICD-10-CM

## 2022-03-21 DIAGNOSIS — K21.9 GASTROESOPHAGEAL REFLUX DISEASE WITHOUT ESOPHAGITIS: ICD-10-CM

## 2022-03-21 PROCEDURE — 99214 OFFICE O/P EST MOD 30 MIN: CPT | Performed by: NURSE PRACTITIONER

## 2022-03-21 NOTE — PROGRESS NOTES
GASTROENTEROLOGY OFFICE NOTE    Meliton Lagos  [id number]  1990    CARE TEAM  Patient Care Team:  Sanna Herr APRN as PCP - General (Family Medicine)  Jose Guadalupe Bell MD (Family Medicine)  Bhupendra Mobley MD as Consulting Physician (Gastroenterology)  Deanne Mcnair APRN as Nurse Practitioner (Nurse Practitioner)    Referring Provider: No ref. provider found    Chief Complaint   Patient presents with   • Heartburn        HISTORY OF PRESENT ILLNESS:   Meliton Lagos is a 31 y.o. male who presents to the clinic today as a referral from his PCP Ms. Ambrocioia Carmenza for evaluation regarding reflux.  Review of referral documentation revealed patient has history of Jackson's esophagitis and esophageal stricture but I am unable to locate this history.  6/30/2020 EGD per Dr. Mobley due to reflux and dysphagia revealed erosive esophagitis distal 1 cm of esophagus, esophageal dilation to 20 mm with balloon, increase omeprazole to 40 mg twice daily and follow-up in 8 weeks. Biopsy of the esophagus 6/30/2020 revealed findings suggestive of reflux and were negative for intestinal metaplasia and dysplasia.    6/16/2021 EGD per Dr. Mobley due to dysphagia, cough, hoarseness of voice, reflux, history of erosive esophagitis appeared normal, dilation performed, biopsies obtained, increase pantoprazole to 40 mg twice daily. Biopsy of the esophagus revealed reflux esophagitis without eosinophils identified, negative for metaplasia, dysplasia or malignancy.  He describes when being around his friends who are smoking he has experienced chest pain and feeling short of air.  He also had chest pain and feeling short of air after receiving 1 dose of the COVID-19 vaccine and it was suggested he not receive other doses of the COVID-19 vaccine.  He has previously been on omeprazole 20 to 40 mg once to twice daily on pantoprazole 40 mg once to twice daily.  He reports he restarted  pantoprazole 40 mg daily approximately 1 week ago.  I believe he reports when taking a PPI in the past it has not been helpful for chest pain or shortness of breath.  He reports possible heartburn aggravated by eating hot sauce and improved with rest, and liquid diet.  He also reports history of improvement and possible heartburn when he lost weight and weight 85 kg.  His weight today is 96.9 kg.  He also reports approximately 2 weeks ago and 3 days ago he had the sensation that an object was stuck in his chest and was relieved with intake of liquid.  He occasionally has nausea after eating.  Use of proton pump inhibitor might be helpful for nausea after eating.  He is also established with  gastroenterology and I reviewed some  records today as follows:  Mr. Jacob Chairez documentation at visit on 1/27/2022 due to reflux, abdominal pain, alternating constipation with diarrhea, on omeprazole 40 mg daily, weight loss of approximately 15 pounds helpful for abdominal pain and reflux. He has follow up with Mr. Chairez on 4/28/2022.  Small-volume watery stools with sensation of inadequate emptying, large amounts of tea and coffee without much water.  Recommendation to consume methylcellulose fiber capsule in the morning and increase hydration, consider stool softener and/or polyethylene glycol at follow-up visit depending on response; reflux increase omeprazole from 40 mg daily to 40 mg twice daily for 8 weeks with prior resolution of symptoms with weight loss but had gained weight, encouraged weight loss and avoid known triggers return in 4 months to reassess.  Also with history of Gilbert's.  12/13/2021 CMP with normal liver enzymes.  Documentation during prior visits at  with recommendations to start TCA, nortriptyline nightly as well as lactose-free diet to help with bloating and diarrhea giving intolerance to dairy, limiting higher FODMAP foods such as vegetables and dairy; recommended he restart low-dose TCA  and begin taking omeprazole 20 mg that his PCP had prescribed as well as as needed Bentyl   Past Medical History:   Diagnosis Date   • GERD (gastroesophageal reflux disease)         Past Surgical History:   Procedure Laterality Date   • ANAL FISTULA REPAIR     • CHOLECYSTECTOMY     • COLONOSCOPY      [facility]   • ENDOSCOPY  2021    Dr. Mobley empiric dilation performed bx suggestive of esophagitis   • ENDOSCOPY  2020    EGD Dr. Mobley, empiric dilation erosive esophagitis        Current Outpatient Medications on File Prior to Visit   Medication Sig   • pantoprazole (PROTONIX) 40 MG EC tablet Take 1 tablet by mouth Daily.   • vitamin D (ERGOCALCIFEROL) 1.25 MG (68444 UT) capsule capsule TAKE ONE CAPSULE BY MOUTH ONCE WEEKLY     No current facility-administered medications on file prior to visit.       No Known Allergies    Family History   Problem Relation Age of Onset   • No Known Problems Mother    • No Known Problems Father    • Heart attack Maternal Grandfather    • Heart disease Maternal Grandfather    • No Known Problems Paternal Grandmother    • No Known Problems Paternal Grandfather    • Colon cancer Neg Hx    • Colon polyps Neg Hx        Social History     Socioeconomic History   • Marital status:    Tobacco Use   • Smoking status: Former Smoker     Packs/day: 1.00     Years: 5.00     Pack years: 5.00     Types: Cigarettes     Start date: 2012     Quit date: 2016     Years since quittin.2   • Smokeless tobacco: Never Used   Vaping Use   • Vaping Use: Never used   Substance and Sexual Activity   • Alcohol use: Yes     Comment: occas   • Drug use: No   • Sexual activity: Yes     Partners: Female     Birth control/protection: None       PHYSICAL EXAM   /82 (BP Location: Left arm, Patient Position: Sitting, Cuff Size: Adult)   Pulse 81   Temp 98 °F (36.7 °C) (Temporal)   Wt 96.9 kg (213 lb 9.6 oz)   BMI 33.45 kg/m²   Physical  Exam  Constitutional:       General: He is not in acute distress.     Appearance: He is not toxic-appearing.   HENT:      Head: Normocephalic and atraumatic. No contusion.      Right Ear: External ear normal.      Left Ear: External ear normal.   Eyes:      General: Lids are normal. No scleral icterus.        Right eye: No discharge.         Left eye: No discharge.      Extraocular Movements: Extraocular movements intact.   Neck:      Trachea: Trachea normal.      Comments: No visible mass  No visible adenopathy  Cardiovascular:      Rate and Rhythm: Normal rate.   Pulmonary:      Effort: No respiratory distress.      Comments: Symmetrical expansion    Abdominal:      Palpations: Abdomen is soft. There is no mass.      Tenderness: There is no abdominal tenderness.   Musculoskeletal:      Right lower leg: No edema.      Left lower leg: No edema.      Comments: Symmetrical movement of upper extremities  Symmetrical movement of lower extremities  No visible deformities   Skin:     General: Skin is warm and dry.      Coloration: Skin is not jaundiced.   Neurological:      General: No focal deficit present.      Mental Status: He is alert and oriented to person, place, and time.   Psychiatric:         Mood and Affect: Mood normal.         Behavior: Behavior normal.         Thought Content: Thought content normal.         Results Review:  6/30/2020 EGD per Dr. Mobley due to reflux and dysphagia revealed erosive esophagitis distal 1 cm of esophagus, esophageal dilation to 20 mm with balloon, increase omeprazole to 40 mg twice daily and follow-up in 8 weeks. Biopsy of the esophagus 6/30/2020 revealed findings suggestive of reflux and were negative for intestinal metaplasia and dysplasia.    6/16/2021 EGD per Dr. Mobley due to dysphagia, cough, hoarseness of voice, reflux, history of erosive esophagitis appeared normal, dilation performed, biopsies obtained, increase pantoprazole to 40 mg twice daily. Biopsy of the  esophagus revealed reflux esophagitis without eosinophils identified, negative for metaplasia, dysplasia or malignancy.  12/2/2016 HIDA scan with sincalide and Kinevac revealed gallbladder ejection fraction of 97%.    CMP    CMP 8/26/21 11/9/21 12/13/21   Glucose 85 100 (A) 86   BUN 18 19 22 (A)   Creatinine 0.84 0.95 0.80   eGFR Non  Am 105 91 111   eGFR  Am 128 111 135   Sodium 140 137 136   Potassium 4.7 5.2 4.6   Chloride 103 102 101   Calcium 9.6 10.0 9.7   Total Protein 7.7 7.5    Albumin 4.90 5.10 4.70   Globulin 2.8 2.4    Total Bilirubin 1.0 1.6 (A) 1.0   Alkaline Phosphatase 90 77 70   AST (SGOT) 35 30 20   ALT (SGPT) 72 (A) 64 (A) 38   (A) Abnormal value       Comments are available for some flowsheets but are not being displayed.           CBC    CBC 8/26/21 11/9/21   WBC 6.04 4.73   RBC 5.43 5.65   Hemoglobin 15.5 16.6   Hematocrit 46.5 47.6   MCV 85.6 84.2   MCH 28.5 29.4   MCHC 33.3 34.9   RDW 12.3 12.5   Platelets 276 273             ASSESSMENT / PLAN  1. Chest pain, unspecified type and Shortness of breath  - I do not feel as though chest pain and shortness of breath that seem to have been triggered by smoke and COVID 19 vaccination are related to reflux. Try to avoid smoke.     2. History of esophagitis  - identified visually and biopsy during EGD in 2020 and via biopsy in 2021. I suggest weight loss and intermittent use of pantoprazole, consider use of pantoprazole daily for 1 to 2 months, then trial off and stay off pantoprazole if not experiencing symptoms of possible heartburn/reflux.   3. Gastroesophageal reflux disease without esophagitis  - see treatment of esophagitis above as well as try to avoid triggers such as   - Weight loss   - Elevation of the head of the head of the bed  - Remain upright 2 to 3 hours after eating  - Dietary modification:  Avoidance of triggers such as fatty foods, caffeine, beverages bottled or canned; chocolate, spicy foods, peppermint, acidic  (including vinegar and tomato based sauces) and citrus items (grapefruit, orange, lemon, lime, pineapple)  4. Dysphagia, unspecified type  S/p empiric dilation due to dysphagia in 2020 and 2021. Intermittent sensation of object in chest area relieved by drinking water.  It would likely be helpful if he took small bites, chewed well, took sips after 1-2 bites.  5. Nausea  - occasional nausea after eating that is possibly improved when taking PPI which is a reason to consider continued use of PPI, however, I believe if he makes dietary changes that are helpful for weight loss, nausea and reflux also improve and he may not need daily PPI.   - normal HIDA scan 2016  - previously recommended nortriptyline due to abdominal discomfort and I believe he had colonoscopy at  in 2019. Also documented history of Gilbert's but CMP with normal LFTs 12/2021.  - I discussed he likely needs to decide if he wants to return to  gastroenterology as scheduled or if he would rather continue care at our office. He reports he would like to continue care at our office. I recommend he call  to cancel upcoming appointment. He asked me to cancel his appointment but I am unable to do so. He needs to call to cancel the appointment.     Return in about 6 weeks (around 5/2/2022).    Paty Jane, APRN  03/21/2022

## 2022-04-05 ENCOUNTER — LAB (OUTPATIENT)
Dept: LAB | Facility: HOSPITAL | Age: 32
End: 2022-04-05

## 2022-04-05 ENCOUNTER — OFFICE VISIT (OUTPATIENT)
Dept: FAMILY MEDICINE CLINIC | Facility: CLINIC | Age: 32
End: 2022-04-05

## 2022-04-05 DIAGNOSIS — R42 DIZZINESS: Primary | ICD-10-CM

## 2022-04-05 DIAGNOSIS — R74.8 ELEVATED LIVER ENZYMES: ICD-10-CM

## 2022-04-05 DIAGNOSIS — G43.009 MIGRAINE WITHOUT AURA AND WITHOUT STATUS MIGRAINOSUS, NOT INTRACTABLE: ICD-10-CM

## 2022-04-05 DIAGNOSIS — R47.89 WORD FINDING DIFFICULTY: ICD-10-CM

## 2022-04-05 DIAGNOSIS — E55.9 VITAMIN D DEFICIENCY: ICD-10-CM

## 2022-04-05 DIAGNOSIS — R53.82 CHRONIC FATIGUE: ICD-10-CM

## 2022-04-05 DIAGNOSIS — R41.840 ATTENTION OR CONCENTRATION DEFICIT: ICD-10-CM

## 2022-04-05 LAB
ALBUMIN SERPL-MCNC: 5.2 G/DL (ref 3.5–5.2)
ALBUMIN/GLOB SERPL: 1.9 G/DL
ALP SERPL-CCNC: 72 U/L (ref 39–117)
ALT SERPL W P-5'-P-CCNC: 50 U/L (ref 1–41)
ANION GAP SERPL CALCULATED.3IONS-SCNC: 13.3 MMOL/L (ref 5–15)
AST SERPL-CCNC: 27 U/L (ref 1–40)
BASOPHILS # BLD AUTO: 0.03 10*3/MM3 (ref 0–0.2)
BASOPHILS NFR BLD AUTO: 0.6 % (ref 0–1.5)
BILIRUB SERPL-MCNC: 1.2 MG/DL (ref 0–1.2)
BUN SERPL-MCNC: 14 MG/DL (ref 6–20)
BUN/CREAT SERPL: 15.9 (ref 7–25)
CALCIUM SPEC-SCNC: 10.3 MG/DL (ref 8.6–10.5)
CHLORIDE SERPL-SCNC: 103 MMOL/L (ref 98–107)
CO2 SERPL-SCNC: 26.7 MMOL/L (ref 22–29)
CREAT SERPL-MCNC: 0.88 MG/DL (ref 0.76–1.27)
DEPRECATED RDW RBC AUTO: 39.6 FL (ref 37–54)
EGFRCR SERPLBLD CKD-EPI 2021: 116.4 ML/MIN/1.73
EOSINOPHIL # BLD AUTO: 0.14 10*3/MM3 (ref 0–0.4)
EOSINOPHIL NFR BLD AUTO: 2.7 % (ref 0.3–6.2)
ERYTHROCYTE [DISTWIDTH] IN BLOOD BY AUTOMATED COUNT: 12.5 % (ref 12.3–15.4)
GLOBULIN UR ELPH-MCNC: 2.8 GM/DL
GLUCOSE SERPL-MCNC: 90 MG/DL (ref 65–99)
HCT VFR BLD AUTO: 49 % (ref 37.5–51)
HGB BLD-MCNC: 16.3 G/DL (ref 13–17.7)
IMM GRANULOCYTES # BLD AUTO: 0.02 10*3/MM3 (ref 0–0.05)
IMM GRANULOCYTES NFR BLD AUTO: 0.4 % (ref 0–0.5)
LYMPHOCYTES # BLD AUTO: 1.91 10*3/MM3 (ref 0.7–3.1)
LYMPHOCYTES NFR BLD AUTO: 37.2 % (ref 19.6–45.3)
MCH RBC QN AUTO: 29 PG (ref 26.6–33)
MCHC RBC AUTO-ENTMCNC: 33.3 G/DL (ref 31.5–35.7)
MCV RBC AUTO: 87.2 FL (ref 79–97)
MONOCYTES # BLD AUTO: 0.43 10*3/MM3 (ref 0.1–0.9)
MONOCYTES NFR BLD AUTO: 8.4 % (ref 5–12)
NEUTROPHILS NFR BLD AUTO: 2.61 10*3/MM3 (ref 1.7–7)
NEUTROPHILS NFR BLD AUTO: 50.7 % (ref 42.7–76)
NRBC BLD AUTO-RTO: 0 /100 WBC (ref 0–0.2)
PLATELET # BLD AUTO: 263 10*3/MM3 (ref 140–450)
PMV BLD AUTO: 10.3 FL (ref 6–12)
POTASSIUM SERPL-SCNC: 4.2 MMOL/L (ref 3.5–5.2)
PROT SERPL-MCNC: 8 G/DL (ref 6–8.5)
RBC # BLD AUTO: 5.62 10*6/MM3 (ref 4.14–5.8)
SODIUM SERPL-SCNC: 143 MMOL/L (ref 136–145)
WBC NRBC COR # BLD: 5.14 10*3/MM3 (ref 3.4–10.8)

## 2022-04-05 PROCEDURE — G0432 EIA HIV-1/HIV-2 SCREEN: HCPCS | Performed by: NURSE PRACTITIONER

## 2022-04-05 PROCEDURE — 82607 VITAMIN B-12: CPT | Performed by: NURSE PRACTITIONER

## 2022-04-05 PROCEDURE — 99214 OFFICE O/P EST MOD 30 MIN: CPT | Performed by: NURSE PRACTITIONER

## 2022-04-05 PROCEDURE — 84443 ASSAY THYROID STIM HORMONE: CPT | Performed by: NURSE PRACTITIONER

## 2022-04-05 PROCEDURE — 80053 COMPREHEN METABOLIC PANEL: CPT | Performed by: NURSE PRACTITIONER

## 2022-04-05 PROCEDURE — 85025 COMPLETE CBC W/AUTO DIFF WBC: CPT | Performed by: NURSE PRACTITIONER

## 2022-04-05 PROCEDURE — 82306 VITAMIN D 25 HYDROXY: CPT | Performed by: NURSE PRACTITIONER

## 2022-04-05 NOTE — PROGRESS NOTES
Follow Up Office Note     Patient Name: Meliton Lagos  : 1990   MRN: [id number]     Chief Complaint:    Chief Complaint   Patient presents with   • Dizziness   • Fatigue       History of Present Illness: Meliton Lagos is a 31 y.o. male who presents today with complaint of intermittent episodes of dizziness which he reports as ongoing for the past 2 months.  Patient states that he now has a new symptom of difficulty finding words at times and trouble focusing/concentrating.  Patient has been referred to neurology due to migraine headaches, his first appointment is 2022.      Subjective      Review of Systems:   Review of Systems   Constitutional: Positive for fatigue. Negative for activity change, appetite change, chills, diaphoresis, fever and unexpected weight change.   Respiratory: Negative for chest tightness and shortness of breath.    Cardiovascular: Negative for chest pain, palpitations and leg swelling.   Gastrointestinal: Negative.    Genitourinary: Negative.         Patient requesting HIV testing today.   Musculoskeletal: Negative for arthralgias and myalgias.   Skin: Negative.    Neurological: Positive for dizziness and headaches. Negative for tremors, seizures, syncope, facial asymmetry, weakness, light-headedness and numbness. Speech difficulty: word finding difficulty.   Psychiatric/Behavioral: Positive for decreased concentration.        Past Medical History:   Past Medical History:   Diagnosis Date   • GERD (gastroesophageal reflux disease)          Medications:     Current Outpatient Medications:   •  pantoprazole (PROTONIX) 40 MG EC tablet, Take 1 tablet by mouth Daily., Disp: 30 tablet, Rfl: 5  •  vitamin D (ERGOCALCIFEROL) 1.25 MG (66033 UT) capsule capsule, TAKE ONE CAPSULE BY MOUTH ONCE WEEKLY, Disp: 12 capsule, Rfl: 0    Allergies:   No Known Allergies      Objective     Physical Exam:  Vital Signs:   Vitals:    22 1118   BP: 124/80   Pulse: 74   Resp: 20  "  Temp: 98 °F (36.7 °C)   SpO2: 98%   Weight: 97.5 kg (215 lb)   Height: 170.2 cm (67\")   PainSc: 0-No pain     Body mass index is 33.67 kg/m².     Physical Exam  Vitals and nursing note reviewed.   Constitutional:       General: He is not in acute distress.     Appearance: Normal appearance. He is well-developed. He is not ill-appearing, toxic-appearing or diaphoretic.   HENT:      Head: Normocephalic and atraumatic.   Neck:      Thyroid: No thyromegaly.      Vascular: No carotid bruit.   Cardiovascular:      Rate and Rhythm: Normal rate and regular rhythm.   Pulmonary:      Effort: Pulmonary effort is normal. No respiratory distress.      Breath sounds: Normal breath sounds. No stridor. No wheezing.   Musculoskeletal:      Cervical back: Normal range of motion and neck supple.   Lymphadenopathy:      Cervical: No cervical adenopathy.   Skin:     General: Skin is warm and dry.   Neurological:      General: No focal deficit present.      Mental Status: He is alert and oriented to person, place, and time. Mental status is at baseline.   Psychiatric:         Mood and Affect: Mood normal.         Behavior: Behavior normal. Behavior is cooperative.         Thought Content: Thought content normal.         Judgment: Judgment normal.         Assessment / Plan      Assessment/Plan:   Diagnoses and all orders for this visit:    1. Dizziness (Primary)  -     CT Head Without Contrast; Future    2. Word finding difficulty  -     CT Head Without Contrast; Future    3. Migraine without aura and without status migrainosus, not intractable  -     CT Head Without Contrast; Future    4. Attention or concentration deficit  -     CT Head Without Contrast; Future    5. Elevated liver enzymes  -     Comprehensive Metabolic Panel    6. Vitamin D deficiency  -     Vitamin D 25 Hydroxy; Future    7. Chronic fatigue  -     CBC Auto Differential; Future  -     HIV-1 / O / 2 Ag / Antibody 4th Generation; Future  -     Vitamin D 25 Hydroxy; " Future  -     TSH; Future  -     Vitamin B12; Future    Laboratory studies and diagnostic imaging per orders, further recommendation after results evaluation.    Follow Up:   PRN and at next scheduled appointment(s) with PCP.    Discussed the nature of the medical condition(s) risks, complications, implications, management, safe and proper use of medications. Encouraged medication compliance, and keeping scheduled follow up appointments with me and any other providers.      RTC if symptoms fail to improve, to ER if symptoms worsen.      JAY León  [facility] [place]

## 2022-04-06 VITALS
TEMPERATURE: 98 F | HEART RATE: 74 BPM | WEIGHT: 215 LBS | HEIGHT: 67 IN | OXYGEN SATURATION: 98 % | SYSTOLIC BLOOD PRESSURE: 124 MMHG | DIASTOLIC BLOOD PRESSURE: 80 MMHG | BODY MASS INDEX: 33.74 KG/M2 | RESPIRATION RATE: 20 BRPM

## 2022-04-06 LAB
25(OH)D3 SERPL-MCNC: 21.8 NG/ML (ref 30–100)
HIV1+2 AB SER QL: NORMAL
TSH SERPL DL<=0.05 MIU/L-ACNC: 2.98 UIU/ML (ref 0.27–4.2)
VIT B12 BLD-MCNC: 659 PG/ML (ref 211–946)

## 2022-04-12 DIAGNOSIS — E55.9 VITAMIN D DEFICIENCY: ICD-10-CM

## 2022-04-13 RX ORDER — ERGOCALCIFEROL 1.25 MG/1
CAPSULE ORAL
Qty: 12 CAPSULE | Refills: 0 | OUTPATIENT
Start: 2022-04-13

## 2022-04-17 ENCOUNTER — HOSPITAL ENCOUNTER (EMERGENCY)
Facility: HOSPITAL | Age: 32
Discharge: HOME | End: 2022-04-18
Attending: EMERGENCY MEDICINE | Admitting: EMERGENCY MEDICINE

## 2022-04-17 ENCOUNTER — APPOINTMENT (OUTPATIENT)
Dept: GENERAL RADIOLOGY | Facility: HOSPITAL | Age: 32
End: 2022-04-17

## 2022-04-17 DIAGNOSIS — R07.9 NONSPECIFIC CHEST PAIN: Primary | ICD-10-CM

## 2022-04-17 DIAGNOSIS — T50.B95A ADVERSE EFFECT OF COVID-19 VACCINE: ICD-10-CM

## 2022-04-17 LAB
ALBUMIN SERPL-MCNC: 5.2 G/DL (ref 3.5–5.2)
ALBUMIN/GLOB SERPL: 1.7 G/DL
ALP SERPL-CCNC: 84 U/L (ref 39–117)
ALT SERPL W P-5'-P-CCNC: 57 U/L (ref 1–41)
ANION GAP SERPL CALCULATED.3IONS-SCNC: 11 MMOL/L (ref 5–15)
AST SERPL-CCNC: 36 U/L (ref 1–40)
BASOPHILS # BLD AUTO: 0.04 10*3/MM3 (ref 0–0.2)
BASOPHILS NFR BLD AUTO: 0.7 % (ref 0–1.5)
BILIRUB SERPL-MCNC: 1.4 MG/DL (ref 0–1.2)
BUN SERPL-MCNC: 13 MG/DL (ref 6–20)
BUN/CREAT SERPL: 13.3 (ref 7–25)
CALCIUM SPEC-SCNC: 9.9 MG/DL (ref 8.6–10.5)
CHLORIDE SERPL-SCNC: 99 MMOL/L (ref 98–107)
CO2 SERPL-SCNC: 29 MMOL/L (ref 22–29)
CREAT SERPL-MCNC: 0.98 MG/DL (ref 0.76–1.27)
DEPRECATED RDW RBC AUTO: 36.7 FL (ref 37–54)
EGFRCR SERPLBLD CKD-EPI 2021: 104.4 ML/MIN/1.73
EOSINOPHIL # BLD AUTO: 0.08 10*3/MM3 (ref 0–0.4)
EOSINOPHIL NFR BLD AUTO: 1.5 % (ref 0.3–6.2)
ERYTHROCYTE [DISTWIDTH] IN BLOOD BY AUTOMATED COUNT: 12 % (ref 12.3–15.4)
GLOBULIN UR ELPH-MCNC: 3.1 GM/DL
GLUCOSE SERPL-MCNC: 109 MG/DL (ref 65–99)
HCT VFR BLD AUTO: 48.7 % (ref 37.5–51)
HGB BLD-MCNC: 16.6 G/DL (ref 13–17.7)
HOLD SPECIMEN: NORMAL
HOLD SPECIMEN: NORMAL
IMM GRANULOCYTES # BLD AUTO: 0.01 10*3/MM3 (ref 0–0.05)
IMM GRANULOCYTES NFR BLD AUTO: 0.2 % (ref 0–0.5)
LYMPHOCYTES # BLD AUTO: 1.92 10*3/MM3 (ref 0.7–3.1)
LYMPHOCYTES NFR BLD AUTO: 35.4 % (ref 19.6–45.3)
MCH RBC QN AUTO: 28.9 PG (ref 26.6–33)
MCHC RBC AUTO-ENTMCNC: 34.1 G/DL (ref 31.5–35.7)
MCV RBC AUTO: 84.7 FL (ref 79–97)
MONOCYTES # BLD AUTO: 0.36 10*3/MM3 (ref 0.1–0.9)
MONOCYTES NFR BLD AUTO: 6.6 % (ref 5–12)
NEUTROPHILS NFR BLD AUTO: 3.01 10*3/MM3 (ref 1.7–7)
NEUTROPHILS NFR BLD AUTO: 55.6 % (ref 42.7–76)
NRBC BLD AUTO-RTO: 0 /100 WBC (ref 0–0.2)
PLATELET # BLD AUTO: 263 10*3/MM3 (ref 140–450)
PMV BLD AUTO: 9.9 FL (ref 6–12)
POTASSIUM SERPL-SCNC: 3.7 MMOL/L (ref 3.5–5.2)
PROT SERPL-MCNC: 8.3 G/DL (ref 6–8.5)
QT INTERVAL: 332 MS
QTC INTERVAL: 421 MS
RBC # BLD AUTO: 5.75 10*6/MM3 (ref 4.14–5.8)
SODIUM SERPL-SCNC: 139 MMOL/L (ref 136–145)
TROPONIN T SERPL-MCNC: <0.01 NG/ML (ref 0–0.03)
WBC NRBC COR # BLD: 5.42 10*3/MM3 (ref 3.4–10.8)
WHOLE BLOOD HOLD SPECIMEN: NORMAL
WHOLE BLOOD HOLD SPECIMEN: NORMAL

## 2022-04-17 PROCEDURE — 80053 COMPREHEN METABOLIC PANEL: CPT

## 2022-04-17 PROCEDURE — 85025 COMPLETE CBC W/AUTO DIFF WBC: CPT

## 2022-04-17 PROCEDURE — 84484 ASSAY OF TROPONIN QUANT: CPT

## 2022-04-17 PROCEDURE — 36415 COLL VENOUS BLD VENIPUNCTURE: CPT

## 2022-04-17 PROCEDURE — 85379 FIBRIN DEGRADATION QUANT: CPT | Performed by: PHYSICIAN ASSISTANT

## 2022-04-17 PROCEDURE — 93005 ELECTROCARDIOGRAM TRACING: CPT

## 2022-04-17 PROCEDURE — 71045 X-RAY EXAM CHEST 1 VIEW: CPT

## 2022-04-17 PROCEDURE — 99283 EMERGENCY DEPT VISIT LOW MDM: CPT

## 2022-04-17 RX ORDER — SODIUM CHLORIDE 0.9 % (FLUSH) 0.9 %
10 SYRINGE (ML) INJECTION AS NEEDED
Status: DISCONTINUED | OUTPATIENT
Start: 2022-04-17 | End: 2022-04-18 | Stop reason: HOSPADM

## 2022-04-18 ENCOUNTER — HOSPITAL ENCOUNTER (OUTPATIENT)
Dept: CT IMAGING | Facility: HOSPITAL | Age: 32
Discharge: HOME | End: 2022-04-18

## 2022-04-18 ENCOUNTER — LAB (OUTPATIENT)
Dept: LAB | Facility: HOSPITAL | Age: 32
End: 2022-04-18

## 2022-04-18 ENCOUNTER — OFFICE VISIT (OUTPATIENT)
Dept: NEUROLOGY | Facility: CLINIC | Age: 32
End: 2022-04-18

## 2022-04-18 VITALS
BODY MASS INDEX: 32.65 KG/M2 | HEART RATE: 78 BPM | DIASTOLIC BLOOD PRESSURE: 75 MMHG | WEIGHT: 208 LBS | HEIGHT: 67 IN | SYSTOLIC BLOOD PRESSURE: 115 MMHG | TEMPERATURE: 98.2 F | OXYGEN SATURATION: 97 % | RESPIRATION RATE: 16 BRPM

## 2022-04-18 VITALS
HEART RATE: 82 BPM | SYSTOLIC BLOOD PRESSURE: 111 MMHG | HEIGHT: 67 IN | OXYGEN SATURATION: 98 % | BODY MASS INDEX: 32.83 KG/M2 | DIASTOLIC BLOOD PRESSURE: 73 MMHG | WEIGHT: 209.2 LBS | TEMPERATURE: 97.5 F

## 2022-04-18 DIAGNOSIS — G44.89 OTHER HEADACHE SYNDROME: ICD-10-CM

## 2022-04-18 DIAGNOSIS — R20.2 NUMBNESS AND TINGLING IN LEFT ARM: Primary | ICD-10-CM

## 2022-04-18 DIAGNOSIS — R20.0 NUMBNESS AND TINGLING IN LEFT ARM: Primary | ICD-10-CM

## 2022-04-18 DIAGNOSIS — R47.89 WORD FINDING DIFFICULTY: ICD-10-CM

## 2022-04-18 DIAGNOSIS — R20.2 NUMBNESS AND TINGLING IN LEFT ARM: ICD-10-CM

## 2022-04-18 DIAGNOSIS — G43.009 MIGRAINE WITHOUT AURA AND WITHOUT STATUS MIGRAINOSUS, NOT INTRACTABLE: ICD-10-CM

## 2022-04-18 DIAGNOSIS — R20.0 NUMBNESS AND TINGLING IN LEFT ARM: ICD-10-CM

## 2022-04-18 DIAGNOSIS — R42 DIZZINESS: ICD-10-CM

## 2022-04-18 DIAGNOSIS — R41.840 ATTENTION OR CONCENTRATION DEFICIT: ICD-10-CM

## 2022-04-18 LAB
D DIMER PPP FEU-MCNC: <0.27 MCGFEU/ML (ref 0.01–0.5)
HOLD SPECIMEN: NORMAL
TROPONIN T SERPL-MCNC: <0.01 NG/ML (ref 0–0.03)

## 2022-04-18 PROCEDURE — 84484 ASSAY OF TROPONIN QUANT: CPT

## 2022-04-18 PROCEDURE — 99214 OFFICE O/P EST MOD 30 MIN: CPT | Performed by: NURSE PRACTITIONER

## 2022-04-18 PROCEDURE — 80061 LIPID PANEL: CPT | Performed by: NURSE PRACTITIONER

## 2022-04-18 PROCEDURE — 70450 CT HEAD/BRAIN W/O DYE: CPT

## 2022-04-18 NOTE — PROGRESS NOTES
Headache New Office Visit      Encounter Date: 2022   Patient Name: Meliton Lagos  : 1990   MRN: [id number]   PCP: Sanna THOMPSON  Chief Complaint:    Chief Complaint   Patient presents with   • Headache       History of Present Illness: Meliton Lagos is a 31 y.o. male who is here today for evaluation of headaches, dizziness, fatigue, LUE pain    LUE Pain/Swelling  Had Moderna vaccine in right UE in 2021. 2 days later complains of left sided chest and arm pain burning with swelling of breast tissue. States LUE turns dark in color intermittently. Has weakness in LUE w limited ROM due to pain. Does complain of numbness w pins and needles sensation.  Eating causes pressure in left chest but does not feel this is GERD. Taking PPI w no relief.  Denies neck and arm injury. Occasionally has pain at top of neck. Tender to touch. Scalp feel sore.  Has had multiple ED visits at [facility].     Headache  States he is now HA free. Last headache 4 weeks ago.  2 days after vaccine developed headache that was frontal and occicpital and radiated holocranial. Throbbing in nature with moderate to severe intensity. Headache pain worse with sexual intercourse. Duration 10 minutes. No nausea, vomiting, photophobia, phonophobia, or visual changes.  MRI Brain With & Without Contrast (2018 19:38)-neg    Diagnosed w chronic migraines.    Abortives:NSAIDS  Preventatives:    Diagnosed with abscessed tooth and has appt with Dentist for extraction next week. Also needs wisdom teeth removed.    CT Head Without Contrast (2022 11:41)-neg    Dizziness  Started Feb. Last month it was daily.  Now will occur 3 days a week and each episode is less than 10 minutes. Changing positions helps.  Can't focus on conversation, word finding difficulty. No vertigo. Has nausea. Well hydrated and eating on a regular basis.  Dizziness is independent of chest pain.    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  Rheum doubts diagnosis. No treatment.  RHEUMATOLOGY - SCAN by [facility] (2019 00:00)  Exposure to HIV-neg . Gilbert's disease.  Born in [place]  Subjective      Past Medical History:   Past Medical History:   Diagnosis Date   • GERD (gastroesophageal reflux disease)        Past Surgical History:   Past Surgical History:   Procedure Laterality Date   • ANAL FISTULA REPAIR     • CHOLECYSTECTOMY     • COLONOSCOPY      [facility]   • ENDOSCOPY  2021    Dr. Mobley empiric dilation performed bx suggestive of esophagitis   • ENDOSCOPY  2020    EGD Dr. Mobley, empiric dilation erosive esophagitis       Family History:   Family History   Problem Relation Age of Onset   • No Known Problems Mother    • No Known Problems Father    • Heart attack Maternal Grandfather    • Heart disease Maternal Grandfather    • No Known Problems Paternal Grandmother    • No Known Problems Paternal Grandfather    • Colon cancer Neg Hx    • Colon polyps Neg Hx        Social History:   Social History     Socioeconomic History   • Marital status:    Tobacco Use   • Smoking status: Former Smoker     Packs/day: 1.00     Years: 5.00     Pack years: 5.00     Types: Cigarettes     Start date: 2012     Quit date: 2016     Years since quittin.3   • Smokeless tobacco: Never Used   Vaping Use   • Vaping Use: Never used   Substance and Sexual Activity   • Alcohol use: Yes     Comment: occas   • Drug use: No   • Sexual activity: Yes     Partners: Female     Birth control/protection: None       Medications:     Current Outpatient Medications:   •  pantoprazole (PROTONIX) 40 MG EC tablet, Take 1 tablet by mouth Daily., Disp: 30 tablet, Rfl: 5  •  vitamin D (ERGOCALCIFEROL) 1.25 MG (49931 UT) capsule capsule, TAKE ONE CAPSULE BY MOUTH ONCE WEEKLY, Disp: 12 capsule, Rfl: 0    Allergies:   No Known Allergies    PHQ-9 Total Score:     STEADI Fall Risk Assessment has not been  completed.    Objective     Physical Exam:   Physical Exam  Eyes:      Pupils: Pupils are equal, round, and reactive to light.   Neurological:      Mental Status: He is oriented to person, place, and time.      Coordination: Romberg Test normal.      Gait: Gait is intact.      Deep Tendon Reflexes:      Reflex Scores:       Tricep reflexes are 2+ on the right side and 2+ on the left side.       Bicep reflexes are 2+ on the right side and 2+ on the left side.       Brachioradialis reflexes are 2+ on the right side and 2+ on the left side.       Patellar reflexes are 2+ on the right side and 2+ on the left side.       Achilles reflexes are 2+ on the right side and 2+ on the left side.  Psychiatric:         Speech: Speech normal.         Neurologic Exam     Mental Status   Oriented to person, place, and time.   Follows 3 step commands.   Attention: normal. Concentration: normal.   Speech: speech is normal   Level of consciousness: alert  Knowledge: consistent with education.   Normal comprehension.   Appears anxious     Cranial Nerves     CN III, IV, VI   Pupils are equal, round, and reactive to light.  Right pupil: Accommodation: intact.   Left pupil: Accommodation: intact.   CN III: no CN III palsy  CN VI: no CN VI palsy  Nystagmus: none   Diplopia: none  Upgaze: normal  Downgaze: normal  Conjugate gaze: present    CN VII   Facial expression full, symmetric.     CN VIII   Hearing: intact    CN XII   CN XII normal.     Motor Exam   Muscle bulk: normal  Overall muscle tone: normal    Strength   Left deltoid: 3/5  Right biceps: 5/5  Left biceps: 3/5  Right triceps: 5/5  Left triceps: 3/5  Left wrist flexion: 3/5  Left wrist extension: 3/5  Right interossei: 5/5  Left interossei: 3/5  Right quadriceps: 5/5  Left quadriceps: 5/5  Right anterior tibial: 5/5  Left anterior tibial: 5/5  Right posterior tibial: 5/5  Left posterior tibial: 5/5He is guarding his LUE. Decreased ROM. Weak  on left     Sensory Exam   Right arm  "light touch: normal  Left arm light touch: decreased from wrist  Right leg light touch: normal  Left leg light touch: normal    Gait, Coordination, and Reflexes     Gait  Gait: normal    Coordination   Romberg: negative    Tremor   Resting tremor: absent  Action tremor: absent    Reflexes   Right brachioradialis: 2+  Left brachioradialis: 2+  Right biceps: 2+  Left biceps: 2+  Right triceps: 2+  Left triceps: 2+  Right patellar: 2+  Left patellar: 2+  Right achilles: 2+  Left achilles: 2+  Right : 2+  Left : 2+       Vital Signs:   Vitals:    04/18/22 1516   BP: 111/73   Pulse: 82   Temp: 97.5 °F (36.4 °C)   SpO2: 98%   Weight: 94.9 kg (209 lb 3.2 oz)   Height: 170.2 cm (67.01\")     Body mass index is 32.76 kg/m².     Results:   Imaging:   CT Head Without Contrast    Result Date: 4/18/2022   No evidence of acute intracranial process.    This report was finalized on 4/18/2022 11:54 AM by Josue Carl.      XR Chest 1 View    Result Date: 4/18/2022  No acute cardiopulmonary process. Electronically signed by:  Gonzalez Muhammad D.O.  4/17/2022 10:27 PM Mountain Time         Assessment / Plan      Assessment/Plan:   Diagnoses and all orders for this visit:    1. Numbness and tingling in left arm (Primary)  Comments:  MRI brain and c-spine. Check labs. He may need referral to Ortho.  Orders:  -     Vitamin B12 & Folate; Future  -     BHAVIN by IFA, Reflex 9-biomarkers profile; Future  -     NMO IgG Autoantibodies; Future  -     Anti-Myelin Oligodendrocyte Glycoprotein (MOG), Serum; Future  -     C-reactive Protein; Future  -     Rheumatoid Factor; Future  -     MRI Brain With & Without Contrast; Future  -     MRI Cervical Spine With & Without Contrast; Future    2. Dizziness  Comments:  Cont follow up w Cardiology.  Orders:  -     MRI Brain With & Without Contrast; Future    3. Other headache syndrome  Comments:  This does not seem to be his main concern today, however all symptoms could be migranous.  Orders:  -     " Vitamin B12 & Folate; Future  -     BHAVIN by IFA, Reflex 9-biomarkers profile; Future  -     NMO IgG Autoantibodies; Future  -     Anti-Myelin Oligodendrocyte Glycoprotein (MOG), Serum; Future  -     C-reactive Protein; Future  -     Rheumatoid Factor; Future  -     MRI Brain With & Without Contrast; Future     Reviewing his chart he has had similar complaints back to 2020. It is likely he will require a comprehensive workup with other speciality clinics to confirm diagnosis. Follow up with [facility] should be scheduled.    Patient Education:   I have discussed with the patient today the causes and overview of headaches. We discussed the different types of headaches to include tension-type headaches, Migraine headaches and Cluster headaches. We also discussed when headaches could or would be of a more serious condition such as brain infection, inflammation or bleeding within or around the brain. When to seek immediate medical attention or call 911.     Reviewed medications, potential side effects and signs and symptoms to report. Discussed risk versus benefits of treatment plan with patient and/or family-including medications, labs and radiology that may be ordered. Addressed questions and concerns during visit. Patient and/or family verbalized understanding and agree with plan. Instructed to call the office with any questions and report to ER with any life-threatening symptoms.     Follow Up:   Return in about 6 weeks (around 5/30/2022) for Recheck.    During this visit the following were done:  Labs Reviewed [x]    Labs Ordered []    Radiology Reports Reviewed [x]    Radiology Ordered [x]    PCP Records Reviewed [x]    Referring Provider Records Reviewed []    ER Records Reviewed [x]    Hospital Records Reviewed []    History Obtained From Family []    Radiology Images Reviewed [x]    Other Reviewed [x]    Records Requested []      Armaan Baxter, JOHN, APRN

## 2022-04-19 ENCOUNTER — OFFICE VISIT (OUTPATIENT)
Dept: FAMILY MEDICINE CLINIC | Facility: CLINIC | Age: 32
End: 2022-04-19

## 2022-04-19 ENCOUNTER — OFFICE VISIT (OUTPATIENT)
Dept: CARDIOLOGY | Facility: HOSPITAL | Age: 32
End: 2022-04-19

## 2022-04-19 ENCOUNTER — HOSPITAL ENCOUNTER (OUTPATIENT)
Dept: CARDIOLOGY | Facility: HOSPITAL | Age: 32
Discharge: HOME | End: 2022-04-19

## 2022-04-19 VITALS
SYSTOLIC BLOOD PRESSURE: 119 MMHG | OXYGEN SATURATION: 99 % | HEIGHT: 67 IN | BODY MASS INDEX: 33.59 KG/M2 | RESPIRATION RATE: 18 BRPM | DIASTOLIC BLOOD PRESSURE: 74 MMHG | WEIGHT: 214 LBS | HEART RATE: 78 BPM | TEMPERATURE: 96.6 F

## 2022-04-19 VITALS
HEART RATE: 70 BPM | TEMPERATURE: 97 F | HEIGHT: 67 IN | OXYGEN SATURATION: 98 % | RESPIRATION RATE: 20 BRPM | WEIGHT: 215 LBS | BODY MASS INDEX: 33.74 KG/M2 | DIASTOLIC BLOOD PRESSURE: 82 MMHG | SYSTOLIC BLOOD PRESSURE: 130 MMHG

## 2022-04-19 DIAGNOSIS — R73.9 HYPERGLYCEMIA: ICD-10-CM

## 2022-04-19 DIAGNOSIS — R09.89 DIMINISHED PULSE IN UPPER EXTREMITY: Primary | ICD-10-CM

## 2022-04-19 DIAGNOSIS — E78.1 HYPERTRIGLYCERIDEMIA: ICD-10-CM

## 2022-04-19 DIAGNOSIS — R23.8 DUSKY DISCOLORATION OF SKIN: ICD-10-CM

## 2022-04-19 DIAGNOSIS — R07.9 CHEST PAIN, UNSPECIFIED TYPE: ICD-10-CM

## 2022-04-19 DIAGNOSIS — R07.9 CHEST PAIN, UNSPECIFIED TYPE: Primary | ICD-10-CM

## 2022-04-19 DIAGNOSIS — R42 DIZZINESS: ICD-10-CM

## 2022-04-19 DIAGNOSIS — R63.1 POLYDIPSIA: ICD-10-CM

## 2022-04-19 PROBLEM — B37.2 YEAST DERMATITIS: Status: RESOLVED | Noted: 2021-08-30 | Resolved: 2022-04-19

## 2022-04-19 PROBLEM — R21 RASH AND OTHER NONSPECIFIC SKIN ERUPTION: Status: RESOLVED | Noted: 2021-08-30 | Resolved: 2022-04-19

## 2022-04-19 PROBLEM — K22.10 EROSIVE ESOPHAGITIS: Status: ACTIVE | Noted: 2022-04-19

## 2022-04-19 PROBLEM — R39.89 DARK YELLOW-COLORED URINE: Status: RESOLVED | Noted: 2021-08-30 | Resolved: 2022-04-19

## 2022-04-19 PROBLEM — K22.10 EROSIVE ESOPHAGITIS: Status: RESOLVED | Noted: 2022-04-19 | Resolved: 2022-04-19

## 2022-04-19 LAB
CHOLEST SERPL-MCNC: 213 MG/DL (ref 0–200)
EXPIRATION DATE: NORMAL
HBA1C MFR BLD: 5.2 %
HDLC SERPL-MCNC: 58 MG/DL (ref 40–60)
LDLC SERPL CALC-MCNC: 134 MG/DL (ref 0–100)
LDLC/HDLC SERPL: 2.26 {RATIO}
Lab: NORMAL
QT INTERVAL: 378 MS
QTC INTERVAL: 427 MS
TRIGL SERPL-MCNC: 121 MG/DL (ref 0–150)
VLDLC SERPL-MCNC: 21 MG/DL (ref 5–40)

## 2022-04-19 PROCEDURE — 93010 ELECTROCARDIOGRAM REPORT: CPT | Performed by: INTERNAL MEDICINE

## 2022-04-19 PROCEDURE — 93005 ELECTROCARDIOGRAM TRACING: CPT | Performed by: NURSE PRACTITIONER

## 2022-04-19 PROCEDURE — 99213 OFFICE O/P EST LOW 20 MIN: CPT | Performed by: NURSE PRACTITIONER

## 2022-04-19 PROCEDURE — 83036 HEMOGLOBIN GLYCOSYLATED A1C: CPT | Performed by: NURSE PRACTITIONER

## 2022-04-19 PROCEDURE — 99214 OFFICE O/P EST MOD 30 MIN: CPT | Performed by: NURSE PRACTITIONER

## 2022-04-19 PROCEDURE — 3044F HG A1C LEVEL LT 7.0%: CPT | Performed by: NURSE PRACTITIONER

## 2022-04-19 RX ORDER — IBUPROFEN 400 MG/1
400 TABLET ORAL EVERY 8 HOURS
Qty: 15 TABLET | Refills: 0 | Status: SHIPPED | OUTPATIENT
Start: 2022-04-19 | End: 2022-04-19

## 2022-04-19 NOTE — PROGRESS NOTES
Follow Up Office Note     Patient Name: Meliton Lagos  : 1990   MRN: [id number]     Chief Complaint:    Chief Complaint   Patient presents with   • Chest Pain   • Hyperglycemia       History of Present Illness: Meliton Lagos is a 31 y.o. male who presents today pain in left arm/breast that's worsened over the last 10 days. Patient recently seen at [facility] ER for chest pain 22. He also reports that he was seen at [place] [facility] 22 for same. Cardiac studies were within normal limits. Patient evaluated by [facility] Cardiology yesterday. Patient also recently evaluated by [facility] Neurology for paresthesia left arm. Patient is convinced that his symptoms are the results of adverse reaction to Moderna Covid-19 vaccine. He states that his chest and arm pain began shortly after receiving the vaccine.    He is also requesting testing for diabetes. He states that he has dry mouth and frequent thirst. Patient also had elevated glucose level on recent laboratory studies.     Office Visit with Chico Arias APRN (2022)    Office Visit with Armaan Baxter DNP, APRN (2022)    ED with Pierce Harrison DO (2022)    Comprehensive Metabolic Panel (2022 22:41)    HPI   Patient reports pain in left arm and chest pain that's worsened over the last 10 days. Pain is intermittent and worse with exertional activities like walking long distances or after having sex. He experiences shortness of breath with the chest pain. Chest pain lasts about 2-3 hours when it flares up. Today he is not having any pain. Patient reports swelling in left chest area and notices skin color changes to a darker color in left hand and arm when the pain flares up. Patient saw cardiology today for a hospital follow-up for chest pain and EKG was normal.     ROS: positive for chest pain, shortness of breath, swelling of left chest, discoloration of skin in left arm/hand.    Subjective      Review  "of Systems:   Review of Systems   Constitutional: Negative for activity change and appetite change.   Respiratory: Positive for chest tightness and shortness of breath. Negative for cough, wheezing and stridor.    Cardiovascular: Positive for chest pain. Negative for palpitations and leg swelling.        Chest wall pain (left)   Gastrointestinal: Negative.    Endocrine: Negative for polydipsia.   Musculoskeletal: Positive for myalgias (musculoskeletal pain chest wall and back).   Skin: Positive for color change (left arm/hand). Negative for rash and wound.        Past Medical History:   Past Medical History:   Diagnosis Date   • GERD (gastroesophageal reflux disease)          Medications:     Current Outpatient Medications:   •  pantoprazole (PROTONIX) 40 MG EC tablet, Take 1 tablet by mouth Daily., Disp: 30 tablet, Rfl: 5  •  vitamin D (ERGOCALCIFEROL) 1.25 MG (19018 UT) capsule capsule, TAKE ONE CAPSULE BY MOUTH ONCE WEEKLY, Disp: 12 capsule, Rfl: 0    Allergies:   Allergies   Allergen Reactions   • Amoxicillin Other (See Comments)     Chest pain         Objective     Physical Exam:  Vital Signs:   Vitals:    04/19/22 1342   BP: 130/82   Pulse: 70   Resp: 20   Temp: 97 °F (36.1 °C)   SpO2: 98%   Weight: 97.5 kg (215 lb)   Height: 170.2 cm (67\")     Body mass index is 33.67 kg/m².     Physical Exam  Vitals and nursing note reviewed.   Constitutional:       General: He is not in acute distress.     Appearance: Normal appearance. He is well-developed. He is not ill-appearing, toxic-appearing or diaphoretic.   HENT:      Head: Normocephalic and atraumatic.   Cardiovascular:      Rate and Rhythm: Normal rate and regular rhythm.      Pulses:           Radial pulses are 2+ on the right side and 1+ on the left side.      Heart sounds: Normal heart sounds, S1 normal and S2 normal. No murmur heard.  Pulmonary:      Effort: Pulmonary effort is normal. No respiratory distress.      Breath sounds: Normal breath sounds. No " stridor. No wheezing.   Chest:      Chest wall: Swelling and tenderness present. No mass, deformity or crepitus.   Breasts:      Right: Normal. No axillary adenopathy or supraclavicular adenopathy.      Left: Normal. No axillary adenopathy or supraclavicular adenopathy.       Musculoskeletal:      Right shoulder: Normal.      Left shoulder: Swelling and tenderness present. No deformity, effusion or crepitus. Decreased range of motion. Decreased strength. Abnormal pulse.      Right lower leg: No edema.      Left lower leg: No edema.   Lymphadenopathy:      Upper Body:      Right upper body: No supraclavicular, axillary or pectoral adenopathy.      Left upper body: No supraclavicular, axillary or pectoral adenopathy.   Skin:     General: Skin is warm and dry.      Comments: Skin left arm dusky in coloration.   Neurological:      General: No focal deficit present.      Mental Status: He is alert and oriented to person, place, and time.   Psychiatric:         Mood and Affect: Mood is anxious.         Behavior: Behavior normal. Behavior is cooperative.         Thought Content: Thought content normal.         Judgment: Judgment normal.         Assessment / Plan      Assessment/Plan:   Diagnoses and all orders for this visit:    1. Diminished pulse in upper extremity (Primary)  -     Duplex Venous Upper Extremity - Left CAR; Future    2. Dusky discoloration of skin  -     Duplex Venous Upper Extremity - Left CAR; Future    3. Hypertriglyceridemia  -     Lipid Panel; Future  -     Lipid Panel    4. Hyperglycemia  -     POC Glycosylated Hemoglobin (Hb A1C)    5. Polydipsia  -     POC Glycosylated Hemoglobin (Hb A1C)           Lab 04/19/22  1441   HEMOGLOBIN A1C 5.2     Suspect possible thoracic outlet syndrome. Further recommendation after doppler evaluation.   Continue current medications.    Discussed the nature of the medical condition(s) risks, complications, implications, management, safe and proper use of medications.  Encouraged medication compliance, and keeping scheduled follow up appointments with me and any other providers.      I spent 30 minutes caring for Ali on this date of service. This time includes time spent by me in the following activities:preparing for the visit, reviewing tests, performing a medically appropriate examination and/or evaluation , counseling and educating the patient/family/caregiver, ordering medications, tests, or procedures and documenting information in the medical record.    I attest that I have reviewed the student note and that the components of the history of the present illness, the physical exam, and the assessment and plan documented were performed by me or were performed in my presence by the student and verified by me.    RTC if symptoms fail to improve, to ER if symptoms worsen.      JAY León  [facility] [place]

## 2022-04-19 NOTE — PROGRESS NOTES
Chief Complaint  Chest Pain and Follow-up    Subjective      History of Present Illness {CC  Problem List  Visit  Diagnosis   Encounters  Notes  Medications  Labs  Result Review Imaging  Media :23}     Meliton Lagos, 31 y.o. male with chest tightness, GERD, esophageal stricture s/p dilation, former smoker presents to [facility] for Chest Pain and Follow-up.    Patient was recently evaluated at [facility] emergency department for complaints of chest pain.  Emergency department work-up revealed normal troponin/d-dimer, CXR with no acute cardiopulmonary findings, and ECGs with no evidence of acute coronary syndrome. Patient was instructed to follow-up at [facility] heart and valve clinic for further evaluation. Patient completed a follow-up with PCP and endorsed continued chest pain and dizziness; brain/c-spine MRI ordered along with CRP/rheumatoid factor/BHAVIN and referred to  rheumatology. Patient also follows with [facility]. Patient has follow-up with Dr. Rodriguez scheduled on 4/26/22.     Patient presents today noting increased chest tightness for the past three months; greatly worsened over the past 10 days when his left lateral chest became swollen. Pain is under his left breast into his axilla. He notes that symptoms are somewhat similar to when he initially received his COVID-19 vaccine. He does report that his previous COVID-19 vaccine was in his right arm. He states that he has increased swelling around his upper left chest with associated burning; this is generally when his pain is the worst. Pain is often worsened when he consumes food or drinks. Pain is occassionally worsened with palpation and when raising his arm above his head; range of motion is also decreased. Mild dyspnea when pain is most severe, but otherwise denies. He does note dizziness approximately one month ago with associated headaches, but dizziness has now  "resolved.  Denies palpitation/racing heart and syncope. He notes that he drinks 3-16oz bottle of water daily.       Objective     Vital Signs:   Vitals:    04/19/22 0853   BP: 119/74   BP Location: Left arm   Patient Position: Sitting   Cuff Size: Adult   Pulse: 78   Resp: 18   Temp: 96.6 °F (35.9 °C)   TempSrc: Temporal   SpO2: 99%   Weight: 97.1 kg (214 lb)   Height: 170.2 cm (67\")     Body mass index is 33.52 kg/m².  Physical Exam  Vitals and nursing note reviewed.   Constitutional:       Appearance: Normal appearance.   HENT:      Head: Normocephalic.   Eyes:      Extraocular Movements: Extraocular movements intact.   Neck:      Vascular: No carotid bruit.   Cardiovascular:      Rate and Rhythm: Normal rate and regular rhythm.      Pulses: Normal pulses.      Heart sounds: Normal heart sounds, S1 normal and S2 normal. No murmur heard.  Pulmonary:      Effort: Pulmonary effort is normal. No respiratory distress.      Breath sounds: Normal breath sounds.   Musculoskeletal:      Cervical back: Neck supple.      Right lower leg: No edema.      Left lower leg: No edema.      Comments: Left lateral chest tender to palpation and with movement.   Skin:     General: Skin is warm and dry.   Neurological:      General: No focal deficit present.      Mental Status: He is alert.   Psychiatric:         Mood and Affect: Mood normal.         Behavior: Behavior normal.         Thought Content: Thought content normal.          Data Reviewed:{ Labs  Result Review  Imaging  Med Tab  Media :23}     ECG 12 Lead (04/19/2022 08:57)    Adult Transthoracic Echo Complete W/ Cont if Necessary Per Protocol (09/30/2021 11:50)  Treadmill Stress Test (05/22/2020 15:11)  ED with Pierce Harrison DO (04/17/2022)      Troponin (04/18/2022 00:36)  D-dimer, Quantitative (04/17/2022 22:41)  Comprehensive Metabolic Panel (04/17/2022 22:41)  CBC & Differential (04/17/2022 22:41)  Troponin (04/17/2022 22:41)  XR Chest 1 View (04/18/2022 " 00:08)      Assessment/Plan   Assessment and Plan {CC Problem List  Visit Diagnosis  ROS  Review (Popup)  Health Maintenance  Quality  BestPractice  Medications  SmartSets  SnapShot Encounters  Media :23}     1. Chest pain, unspecified type  -Likely musculoskeletal in nature given pain worsens with palpation and with left arm palpation/sensation.  Recent emergency department work-up unremarkable for cardiac etiology.  -Discussed short course of for possible inflammatory pain; prefers not to take 800 mg every 8 hours, but willing to trial 400 mg every 8 hours for 5 day duration.  - ibuprofen (ADVIL,MOTRIN) 400 MG tablet; Take 1 tablet by mouth Every 8 (Eight) Hours for 5 days.  Dispense: 15 tablet; Refill: 0  -Lab work ordered yesterday by PCP pending includes CRP, rheumatoid factor, and exam  -Continue with rheumatology referral as ordered  -Continue follow-up with Dr. Rodriguez in approximately 1 week as scheduled    2. Dizziness  -New onset a few months ago that he states occurs with headache episodes.  Denies dizziness with position changes.   -States dizziness now resolved.  -Discussed maintaining adequate water intake to - 3 L water daily.    -Continue with MRI imaging as ordered by PCP      Follow Up {Instructions Charge Capture  Follow-up Communications :23}     Return if symptoms worsen or fail to improve.      Patient was given instructions and counseling regarding his condition or for health maintenance advice. Please see specific information pulled into the AVS if appropriate.  Patient was instructed to call the [facility] with any questions, concerns, or worsening symptoms.    Dictated Utilizing Dragon Dictation   Please note that portions of this note were completed with a voice recognition program.   Part of this note may be an electronic transcription/translation of spoken language to printed text using the Dragon Dictation System.

## 2022-04-19 NOTE — PATIENT INSTRUCTIONS
- Ibuprofen 400 mg p.o. every 8 hours consistently for 5 days     - Increase water intake to 2-3L daily    - Continue with Dr. Rodriguez follow-up and [facility] as directed

## 2022-04-20 LAB
ANA HOMOGEN TITR SER: NORMAL {TITER}
ANA SER QL IF: POSITIVE
AQP4 H2O CHANNEL IGG SERPL IA-ACNC: <1.5 U/ML (ref 0–3)
CENTROMERE B AB SER-ACNC: <0.2 AI (ref 0–0.9)
CHROMATIN AB SERPL-ACNC: <0.2 AI (ref 0–0.9)
CRP SERPL-MCNC: 1 MG/L (ref 0–10)
DSDNA AB SER-ACNC: 1 IU/ML (ref 0–9)
ENA JO1 AB SER-ACNC: <0.2 AI (ref 0–0.9)
ENA RNP AB SER-ACNC: <0.2 AI (ref 0–0.9)
ENA SCL70 AB SER-ACNC: <0.2 AI (ref 0–0.9)
ENA SM AB SER-ACNC: <0.2 AI (ref 0–0.9)
ENA SS-A AB SER-ACNC: <0.2 AI (ref 0–0.9)
ENA SS-B AB SER-ACNC: 0.3 AI (ref 0–0.9)
FOLATE SERPL-MCNC: 12.9 NG/ML
LABORATORY COMMENT REPORT: ABNORMAL
Lab: NORMAL
Lab: NORMAL
MOG AB SER QL CBA IFA: NEGATIVE
VIT B12 SERPL-MCNC: 540 PG/ML (ref 232–1245)

## 2022-04-20 NOTE — ED PROVIDER NOTES
[facility]    EMERGENCY DEPARTMENT ENCOUNTER      Pt Name: Meliton Lagos  MRN: [id number]  YOB: 1990  Date of evaluation: 4/17/2022  Provider: LAUREN Quiñonez    CHIEF COMPLAINT       Chief Complaint   Patient presents with   • Chest Pain         HISTORY OF PRESENT ILLNESS  (Location/Symptom, Timing/Onset, Context/Setting, Quality, Duration, Modifying Factors, Severity.)   Meliton Lagos is a 31 y.o. male who presents to the emergency department with complaints of pain in his left anterior chest. Patient shares that he received his COVID vaccination 10 months ago and since being vaccinated has had pain in his left chest and arm. He reports having been seen and evaluated at [facility] ER with a negative cardiac workup yesterday. He previously was seen and evaluated by his primary care who also did not find any cause for his symptoms. He reports that the pain is made worse with eating, drinking, having bowel movements and urinating. He reports when he does not eat he feels better and that after he urinates or has a bowel movement he experiences some relief from his symptoms. No additional associated symptoms on exam.     [facility] [place]   Nursing notes were reviewed.    REVIEW OF SYSTEMS    (2-9 systems for level 4, 10 or more for level 5)   Review of Systems   Constitutional: Negative for chills and fever.   Eyes: Negative for visual disturbance.   Respiratory: Positive for shortness of breath.    Cardiovascular: Positive for chest pain.   Gastrointestinal: Negative.    Genitourinary: Negative for dysuria.   Skin: Negative for color change and rash.   All other systems reviewed and are negative.       All systems reviewed and negative except for those discussed in HPI.   PAST MEDICAL HISTORY     Past Medical History:   Diagnosis Date   • GERD (gastroesophageal reflux disease)          SURGICAL HISTORY       Past Surgical History:   Procedure Laterality Date   • ANAL FISTULA REPAIR  2009   •  CHOLECYSTECTOMY     • COLONOSCOPY      [facility]   • ENDOSCOPY  2021    Dr. Mobley empiric dilation performed bx suggestive of esophagitis   • ENDOSCOPY  2020    EGD Dr. Mobley, empiric dilation erosive esophagitis         CURRENT MEDICATIONS     No current facility-administered medications for this encounter.    Current Outpatient Medications:   •  pantoprazole (PROTONIX) 40 MG EC tablet, Take 1 tablet by mouth Daily., Disp: 30 tablet, Rfl: 5  •  vitamin D (ERGOCALCIFEROL) 1.25 MG (05526 UT) capsule capsule, TAKE ONE CAPSULE BY MOUTH ONCE WEEKLY, Disp: 12 capsule, Rfl: 0    ALLERGIES     Amoxicillin    FAMILY HISTORY       Family History   Problem Relation Age of Onset   • No Known Problems Mother    • No Known Problems Father    • Heart attack Maternal Grandfather    • Heart disease Maternal Grandfather    • No Known Problems Paternal Grandmother    • No Known Problems Paternal Grandfather    • Colon cancer Neg Hx    • Colon polyps Neg Hx           SOCIAL HISTORY       Social History     Socioeconomic History   • Marital status:    Tobacco Use   • Smoking status: Former Smoker     Packs/day: 1.00     Years: 5.00     Pack years: 5.00     Types: Cigarettes     Start date: 2012     Quit date: 2016     Years since quittin.3   • Smokeless tobacco: Never Used   Vaping Use   • Vaping Use: Never used   Substance and Sexual Activity   • Alcohol use: Yes     Comment: occas   • Drug use: No   • Sexual activity: Yes     Partners: Female     Birth control/protection: None         PHYSICAL EXAM    (up to 7 for level 4, 8 or more for level 5)   Physical Exam  Vitals and nursing note reviewed.   Constitutional:       General: He is not in acute distress.     Appearance: Normal appearance. He is well-developed. He is not toxic-appearing.   HENT:      Head: Normocephalic and atraumatic.      Nose: Nose normal.      Mouth/Throat:      Mouth: Mucous membranes are moist.   Eyes:       Extraocular Movements: Extraocular movements intact.   Cardiovascular:      Rate and Rhythm: Normal rate and regular rhythm.      Pulses: Normal pulses.   Pulmonary:      Effort: Pulmonary effort is normal. No respiratory distress.      Breath sounds: Normal breath sounds.   Chest:      Chest wall: Tenderness present.   Abdominal:      General: There is no distension.      Palpations: Abdomen is soft.      Tenderness: There is no guarding or rebound.   Musculoskeletal:         General: No deformity. Normal range of motion.      Cervical back: Normal range of motion and neck supple.   Skin:     General: Skin is warm and dry.   Neurological:      General: No focal deficit present.      Mental Status: He is alert.   Psychiatric:         Mood and Affect: Mood is anxious.         Behavior: Behavior normal.         Thought Content: Thought content normal.         Judgment: Judgment normal.        DIAGNOSTIC RESULTS     EKG: All EKGs are interpreted by the Emergency Department Physician who either signs or Co-signs this chart in the absence of a cardiologist.    ECG 12 Lead   Final Result   Test Reason : CHEST PAIN   Blood Pressure :   */*   mmHG   Vent. Rate :  97 BPM     Atrial Rate :  97 BPM      P-R Int : 108 ms          QRS Dur :  84 ms       QT Int : 332 ms       P-R-T Axes :  54  38  21 degrees      QTc Int : 421 ms      Sinus rhythm with short AL   Indeterminate axis   Abnormal ECG   When compared with ECG of 16-SEP-2021 10:48,   AL interval has decreased   Inferior infarct is now present   ST now depressed in Lateral leads   Confirmed by SABRA DEVLIN MD ([id number]) on 4/17/2022 11:47:56 PM      Referred By:            Confirmed By: SABRA DEVLIN MD          RADIOLOGY:   Non-plain film images such as CT, Ultrasound and MRI are read by the radiologist. Plain radiographic images are visualized and preliminarily interpreted by the emergency physician with the below findings:      [x] Radiologist's Report  Reviewed:  XR Chest 1 View   Final Result      No acute cardiopulmonary process.      Electronically signed by:  Gonzalez Muhammad D.O.     4/17/2022 10:27 PM Mountain Time            ED BEDSIDE ULTRASOUND:   Performed by ED Physician - none    LABS:    I have reviewed and interpreted all of the currently available lab results from this visit (if applicable):  Results for orders placed or performed during the hospital encounter of 04/17/22   Troponin    Specimen: Blood   Result Value Ref Range    Troponin T <0.010 0.000 - 0.030 ng/mL   Troponin    Specimen: Blood   Result Value Ref Range    Troponin T <0.010 0.000 - 0.030 ng/mL   Comprehensive Metabolic Panel    Specimen: Blood   Result Value Ref Range    Glucose 109 (H) 65 - 99 mg/dL    BUN 13 6 - 20 mg/dL    Creatinine 0.98 0.76 - 1.27 mg/dL    Sodium 139 136 - 145 mmol/L    Potassium 3.7 3.5 - 5.2 mmol/L    Chloride 99 98 - 107 mmol/L    CO2 29.0 22.0 - 29.0 mmol/L    Calcium 9.9 8.6 - 10.5 mg/dL    Total Protein 8.3 6.0 - 8.5 g/dL    Albumin 5.20 3.50 - 5.20 g/dL    ALT (SGPT) 57 (H) 1 - 41 U/L    AST (SGOT) 36 1 - 40 U/L    Alkaline Phosphatase 84 39 - 117 U/L    Total Bilirubin 1.4 (H) 0.0 - 1.2 mg/dL    Globulin 3.1 gm/dL    A/G Ratio 1.7 g/dL    BUN/Creatinine Ratio 13.3 7.0 - 25.0    Anion Gap 11.0 5.0 - 15.0 mmol/L    eGFR 104.4 >60.0 mL/min/1.73   CBC Auto Differential    Specimen: Blood   Result Value Ref Range    WBC 5.42 3.40 - 10.80 10*3/mm3    RBC 5.75 4.14 - 5.80 10*6/mm3    Hemoglobin 16.6 13.0 - 17.7 g/dL    Hematocrit 48.7 37.5 - 51.0 %    MCV 84.7 79.0 - 97.0 fL    MCH 28.9 26.6 - 33.0 pg    MCHC 34.1 31.5 - 35.7 g/dL    RDW 12.0 (L) 12.3 - 15.4 %    RDW-SD 36.7 (L) 37.0 - 54.0 fl    MPV 9.9 6.0 - 12.0 fL    Platelets 263 140 - 450 10*3/mm3    Neutrophil % 55.6 42.7 - 76.0 %    Lymphocyte % 35.4 19.6 - 45.3 %    Monocyte % 6.6 5.0 - 12.0 %    Eosinophil % 1.5 0.3 - 6.2 %    Basophil % 0.7 0.0 - 1.5 %    Immature Grans % 0.2 0.0 - 0.5 %     "Neutrophils, Absolute 3.01 1.70 - 7.00 10*3/mm3    Lymphocytes, Absolute 1.92 0.70 - 3.10 10*3/mm3    Monocytes, Absolute 0.36 0.10 - 0.90 10*3/mm3    Eosinophils, Absolute 0.08 0.00 - 0.40 10*3/mm3    Basophils, Absolute 0.04 0.00 - 0.20 10*3/mm3    Immature Grans, Absolute 0.01 0.00 - 0.05 10*3/mm3    nRBC 0.0 0.0 - 0.2 /100 WBC   D-dimer, Quantitative    Specimen: Blood   Result Value Ref Range    D-Dimer, Quantitative <0.27 0.01 - 0.50 MCGFEU/mL   ECG 12 Lead   Result Value Ref Range    QT Interval 332 ms    QTC Interval 421 ms   Green Top (Gel)   Result Value Ref Range    Extra Tube Hold for add-ons.    Lavender Top   Result Value Ref Range    Extra Tube hold for add-on    Gold Top - SST   Result Value Ref Range    Extra Tube Hold for add-ons.    Gray Top   Result Value Ref Range    Extra Tube Hold for add-ons.    Light Blue Top   Result Value Ref Range    Extra Tube hold for add-on         All other labs were within normal range or not returned as of this dictation.      EMERGENCY DEPARTMENT COURSE and DIFFERENTIAL DIAGNOSIS/MDM:   Vitals:    Vitals:    04/17/22 2228 04/18/22 0000 04/18/22 0030 04/18/22 0130   BP: (!) 148/106 143/71 127/79 115/75   BP Location: Left arm      Patient Position: Sitting      Pulse: 90 79 76 78   Resp: 16      Temp: 98.2 °F (36.8 °C)      TempSrc: Oral      SpO2: 95% 94% 94% 97%   Weight: 94.3 kg (208 lb)      Height: 170.2 cm (67\")          ED Course as of 04/20/22 1313   Mon Apr 18, 2022   0129 This patient presents with chest pain, with symptoms suggestive of noncardiac chest pain. History without high risk features. Minimal CAD risk factors. Exam without evidence of volume overload. EKG without signs of active ischemia. Initial and two hour troponin negative. Chest imaging demonstrates no acte acute cardiopulmonary process. HEART score: 2. Presentation not consistent with acute PE (D-dimer negative), pneumothorax, thoracic arotic dissection, cardiac effusion or tamponade. "     At time of discharge disposition patient resting comfortable, no acute distress, afebrile, nontoxic in appearance, vital signs stable and able to maintain oxygen saturation of 97% on room air.  [JG]      ED Course User Index  [JG] Willy De La Torre PA       MDM  Number of Diagnoses or Management Options  Adverse effect of COVID-19 vaccine: established, worsening  Nonspecific chest pain: established, worsening     Amount and/or Complexity of Data Reviewed  Clinical lab tests: reviewed  Tests in the radiology section of CPT®: reviewed    Risk of Complications, Morbidity, and/or Mortality  Presenting problems: moderate  Diagnostic procedures: moderate  Management options: moderate    Patient Progress  Patient progress: stable       I had a discussion with the patient/family regarding diagnosis, diagnostic results, treatment plan, and medications.  The patient/family indicated understanding of these instructions.  I spent adequate time at the bedside preceding discharge necessary to personally discuss the aftercare instructions, giving patient education, providing explanations of the results of our evaluations/findings, and my decision making to assure that the patient/family understand the plan of care.  Time was allotted to answer questions at that time and throughout the ED course.  Emphasis was placed on timely follow-up after discharge.  I also discussed the potential for the development of an acute emergent condition requiring further evaluation, admission, or even surgical intervention. I discussed that we found nothing during the visit today indicating the need for further workup, admission, or the presence of an unstable medical condition.  I encouraged the patient to return to the emergency department immediately for ANY concerns, worsening, new complaints, or if symptoms persist and unable to seek follow-up in a timely fashion.  The patient/family expressed understanding and agreement with this plan.  The  patient will follow-up with primary care and chest pain clinic for reevaluation.       MEDICATIONS ADMINISTERED IN ED:  Medications - No data to display    PROCEDURES:  Procedures          CRITICAL CARE TIME    Total Critical Care time was 0 minutes, excluding separately reportable procedures.   There was a high probability of clinically significant/life threatening deterioration in the patient's condition which required my urgent intervention.      FINAL IMPRESSION      1. Nonspecific chest pain    2. Adverse effect of COVID-19 vaccine          DISPOSITION/PLAN     ED Disposition     ED Disposition   Discharge    Condition   Stable    Comment   --             PATIENT REFERRED TO:  Sanna Herr, APRN  [address]  [address]  [place] [id number]  [phone]    Schedule an appointment as soon as possible for a visit   Call for follow-up with primary care    [facility] CARDIOLOGY  [address]  [facility] [address] [phone]  Schedule an appointment as soon as possible for a visit   Call for follow-up appointment with chest pain clinic    [facility] Emergency Department  [address]  [facility] [address] [phone]  Go to   If symptoms worsen      DISCHARGE MEDICATIONS:     Medication List      CONTINUE taking these medications    pantoprazole 40 MG EC tablet  Commonly known as: PROTONIX  Take 1 tablet by mouth Daily.     vitamin D 1.25 MG (79027 UT) capsule capsule  Commonly known as: ERGOCALCIFEROL  TAKE ONE CAPSULE BY MOUTH ONCE WEEKLY                Comment: Please note this report has been produced using speech recognition software.      LAUREN Quiñonez Jason C, PA  04/20/22 4165

## 2022-04-21 ENCOUNTER — APPOINTMENT (OUTPATIENT)
Dept: GENERAL RADIOLOGY | Facility: HOSPITAL | Age: 32
End: 2022-04-21

## 2022-04-21 ENCOUNTER — APPOINTMENT (OUTPATIENT)
Dept: CARDIOLOGY | Facility: HOSPITAL | Age: 32
End: 2022-04-21

## 2022-04-21 ENCOUNTER — TELEPHONE (OUTPATIENT)
Dept: FAMILY MEDICINE CLINIC | Facility: CLINIC | Age: 32
End: 2022-04-21

## 2022-04-21 ENCOUNTER — APPOINTMENT (OUTPATIENT)
Dept: CT IMAGING | Facility: HOSPITAL | Age: 32
End: 2022-04-21

## 2022-04-21 ENCOUNTER — HOSPITAL ENCOUNTER (EMERGENCY)
Facility: HOSPITAL | Age: 32
Discharge: HOME | End: 2022-04-21
Attending: EMERGENCY MEDICINE | Admitting: EMERGENCY MEDICINE

## 2022-04-21 VITALS
OXYGEN SATURATION: 90 % | TEMPERATURE: 99 F | BODY MASS INDEX: 33.74 KG/M2 | DIASTOLIC BLOOD PRESSURE: 66 MMHG | HEIGHT: 67 IN | HEART RATE: 85 BPM | WEIGHT: 215 LBS | RESPIRATION RATE: 20 BRPM | SYSTOLIC BLOOD PRESSURE: 106 MMHG

## 2022-04-21 DIAGNOSIS — R76.8 ANA POSITIVE: ICD-10-CM

## 2022-04-21 DIAGNOSIS — M79.18 MUSCULOSKELETAL PAIN: ICD-10-CM

## 2022-04-21 DIAGNOSIS — R07.89 LEFT-SIDED CHEST WALL PAIN: Primary | ICD-10-CM

## 2022-04-21 LAB
ALBUMIN SERPL-MCNC: 5 G/DL (ref 3.5–5.2)
ALBUMIN/GLOB SERPL: 1.7 G/DL
ALP SERPL-CCNC: 81 U/L (ref 39–117)
ALT SERPL W P-5'-P-CCNC: 60 U/L (ref 1–41)
ANION GAP SERPL CALCULATED.3IONS-SCNC: 13 MMOL/L (ref 5–15)
AST SERPL-CCNC: 31 U/L (ref 1–40)
BASOPHILS # BLD AUTO: 0.03 10*3/MM3 (ref 0–0.2)
BASOPHILS NFR BLD AUTO: 0.5 % (ref 0–1.5)
BH CV ECHO MEAS - BSA(HAYCOCK): 2.2 M^2
BH CV ECHO MEAS - BSA: 2.1 M^2
BH CV ECHO MEAS - BZI_BMI: 33.7 KILOGRAMS/M^2
BH CV ECHO MEAS - BZI_METRIC_HEIGHT: 170.2 CM
BH CV ECHO MEAS - BZI_METRIC_WEIGHT: 97.5 KG
BH CV UPPER VENOUS LEFT AXILLARY AUGMENT: NORMAL
BH CV UPPER VENOUS LEFT AXILLARY COMPRESS: NORMAL
BH CV UPPER VENOUS LEFT AXILLARY PHASIC: NORMAL
BH CV UPPER VENOUS LEFT AXILLARY SPONT: NORMAL
BH CV UPPER VENOUS LEFT BASILIC FOREARM COMPRESS: NORMAL
BH CV UPPER VENOUS LEFT BASILIC UPPER COMPRESS: NORMAL
BH CV UPPER VENOUS LEFT BRACHIAL AUGMENT: NORMAL
BH CV UPPER VENOUS LEFT BRACHIAL COMPRESS: NORMAL
BH CV UPPER VENOUS LEFT BRACHIAL PHASIC: NORMAL
BH CV UPPER VENOUS LEFT BRACHIAL SPONT: NORMAL
BH CV UPPER VENOUS LEFT CEPHALIC FOREARM COMPRESS: NORMAL
BH CV UPPER VENOUS LEFT CEPHALIC UPPER COMPRESS: NORMAL
BH CV UPPER VENOUS LEFT INTERNAL JUGULAR AUGMENT: NORMAL
BH CV UPPER VENOUS LEFT INTERNAL JUGULAR COMPRESS: NORMAL
BH CV UPPER VENOUS LEFT INTERNAL JUGULAR PHASIC: NORMAL
BH CV UPPER VENOUS LEFT INTERNAL JUGULAR SPONT: NORMAL
BH CV UPPER VENOUS LEFT RADIAL COMPRESS: NORMAL
BH CV UPPER VENOUS LEFT SUBCLAVIAN AUGMENT: NORMAL
BH CV UPPER VENOUS LEFT SUBCLAVIAN COMPRESS: NORMAL
BH CV UPPER VENOUS LEFT SUBCLAVIAN PHASIC: NORMAL
BH CV UPPER VENOUS LEFT SUBCLAVIAN SPONT: NORMAL
BH CV UPPER VENOUS LEFT ULNAR COMPRESS: NORMAL
BH CV UPPER VENOUS RIGHT SUBCLAVIAN AUGMENT: NORMAL
BH CV UPPER VENOUS RIGHT SUBCLAVIAN COMPRESS: NORMAL
BH CV UPPER VENOUS RIGHT SUBCLAVIAN PHASIC: NORMAL
BH CV UPPER VENOUS RIGHT SUBCLAVIAN SPONT: NORMAL
BILIRUB SERPL-MCNC: 1.3 MG/DL (ref 0–1.2)
BUN SERPL-MCNC: 15 MG/DL (ref 6–20)
BUN/CREAT SERPL: 15.6 (ref 7–25)
CALCIUM SPEC-SCNC: 9.8 MG/DL (ref 8.6–10.5)
CHLORIDE SERPL-SCNC: 100 MMOL/L (ref 98–107)
CO2 SERPL-SCNC: 28 MMOL/L (ref 22–29)
CREAT SERPL-MCNC: 0.96 MG/DL (ref 0.76–1.27)
DEPRECATED RDW RBC AUTO: 35.9 FL (ref 37–54)
EGFRCR SERPLBLD CKD-EPI 2021: 107 ML/MIN/1.73
EOSINOPHIL # BLD AUTO: 0.11 10*3/MM3 (ref 0–0.4)
EOSINOPHIL NFR BLD AUTO: 1.8 % (ref 0.3–6.2)
ERYTHROCYTE [DISTWIDTH] IN BLOOD BY AUTOMATED COUNT: 12 % (ref 12.3–15.4)
GLOBULIN UR ELPH-MCNC: 2.9 GM/DL
GLUCOSE SERPL-MCNC: 105 MG/DL (ref 65–99)
HCT VFR BLD AUTO: 45.8 % (ref 37.5–51)
HGB BLD-MCNC: 15.6 G/DL (ref 13–17.7)
HOLD SPECIMEN: NORMAL
IMM GRANULOCYTES # BLD AUTO: 0.01 10*3/MM3 (ref 0–0.05)
IMM GRANULOCYTES NFR BLD AUTO: 0.2 % (ref 0–0.5)
LIPASE SERPL-CCNC: 62 U/L (ref 13–60)
LYMPHOCYTES # BLD AUTO: 1.77 10*3/MM3 (ref 0.7–3.1)
LYMPHOCYTES NFR BLD AUTO: 28.9 % (ref 19.6–45.3)
MAXIMAL PREDICTED HEART RATE: 187 BPM
MCH RBC QN AUTO: 28.7 PG (ref 26.6–33)
MCHC RBC AUTO-ENTMCNC: 34.1 G/DL (ref 31.5–35.7)
MCV RBC AUTO: 84.2 FL (ref 79–97)
MONOCYTES # BLD AUTO: 0.34 10*3/MM3 (ref 0.1–0.9)
MONOCYTES NFR BLD AUTO: 5.6 % (ref 5–12)
NEUTROPHILS NFR BLD AUTO: 3.86 10*3/MM3 (ref 1.7–7)
NEUTROPHILS NFR BLD AUTO: 63 % (ref 42.7–76)
NRBC BLD AUTO-RTO: 0 /100 WBC (ref 0–0.2)
NT-PROBNP SERPL-MCNC: 16.1 PG/ML (ref 0–450)
PLATELET # BLD AUTO: 256 10*3/MM3 (ref 140–450)
PMV BLD AUTO: 10.3 FL (ref 6–12)
POTASSIUM SERPL-SCNC: 3.6 MMOL/L (ref 3.5–5.2)
PROT SERPL-MCNC: 7.9 G/DL (ref 6–8.5)
QT INTERVAL: 332 MS
QT INTERVAL: 348 MS
QTC INTERVAL: 423 MS
QTC INTERVAL: 434 MS
RBC # BLD AUTO: 5.44 10*6/MM3 (ref 4.14–5.8)
SODIUM SERPL-SCNC: 141 MMOL/L (ref 136–145)
STRESS TARGET HR: 159 BPM
TROPONIN T SERPL-MCNC: <0.01 NG/ML (ref 0–0.03)
TROPONIN T SERPL-MCNC: <0.01 NG/ML (ref 0–0.03)
WBC NRBC COR # BLD: 6.12 10*3/MM3 (ref 3.4–10.8)
WHOLE BLOOD HOLD SPECIMEN: NORMAL
WHOLE BLOOD HOLD SPECIMEN: NORMAL

## 2022-04-21 PROCEDURE — 80053 COMPREHEN METABOLIC PANEL: CPT | Performed by: EMERGENCY MEDICINE

## 2022-04-21 PROCEDURE — 84484 ASSAY OF TROPONIN QUANT: CPT | Performed by: EMERGENCY MEDICINE

## 2022-04-21 PROCEDURE — 36415 COLL VENOUS BLD VENIPUNCTURE: CPT

## 2022-04-21 PROCEDURE — 93971 EXTREMITY STUDY: CPT

## 2022-04-21 PROCEDURE — 83880 ASSAY OF NATRIURETIC PEPTIDE: CPT | Performed by: EMERGENCY MEDICINE

## 2022-04-21 PROCEDURE — 83690 ASSAY OF LIPASE: CPT | Performed by: EMERGENCY MEDICINE

## 2022-04-21 PROCEDURE — 93971 EXTREMITY STUDY: CPT | Performed by: INTERNAL MEDICINE

## 2022-04-21 PROCEDURE — 71275 CT ANGIOGRAPHY CHEST: CPT

## 2022-04-21 PROCEDURE — 99283 EMERGENCY DEPT VISIT LOW MDM: CPT

## 2022-04-21 PROCEDURE — 93005 ELECTROCARDIOGRAM TRACING: CPT

## 2022-04-21 PROCEDURE — 0 IOPAMIDOL PER 1 ML: Performed by: EMERGENCY MEDICINE

## 2022-04-21 PROCEDURE — 85025 COMPLETE CBC W/AUTO DIFF WBC: CPT | Performed by: EMERGENCY MEDICINE

## 2022-04-21 PROCEDURE — 93005 ELECTROCARDIOGRAM TRACING: CPT | Performed by: EMERGENCY MEDICINE

## 2022-04-21 PROCEDURE — 71045 X-RAY EXAM CHEST 1 VIEW: CPT

## 2022-04-21 RX ORDER — MELOXICAM 15 MG/1
15 TABLET ORAL DAILY
Qty: 10 TABLET | Refills: 0 | Status: SHIPPED | OUTPATIENT
Start: 2022-04-21 | End: 2022-10-24

## 2022-04-21 RX ORDER — SODIUM CHLORIDE 0.9 % (FLUSH) 0.9 %
10 SYRINGE (ML) INJECTION AS NEEDED
Status: DISCONTINUED | OUTPATIENT
Start: 2022-04-21 | End: 2022-04-21 | Stop reason: HOSPADM

## 2022-04-21 RX ORDER — ACETAMINOPHEN 500 MG
1000 TABLET ORAL ONCE
Status: DISCONTINUED | OUTPATIENT
Start: 2022-04-21 | End: 2022-04-21 | Stop reason: HOSPADM

## 2022-04-21 RX ORDER — KETOROLAC TROMETHAMINE 15 MG/ML
15 INJECTION, SOLUTION INTRAMUSCULAR; INTRAVENOUS ONCE
Status: DISCONTINUED | OUTPATIENT
Start: 2022-04-21 | End: 2022-04-21 | Stop reason: HOSPADM

## 2022-04-21 RX ORDER — PREDNISONE 10 MG/1
TABLET ORAL
Qty: 35 TABLET | Refills: 0 | Status: SHIPPED | OUTPATIENT
Start: 2022-04-21 | End: 2022-07-22

## 2022-04-21 RX ORDER — ASPIRIN 81 MG/1
324 TABLET, CHEWABLE ORAL ONCE
Status: DISCONTINUED | OUTPATIENT
Start: 2022-04-21 | End: 2022-04-21 | Stop reason: HOSPADM

## 2022-04-21 RX ADMIN — IOPAMIDOL 80 ML: 755 INJECTION, SOLUTION INTRAVENOUS at 07:12

## 2022-04-21 NOTE — ED PROVIDER NOTES
Subjective   The patient presents to the emergency department with ongoing left upper chest and arm pain.  Patient reports swelling in the left chest and arm with onset 10 months ago with the Moderna vaccine.  Patient has had multiple evaluations on this.  Patient actually saw his cardiologist yesterday as well as recently saw his primary care physician and gastroenterology.  Evaluation is ongoing.  However, the patient states his pain was worse last night and he presented to the ER for evaluation.  Patient was actually seen here recently for the same symptoms with negative cardiac evaluation.  Patient has been cleared from a cardiac evaluation standpoint by cardiology as well.  Patient currently has orders in place by his physicians for MRI of the head, MRI of the neck, and left extremity Doppler.  Patient does report with the symptoms to get some dusky changes within the left arm.  Patient denies any significant past medical history other than GERD.  No fever or chills.  No nausea, vomiting, or diarrhea.  Pain is worse with any type of movement, activity, and raising of the arm.      History provided by:  Patient      Review of Systems   Constitutional: Negative.    Respiratory: Negative.    Cardiovascular: Positive for chest pain.   Gastrointestinal: Negative.    Genitourinary: Negative.    Neurological: Negative.    Psychiatric/Behavioral: Negative.    All other systems reviewed and are negative.      Past Medical History:   Diagnosis Date   • GERD (gastroesophageal reflux disease)        Allergies   Allergen Reactions   • Amoxicillin Other (See Comments)     Chest pain       Past Surgical History:   Procedure Laterality Date   • ANAL FISTULA REPAIR  2009   • CHOLECYSTECTOMY     • COLONOSCOPY  2019    [facility]   • ENDOSCOPY  06/16/2021    Dr. Mobley empiric dilation performed bx suggestive of esophagitis   • ENDOSCOPY  06/30/2020    EGD Dr. Mobley, empiric dilation erosive esophagitis        Family History   Problem Relation Age of Onset   • No Known Problems Mother    • No Known Problems Father    • Heart attack Maternal Grandfather    • Heart disease Maternal Grandfather    • No Known Problems Paternal Grandmother    • No Known Problems Paternal Grandfather    • Colon cancer Neg Hx    • Colon polyps Neg Hx        Social History     Socioeconomic History   • Marital status:    Tobacco Use   • Smoking status: Former Smoker     Packs/day: 1.00     Years: 5.00     Pack years: 5.00     Types: Cigarettes     Start date: 2012     Quit date: 2016     Years since quittin.3   • Smokeless tobacco: Never Used   Vaping Use   • Vaping Use: Never used   Substance and Sexual Activity   • Alcohol use: Yes     Comment: occas   • Drug use: No   • Sexual activity: Yes     Partners: Female     Birth control/protection: None           Objective   Physical Exam  Vitals and nursing note reviewed.   Constitutional:       General: He is not in acute distress.     Appearance: He is well-developed. He is obese. He is not toxic-appearing.   HENT:      Head: Normocephalic and atraumatic.   Cardiovascular:      Rate and Rhythm: Normal rate and regular rhythm.      Pulses:           Radial pulses are 2+ on the right side and 2+ on the left side.      Heart sounds: Normal heart sounds.   Pulmonary:      Effort: Pulmonary effort is normal. No tachypnea or respiratory distress.      Breath sounds: Normal breath sounds.   Chest:      Chest wall: Tenderness present.      Comments: Left upper chest tenderness with some fullness noted.  Reproduction of symptomatic complaints with palpation and movement of the left arm.  Abdominal:      Palpations: Abdomen is soft.      Tenderness: There is no abdominal tenderness.   Musculoskeletal:         General: Normal range of motion.      Right lower leg: No tenderness. No edema.      Left lower leg: No tenderness. No edema.   Skin:     General: Skin is warm and dry.    Neurological:      Mental Status: He is alert and oriented to person, place, and time.   Psychiatric:         Mood and Affect: Mood normal.         Behavior: Behavior normal.         Procedures           ED Course  ED Course as of 04/21/22 0956   Thu Apr 21, 2022   0649 Patient initially refused IV and medications.  Patient advised that we cannot perform the actual evaluation without having an IV in place.  Patient is agreeable to the IV at this time. [RS]   0757 CT Angiogram Chest  Personally viewed the CT of the chest demonstrating no focal or emergent findings.  See report for radiology for details. [RS]   0856 Patient is resting comfortably.  No emergent findings and evaluation here in the ER.  No clear etiology for the patient's discomfort though it is clearly involving the musculoskeletal system of the left upper chest and left upper extremity.  Patient is to continue follow-up as currently scheduled. I had a discussion with the patient/family regarding diagnosis, diagnostic results, treatment plan, and medications.  The patient/family indicated understanding of these instructions.  I spent adequate time at the bedside proceeding discharge necessary to personally discuss the aftercare instructions, giving patient education, providing explanations of the results of our evaluations/findings, and my decision making to assure that the patient/family understand the plan of care.  Time was allotted to answer questions at that time and throughout the ED course.  Emphasis was placed on timely follow-up after discharge.  I also discussed the potential for the development of an acute emergent condition requiring further evaluation, admission, or even surgical intervention. I discussed that we found nothing during the visit today indicating the need for further workup, admission, or the presence of an unstable medical condition.  I encouraged the patient to return to the emergency department immediately for ANY  concerns, worsening, new complaints, or if symptoms persist and unable to seek follow-up in a timely fashion.  The patient/family expressed understanding and agreement with this plan.  [RS]      ED Course User Index  [RS] Roger Moody MD                                                 MDM  Number of Diagnoses or Management Options  BHAVIN positive  Left-sided chest wall pain  Musculoskeletal pain  Diagnosis management comments: Recent Results (from the past 24 hour(s))  -ECG 12 Lead:   Collection Time: 04/21/22  4:42 AM       Result                      Value             Ref Range           QT Interval                 332               ms                  QTC Interval                434               ms             -Troponin:   Collection Time: 04/21/22  5:10 AM  Specimen: Blood       Result                      Value             Ref Range           Troponin T                  <0.010            0.000 - 0.03*  -Lipase:   Collection Time: 04/21/22  5:10 AM  Specimen: Blood       Result                      Value             Ref Range           Lipase                      62 (H)            13 - 60 U/L    -BNP:   Collection Time: 04/21/22  5:10 AM  Specimen: Blood       Result                      Value             Ref Range           proBNP                      16.1              0.0 - 450.0 *  -Green Top (Gel):   Collection Time: 04/21/22  5:10 AM       Result                      Value             Ref Range           Extra Tube                                                    Hold for add-ons.  -Lavender Top:   Collection Time: 04/21/22  5:10 AM       Result                      Value             Ref Range           Extra Tube                                                    hold for add-on  -Gold Top - SST:   Collection Time: 04/21/22  5:10 AM       Result                      Value             Ref Range           Extra Tube                                                    Hold for add-ons.  -Gray Top:    Collection Time: 04/21/22  5:10 AM       Result                      Value             Ref Range           Extra Tube                                                    Hold for add-ons.  -Light Blue Top:   Collection Time: 04/21/22  5:10 AM       Result                      Value             Ref Range           Extra Tube                                                    hold for add-on  -CBC Auto Differential:   Collection Time: 04/21/22  5:10 AM  Specimen: Blood       Result                      Value             Ref Range           WBC                         6.12              3.40 - 10.80*       RBC                         5.44              4.14 - 5.80 *       Hemoglobin                  15.6              13.0 - 17.7 *       Hematocrit                  45.8              37.5 - 51.0 %       MCV                         84.2              79.0 - 97.0 *       MCH                         28.7              26.6 - 33.0 *       MCHC                        34.1              31.5 - 35.7 *       RDW                         12.0 (L)          12.3 - 15.4 %       RDW-SD                      35.9 (L)          37.0 - 54.0 *       MPV                         10.3              6.0 - 12.0 fL       Platelets                   256               140 - 450 10*       Neutrophil %                63.0              42.7 - 76.0 %       Lymphocyte %                28.9              19.6 - 45.3 %       Monocyte %                  5.6               5.0 - 12.0 %        Eosinophil %                1.8               0.3 - 6.2 %         Basophil %                  0.5               0.0 - 1.5 %         Immature Grans %            0.2               0.0 - 0.5 %         Neutrophils, Absolute       3.86              1.70 - 7.00 *       Lymphocytes, Absolute       1.77              0.70 - 3.10 *       Monocytes, Absolute         0.34              0.10 - 0.90 *       Eosinophils, Absolute       0.11              0.00 - 0.40 *       Basophils,  Absolute         0.03              0.00 - 0.20 *       Immature Grans, Absolu*     0.01              0.00 - 0.05 *       nRBC                        0.0               0.0 - 0.2 /1*  -Comprehensive Metabolic Panel:   Collection Time: 04/21/22  6:57 AM  Specimen: Blood       Result                      Value             Ref Range           Glucose                     105 (H)           65 - 99 mg/dL       BUN                         15                6 - 20 mg/dL        Creatinine                  0.96              0.76 - 1.27 *       Sodium                      141               136 - 145 mm*       Potassium                   3.6               3.5 - 5.2 mm*       Chloride                    100               98 - 107 mmo*       CO2                         28.0              22.0 - 29.0 *       Calcium                     9.8               8.6 - 10.5 m*       Total Protein               7.9               6.0 - 8.5 g/*       Albumin                     5.00              3.50 - 5.20 *       ALT (SGPT)                  60 (H)            1 - 41 U/L          AST (SGOT)                  31                1 - 40 U/L          Alkaline Phosphatase        81                39 - 117 U/L        Total Bilirubin             1.3 (H)           0.0 - 1.2 mg*       Globulin                    2.9               gm/dL               A/G Ratio                   1.7               g/dL                BUN/Creatinine Ratio        15.6              7.0 - 25.0          Anion Gap                   13.0              5.0 - 15.0 m*       eGFR                        107.0             >60.0 mL/min*  -Troponin:   Collection Time: 04/21/22  8:25 AM  Specimen: Blood       Result                      Value             Ref Range           Troponin T                  <0.010            0.000 - 0.03*  -ECG 12 Lead:   Collection Time: 04/21/22  8:29 AM       Result                      Value             Ref Range           QT Interval                 348                ms                  QTC Interval                423               ms             -Duplex Venous Upper Extremity - Left CAR:   Collection Time: 04/21/22  8:40 AM       Result                      Value             Ref Range           BSA                         2.1               m^2                  CV ECHO RAYRAY - BZI_*     33.7              kilograms/m^2        CV ECHO RAYRAY - BSA(*     2.2               m^2                  CV ECHO RAYRAY - BZI_*     97.5              kg                   CV ECHO RAYRAY - BZI_*     170.2             cm                  Target HR (85%)             159               bpm                 Max. Pred. HR (100%)        187               bpm                 Left Internal Jugular *     Y                                     Left Internal Jugular *     C                                     Left Internal Jugular *     Y                                     Left Internal Jugular *     Y                                     Right Subclavian Augme*     Y                                     Right Subclavian Compr*     C                                     Right Subclavian Phasic     Y                                     Right Subclavian Spont      Y                                     Left Subclavian Augment     Y                                     Left Subclavian Compre*     C                                     Left Subclavian Phasic      Y                                     Left Subclavian Spont       Y                                     Left Axillary Augment       Y                                     Left Axillary Compress      C                                     Left Axillary Phasic        Y                                     Left Axillary Spont         Y                                     Left Brachial Augment       Y                                     Left Brachial Compress      C                                     Left Brachial Phasic        Y                                      Left Brachial Spont         Y                                     Left Radial Compress        C                                     Left Ulnar Compress         C                                     Left Basilic Upper Com*     C                                     Left Basilic Forearm C*     C                                     Left Cephalic Upper Co*     C                                     Left Cephalic Forearm *     C                                Note: In addition to lab results from this visit, the labs listed above may include labs taken at another facility or during a different encounter within the last 24 hours. Please correlate lab times with ED admission and discharge times for further clarification of the services performed during this visit.    CT Angiogram Chest   Final Result         1.  No evidence of pulmonary embolus or other acute process within the    chest.         This report was finalized on 4/21/2022 7:22 AM by Gonzalez Ozuna MD.          XR Chest 1 View   Final Result    No acute cardiopulmonary abnormality.        Electronically signed by:  Anival Al D.O.      4/21/2022 4:03 AM Mountain Time     -----------------------------------------------------------            04/21/22 04/21/22 04/21/22 04/21/22               0830         0839         0900      0904     -----------------------------------------------------------   BP:       117/76                              106/66     BP Location:                                                 Patient Position:                                                 Pulse:      89                        85                 Resp:                                           20       Temp:                                                    TempSrc:                                                 SpO2:      93%                       90%                 Weight:           97.5  "kg (215 lb)                       Height:            170.2 cm (67\")                       -----------------------------------------------------------  Medications  sodium chloride 0.9 % flush 10 mL (has no administration in time range)  aspirin chewable tablet 324 mg (324 mg Oral Not Given 4/21/22 0510)  acetaminophen (TYLENOL) tablet 1,000 mg (1,000 mg Oral Not Given 4/21/22 0638)  ketorolac (TORADOL) injection 15 mg (15 mg Intravenous Not Given 4/21/22 0638)  iopamidol (ISOVUE-370) 76 % injection 100 mL (80 mL Intravenous Given 4/21/22 0712)  ECG/EMG Results (last 24 hours)     ** No results found for the last 24 hours. **      ECG 12 Lead   Final Result    Test Reason : chest pain    Blood Pressure :   */*   mmHG    Vent. Rate :  89 BPM     Atrial Rate :  89 BPM       P-R Int : 144 ms          QRS Dur :  92 ms        QT Int : 348 ms       P-R-T Axes :  48 -33  27 degrees       QTc Int : 423 ms        Normal sinus rhythm    Left axis deviation    Incomplete right bundle branch block    Abnormal ECG    When compared with ECG of 21-APR-2022 04:42, (Unconfirmed)    No significant change was found    Confirmed by AISHA ESTEVES MD ([id number]) on 4/21/2022 9:55:02 AM        Referred By: EDMD           Confirmed By: AISHA ESTEVES MD     ECG 12 Lead   Final Result    Test Reason : chest pain    Blood Pressure :   */*   mmHG    Vent. Rate : 103 BPM     Atrial Rate : 103 BPM       P-R Int : 138 ms          QRS Dur :  90 ms        QT Int : 332 ms       P-R-T Axes :  52 -43  22 degrees       QTc Int : 434 ms        Sinus tachycardia    Left axis deviation    Abnormal ECG    When compared with ECG of 19-APR-2022 08:57,    ST now depressed in Anterior leads    Confirmed by AISHA ESTEVES MD ([id number]) on 4/21/2022 9:54:57 AM        Referred By:            Confirmed By: AISHA ESTEVES MD            Amount and/or Complexity of Data Reviewed  Clinical lab tests: reviewed  Tests in the radiology section of CPT®: reviewed  Review " and summarize past medical records: yes  Independent visualization of images, tracings, or specimens: yes        Final diagnoses:   Left-sided chest wall pain   Musculoskeletal pain   BHAVIN positive       ED Disposition  ED Disposition     ED Disposition   Discharge    Condition   Stable    Comment   --             Sanna Herr, APRN  [address]  [facility] [id number] [phone]    Schedule an appointment as soon as possible for a visit       [facility] Emergency Department  [address] [address] [phone]    As needed, If symptoms worsen or ANY concerns.         Medication List      New Prescriptions    meloxicam 15 MG tablet  Commonly known as: MOBIC  Take 1 tablet by mouth Daily.     predniSONE 10 MG tablet  Commonly known as: DELTASONE  40mg PO daily for 5 days, then 20mg PO daily for 5 days, then 10mg PO daily for 5 days           Where to Get Your Medications      These medications were sent to [facility] [address] [place] [address] [address] [place] - [phone]  - [phone] [place] [id number]    Phone: [phone]   · meloxicam 15 MG tablet  · predniSONE 10 MG tablet          Roger Moody MD  04/21/22 5192

## 2022-04-21 NOTE — TELEPHONE ENCOUNTER
Provider: DEBORAH    Caller: ERMIAS     Phone Number: [phone]    Reason for Call: PATIENT STATED THAT TWO DAYS AGO HE WAS TOLD THAT HE WAS GOING TO HAVE A CHEST AND ARM MRI AS SOON AS POSSIBLE.  PATIENT HAS NOT GOTTEN A CALL FROM THE OFFICE TO GET THAT MRI DONE. ANOTHER DOCTOR STATED THAT HE WOULD LIKE TO DO AN MRI FOR HIS HEAD.  PATIENT IS SCHEDULED 5/27/22 FOR HEAD MRI.  PATIENT STATED THAT IS TOO FAR AWAY BECAUSE HE IS IN A LOT OF PAIN.  PATIENT WENT TO ER LAST NIGHT AND GOT CAT SCAN AND ULTRASOUND. DOCTOR AT ER STATED THAT HE MAY HAVE INFLAMMATION IN CHEST.  PATIENT GOT MEDICATION.  PATIENT IS ASKING IF HE SHOULD TAKE MEDICATION: MELOXICAM 15 MG, PREDNISONE 10 MG.  PATIENT WAS SEEN ON 4/18/22 BY JAYDE RUIZ AND HAD LABS DONE.  BHAVIN TEST WAS POSITIVE.

## 2022-04-25 ENCOUNTER — TELEPHONE (OUTPATIENT)
Dept: FAMILY MEDICINE CLINIC | Facility: CLINIC | Age: 32
End: 2022-04-25

## 2022-04-26 ENCOUNTER — OFFICE VISIT (OUTPATIENT)
Dept: CARDIOLOGY | Facility: CLINIC | Age: 32
End: 2022-04-26

## 2022-04-26 ENCOUNTER — TELEPHONE (OUTPATIENT)
Dept: CARDIOLOGY | Facility: CLINIC | Age: 32
End: 2022-04-26

## 2022-04-26 VITALS
SYSTOLIC BLOOD PRESSURE: 110 MMHG | OXYGEN SATURATION: 97 % | DIASTOLIC BLOOD PRESSURE: 82 MMHG | HEART RATE: 85 BPM | HEIGHT: 67 IN | BODY MASS INDEX: 33.27 KG/M2 | WEIGHT: 212 LBS

## 2022-04-26 DIAGNOSIS — R07.89 CHEST TIGHTNESS: Primary | ICD-10-CM

## 2022-04-26 PROCEDURE — 99213 OFFICE O/P EST LOW 20 MIN: CPT | Performed by: INTERNAL MEDICINE

## 2022-04-26 NOTE — PROGRESS NOTES
[facility] at [facility]  Office visit  Meliton Lagos  1990    There is no work phone number on file.    VISIT DATE:  4/26/2022    PCP: Sanna Herr APRN  [address] [address]    CC:  Chief Complaint   Patient presents with   • Chest tightness     Follow up       Previous cardiac studies and procedures:  May 2020 exercise treadmill test:  · The exercise ECG stress test was negative for clinical and ECG evidence of myocardial ischemia. The [facility] treadmill score was 4.8. The patient had sudden onset dyspnea at the 4:46 minute latoya and had to stop the study. He had an exaggerated heart rate response to stress. With stopping at this time he also had a severely decreased exercise capacity, but technically reached his target heart rate.  · Impressions are consistent with a low risk stress test.    September 2021 TTE  · Left ventricular ejection fraction appears to be 61 - 65%. Left ventricular systolic function is normal.  · Left ventricular diastolic function was normal.  · Estimated right ventricular systolic pressure from tricuspid regurgitation is normal (<35 mmHg).  · No significant structural or functional valvular disease.      ASSESSMENT:   Diagnosis Plan   1. Chest tightness         PLAN:  Precordial pain: Noncardiac in etiology.  Ongoing neurology evaluation.  Agree with repeat rheumatology evaluation, referral placed today.  We will follow-up with cardiology on an as-needed basis.    Subjective  Still having persistent episodes of left upper chest and shoulder discomfort.  Feels like it is swollen.  Intermittent paresthesias in the left upper extremity.  Left upper chest and shoulder often feel like its a burning/fire sensation.  No obvious triggers.  Previous cardiac evaluation has been unremarkable.  Normal transthoracic echo.  Normal exercise treadmill testing, normal CTA chest.  Negative troponins.  Unremarkable EKG.  Recently started on a course of  "prednisone which has resulted in some relief of his symptoms.  Has MRI pending of his head neck ordered by neurology next week.  Recent positive BHAVIN.  Personally reviewed recent CTA chest which is unremarkable.    PHYSICAL EXAMINATION:  Vitals:    04/26/22 1452   BP: 110/82   BP Location: Left arm   Patient Position: Sitting   Pulse: 85   SpO2: 97%   Weight: 96.2 kg (212 lb)   Height: 170.2 cm (67\")     General Appearance:    Alert, cooperative, no distress, appears stated age   Head:    Normocephalic, without obvious abnormality, atraumatic   Eyes:    conjunctiva/corneas clear   Nose:   Nares normal, septum midline, mucosa normal, no drainage   Throat:   Lips, teeth and gums normal   Neck:   Supple, symmetrical, trachea midline, no carotid    bruit or JVD   Lungs:     Clear to auscultation bilaterally, respirations unlabored   Chest Wall:    No tenderness or deformity    Heart:    Regular rate and rhythm, S1 and S2 normal, no murmur, rub   or gallop, normal carotid impulse bilaterally without bruit.   Abdomen:     Soft, non-tender   Extremities:   Extremities normal, atraumatic, no cyanosis or edema   Pulses:   2+ and symmetric all extremities   Skin:   Skin color, texture, turgor normal, no rashes or lesions       Diagnostic Data:  Procedures  Lab Results   Component Value Date    CHLPL 183 09/25/2019    TRIG 121 04/18/2022    HDL 58 04/18/2022     Lab Results   Component Value Date    GLUCOSE 105 (H) 04/21/2022    BUN 15 04/21/2022    CREATININE 0.96 04/21/2022     04/21/2022    K 3.6 04/21/2022     04/21/2022    CO2 28.0 04/21/2022     Lab Results   Component Value Date    HGBA1C 5.2 04/19/2022     Lab Results   Component Value Date    WBC 6.12 04/21/2022    HGB 15.6 04/21/2022    HCT 45.8 04/21/2022     04/21/2022       Allergies  Allergies   Allergen Reactions   • Amoxicillin Other (See Comments)     Chest pain       Current Medications    Current Outpatient Medications:   •  predniSONE " (DELTASONE) 10 MG tablet, 40mg PO daily for 5 days, then 20mg PO daily for 5 days, then 10mg PO daily for 5 days, Disp: 35 tablet, Rfl: 0  •  meloxicam (MOBIC) 15 MG tablet, Take 1 tablet by mouth Daily., Disp: 10 tablet, Rfl: 0  •  pantoprazole (PROTONIX) 40 MG EC tablet, Take 1 tablet by mouth Daily., Disp: 30 tablet, Rfl: 5  •  vitamin D (ERGOCALCIFEROL) 1.25 MG (53984 UT) capsule capsule, TAKE ONE CAPSULE BY MOUTH ONCE WEEKLY, Disp: 12 capsule, Rfl: 0          ROS  ROS      SOCIAL HX  Social History     Socioeconomic History   • Marital status:    Tobacco Use   • Smoking status: Former Smoker     Packs/day: 1.00     Years: 5.00     Pack years: 5.00     Types: Cigarettes     Start date: 2012     Quit date:      Years since quittin.3   • Smokeless tobacco: Never Used   Vaping Use   • Vaping Use: Never used   Substance and Sexual Activity   • Alcohol use: Yes     Comment: occas   • Drug use: No   • Sexual activity: Yes     Partners: Female     Birth control/protection: None       FAMILY HX  Family History   Problem Relation Age of Onset   • No Known Problems Mother    • No Known Problems Father    • Heart attack Maternal Grandfather    • Heart disease Maternal Grandfather    • No Known Problems Paternal Grandmother    • No Known Problems Paternal Grandfather    • Colon cancer Neg Hx    • Colon polyps Neg Hx              Dereck Rodriguez III, MD, [facility]

## 2022-04-26 NOTE — TELEPHONE ENCOUNTER
----- Message from Dereck Rodriguez III, MD sent at 4/26/2022  3:08 PM EDT -----  Needs referral to rheumatology.  Diagnosis positive BHAVIN and arthritis.

## 2022-04-26 NOTE — TELEPHONE ENCOUNTER
Referral form completed and faxed at this time.  Tiffany from [facility] office to fax BHAVIN result.

## 2022-05-02 ENCOUNTER — OFFICE VISIT (OUTPATIENT)
Dept: FAMILY MEDICINE CLINIC | Facility: CLINIC | Age: 32
End: 2022-05-02

## 2022-05-02 VITALS
WEIGHT: 215 LBS | BODY MASS INDEX: 33.74 KG/M2 | DIASTOLIC BLOOD PRESSURE: 74 MMHG | TEMPERATURE: 97 F | HEIGHT: 67 IN | RESPIRATION RATE: 22 BRPM | SYSTOLIC BLOOD PRESSURE: 104 MMHG | HEART RATE: 86 BPM | OXYGEN SATURATION: 98 %

## 2022-05-02 DIAGNOSIS — R07.89 CHEST WALL PAIN, CHRONIC: Primary | ICD-10-CM

## 2022-05-02 DIAGNOSIS — R76.8 POSITIVE ANA (ANTINUCLEAR ANTIBODY): ICD-10-CM

## 2022-05-02 DIAGNOSIS — G89.29 CHEST WALL PAIN, CHRONIC: Primary | ICD-10-CM

## 2022-05-02 PROCEDURE — 99213 OFFICE O/P EST LOW 20 MIN: CPT | Performed by: NURSE PRACTITIONER

## 2022-05-03 NOTE — PROGRESS NOTES
Follow Up Office Note     Patient Name: Meliton Lagos  : 1990   MRN: [id number]     Chief Complaint:    Chief Complaint   Patient presents with   • Chest Pain       History of Present Illness: Meliton Lagos is a 32 y.o. male who presents today for re-evaluation/management left-sided chest wall pain. Patient recently seen for same complaint at [facility] ER 22. Patient subsequently evaluated by Dr. Dereck Rodriguez at [facility] on 22. Dr. Rodriguez did not feel that patient's pain was of cardiac origin due to normal cardiac testing. Dr. Rodriguez recommended patient continue with neurology and repeat rheumatology evaluation due to positive BHAVIN.   Patient states that his chest wall pain has improved since he has been on prednisone (prescribed by ER). He has not started meloxicam yet as he states that the pharmacist advised him to wait until he finished prednisone.    ED with Roger Moody MD (2022)  Office Visit with Dereck Rodriguez III, MD (2022)    Subjective      Review of Systems:   Review of Systems   Constitutional: Negative.    Respiratory: Negative for cough, chest tightness, shortness of breath, wheezing and stridor.    Cardiovascular: Negative for palpitations and leg swelling.   Skin: Negative for rash.   Neurological: Negative.         Past Medical History:   Past Medical History:   Diagnosis Date   • GERD (gastroesophageal reflux disease)          Medications:     Current Outpatient Medications:   •  meloxicam (MOBIC) 15 MG tablet, Take 1 tablet by mouth Daily., Disp: 10 tablet, Rfl: 0  •  pantoprazole (PROTONIX) 40 MG EC tablet, Take 1 tablet by mouth Daily., Disp: 30 tablet, Rfl: 5  •  predniSONE (DELTASONE) 10 MG tablet, 40mg PO daily for 5 days, then 20mg PO daily for 5 days, then 10mg PO daily for 5 days, Disp: 35 tablet, Rfl: 0  •  vitamin D (ERGOCALCIFEROL) 1.25 MG (25455 UT) capsule capsule, TAKE ONE CAPSULE BY MOUTH ONCE WEEKLY, Disp: 12 capsule, Rfl:  "0    Allergies:   Allergies   Allergen Reactions   • Amoxicillin Other (See Comments)     Chest pain         Objective     Physical Exam:  Vital Signs:   Vitals:    05/02/22 1327   BP: 104/74   Pulse: 86   Resp: 22   Temp: 97 °F (36.1 °C)   SpO2: 98%   Weight: 97.5 kg (215 lb)   Height: 170.2 cm (67\")   PainSc: 0-No pain     Body mass index is 33.67 kg/m².     Physical Exam  Vitals and nursing note reviewed.   Constitutional:       General: He is not in acute distress.     Appearance: Normal appearance. He is well-developed. He is not ill-appearing, toxic-appearing or diaphoretic.   HENT:      Head: Normocephalic and atraumatic.   Cardiovascular:      Rate and Rhythm: Normal rate and regular rhythm.   Pulmonary:      Effort: Pulmonary effort is normal. No respiratory distress.      Breath sounds: Normal breath sounds. No stridor. No wheezing.   Skin:     General: Skin is warm and dry.   Neurological:      General: No focal deficit present.      Mental Status: He is alert and oriented to person, place, and time.   Psychiatric:         Mood and Affect: Mood normal.         Behavior: Behavior normal. Behavior is cooperative.         Thought Content: Thought content normal.         Judgment: Judgment normal.         Assessment / Plan      Assessment/Plan:   Diagnoses and all orders for this visit:    1. Chest wall pain, chronic (Primary)  -     Ambulatory Referral to Rheumatology    2. Positive BHAVIN (antinuclear antibody)  -     Ambulatory Referral to Rheumatology    Continue current medications. Complete course of prednisone.   Continue with neurology evaluation.   Keep upcoming appointment with gastroenterology.    Follow Up:   PRN and at next scheduled appointment(s) with PCP.    Discussed the nature of the medical condition(s) risks, complications, implications, management, safe and proper use of medications. Encouraged medication compliance, and keeping scheduled follow up appointments with me and any other " providers.      RTC if symptoms fail to improve, to ER if symptoms worsen.      JAY León  [facility] [place] [facility]

## 2022-05-09 ENCOUNTER — TELEPHONE (OUTPATIENT)
Dept: FAMILY MEDICINE CLINIC | Facility: CLINIC | Age: 32
End: 2022-05-09

## 2022-05-31 ENCOUNTER — OFFICE VISIT (OUTPATIENT)
Dept: FAMILY MEDICINE CLINIC | Facility: CLINIC | Age: 32
End: 2022-05-31

## 2022-05-31 VITALS
WEIGHT: 215 LBS | RESPIRATION RATE: 20 BRPM | OXYGEN SATURATION: 97 % | HEIGHT: 67 IN | SYSTOLIC BLOOD PRESSURE: 122 MMHG | BODY MASS INDEX: 33.74 KG/M2 | DIASTOLIC BLOOD PRESSURE: 80 MMHG | HEART RATE: 91 BPM | TEMPERATURE: 97 F

## 2022-05-31 DIAGNOSIS — G89.29 CHEST WALL PAIN, CHRONIC: Primary | ICD-10-CM

## 2022-05-31 DIAGNOSIS — R07.89 CHEST WALL PAIN, CHRONIC: Primary | ICD-10-CM

## 2022-05-31 DIAGNOSIS — R09.89 DIMINISHED PULSE IN UPPER EXTREMITY: ICD-10-CM

## 2022-05-31 DIAGNOSIS — L20.9 ATOPIC DERMATITIS, UNSPECIFIED TYPE: ICD-10-CM

## 2022-05-31 DIAGNOSIS — L29.9 PRURITIC CONDITION: ICD-10-CM

## 2022-05-31 PROCEDURE — 99214 OFFICE O/P EST MOD 30 MIN: CPT | Performed by: NURSE PRACTITIONER

## 2022-05-31 RX ORDER — PIMECROLIMUS 10 MG/G
1 CREAM TOPICAL 2 TIMES DAILY
Qty: 60 G | Refills: 0 | Status: SHIPPED | OUTPATIENT
Start: 2022-05-31 | End: 2022-10-24

## 2022-06-02 NOTE — PROGRESS NOTES
Follow Up Office Note     Patient Name: Meliton Lagos  : 1990   MRN: [id number]     Chief Complaint:    Chief Complaint   Patient presents with   • Chest Pain     Coming back   • Rash     Bilateral hands       History of Present Illness: Meliton Lagos is a 32 y.o. male who presents today with continued complaint of intermittent left-sided chest wall pain and left arm pain.  Patient has had multiple office visits, ER visits and specialty appointments over the past several months for this issue.  Patient was recently seen by [facility]  due to positive BHAVIN.  The rheumatologist suggested that he may need to be evaluated for thoracic outlet syndrome.  Patient states he feels fine as long as he is on steroids but soon after completing them the pain comes back.  Patient has already been evaluated by cardiology who felt that his symptoms were not of cardiac origin.  Patient is also following with [facility] for headache and left upper extremity paresthesia and has an MRI of the cervical spine and brain ordered, however he has not had these test performed yet (no-showed).  Patient states he was unaware that he had these appointments.  We will attempt to reschedule these tests today.      Patient has a new complaint of rash and itching on bilateral hands that comes and goes.  He states this has been waxing and waning for the past 2 weeks.    CT Angiogram Chest (2022 07:12)  XR Chest 1 View (2022 05:31)  CT Head Without Contrast (2022 11:41)  Duplex Venous Upper Extremity - Left CAR (2022 08:40)  ED with Roger Moody MD (2022)  Office Visit with Dereck Rodriguez III, MD (2022)  Office Visit with Armaan Baxter DNP, APRN (2022)  ED with Pierce Harrison DO (2022)      Subjective      Review of Systems:   Review of Systems   Constitutional: Negative for activity change, appetite change, chills, diaphoresis, fatigue, fever and  "unexpected weight change.   Respiratory: Negative.    Cardiovascular: Positive for chest pain. Negative for palpitations and leg swelling.   Gastrointestinal: Negative.    Skin: Positive for rash.   Neurological: Positive for numbness (and tingling LUE) and headaches. Negative for dizziness, tremors, seizures, syncope, facial asymmetry, speech difficulty, weakness and light-headedness.        Past Medical History:   Past Medical History:   Diagnosis Date   • GERD (gastroesophageal reflux disease)          Medications:     Current Outpatient Medications:   •  vitamin D (ERGOCALCIFEROL) 1.25 MG (45197 UT) capsule capsule, TAKE ONE CAPSULE BY MOUTH ONCE WEEKLY, Disp: 12 capsule, Rfl: 0  •  meloxicam (MOBIC) 15 MG tablet, Take 1 tablet by mouth Daily., Disp: 10 tablet, Rfl: 0  •  pantoprazole (PROTONIX) 40 MG EC tablet, Take 1 tablet by mouth Daily., Disp: 30 tablet, Rfl: 5  •  pimecrolimus (Elidel) 1 % cream, Apply 1 application topically to the appropriate area as directed 2 (Two) Times a Day., Disp: 60 g, Rfl: 0  •  predniSONE (DELTASONE) 10 MG tablet, 40mg PO daily for 5 days, then 20mg PO daily for 5 days, then 10mg PO daily for 5 days, Disp: 35 tablet, Rfl: 0    Allergies:   Allergies   Allergen Reactions   • Amoxicillin Other (See Comments)     Chest pain         Objective     Physical Exam:  Vital Signs:   Vitals:    05/31/22 1253   BP: 122/80   Pulse: 91   Resp: 20   Temp: 97 °F (36.1 °C)   SpO2: 97%   Weight: 97.5 kg (215 lb)   Height: 170.2 cm (67\")   PainSc: 0-No pain     Body mass index is 33.67 kg/m².     Physical Exam  Vitals and nursing note reviewed.   Constitutional:       General: He is not in acute distress.     Appearance: Normal appearance. He is well-developed. He is not ill-appearing, toxic-appearing or diaphoretic.   HENT:      Head: Normocephalic and atraumatic.   Cardiovascular:      Rate and Rhythm: Normal rate and regular rhythm.      Heart sounds: Normal heart sounds, S1 normal and S2 " normal. No murmur heard.  Pulmonary:      Effort: Pulmonary effort is normal. No respiratory distress.      Breath sounds: Normal breath sounds. No stridor. No wheezing.   Musculoskeletal:      Right lower leg: No edema.      Left lower leg: No edema.   Skin:     General: Skin is warm and dry.      Findings: No lesion or rash.      Comments: No visible rash at this time on hands or other body areas.  Suspect atopic dermatitis.   Neurological:      General: No focal deficit present.      Mental Status: He is alert and oriented to person, place, and time.   Psychiatric:         Mood and Affect: Mood normal.         Behavior: Behavior normal. Behavior is cooperative.         Thought Content: Thought content normal.         Judgment: Judgment normal.         Assessment / Plan      Assessment/Plan:   Diagnoses and all orders for this visit:    1. Chest wall pain, chronic (Primary)  -     Ambulatory Referral to Vascular Surgery    2. Diminished pulse in upper extremity  -     Ambulatory Referral to Vascular Surgery    3. Pruritic condition  -     pimecrolimus (Elidel) 1 % cream; Apply 1 application topically to the appropriate area as directed 2 (Two) Times a Day.  Dispense: 60 g; Refill: 0    4. Atopic dermatitis, unspecified type  -     pimecrolimus (Elidel) 1 % cream; Apply 1 application topically to the appropriate area as directed 2 (Two) Times a Day.  Dispense: 60 g; Refill: 0    Continue current treatment plan.  Keep all specialty appointments and testing appointments.  Continue current medications.    Follow Up:   PRN and at next scheduled appointment(s) with PCP.    Discussed the nature of the medical condition(s) risks, complications, implications, management, safe and proper use of medications. Encouraged medication compliance, and keeping scheduled follow up appointments with me and any other providers.      I spent 30 minutes caring for Ali on this date of service. This time includes time spent by me in the  following activities:preparing for the visit, reviewing tests, performing a medically appropriate examination and/or evaluation , counseling and educating the patient/family/caregiver, ordering medications, tests, or procedures, referring and communicating with other health care professionals , documenting information in the medical record and care coordination.    RTC if symptoms fail to improve, to ER if symptoms worsen.      JAY León  [facility] [place]

## 2022-06-30 ENCOUNTER — APPOINTMENT (OUTPATIENT)
Dept: MRI IMAGING | Facility: HOSPITAL | Age: 32
End: 2022-06-30

## 2022-06-30 ENCOUNTER — HOSPITAL ENCOUNTER (OUTPATIENT)
Dept: CARDIOLOGY | Facility: HOSPITAL | Age: 32
Discharge: HOME | End: 2022-06-30
Admitting: NURSE PRACTITIONER

## 2022-06-30 VITALS — BODY MASS INDEX: 33.74 KG/M2 | WEIGHT: 215 LBS | HEIGHT: 67 IN

## 2022-06-30 DIAGNOSIS — R23.8 DUSKY DISCOLORATION OF SKIN: ICD-10-CM

## 2022-06-30 DIAGNOSIS — R09.89 DIMINISHED PULSE IN UPPER EXTREMITY: ICD-10-CM

## 2022-06-30 PROCEDURE — 93971 EXTREMITY STUDY: CPT

## 2022-06-30 PROCEDURE — 93971 EXTREMITY STUDY: CPT | Performed by: INTERNAL MEDICINE

## 2022-07-01 LAB
BH CV UPPER VENOUS LEFT AXILLARY AUGMENT: NORMAL
BH CV UPPER VENOUS LEFT AXILLARY COMPRESS: NORMAL
BH CV UPPER VENOUS LEFT AXILLARY PHASIC: NORMAL
BH CV UPPER VENOUS LEFT AXILLARY SPONT: NORMAL
BH CV UPPER VENOUS LEFT BASILIC FOREARM COMPRESS: NORMAL
BH CV UPPER VENOUS LEFT BASILIC UPPER COMPRESS: NORMAL
BH CV UPPER VENOUS LEFT BRACHIAL AUGMENT: NORMAL
BH CV UPPER VENOUS LEFT BRACHIAL COMPRESS: NORMAL
BH CV UPPER VENOUS LEFT BRACHIAL PHASIC: NORMAL
BH CV UPPER VENOUS LEFT BRACHIAL SPONT: NORMAL
BH CV UPPER VENOUS LEFT CEPHALIC FOREARM COMPRESS: NORMAL
BH CV UPPER VENOUS LEFT CEPHALIC UPPER COMPRESS: NORMAL
BH CV UPPER VENOUS LEFT INTERNAL JUGULAR AUGMENT: NORMAL
BH CV UPPER VENOUS LEFT INTERNAL JUGULAR COMPRESS: NORMAL
BH CV UPPER VENOUS LEFT INTERNAL JUGULAR PHASIC: NORMAL
BH CV UPPER VENOUS LEFT INTERNAL JUGULAR SPONT: NORMAL
BH CV UPPER VENOUS LEFT RADIAL COMPRESS: NORMAL
BH CV UPPER VENOUS LEFT SUBCLAVIAN AUGMENT: NORMAL
BH CV UPPER VENOUS LEFT SUBCLAVIAN COMPRESS: NORMAL
BH CV UPPER VENOUS LEFT SUBCLAVIAN PHASIC: NORMAL
BH CV UPPER VENOUS LEFT SUBCLAVIAN SPONT: NORMAL
BH CV UPPER VENOUS LEFT ULNAR COMPRESS: NORMAL
BH CV UPPER VENOUS RIGHT INTERNAL JUGULAR AUGMENT: NORMAL
BH CV UPPER VENOUS RIGHT INTERNAL JUGULAR COMPRESS: NORMAL
BH CV UPPER VENOUS RIGHT INTERNAL JUGULAR PHASIC: NORMAL
BH CV UPPER VENOUS RIGHT INTERNAL JUGULAR SPONT: NORMAL
BH CV UPPER VENOUS RIGHT SUBCLAVIAN AUGMENT: NORMAL
BH CV UPPER VENOUS RIGHT SUBCLAVIAN COMPRESS: NORMAL
BH CV UPPER VENOUS RIGHT SUBCLAVIAN PHASIC: NORMAL
BH CV UPPER VENOUS RIGHT SUBCLAVIAN SPONT: NORMAL
MAXIMAL PREDICTED HEART RATE: 186 BPM
STRESS TARGET HR: 158 BPM

## 2022-07-07 ENCOUNTER — TELEPHONE (OUTPATIENT)
Dept: FAMILY MEDICINE CLINIC | Facility: CLINIC | Age: 32
End: 2022-07-07

## 2022-07-22 ENCOUNTER — OFFICE VISIT (OUTPATIENT)
Dept: FAMILY MEDICINE CLINIC | Facility: CLINIC | Age: 32
End: 2022-07-22

## 2022-07-22 VITALS
WEIGHT: 216.2 LBS | HEART RATE: 67 BPM | TEMPERATURE: 98.4 F | RESPIRATION RATE: 16 BRPM | DIASTOLIC BLOOD PRESSURE: 60 MMHG | HEIGHT: 67 IN | BODY MASS INDEX: 33.93 KG/M2 | SYSTOLIC BLOOD PRESSURE: 110 MMHG | OXYGEN SATURATION: 97 %

## 2022-07-22 DIAGNOSIS — L03.115 CELLULITIS OF RIGHT LOWER EXTREMITY: Primary | ICD-10-CM

## 2022-07-22 PROCEDURE — 99213 OFFICE O/P EST LOW 20 MIN: CPT | Performed by: NURSE PRACTITIONER

## 2022-07-22 RX ORDER — SULFAMETHOXAZOLE AND TRIMETHOPRIM 800; 160 MG/1; MG/1
1 TABLET ORAL 2 TIMES DAILY
COMMUNITY
Start: 2022-07-21 | End: 2022-07-28

## 2022-07-22 RX ORDER — MUPIROCIN CALCIUM 20 MG/G
1 CREAM TOPICAL 3 TIMES DAILY
Qty: 30 G | Refills: 0 | Status: SHIPPED | OUTPATIENT
Start: 2022-07-22 | End: 2022-10-24

## 2022-07-22 RX ORDER — DOXYCYCLINE 100 MG/1
100 CAPSULE ORAL 2 TIMES DAILY
Qty: 20 CAPSULE | Refills: 0 | Status: SHIPPED | OUTPATIENT
Start: 2022-07-22 | End: 2022-08-01

## 2022-07-22 NOTE — PROGRESS NOTES
Follow Up Office Note     Patient Name: Meliton Lagos  : 1990   MRN: [id number]     Chief Complaint:    Chief Complaint   Patient presents with   • cellulitis right leg     ER follow up       History of Present Illness: Meliton Lagos is a 32 y.o. male who presents today s/p  ER visit 22 for cellulitis right lower leg. Patient states that the lesion on his right lower leg began 20 days ago. He thinks that it may be from an insect bite. Patient was prescribed a course of Bactrim by ER physician however he states that he stopped taking it after 2 doses as it made his chest hurt. He is currently not taking any antibiotics. He states that he has been cleaning the area with peroxide and using topical Neosporin, but is concerned that the wound is not healing. Prior to going to ER patient states that he tried to drain the lesion at home, but it seemed to get worse afterward.  Patient denies fever, chills, body aches.      Subjective      Review of Systems:   Review of Systems   Constitutional: Negative for activity change, appetite change, chills, diaphoresis, fatigue, fever and unexpected weight change.   HENT: Negative.    Respiratory: Negative.    Cardiovascular: Negative.    Gastrointestinal: Negative.    Skin: Positive for wound. Negative for rash.   Neurological: Negative.         Past Medical History:   Past Medical History:   Diagnosis Date   • GERD (gastroesophageal reflux disease)          Medications:     Current Outpatient Medications:   •  sulfamethoxazole-trimethoprim (BACTRIM DS,SEPTRA DS) 800-160 MG per tablet, Take 1 tablet by mouth 2 (Two) Times a Day., Disp: , Rfl:   •  vitamin D (ERGOCALCIFEROL) 1.25 MG (69039 UT) capsule capsule, TAKE ONE CAPSULE BY MOUTH ONCE WEEKLY, Disp: 12 capsule, Rfl: 0  •  doxycycline (MONODOX) 100 MG capsule, Take 1 capsule by mouth 2 (Two) Times a Day for 10 days., Disp: 20 capsule, Rfl: 0  •  meloxicam (MOBIC) 15 MG tablet, Take 1 tablet by mouth  "Daily., Disp: 10 tablet, Rfl: 0  •  mupirocin (Bactroban) 2 % cream, Apply 1 application topically to the appropriate area as directed 3 (Three) Times a Day., Disp: 30 g, Rfl: 0  •  pantoprazole (PROTONIX) 40 MG EC tablet, Take 1 tablet by mouth Daily., Disp: 30 tablet, Rfl: 5  •  pimecrolimus (Elidel) 1 % cream, Apply 1 application topically to the appropriate area as directed 2 (Two) Times a Day., Disp: 60 g, Rfl: 0    Allergies:   Allergies   Allergen Reactions   • Amoxicillin Other (See Comments)     Chest pain   • Bactrim [Sulfamethoxazole-Trimethoprim] Other (See Comments)     Chest pain         Objective     Physical Exam:  Vital Signs:   Vitals:    07/22/22 1249   BP: 110/60   Pulse: 67   Resp: 16   Temp: 98.4 °F (36.9 °C)   SpO2: 97%   Weight: 98.1 kg (216 lb 3.2 oz)   Height: 170.2 cm (67\")     Body mass index is 33.86 kg/m².     Physical Exam  Vitals and nursing note reviewed.   Constitutional:       General: He is not in acute distress.     Appearance: Normal appearance. He is well-developed. He is not ill-appearing, toxic-appearing or diaphoretic.   HENT:      Head: Normocephalic and atraumatic.   Cardiovascular:      Rate and Rhythm: Normal rate and regular rhythm.   Pulmonary:      Effort: Pulmonary effort is normal. No respiratory distress.      Breath sounds: Normal breath sounds. No stridor. No wheezing.   Musculoskeletal:        Legs:    Skin:     General: Skin is warm and dry.      Findings: Lesion present. No rash.   Neurological:      General: No focal deficit present.      Mental Status: He is alert and oriented to person, place, and time.   Psychiatric:         Mood and Affect: Mood normal.         Behavior: Behavior normal. Behavior is cooperative.         Thought Content: Thought content normal.         Judgment: Judgment normal.         Assessment / Plan      Assessment/Plan:   Diagnoses and all orders for this visit:    1. Cellulitis of right lower extremity (Primary)  -     doxycycline " (MONODOX) 100 MG capsule; Take 1 capsule by mouth 2 (Two) Times a Day for 10 days.  Dispense: 20 capsule; Refill: 0  -     mupirocin (Bactroban) 2 % cream; Apply 1 application topically to the appropriate area as directed 3 (Three) Times a Day.  Dispense: 30 g; Refill: 0     Patient advised not to take any more Bactrim. Bactrim put on patient's allergy list.  Continue wound care.    Follow Up:   PRN and at next scheduled appointment(s) with PCP.    Discussed the nature of the medical condition(s) risks, complications, implications, management, safe and proper use of medications. Encouraged medication compliance, and keeping scheduled follow up appointments with me and any other providers.      RTC if symptoms fail to improve, to ER if symptoms worsen.        *Dictated Utilizing Dragon Dictation   Please note that portions of this note were completed with a voice recognition program.   Part of this note may be an electronic transcription/translation of spoken language to printed text using the Dragon Dictation System.          JAY León  [facility] [place]

## 2022-08-08 ENCOUNTER — APPOINTMENT (OUTPATIENT)
Dept: MRI IMAGING | Facility: HOSPITAL | Age: 32
End: 2022-08-08

## 2022-08-08 ENCOUNTER — HOSPITAL ENCOUNTER (OUTPATIENT)
Dept: MRI IMAGING | Facility: HOSPITAL | Age: 32
Discharge: HOME | End: 2022-08-08

## 2022-08-08 DIAGNOSIS — R20.2 NUMBNESS AND TINGLING IN LEFT ARM: ICD-10-CM

## 2022-08-08 DIAGNOSIS — R20.0 NUMBNESS AND TINGLING IN LEFT ARM: ICD-10-CM

## 2022-08-08 DIAGNOSIS — G44.89 OTHER HEADACHE SYNDROME: ICD-10-CM

## 2022-08-08 DIAGNOSIS — R42 DIZZINESS: ICD-10-CM

## 2022-08-08 PROCEDURE — 70553 MRI BRAIN STEM W/O & W/DYE: CPT

## 2022-08-08 PROCEDURE — A9577 INJ MULTIHANCE: HCPCS | Performed by: NURSE PRACTITIONER

## 2022-08-08 PROCEDURE — 0 GADOBENATE DIMEGLUMINE 529 MG/ML SOLUTION: Performed by: NURSE PRACTITIONER

## 2022-08-08 PROCEDURE — 72156 MRI NECK SPINE W/O & W/DYE: CPT

## 2022-08-08 RX ADMIN — GADOBENATE DIMEGLUMINE 5 ML: 529 INJECTION, SOLUTION INTRAVENOUS at 16:18

## 2022-08-10 ENCOUNTER — TELEPHONE (OUTPATIENT)
Dept: NEUROLOGY | Facility: CLINIC | Age: 32
End: 2022-08-10

## 2022-08-10 NOTE — TELEPHONE ENCOUNTER
Called patient and gave results.  Patient was understanding.    ----- Message from Armaan Baxter DNP, APRN sent at 8/10/2022  8:23 AM EDT -----  Please notify pt MRI of brain and neck show no concerning findings.

## 2022-10-24 ENCOUNTER — OFFICE VISIT (OUTPATIENT)
Dept: FAMILY MEDICINE CLINIC | Facility: CLINIC | Age: 32
End: 2022-10-24

## 2022-10-24 ENCOUNTER — LAB (OUTPATIENT)
Dept: LAB | Facility: HOSPITAL | Age: 32
End: 2022-10-24

## 2022-10-24 VITALS
DIASTOLIC BLOOD PRESSURE: 76 MMHG | BODY MASS INDEX: 34.37 KG/M2 | WEIGHT: 219 LBS | OXYGEN SATURATION: 97 % | HEIGHT: 67 IN | TEMPERATURE: 98.6 F | HEART RATE: 77 BPM | SYSTOLIC BLOOD PRESSURE: 122 MMHG

## 2022-10-24 DIAGNOSIS — R53.82 CHRONIC FATIGUE: Chronic | ICD-10-CM

## 2022-10-24 DIAGNOSIS — M62.838 NECK MUSCLE SPASM: ICD-10-CM

## 2022-10-24 DIAGNOSIS — K21.9 CHRONIC GERD: Primary | Chronic | ICD-10-CM

## 2022-10-24 DIAGNOSIS — E55.9 VITAMIN D DEFICIENCY: ICD-10-CM

## 2022-10-24 PROCEDURE — 99214 OFFICE O/P EST MOD 30 MIN: CPT | Performed by: NURSE PRACTITIONER

## 2022-10-24 PROCEDURE — 82306 VITAMIN D 25 HYDROXY: CPT

## 2022-10-24 PROCEDURE — 85027 COMPLETE CBC AUTOMATED: CPT

## 2022-10-24 RX ORDER — TIZANIDINE 4 MG/1
4 TABLET ORAL NIGHTLY PRN
Qty: 30 TABLET | Refills: 1 | OUTPATIENT
Start: 2022-10-24 | End: 2023-03-21

## 2022-10-24 RX ORDER — OMEPRAZOLE 40 MG/1
40 CAPSULE, DELAYED RELEASE ORAL DAILY
Qty: 90 CAPSULE | Refills: 0 | Status: SHIPPED | OUTPATIENT
Start: 2022-10-24 | End: 2023-03-21

## 2022-10-25 LAB
25(OH)D3 SERPL-MCNC: 19.9 NG/ML (ref 30–100)
DEPRECATED RDW RBC AUTO: 36.3 FL (ref 37–54)
ERYTHROCYTE [DISTWIDTH] IN BLOOD BY AUTOMATED COUNT: 12.1 % (ref 12.3–15.4)
HCT VFR BLD AUTO: 46.1 % (ref 37.5–51)
HGB BLD-MCNC: 15.8 G/DL (ref 13–17.7)
MCH RBC QN AUTO: 28.8 PG (ref 26.6–33)
MCHC RBC AUTO-ENTMCNC: 34.3 G/DL (ref 31.5–35.7)
MCV RBC AUTO: 84.1 FL (ref 79–97)
PLATELET # BLD AUTO: 247 10*3/MM3 (ref 140–450)
PMV BLD AUTO: 10.8 FL (ref 6–12)
RBC # BLD AUTO: 5.48 10*6/MM3 (ref 4.14–5.8)
WBC NRBC COR # BLD: 4.93 10*3/MM3 (ref 3.4–10.8)

## 2022-10-28 DIAGNOSIS — E55.9 VITAMIN D DEFICIENCY: ICD-10-CM

## 2022-10-28 RX ORDER — ERGOCALCIFEROL 1.25 MG/1
CAPSULE ORAL
Qty: 4 CAPSULE | Refills: 11 | Status: SHIPPED | OUTPATIENT
Start: 2022-10-28 | End: 2022-11-01 | Stop reason: SDUPTHER

## 2022-10-29 NOTE — ASSESSMENT & PLAN NOTE
Acute exacerbation chronic GERD.  Restart omeprazole daily. Take in the morning on an empty stomach. Encouraged medication compliance.

## 2022-10-29 NOTE — PROGRESS NOTES
Follow Up Office Note     Patient Name: Meliton Lagos  : 1990   MRN: [id number]     Chief Complaint:    Chief Complaint   Patient presents with   • Heartburn     Pt states that after he eats he is having heart burn. Pt states that sometime it can be controlled but lately has been acting up.    • Headache     Pt states that if he drives more than 4 hours he will get a headache and dizzy spells.        History of Present Illness: Meliton Lagos is a 32 y.o. male who presents today with c/o acute exacerbation of chronic GERD symptoms for the past several weeks. He states that he has not been taking his omeprazole.    Patient is a  and is c/o frequent headaches if he drives for more than 4 hours at a time and will sometimes feel dizzy.    Patient c/o chronic fatigue. He is requesting to have his Vitamin D level checked today.    Subjective      Review of Systems:   Review of Systems   Constitutional: Positive for fatigue (chronic). Negative for activity change, appetite change, chills, diaphoresis, fever and unexpected weight change.   HENT: Negative for trouble swallowing and voice change.    Respiratory: Negative for cough, choking and wheezing.    Cardiovascular: Negative for chest pain, palpitations and leg swelling.   Gastrointestinal: Positive for abdominal pain (epigastric). Negative for abdominal distention, blood in stool, diarrhea, nausea and vomiting.        Severe heartburn   Musculoskeletal: Positive for myalgias and neck pain.   Neurological: Positive for headaches. Negative for tremors, seizures, syncope, facial asymmetry, speech difficulty, weakness, light-headedness and numbness.        Past Medical History:   Past Medical History:   Diagnosis Date   • GERD (gastroesophageal reflux disease)          Medications:     Current Outpatient Medications:   •  omeprazole (priLOSEC) 40 MG capsule, Take 1 capsule by mouth Daily. Take 30 minutes 1st meal of the day, Disp: 90  "capsule, Rfl: 0  •  tiZANidine (ZANAFLEX) 4 MG tablet, Take 1 tablet by mouth At Night As Needed for Muscle Spasms., Disp: 30 tablet, Rfl: 1  •  vitamin D (ERGOCALCIFEROL) 1.25 MG (04644 UT) capsule capsule, TAKE ONE CAPSULE BY MOUTH ONCE WEEKLY, Disp: 4 capsule, Rfl: 11    Allergies:   Allergies   Allergen Reactions   • Amoxicillin Other (See Comments)     Chest pain   • Bactrim [Sulfamethoxazole-Trimethoprim] Other (See Comments)     Chest pain         Objective     Physical Exam:  Vital Signs:   Vitals:    10/24/22 1400   BP: 122/76   BP Location: Left arm   Patient Position: Sitting   Cuff Size: Large Adult   Pulse: 77   Temp: 98.6 °F (37 °C)   TempSrc: Infrared   SpO2: 97%   Weight: 99.3 kg (219 lb)   Height: 170.2 cm (67\")   PainSc: 0-No pain     Body mass index is 34.3 kg/m².     Physical Exam  Vitals and nursing note reviewed.   Constitutional:       General: He is not in acute distress.     Appearance: Normal appearance. He is well-developed. He is not ill-appearing, toxic-appearing or diaphoretic.   HENT:      Head: Normocephalic and atraumatic.   Cardiovascular:      Rate and Rhythm: Normal rate and regular rhythm.   Pulmonary:      Effort: Pulmonary effort is normal. No respiratory distress.      Breath sounds: Normal breath sounds. No stridor. No wheezing.   Abdominal:      General: There is no distension.      Palpations: Abdomen is soft.      Tenderness: There is no abdominal tenderness. There is no guarding or rebound.   Musculoskeletal:      Right shoulder: Normal.      Left shoulder: Normal.      Cervical back: Spasms and tenderness present. No swelling, edema, deformity or rigidity. Normal range of motion.      Thoracic back: Normal.      Lumbar back: Normal.      Comments: Shoulder tension/spasms bilaterally   Skin:     General: Skin is warm and dry.   Neurological:      General: No focal deficit present.      Mental Status: He is alert and oriented to person, place, and time.   Psychiatric:    "      Mood and Affect: Mood normal.         Behavior: Behavior normal. Behavior is cooperative.         Thought Content: Thought content normal.         Judgment: Judgment normal.         Assessment / Plan      Assessment/Plan:   Diagnoses and all orders for this visit:    1. Chronic GERD (Primary)  Assessment & Plan:  Acute exacerbation chronic GERD.  Restart omeprazole daily. Take in the morning on an empty stomach. Encouraged medication compliance.    Orders:  -     omeprazole (priLOSEC) 40 MG capsule; Take 1 capsule by mouth Daily. Take 30 minutes 1st meal of the day  Dispense: 90 capsule; Refill: 0    2. Neck muscle spasm  -     tiZANidine (ZANAFLEX) 4 MG tablet; Take 1 tablet by mouth At Night As Needed for Muscle Spasms.  Dispense: 30 tablet; Refill: 1    3. Vitamin D deficiency  -     Vitamin D,25-Hydroxy; Future  -     CBC (No Diff); Future    4. Chronic fatigue  -     CBC (No Diff); Future     Laboratory studies per orders, further recommendation after results evaluation.     Follow Up:   PRN and at next scheduled appointment(s) with PCP.    Discussed the nature of the medical condition(s) risks, complications, implications, management, safe and proper use of medications. Encouraged medication compliance, and keeping scheduled follow up appointments with me and any other providers.      RTC if symptoms fail to improve, to ER if symptoms worsen.        *Dictated Utilizing Dragon Dictation   Please note that portions of this note were completed with a voice recognition program.   Part of this note may be an electronic transcription/translation of spoken language to printed text using the Dragon Dictation System.          JAY León  [facility]

## 2022-11-01 DIAGNOSIS — E55.9 VITAMIN D DEFICIENCY: ICD-10-CM

## 2022-11-01 RX ORDER — ERGOCALCIFEROL 1.25 MG/1
50000 CAPSULE ORAL
Qty: 5 CAPSULE | Refills: 11 | Status: SHIPPED | OUTPATIENT
Start: 2022-11-01 | End: 2023-03-21

## 2023-01-25 NOTE — ASSESSMENT & PLAN NOTE
Called patient's mother, relayed information. She requested it to be sent to her in [place]. Sent. Closing encounter.     Lucy Mcpherson, BON  [phone]   Laboratory studies per orders.  Further recommendation after laboratory evaluation.

## 2023-03-17 ENCOUNTER — HOSPITAL ENCOUNTER (EMERGENCY)
Facility: HOSPITAL | Age: 33
Discharge: HOME | End: 2023-03-17
Attending: EMERGENCY MEDICINE | Admitting: EMERGENCY MEDICINE
Payer: MEDICAID

## 2023-03-17 VITALS
OXYGEN SATURATION: 98 % | DIASTOLIC BLOOD PRESSURE: 99 MMHG | TEMPERATURE: 98.9 F | WEIGHT: 200 LBS | HEART RATE: 90 BPM | HEIGHT: 67 IN | BODY MASS INDEX: 31.39 KG/M2 | RESPIRATION RATE: 16 BRPM | SYSTOLIC BLOOD PRESSURE: 142 MMHG

## 2023-03-17 DIAGNOSIS — R51.9 ACUTE NONINTRACTABLE HEADACHE, UNSPECIFIED HEADACHE TYPE: Primary | ICD-10-CM

## 2023-03-17 LAB
ATMOSPHERIC PRESS: ABNORMAL MM[HG]
BASE EXCESS BLDV CALC-SCNC: 2.1 MMOL/L (ref -2–2)
BDY SITE: ABNORMAL
BODY TEMPERATURE: 37 C
CO2 BLDA-SCNC: 29.4 MMOL/L (ref 22–33)
COHGB MFR BLD: 1 %
EPAP: 0
HCO3 BLDV-SCNC: 27.9 MMOL/L (ref 22–28)
HGB BLDA-MCNC: 16.3 G/DL (ref 13.5–17.5)
INHALED O2 CONCENTRATION: 21 %
IPAP: 0
METHGB BLD QL: 0.5 %
MODALITY: ABNORMAL
NOTE: ABNORMAL
OXYHGB MFR BLDV: 57.3 %
PAW @ PEAK INSP FLOW SETTING VENT: 0 CMH2O
PCO2 BLDV: 46.6 MM HG (ref 41–51)
PH BLDV: 7.39 PH UNITS (ref 7.31–7.41)
PO2 BLDV: 31 MM HG (ref 27–53)
TOTAL RATE: 0 BREATHS/MINUTE

## 2023-03-17 PROCEDURE — 82805 BLOOD GASES W/O2 SATURATION: CPT

## 2023-03-17 PROCEDURE — 99282 EMERGENCY DEPT VISIT SF MDM: CPT

## 2023-03-17 NOTE — DISCHARGE INSTRUCTIONS
It is important that you follow-up with Dr. Herr for further evaluation of your headache.  I would recommend starting Zyrtec daily.  Return anytime if worsening headache or any concerns.

## 2023-03-17 NOTE — ED PROVIDER NOTES
Subjective   History of Present Illness  Mr Harmon presents with headache.  He tells me it has been happening over the last 2 weeks.  Sometimes its occipital, sometimes facial, sometimes over the vertex.  He tells me that he has had at least 1 episode of blurry vision but otherwise no visual changes.  He tells me he notices it mostly when he is driving.  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  He denies any nausea or vomiting.  He has been noting mild epistaxis when he wipes or rubs his nose.  He denies runny nose or allergies.  He denies fevers or chills.        Review of Systems    Past Medical History:   Diagnosis Date   • GERD (gastroesophageal reflux disease)        Allergies   Allergen Reactions   • Amoxicillin Other (See Comments)     Chest pain   • Bactrim [Sulfamethoxazole-Trimethoprim] Other (See Comments)     Chest pain       Past Surgical History:   Procedure Laterality Date   • ANAL FISTULA REPAIR     • CHOLECYSTECTOMY     • COLONOSCOPY      [facility]   • ENDOSCOPY  2021    Dr. Mobley empiric dilation performed bx suggestive of esophagitis   • ENDOSCOPY  2020    EGD Dr. Mobley, empiric dilation erosive esophagitis       Family History   Problem Relation Age of Onset   • No Known Problems Mother    • No Known Problems Father    • Heart attack Maternal Grandfather    • Heart disease Maternal Grandfather    • No Known Problems Paternal Grandmother    • No Known Problems Paternal Grandfather    • Colon cancer Neg Hx    • Colon polyps Neg Hx        Social History     Socioeconomic History   • Marital status:    Tobacco Use   • Smoking status: Former     Packs/day: 1.00     Years: 5.00     Pack years: 5.00     Types: Cigarettes     Start date: 2012     Quit date: 2016     Years since quittin.2   • Smokeless tobacco: Never   Vaping Use   • Vaping Use: Never used   Substance and Sexual Activity   • Alcohol use: Yes     Comment: occas   • Drug use:  No   • Sexual activity: Yes     Partners: Female     Birth control/protection: None           Objective   Physical Exam  Vitals and nursing note reviewed.   Constitutional:       General: He is not in acute distress.     Appearance: Normal appearance.   HENT:      Head: Normocephalic and atraumatic.      Right Ear: Tympanic membrane normal.      Left Ear: Tympanic membrane normal.      Nose: No congestion or rhinorrhea.      Comments: The mucosa on the anterior septum is erythematous and inflamed appearing.  I do not see any active bleeding.  Looks more like allergies  Eyes:      General: No scleral icterus.     Conjunctiva/sclera: Conjunctivae normal.   Neck:      Comments: No JVD  Cardiovascular:      Rate and Rhythm: Normal rate and regular rhythm.      Heart sounds: No murmur heard.    No friction rub.   Pulmonary:      Effort: Pulmonary effort is normal.      Breath sounds: Normal breath sounds. No wheezing or rales.   Musculoskeletal:      Cervical back: Normal range of motion and neck supple.   Skin:     General: Skin is warm and dry.      Capillary Refill: Capillary refill takes less than 2 seconds.   Neurological:      General: No focal deficit present.      Mental Status: He is alert.      Motor: No weakness.      Coordination: Coordination normal.   Psychiatric:         Mood and Affect: Mood normal.         Behavior: Behavior normal.         Thought Content: Thought content normal.         Procedures           ED Course  ED Course as of 03/17/23 1653   Fri Mar 17, 2023   0704 I advised him of my plan for cat scanning, labs and he advises that he has to go in 45 minutes.  He does not want the CT scan or labs.  A fingerstick was done showing blood sugar of 145.  I advised him of my concern for carbon monoxide.  He admits that he does occasionally smell exhaust in his truck.  He has agreed to get a VBG to check for that.  I have advised him that he will need to see his primary care provider for further  work-up of the headaches.  Will try to get respiratory therapist to run the VBG in the next few minutes. [DT]   1651 He advised his nurse that he needed to leave immediately.  The VBG returned just at that time showing normal carbon monoxide level.  He was discharged with instructions to follow-up with his primary care provider for further evaluation.  I had expected this would happen and I had told him beforehand that he would need to follow-up with her for further work-up.  I had recommended that he begin taking an antihistamine as I suspect his headache and bloody nose were from allergies [DT]      ED Course User Index  [DT] Steve Oliveira MD                                           Medical Decision Making  Please refer to emergency department course    Acute nonintractable headache, unspecified headache type: acute illness or injury that poses a threat to life or bodily functions  Amount and/or Complexity of Data Reviewed  External Data Reviewed: notes.  Labs: ordered. Decision-making details documented in ED Course.          Final diagnoses:   Acute nonintractable headache, unspecified headache type       ED Disposition  ED Disposition     ED Disposition   Discharge    Condition   Stable    Comment   --             Sanna Herr, APRN  [address] [address]  [phone]               Medication List      No changes were made to your prescriptions during this visit.          Steve Oliveira MD  03/17/23 7163

## 2023-03-21 ENCOUNTER — OFFICE VISIT (OUTPATIENT)
Dept: FAMILY MEDICINE CLINIC | Facility: CLINIC | Age: 33
End: 2023-03-21
Payer: MEDICAID

## 2023-03-21 ENCOUNTER — HOSPITAL ENCOUNTER (EMERGENCY)
Facility: HOSPITAL | Age: 33
Discharge: HOME | End: 2023-03-21
Attending: EMERGENCY MEDICINE | Admitting: EMERGENCY MEDICINE
Payer: MEDICAID

## 2023-03-21 VITALS
DIASTOLIC BLOOD PRESSURE: 84 MMHG | TEMPERATURE: 98.9 F | RESPIRATION RATE: 14 BRPM | HEIGHT: 67 IN | BODY MASS INDEX: 31.39 KG/M2 | OXYGEN SATURATION: 97 % | WEIGHT: 200 LBS | HEART RATE: 69 BPM | SYSTOLIC BLOOD PRESSURE: 113 MMHG

## 2023-03-21 VITALS
SYSTOLIC BLOOD PRESSURE: 119 MMHG | TEMPERATURE: 98 F | HEART RATE: 83 BPM | OXYGEN SATURATION: 97 % | HEIGHT: 67 IN | WEIGHT: 216 LBS | BODY MASS INDEX: 33.9 KG/M2 | DIASTOLIC BLOOD PRESSURE: 71 MMHG

## 2023-03-21 DIAGNOSIS — E78.00 HYPERCHOLESTEROLEMIA: ICD-10-CM

## 2023-03-21 DIAGNOSIS — G89.29 CHRONIC CERVICAL PAIN: ICD-10-CM

## 2023-03-21 DIAGNOSIS — R06.02 SHORTNESS OF BREATH ON EXERTION: ICD-10-CM

## 2023-03-21 DIAGNOSIS — R68.89 ACTIVITY INTOLERANCE: ICD-10-CM

## 2023-03-21 DIAGNOSIS — R07.9 CHEST PAIN, UNSPECIFIED TYPE: ICD-10-CM

## 2023-03-21 DIAGNOSIS — G43.009 MIGRAINE WITHOUT AURA AND WITHOUT STATUS MIGRAINOSUS, NOT INTRACTABLE: Primary | Chronic | ICD-10-CM

## 2023-03-21 DIAGNOSIS — R22.31 LOCALIZED SWELLING OF RIGHT UPPER EXTREMITY: ICD-10-CM

## 2023-03-21 DIAGNOSIS — M54.2 CHRONIC CERVICAL PAIN: ICD-10-CM

## 2023-03-21 DIAGNOSIS — G44.86 HEADACHE, CERVICOGENIC: Primary | ICD-10-CM

## 2023-03-21 PROCEDURE — 99283 EMERGENCY DEPT VISIT LOW MDM: CPT

## 2023-03-21 RX ORDER — RIZATRIPTAN BENZOATE 5 MG/1
5 TABLET, ORALLY DISINTEGRATING ORAL ONCE AS NEEDED
Qty: 8 TABLET | Refills: 0 | Status: SHIPPED | OUTPATIENT
Start: 2023-03-21 | End: 2023-03-21

## 2023-03-21 RX ORDER — BUTALBITAL, ACETAMINOPHEN AND CAFFEINE 50; 325; 40 MG/1; MG/1; MG/1
1 TABLET ORAL ONCE
Status: COMPLETED | OUTPATIENT
Start: 2023-03-21 | End: 2023-03-21

## 2023-03-21 RX ADMIN — BUTALBITAL, ACETAMINOPHEN, AND CAFFEINE 1 TABLET: 50; 325; 40 TABLET ORAL at 07:50

## 2023-03-21 NOTE — DISCHARGE INSTRUCTIONS
Try the co-Q10 and vitamin B2 to help prevent your headaches and Motrin if you have a headache and the prescription medicine is the last resort.

## 2023-03-21 NOTE — ED PROVIDER NOTES
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  He is followed by [facility].    I reviewed his recent ED visits and primary care visits.    He is plagued by headaches which have been present for at least 4 to 5 years.  Seldom does a month ago by that he does not have a headache but its been much worse in the past 2 weeks.  It is there when he wakes up in the morning and there when he goes to sleep at night but laying down does improve it.  He took Tylenol yesterday that seemed to help for a little bit.  Headaches seem to be insidious in onset.  No tearing associated with them they do not seem to have a unilateral component    Previously he had an MRI of his brain in 2018 which was unremarkable but continued to have headaches in 2022 had a CT scan of his head which was also unremarkable but an MRI of his brain and cervical spine which did show perhaps mild stigmata of migraines.  He was tried on a few medications and they cause chest pain and nothing really seem to help long-term.  He has not seen a neurologist for this.  There is been no head trauma.    He was seen earlier this month by Dr. Oliveira with a headache and a VBG done which was reassuring.    He is also worried about his blood pressure which has been up a little bit here in his last ED visit but he reports it was normal at home a few days ago.    He has had chest pain in the past as well he seen cardiology I have reviewed those records as well.    With his current headache he reports it starts at the base of his neck and seems to go up to the top of his head and feels like his head is on fire.  He has had no lesions.  Previously complained of nosebleeds but none recently.  Sometimes when the headache is bad he gets nauseated with it and his vision seems to get little bit blurry in both eyes.  No focal weakness.    No family history of migraine  headaches that he can recall.        All other systems reviewed and are negative except as noted above.        Review of Systems   All other systems reviewed and are negative.      Past Medical History:   Diagnosis Date   • GERD (gastroesophageal reflux disease)    from [facility] 2022  Previous cardiac studies and procedures:  May 2020 exercise treadmill test:  • The exercise ECG stress test was negative for clinical and ECG evidence of myocardial ischemia. The [facility] treadmill score was 4.8. The patient had sudden onset dyspnea at the 4:46 minute latoya and had to stop the study. He had an exaggerated heart rate response to stress. With stopping at this time he also had a severely decreased exercise capacity, but technically reached his target heart rate.  • Impressions are consistent with a low risk stress test.     September 2021 TTE  • Left ventricular ejection fraction appears to be 61 - 65%. Left ventricular systolic function is normal.  • Left ventricular diastolic function was normal.  • Estimated right ventricular systolic pressure from tricuspid regurgitation is normal (<35 mmHg).  • No significant structural or functional valvular disease.     Allergies   Allergen Reactions   • Amoxicillin Other (See Comments)     Chest pain   • Bactrim [Sulfamethoxazole-Trimethoprim] Other (See Comments)     Chest pain       Past Surgical History:   Procedure Laterality Date   • ANAL FISTULA REPAIR  2009   • CHOLECYSTECTOMY     • COLONOSCOPY  2019    [facility]   • ENDOSCOPY  06/16/2021    Dr. Mobley empiric dilation performed bx suggestive of esophagitis   • ENDOSCOPY  06/30/2020    EGD Dr. Mobley, empiric dilation erosive esophagitis       Family History   Problem Relation Age of Onset   • No Known Problems Mother    • No Known Problems Father    • Heart attack Maternal Grandfather    • Heart disease Maternal Grandfather    • No Known Problems Paternal Grandmother    • No Known Problems Paternal  Grandfather    • Colon cancer Neg Hx    • Colon polyps Neg Hx        Social History     Socioeconomic History   • Marital status:    Tobacco Use   • Smoking status: Former     Packs/day: 1.00     Years: 5.00     Pack years: 5.00     Types: Cigarettes     Start date: 2012     Quit date: 2016     Years since quittin.2   • Smokeless tobacco: Never   Vaping Use   • Vaping Use: Never used   Substance and Sexual Activity   • Alcohol use: Yes     Comment: occas   • Drug use: No   • Sexual activity: Yes     Partners: Female     Birth control/protection: None           Objective   Physical Exam  Vitals and nursing note reviewed.   Constitutional:       Appearance: He is normal weight.      Comments: Pleasant 32-year-old alert and oriented GCS 15.   HENT:      Head: Normocephalic and atraumatic.      Comments: Scalp is unremarkable.     Right Ear: External ear normal.      Left Ear: External ear normal.      Nose: Nose normal.      Mouth/Throat:      Mouth: Mucous membranes are moist.      Pharynx: Oropharynx is clear.   Eyes:      Extraocular Movements: Extraocular movements intact.      Conjunctiva/sclera: Conjunctivae normal.      Pupils: Pupils are equal, round, and reactive to light.   Neck:      Vascular: No carotid bruit.      Comments: He is not tender to palpation of the cervical spine I cannot reproduce the headache by deep palpation there.  Cervical thoracic spine are nontender  Cardiovascular:      Rate and Rhythm: Normal rate and regular rhythm.      Pulses: Normal pulses.      Heart sounds: Normal heart sounds.   Pulmonary:      Effort: Pulmonary effort is normal.      Breath sounds: Normal breath sounds.   Musculoskeletal:         General: No swelling or tenderness. Normal range of motion.      Cervical back: Normal range of motion and neck supple. No rigidity or tenderness.   Lymphadenopathy:      Cervical: No cervical adenopathy.   Skin:     General: Skin is warm and dry.      Capillary  "Refill: Capillary refill takes less than 2 seconds.   Neurological:      Mental Status: He is alert.      Comments: Face is symmetric, voice strong, tongue midline.  Vision, hearing, and speech preserved.  His gait is normal.  No focal weakness.  His knee jerk reflexes 2+ bilaterally.         Procedures           ED Course            No results found for this or any previous visit (from the past 24 hour(s)).  Note: In addition to lab results from this visit, the labs listed above may include labs taken at another facility or during a different encounter within the last 24 hours. Please correlate lab times with ED admission and discharge times for further clarification of the services performed during this visit.    No orders to display     Vitals:    03/21/23 0534 03/21/23 0536 03/21/23 0753   BP: 140/88  113/84   Pulse: 79  69   Resp:  14 14   Temp: 98.9 °F (37.2 °C)     TempSrc: Oral     SpO2: 99%  97%   Weight: 90.7 kg (200 lb)     Height: 170.2 cm (67\")       Medications   butalbital-acetaminophen-caffeine (FIORICET, ESGIC) -40 MG per tablet 1 tablet (1 tablet Oral Given 3/21/23 0750)     ECG/EMG Results (last 24 hours)     ** No results found for the last 24 hours. **        No orders to display                               ILYA reviewed by Franky Pickard MD       Medical Decision Making        I have reviewed all available studies at the bedside with the patient.  His previous MRIs and CT I have reviewed and are reassuring.    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    Unfortunately did have Maxalt on the formulary here but I given Fioricet and he had improvement with this.    Given a trial of Maxalt at home.  He has a lot of medication sensitivities so I talked him about taking a vitamin supplement and co-Q10 to see if this helps decrease frequency of his headaches and I will refer him to neurology for further evaluation as well as follow-up with his PCP.    He will " return to the ED if worse in any way.    Are agreeable with the plan    Headache, cervicogenic: acute illness or injury  Amount and/or Complexity of Data Reviewed  External Data Reviewed: radiology and notes.      Risk  Prescription drug management.          Final diagnoses:   Headache, cervicogenic       ED Disposition  ED Disposition     ED Disposition   Discharge    Condition   Stable    Comment   --             Sanna Herr, APRN  [address] [facility]  [address]  [facility] [id number] [phone]    Schedule an appointment as soon as possible for a visit       Giovanna Sheridan MD  [address] [place] [address]  [facility] [address] [phone]    Schedule an appointment as soon as possible for a visit            Medication List      Stop    tiZANidine 4 MG tablet  Commonly known as: Franky Calix MD  03/21/23 5395

## 2023-03-21 NOTE — PROGRESS NOTES
Follow Up Office Note     Patient Name: Meliton Lagos  : 1990   MRN: [id number]     Chief Complaint:    Chief Complaint   Patient presents with   • Headache   • swelling     Per patient he got a COVID vaccine 2 years ago and has had upper right arm swelling; never goes down.    • Shortness of Breath       History of Present Illness: Meliton Lagos is a 32 y.o. male who presents today s/p ER visit 3/21/23 for headache. Patient states that the headache goes up from his neck to his head. He was also seen at ER 3/17/23 for same complaint. Patient c/o headache today. He reports that he has not tried the rizatriptan medication that he was prescribed as of yet. Patient was advised to schedule an appointment with Dr. Sheridan at [facility] which he has also not done. He states that he thought their office would call him to schedule.     Patient c/o chest tightness and shortness of breath with exertion. This has been an ongoing issue for the past 3 years. He has had a stress test which was considered low risk and a TTE with EF 61-65%.     Patient also c/o swelling right upper deltoid area which has progressively been increasing in size. He states the swelling began after he received a Covid vaccine two years ago.      ED with Franky Pickard MD (2023)  ED with Steve Oliveira MD (2023)  Adult Transthoracic Echo Complete W/ Cont if Necessary Per Protocol (2021 11:50)  Adult Transthoracic Echo Complete W/ Cont if Necessary Per Protocol (2020 16:03)  Treadmill Stress Test (2020 15:11)  CT Angiogram Chest (2022 07:12)  MRI Cervical Spine With & Without Contrast (2022 16:18)    Subjective      I have reviewed and the following portions of the patient's history were updated as appropriate: past family history, past medical history, past social history, past surgical history and problem list.    Review of Systems:   Review of Systems   Constitutional: Positive  "for fatigue. Negative for chills, diaphoresis and fever.   Respiratory: Positive for chest tightness and shortness of breath (with exertion). Negative for cough, wheezing and stridor.    Cardiovascular: Positive for chest pain. Negative for palpitations and leg swelling.   Musculoskeletal: Positive for neck pain.   Neurological: Positive for headaches. Negative for dizziness, tremors, seizures, syncope, facial asymmetry, speech difficulty, weakness, light-headedness and numbness.        Past Medical History:   Past Medical History:   Diagnosis Date   • GERD (gastroesophageal reflux disease)          Medications:     Current Outpatient Medications:   •  rizatriptan MLT (MAXALT-MLT) 5 MG disintegrating tablet, , Disp: , Rfl:     Allergies:   Allergies   Allergen Reactions   • Amoxicillin Other (See Comments)     Chest pain   • Bactrim [Sulfamethoxazole-Trimethoprim] Other (See Comments)     Chest pain         Objective     Physical Exam:  Vital Signs:   Vitals:    03/21/23 1536   BP: 119/71   Pulse: 83   Temp: 98 °F (36.7 °C)   TempSrc: Infrared   SpO2: 97%   Weight: 98 kg (216 lb)   Height: 170.2 cm (67.01\")   PainSc: 0-No pain     Body mass index is 33.82 kg/m².     Physical Exam  Vitals and nursing note reviewed.   Constitutional:       General: He is not in acute distress.     Appearance: Normal appearance. He is well-developed. He is not ill-appearing, toxic-appearing or diaphoretic.   HENT:      Head: Normocephalic and atraumatic.   Cardiovascular:      Rate and Rhythm: Normal rate and regular rhythm.      Heart sounds: Normal heart sounds, S1 normal and S2 normal. No murmur heard.  Pulmonary:      Effort: Pulmonary effort is normal. No tachypnea or respiratory distress.      Breath sounds: Normal breath sounds. No stridor. No decreased breath sounds, wheezing, rhonchi or rales.   Skin:     General: Skin is warm and dry.   Neurological:      General: No focal deficit present.      Mental Status: He is alert and " oriented to person, place, and time.   Psychiatric:         Mood and Affect: Mood is anxious.         Behavior: Behavior normal. Behavior is cooperative.         Thought Content: Thought content normal.         Judgment: Judgment normal.         Assessment / Plan      Assessment/Plan:   Diagnoses and all orders for this visit:    1. Migraine without aura and without status migrainosus, not intractable (Primary)  Assessment & Plan:  Headaches are unchanged.  Continue current treatment regimen.  Will place referral to neurology for patient.   Begin Maxalt.        Orders:  -     Ambulatory Referral to Neurology    2. Chronic cervical pain  Assessment & Plan:  Plan to refer patient to PT per orders.    Orders:  -     Ambulatory Referral to Physical Therapy Evaluate and treat    3. Chest pain, unspecified type  -     Ambulatory Referral to Cardiology  -     ECG 12 Lead    4. Shortness of breath on exertion  -     Ambulatory Referral to Cardiology  -     ECG 12 Lead    5. Activity intolerance  -     Ambulatory Referral to Cardiology  -     ECG 12 Lead    6. Localized swelling of right upper extremity  -     US Nonvascular Extremity Limited; Future    7. Hypercholesterolemia  -     Lipid Panel; Future       ECG 12 Lead    Date/Time: 3/21/2023 4:05 PM  Performed by: Sanna Herr APRN  Authorized by: Sanna Herr APRN   Comparison: compared with previous ECG from 4/21/2022  Comparison to previous ECG: Incomplete RBBB resolved when compared to previous ECG  Rhythm: sinus rhythm  Rate: normal  BPM: 84  Conduction: conduction normal  QRS axis: left  Other: no other findings    Clinical impression: abnormal EKG            Follow Up:   PRN and at next scheduled appointment(s) with PCP.    Discussed the nature of the medical condition(s) risks, complications, implications, management, safe and proper use of medications. Encouraged medication compliance, and keeping scheduled follow up appointments with me and any other  providers.      I spent 30 minutes caring for Ali on this date of service. This time includes time spent by me in the following activities:preparing for the visit, reviewing tests, performing a medically appropriate examination and/or evaluation , counseling and educating the patient/family/caregiver, ordering medications, tests, or procedures, referring and communicating with other health care professionals  and documenting information in the medical record.    RTC if symptoms fail to improve, to ER if symptoms worsen.        *Dictated Utilizing Dragon Dictation   Please note that portions of this note were completed with a voice recognition program.   Part of this note may be an electronic transcription/translation of spoken language to printed text using the Dragon Dictation System. Spelling and/or grammatical errors may exist despite efforts at proofreading.        JAY León  [facility] [place]

## 2023-03-22 ENCOUNTER — LAB (OUTPATIENT)
Dept: LAB | Facility: HOSPITAL | Age: 33
End: 2023-03-22
Payer: MEDICAID

## 2023-03-22 DIAGNOSIS — E78.00 HYPERCHOLESTEROLEMIA: ICD-10-CM

## 2023-03-23 LAB
CHOLEST SERPL-MCNC: 187 MG/DL (ref 0–200)
HDLC SERPL-MCNC: 51 MG/DL (ref 40–60)
LDLC SERPL CALC-MCNC: 115 MG/DL (ref 0–100)
TRIGL SERPL-MCNC: 120 MG/DL (ref 0–150)
VLDLC SERPL CALC-MCNC: 21 MG/DL (ref 5–40)

## 2023-03-25 PROBLEM — M54.2 CHRONIC CERVICAL PAIN: Status: ACTIVE | Noted: 2023-03-25

## 2023-03-25 PROBLEM — G89.29 CHRONIC CERVICAL PAIN: Chronic | Status: ACTIVE | Noted: 2023-03-25

## 2023-03-25 PROBLEM — G89.29 CHRONIC CERVICAL PAIN: Status: ACTIVE | Noted: 2023-03-25

## 2023-03-25 PROBLEM — M54.2 CHRONIC CERVICAL PAIN: Chronic | Status: ACTIVE | Noted: 2023-03-25

## 2023-03-25 RX ORDER — RIZATRIPTAN BENZOATE 5 MG/1
TABLET, ORALLY DISINTEGRATING ORAL
COMMUNITY
Start: 2023-03-22 | End: 2023-04-07

## 2023-03-25 NOTE — ASSESSMENT & PLAN NOTE
Headaches are unchanged.  Continue current treatment regimen.  Will place referral to neurology for patient.   Begin Maxalt.

## 2023-04-07 ENCOUNTER — HOSPITAL ENCOUNTER (OUTPATIENT)
Dept: ULTRASOUND IMAGING | Facility: HOSPITAL | Age: 33
Discharge: HOME | End: 2023-04-07
Admitting: NURSE PRACTITIONER
Payer: MEDICAID

## 2023-04-07 ENCOUNTER — OFFICE VISIT (OUTPATIENT)
Dept: FAMILY MEDICINE CLINIC | Facility: CLINIC | Age: 33
End: 2023-04-07
Payer: MEDICAID

## 2023-04-07 VITALS
WEIGHT: 220.8 LBS | OXYGEN SATURATION: 98 % | DIASTOLIC BLOOD PRESSURE: 78 MMHG | SYSTOLIC BLOOD PRESSURE: 113 MMHG | TEMPERATURE: 97.8 F | BODY MASS INDEX: 34.65 KG/M2 | HEART RATE: 76 BPM | HEIGHT: 67 IN

## 2023-04-07 DIAGNOSIS — R22.31 LOCALIZED SWELLING OF RIGHT UPPER EXTREMITY: ICD-10-CM

## 2023-04-07 DIAGNOSIS — G44.86 CERVICOGENIC HEADACHE: Primary | ICD-10-CM

## 2023-04-07 DIAGNOSIS — M54.2 CHRONIC CERVICAL PAIN: Chronic | ICD-10-CM

## 2023-04-07 DIAGNOSIS — G89.29 CHRONIC CERVICAL PAIN: Chronic | ICD-10-CM

## 2023-04-07 DIAGNOSIS — R35.1 NOCTURIA: ICD-10-CM

## 2023-04-07 LAB
BILIRUB BLD-MCNC: NEGATIVE MG/DL
CLARITY, POC: CLEAR
COLOR UR: YELLOW
EXPIRATION DATE: NORMAL
GLUCOSE UR STRIP-MCNC: NEGATIVE MG/DL
KETONES UR QL: NEGATIVE
LEUKOCYTE EST, POC: NEGATIVE
Lab: NORMAL
NITRITE UR-MCNC: NEGATIVE MG/ML
PH UR: 6 [PH] (ref 5–8)
PROT UR STRIP-MCNC: NEGATIVE MG/DL
RBC # UR STRIP: NEGATIVE /UL
SP GR UR: 1.02 (ref 1–1.03)
UROBILINOGEN UR QL: NORMAL

## 2023-04-07 PROCEDURE — 76882 US LMTD JT/FCL EVL NVASC XTR: CPT

## 2023-04-09 PROBLEM — R35.1 NOCTURIA: Status: ACTIVE | Noted: 2023-04-09

## 2023-04-09 PROBLEM — G44.86 CERVICOGENIC HEADACHE: Status: ACTIVE | Noted: 2023-04-09

## 2023-04-09 NOTE — ASSESSMENT & PLAN NOTE
New problem.   UA normal, no evidence of infection.  Recommend patient limit fluid intake close to bedtime.  Recommend watchful waiting.

## 2023-04-09 NOTE — ASSESSMENT & PLAN NOTE
Suspect headaches related to chronic cervical pain and muscle spasms.  Plan to refer to PT.  Recommend patient resume muscle relaxants that he was prescribed in ER (Robaxin) and Lidoderm patches.

## 2023-04-09 NOTE — PROGRESS NOTES
Follow Up Office Note     Patient Name: Meliton Lagos  : 1990   MRN: [id number]     Chief Complaint:    Chief Complaint   Patient presents with   • Headache     Per patient he has had headache for a month   • Nocturia       History of Present Illness: Meliton Lagos is a 32 y.o. male who presents today with complaint of daily headache times approximately 1 month.  Patient is status post ER visit at [facility] on 3/31/2023 for same complaint.      Patient has previously been evaluated by neurology and was placed on Maxalt which she states did not help his symptoms.  Patient has also tried and failed multiple over-the-counter analgesics as well as prescription anti-inflammatory/analgesics and muscle relaxants.  Diagnostic imaging has been unremarkable thus far.    MRI Cervical Spine With & Without Contrast (2022 16:18)  MRI Brain With & Without Contrast (2022 16:18)    Patient c/o new onset nocturia (1-2 times per night) for the past 1-2 weeks. He denies dysuria or flank pain. He also denies straining with urination or weak urine stream.    Subjective      I have reviewed and the following portions of the patient's history were updated as appropriate: past family history, past medical history, past social history, past surgical history and problem list.    Review of Systems:   Review of Systems   Constitutional: Negative.    Respiratory: Negative.    Cardiovascular: Negative.    Genitourinary: Negative for difficulty urinating, dysuria, flank pain, hematuria and urgency.        Nocturia 1-2 times at night   Musculoskeletal: Positive for myalgias and neck pain.   Neurological: Positive for headaches.        Past Medical History:   Past Medical History:   Diagnosis Date   • GERD (gastroesophageal reflux disease)          Medications:   No current outpatient medications on file.    Allergies:   Allergies   Allergen Reactions   • Amoxicillin Other (See Comments)     Chest  "pain   • Bactrim [Sulfamethoxazole-Trimethoprim] Other (See Comments)     Chest pain         Objective     Physical Exam:  Vital Signs:   Vitals:    04/07/23 1340   BP: 113/78   Pulse: 76   Temp: 97.8 °F (36.6 °C)   TempSrc: Infrared   SpO2: 98%   Weight: 100 kg (220 lb 12.8 oz)   Height: 170.2 cm (67.01\")   PainSc:   4   PainLoc: Head     Body mass index is 34.57 kg/m².     Physical Exam  Vitals and nursing note reviewed.   Constitutional:       General: He is not in acute distress.     Appearance: Normal appearance. He is well-developed. He is not ill-appearing, toxic-appearing or diaphoretic.   HENT:      Head: Normocephalic and atraumatic.   Cardiovascular:      Rate and Rhythm: Normal rate and regular rhythm.   Pulmonary:      Effort: Pulmonary effort is normal. No respiratory distress.      Breath sounds: Normal breath sounds. No stridor. No wheezing.   Musculoskeletal:      Cervical back: Spasms and tenderness present. No swelling, deformity or rigidity.   Skin:     General: Skin is warm and dry.   Neurological:      General: No focal deficit present.      Mental Status: He is alert and oriented to person, place, and time.   Psychiatric:         Mood and Affect: Mood normal.         Behavior: Behavior normal. Behavior is cooperative.         Thought Content: Thought content normal.         Judgment: Judgment normal.         Assessment / Plan      Assessment/Plan:   Diagnoses and all orders for this visit:    1. Cervicogenic headache (Primary)  Assessment & Plan:  Suspect headaches related to chronic cervical pain and muscle spasms.  Plan to refer to PT.  Recommend patient resume muscle relaxants that he was prescribed in ER (Robaxin) and Lidoderm patches.    Orders:  -     Ambulatory Referral to Physical Therapy Evaluate and treat    2. Chronic cervical pain  -     Ambulatory Referral to Physical Therapy Evaluate and treat    3. Nocturia  Assessment & Plan:  New problem.   UA normal, no evidence of " infection.  Recommend patient limit fluid intake close to bedtime.  Recommend watchful waiting.    Orders:  -     POC Urinalysis Dipstick, Automated     Brief Urine Lab Results  (Last result in the past 365 days)      Color   Clarity   Blood   Leuk Est   Nitrite   Protein   CREAT   Urine HCG        04/07/23 1451 Yellow   Clear   Negative   Negative   Negative   Negative                   Follow Up:   PRN and at next scheduled appointment(s) with PCP.    Discussed the nature of the medical condition(s) risks, complications, implications, management, safe and proper use of medications. Encouraged medication compliance, and keeping scheduled follow up appointments with me and any other providers.      I spent 20 minutes caring for Ali on this date of service. This time includes time spent by me in the following activities:preparing for the visit, reviewing tests, performing a medically appropriate examination and/or evaluation , counseling and educating the patient/family/caregiver, referring and communicating with other health care professionals  and documenting information in the medical record.    RTC if symptoms fail to improve, to ER if symptoms worsen.        *Dictated Utilizing Dragon Dictation   Pl.  ease note that portions of this note were completed with a voice recognition program.   Part of this note may be an electronic transcription/translation of spoken language to printed text using the Dragon Dictation System. Spelling and/or grammatical errors may exist despite efforts at proofreading.        JAY León  [facility] [place]

## 2023-04-17 DIAGNOSIS — R22.31 LOCALIZED SWELLING OF RIGHT UPPER EXTREMITY: Primary | ICD-10-CM

## 2023-05-15 ENCOUNTER — OFFICE VISIT (OUTPATIENT)
Dept: FAMILY MEDICINE CLINIC | Facility: CLINIC | Age: 33
End: 2023-05-15
Payer: MEDICAID

## 2023-05-15 ENCOUNTER — LAB (OUTPATIENT)
Dept: LAB | Facility: HOSPITAL | Age: 33
End: 2023-05-15
Payer: MEDICAID

## 2023-05-15 VITALS
HEART RATE: 99 BPM | OXYGEN SATURATION: 97 % | HEIGHT: 67 IN | WEIGHT: 220.6 LBS | TEMPERATURE: 98.4 F | SYSTOLIC BLOOD PRESSURE: 118 MMHG | BODY MASS INDEX: 34.62 KG/M2 | DIASTOLIC BLOOD PRESSURE: 82 MMHG

## 2023-05-15 DIAGNOSIS — M35.2 BEHCET'S DISEASE: Primary | ICD-10-CM

## 2023-05-15 DIAGNOSIS — E55.9 VITAMIN D DEFICIENCY: ICD-10-CM

## 2023-05-15 DIAGNOSIS — N48.9 PENILE LESION: ICD-10-CM

## 2023-05-15 DIAGNOSIS — R53.82 CHRONIC FATIGUE: ICD-10-CM

## 2023-05-15 PROBLEM — G43.909 MIGRAINE WITHOUT STATUS MIGRAINOSUS, NOT INTRACTABLE: Status: ACTIVE | Noted: 2023-04-17

## 2023-05-15 PROBLEM — M54.81 BILATERAL OCCIPITAL NEURALGIA: Status: ACTIVE | Noted: 2023-04-17

## 2023-05-15 PROBLEM — M79.18 MYOFASCIAL PAIN: Status: ACTIVE | Noted: 2023-04-17

## 2023-05-15 LAB
BILIRUB BLD-MCNC: NEGATIVE MG/DL
CLARITY, POC: CLEAR
COLOR UR: YELLOW
EXPIRATION DATE: NORMAL
GLUCOSE UR STRIP-MCNC: NEGATIVE MG/DL
KETONES UR QL: NEGATIVE
LEUKOCYTE EST, POC: NEGATIVE
Lab: NORMAL
NITRITE UR-MCNC: NEGATIVE MG/ML
PH UR: 5.5 [PH] (ref 5–8)
PROT UR STRIP-MCNC: NEGATIVE MG/DL
RBC # UR STRIP: NEGATIVE /UL
SP GR UR: 1.01 (ref 1–1.03)
UROBILINOGEN UR QL: NORMAL

## 2023-05-16 PROBLEM — M35.2 BEHCET'S DISEASE: Chronic | Status: ACTIVE | Noted: 2019-08-23

## 2023-05-16 NOTE — ASSESSMENT & PLAN NOTE
Singular, mildly erythematous skin lesion on dorsum of penis consistent with Behcet's lesion.  I consulted with Dr. De Jesus regarding this and he advised trial of triamcinolone cream.  Should patient's symptoms fail to improve Dr. De Jesus recommends referral to rheumatology.  Patient has been referred in the past however he no showed his appointment with [facility] on 7/6/2022.  Patient states he does not wish a new referral to rheumatology at this time.

## 2023-05-16 NOTE — PROGRESS NOTES
Follow Up Office Note     Patient Name: Meliton Lagos  : 1990   MRN: [id number]     Chief Complaint:    Chief Complaint   Patient presents with   • Rash     Per patient rash on penis x 2 months   • Fatigue       History of Present Illness: Meliton Lagos is a 33 y.o. male who presents today with complaint of skin lesion on penis x2 months.  Patient denies injury or trauma.  He states that the lesion is painless.  He states that he has also had intermittent oral lesions.  Patient has past medical history significant for Behcet's disease.    Patient also complaining of worsening of chronic fatigue.  Patient does have chronic vitamin D deficiency and has stopped taking his vitamin D supplement.    Subjective      I have reviewed and the following portions of the patient's history were updated as appropriate: past family history, past medical history, past social history, past surgical history and problem list.    Review of Systems:   Review of Systems   Constitutional: Positive for fatigue. Negative for activity change, appetite change, chills, diaphoresis and fever.   Respiratory: Negative.    Cardiovascular: Negative.    Genitourinary: Positive for genital sores. Negative for penile discharge, penile pain, penile swelling and testicular pain.   Musculoskeletal: Negative.    Neurological: Negative.         Past Medical History:   Past Medical History:   Diagnosis Date   • GERD (gastroesophageal reflux disease)          Medications:     Current Outpatient Medications:   •  triamcinolone (KENALOG) 0.1 % ointment, Apply 1 application topically to the appropriate area as directed 2 (Two) Times a Day., Disp: 30 g, Rfl: 1    Allergies:   Allergies   Allergen Reactions   • Amoxicillin Other (See Comments)     Chest pain   • Bactrim [Sulfamethoxazole-Trimethoprim] Other (See Comments)     Chest pain         Objective     Physical Exam:  Vital Signs:   Vitals:    05/15/23 1527   BP: 118/82   Pulse: 99  "  Temp: 98.4 °F (36.9 °C)   TempSrc: Infrared   SpO2: 97%   Weight: 100 kg (220 lb 9.6 oz)   Height: 170.2 cm (67.01\")   PainSc: 0-No pain     Body mass index is 34.54 kg/m².     Physical Exam  Vitals and nursing note reviewed. Exam conducted with a chaperone present.   Constitutional:       General: He is not in acute distress.     Appearance: Normal appearance. He is well-developed. He is not ill-appearing, toxic-appearing or diaphoretic.   HENT:      Head: Normocephalic and atraumatic.   Cardiovascular:      Rate and Rhythm: Normal rate and regular rhythm.   Pulmonary:      Effort: Pulmonary effort is normal. No respiratory distress.      Breath sounds: Normal breath sounds. No stridor. No wheezing.   Skin:     General: Skin is warm and dry.   Neurological:      General: No focal deficit present.      Mental Status: He is alert and oriented to person, place, and time.   Psychiatric:         Mood and Affect: Mood normal.         Behavior: Behavior normal. Behavior is cooperative.         Thought Content: Thought content normal.         Judgment: Judgment normal.         Assessment / Plan      Assessment/Plan:   Diagnoses and all orders for this visit:    1. Behcet's disease (Primary)  Assessment & Plan:  Singular, mildly erythematous skin lesion on dorsum of penis consistent with Behcet's lesion.  I consulted with Dr. De Jesus regarding this and he advised trial of triamcinolone cream.  Should patient's symptoms fail to improve Dr. De Jesus recommends referral to rheumatology.  Patient has been referred in the past however he no showed his appointment with [facility] on 7/6/2022.  Patient states he does not wish a new referral to rheumatology at this time.    Orders:  -     triamcinolone (KENALOG) 0.1 % ointment; Apply 1 application topically to the appropriate area as directed 2 (Two) Times a Day.  Dispense: 30 g; Refill: 1    2. Penile lesion  -     OneSwab - Kit, Urine, Clean Catch; Future  -     RPR; " Future  -     POC Urinalysis Dipstick, Automated    3. Chronic fatigue  -     Comprehensive Metabolic Panel; Future  -     CBC (No Diff); Future  -     Vitamin B12; Future    4. Vitamin D deficiency  -     Vitamin D,25-Hydroxy; Future         Follow Up:   PRN and at next scheduled appointment(s) with PCP.    Discussed the nature of the medical condition(s) risks, complications, implications, management, safe and proper use of medications. Encouraged medication compliance, and keeping scheduled follow up appointments with me and any other providers.      RTC if symptoms fail to improve, to ER if symptoms worsen.        *Dictated Utilizing Dragon Dictation   Please note that portions of this note were completed with a voice recognition program.   Part of this note may be an electronic transcription/translation of spoken language to printed text using the Dragon Dictation System. Spelling and/or grammatical errors may exist despite efforts at proofreading.        JAY León  [facility] [place]

## 2023-05-17 LAB
25(OH)D3+25(OH)D2 SERPL-MCNC: 23.2 NG/ML (ref 30–100)
ALBUMIN SERPL-MCNC: 5.2 G/DL (ref 3.5–5.2)
ALBUMIN/GLOB SERPL: 1.9 G/DL
ALP SERPL-CCNC: 82 U/L (ref 39–117)
ALT SERPL-CCNC: 60 U/L (ref 1–41)
AST SERPL-CCNC: 29 U/L (ref 1–40)
BILIRUB SERPL-MCNC: 1.3 MG/DL (ref 0–1.2)
BUN SERPL-MCNC: 18 MG/DL (ref 6–20)
BUN/CREAT SERPL: 16.1 (ref 7–25)
CALCIUM SERPL-MCNC: 10.6 MG/DL (ref 8.6–10.5)
CHLORIDE SERPL-SCNC: 102 MMOL/L (ref 98–107)
CO2 SERPL-SCNC: 26.7 MMOL/L (ref 22–29)
CREAT SERPL-MCNC: 1.12 MG/DL (ref 0.76–1.27)
EGFRCR SERPLBLD CKD-EPI 2021: 87.9 ML/MIN/1.73
ERYTHROCYTE [DISTWIDTH] IN BLOOD BY AUTOMATED COUNT: 12.4 % (ref 12.3–15.4)
GLOBULIN SER CALC-MCNC: 2.8 GM/DL
GLUCOSE SERPL-MCNC: 92 MG/DL (ref 65–99)
HCT VFR BLD AUTO: 46.2 % (ref 37.5–51)
HGB BLD-MCNC: 16 G/DL (ref 13–17.7)
MCH RBC QN AUTO: 29 PG (ref 26.6–33)
MCHC RBC AUTO-ENTMCNC: 34.6 G/DL (ref 31.5–35.7)
MCV RBC AUTO: 83.7 FL (ref 79–97)
PLATELET # BLD AUTO: 266 10*3/MM3 (ref 140–450)
POTASSIUM SERPL-SCNC: 4.3 MMOL/L (ref 3.5–5.2)
PROT SERPL-MCNC: 8 G/DL (ref 6–8.5)
RBC # BLD AUTO: 5.52 10*6/MM3 (ref 4.14–5.8)
RPR SER QL: NON REACTIVE
SODIUM SERPL-SCNC: 141 MMOL/L (ref 136–145)
VIT B12 SERPL-MCNC: 735 PG/ML (ref 211–946)
WBC # BLD AUTO: 6.11 10*3/MM3 (ref 3.4–10.8)

## 2023-05-19 ENCOUNTER — TRANSCRIBE ORDERS (OUTPATIENT)
Dept: ADMINISTRATIVE | Facility: HOSPITAL | Age: 33
End: 2023-05-19
Payer: MEDICAID

## 2023-05-19 DIAGNOSIS — R22.31 LOCALIZED SWELLING, MASS, OR LUMP OF UPPER EXTREMITY, RIGHT: Primary | ICD-10-CM

## 2023-06-02 ENCOUNTER — PATIENT MESSAGE (OUTPATIENT)
Dept: NEUROLOGY | Facility: CLINIC | Age: 33
End: 2023-06-02

## 2023-08-03 ENCOUNTER — OFFICE VISIT (OUTPATIENT)
Dept: FAMILY MEDICINE CLINIC | Facility: CLINIC | Age: 33
End: 2023-08-03
Payer: MEDICAID

## 2023-08-03 VITALS
HEART RATE: 83 BPM | TEMPERATURE: 98.6 F | BODY MASS INDEX: 34.88 KG/M2 | OXYGEN SATURATION: 97 % | HEIGHT: 67 IN | SYSTOLIC BLOOD PRESSURE: 113 MMHG | DIASTOLIC BLOOD PRESSURE: 72 MMHG | WEIGHT: 222.2 LBS

## 2023-08-03 DIAGNOSIS — K52.9 CHRONIC DIARRHEA: ICD-10-CM

## 2023-08-03 DIAGNOSIS — K21.9 CHRONIC GERD: ICD-10-CM

## 2023-08-03 DIAGNOSIS — L55.9 SUNBURN: ICD-10-CM

## 2023-08-03 DIAGNOSIS — R14.0 BLOATING: ICD-10-CM

## 2023-08-03 DIAGNOSIS — M79.672 LEFT FOOT PAIN: Primary | ICD-10-CM

## 2023-08-03 PROBLEM — R13.10 IMPAIRED SWALLOWING ASSOCIATED WITH THROAT PAIN: Chronic | Status: RESOLVED | Noted: 2019-11-18 | Resolved: 2023-08-03

## 2023-08-03 PROBLEM — R07.0 IMPAIRED SWALLOWING ASSOCIATED WITH THROAT PAIN: Chronic | Status: RESOLVED | Noted: 2019-11-18 | Resolved: 2023-08-03

## 2023-08-03 PROBLEM — R10.11 RIGHT UPPER QUADRANT ABDOMINAL PAIN: Status: RESOLVED | Noted: 2021-12-13 | Resolved: 2023-08-03

## 2023-08-03 PROBLEM — K22.2 ESOPHAGEAL STRICTURE: Status: RESOLVED | Noted: 2020-10-14 | Resolved: 2023-08-03

## 2023-08-03 PROCEDURE — 1160F RVW MEDS BY RX/DR IN RCRD: CPT | Performed by: NURSE PRACTITIONER

## 2023-08-03 PROCEDURE — 1159F MED LIST DOCD IN RCRD: CPT | Performed by: NURSE PRACTITIONER

## 2023-08-03 PROCEDURE — 99214 OFFICE O/P EST MOD 30 MIN: CPT | Performed by: NURSE PRACTITIONER

## 2023-08-03 RX ORDER — OXYBENZONE/HOMOSALATE/OCTINOX
1 LOTION (ML) TOPICAL 3 TIMES DAILY PRN
Qty: 170 G | Refills: 0 | Status: SHIPPED | OUTPATIENT
Start: 2023-08-03

## 2023-08-03 RX ORDER — FAMOTIDINE 20 MG/1
20 TABLET, FILM COATED ORAL 2 TIMES DAILY
Qty: 60 TABLET | Refills: 2 | Status: SHIPPED | OUTPATIENT
Start: 2023-08-03

## 2023-08-03 RX ORDER — SACCHAROMYCES BOULARDII 250 MG
250 CAPSULE ORAL 2 TIMES DAILY
Qty: 60 CAPSULE | Refills: 0 | Status: SHIPPED | OUTPATIENT
Start: 2023-08-03

## 2023-08-03 RX ORDER — ERGOCALCIFEROL 1.25 MG/1
CAPSULE ORAL
COMMUNITY
Start: 2023-06-20

## 2023-08-03 RX ORDER — SIMETHICONE 80 MG
80 TABLET,CHEWABLE ORAL EVERY 6 HOURS PRN
Qty: 120 TABLET | Refills: 2 | Status: SHIPPED | OUTPATIENT
Start: 2023-08-03

## 2023-08-04 DIAGNOSIS — M19.072 ARTHRITIS OF LEFT FOOT: Primary | ICD-10-CM

## 2023-08-04 RX ORDER — SENNOSIDES 8.6 MG
650 CAPSULE ORAL 2 TIMES DAILY
Qty: 60 TABLET | Refills: 0 | Status: SHIPPED | OUTPATIENT
Start: 2023-08-04

## 2023-08-10 ENCOUNTER — TELEPHONE (OUTPATIENT)
Dept: CARDIOLOGY | Facility: CLINIC | Age: 33
End: 2023-08-10

## 2023-08-10 NOTE — TELEPHONE ENCOUNTER
Caller: Meliton Lagos    Relationship to patient: Self    Best call back number: [phone]      Type of visit: NEW PATIENT    Requested date: NEXT AVAILABLE     If rescheduling, when is the original appointment: 04-20-23     Additional notes:FIRST AVAILABLE WAS 02-14-24. PLEASE CONTACT PATIENT WITH AN EARLIER DATE.

## 2023-08-14 ENCOUNTER — OFFICE VISIT (OUTPATIENT)
Dept: FAMILY MEDICINE CLINIC | Facility: CLINIC | Age: 33
End: 2023-08-14
Payer: MEDICAID

## 2023-08-14 VITALS
DIASTOLIC BLOOD PRESSURE: 84 MMHG | HEIGHT: 67 IN | HEART RATE: 78 BPM | SYSTOLIC BLOOD PRESSURE: 114 MMHG | WEIGHT: 218.4 LBS | TEMPERATURE: 99.3 F | BODY MASS INDEX: 34.28 KG/M2

## 2023-08-14 DIAGNOSIS — K21.9 CHRONIC GERD: ICD-10-CM

## 2023-08-14 DIAGNOSIS — M79.672 LEFT FOOT PAIN: ICD-10-CM

## 2023-08-14 DIAGNOSIS — Z76.0 MEDICATION REFILL: ICD-10-CM

## 2023-08-14 DIAGNOSIS — M35.2 BEHCET'S DISEASE: Chronic | ICD-10-CM

## 2023-08-14 DIAGNOSIS — R07.9 INTERMITTENT CHEST PAIN: Primary | ICD-10-CM

## 2023-08-14 PROCEDURE — 1160F RVW MEDS BY RX/DR IN RCRD: CPT | Performed by: NURSE PRACTITIONER

## 2023-08-14 PROCEDURE — 1159F MED LIST DOCD IN RCRD: CPT | Performed by: NURSE PRACTITIONER

## 2023-08-14 PROCEDURE — 99214 OFFICE O/P EST MOD 30 MIN: CPT | Performed by: NURSE PRACTITIONER

## 2023-08-14 RX ORDER — NYSTATIN AND TRIAMCINOLONE ACETONIDE 100000; 1 [USP'U]/G; MG/G
1 OINTMENT TOPICAL 2 TIMES DAILY
Qty: 30 G | Refills: 0 | Status: SHIPPED | OUTPATIENT
Start: 2023-08-14

## 2023-08-14 RX ORDER — SUCRALFATE 1 G/1
1 TABLET ORAL
Qty: 90 TABLET | Refills: 0 | Status: SHIPPED | OUTPATIENT
Start: 2023-08-14

## 2023-08-15 PROBLEM — R07.9 INTERMITTENT CHEST PAIN: Status: ACTIVE | Noted: 2023-08-15

## 2023-08-15 NOTE — ASSESSMENT & PLAN NOTE
Plan for patient to keep upcoming cardiology appointment. As he is still having sx plan to have him evaluated at [facility] prior to cardiology appointment.

## 2023-08-15 NOTE — PROGRESS NOTES
Follow Up Office Note     Patient Name: Meliton Lagos  : 1990   MRN: [id number]     Chief Complaint:    Chief Complaint   Patient presents with    Chest Pain     Has had chest pain approx 2 yr after vaccine    Also having L foot pain       History of Present Illness: Meliton Lagos is a 33 y.o. male who presents today s/p ER visit  23 at [facility] for chest pain accompanied by SOA and nausea. Cardiac testing in ER was unremarkable. ER physician felt patient's sx most likely related to chronic GERD. Patient stopped taking his PPI and Pepcid. Patient is adamant that his symptoms are not GI related but are of cardiac etiology.  He has an upcoming appointment with cardiology next month. He states that the stomach medications that he has taken in the past have made his chest pain worse and that is why he stopped taking them. Patient states that he feels his heart issues began after receiving Covid-19 vaccine 2 years ago.    Patient also c/o continued left foot pain. He has been using Voltaren gel with only minimal improvement. He is requesting referral to podiatry.     Office Visit with Sanna Herr APRN (2023)   Subjective      I have reviewed and the following portions of the patient's history were updated as appropriate: past family history, past medical history, past social history, past surgical history and problem list.    Review of Systems:   Review of Systems   Constitutional: Negative.    Respiratory:  Negative for cough, chest tightness, shortness of breath, wheezing and stridor.    Cardiovascular:  Positive for chest pain (intermittent). Negative for palpitations and leg swelling.   Gastrointestinal:  Negative for abdominal pain, nausea and vomiting.   Musculoskeletal:  Positive for arthralgias (left foot pain).      Past Medical History:   Past Medical History:   Diagnosis Date    GERD (gastroesophageal reflux disease)          Medications:     Current  "Outpatient Medications:     famotidine (Pepcid) 20 MG tablet, Take 1 tablet by mouth 2 (Two) Times a Day. (Patient not taking: Reported on 8/14/2023), Disp: 60 tablet, Rfl: 2    nystatin-triamcinolone (MYCOLOG) 445942-8.1 UNIT/GM-% ointment, Apply 1 application  topically to the appropriate area as directed 2 (Two) Times a Day., Disp: 30 g, Rfl: 0    simethicone (MYLICON) 80 MG chewable tablet, Chew 1 tablet Every 6 (Six) Hours As Needed for Flatulence. (Patient not taking: Reported on 8/14/2023), Disp: 120 tablet, Rfl: 2    sucralfate (Carafate) 1 g tablet, Take 1 tablet by mouth 3 (Three) Times a Day Before Meals., Disp: 90 tablet, Rfl: 0    Allergies:   Allergies   Allergen Reactions    Amoxicillin Other (See Comments)     Chest pain    Bactrim [Sulfamethoxazole-Trimethoprim] Other (See Comments)     Chest pain         Objective     Physical Exam:  Vital Signs:   Vitals:    08/14/23 1553   BP: 114/84   Pulse: 78   Temp: 99.3 øF (37.4 øC)   TempSrc: Infrared   Weight: 99.1 kg (218 lb 6.4 oz)   Height: 170.2 cm (67.01\")   PainSc: 6  Comment: foot     Body mass index is 34.2 kg/mý.     Physical Exam  Vitals and nursing note reviewed.   Constitutional:       General: He is not in acute distress.     Appearance: Normal appearance. He is well-developed. He is not ill-appearing, toxic-appearing or diaphoretic.   HENT:      Head: Normocephalic and atraumatic.   Cardiovascular:      Rate and Rhythm: Normal rate and regular rhythm.      Heart sounds: Normal heart sounds, S1 normal and S2 normal. No murmur heard.  Pulmonary:      Effort: Pulmonary effort is normal. No respiratory distress.      Breath sounds: Normal breath sounds. No stridor. No wheezing.   Skin:     General: Skin is warm and dry.   Neurological:      General: No focal deficit present.      Mental Status: He is alert and oriented to person, place, and time.   Psychiatric:         Mood and Affect: Mood normal.         Behavior: Behavior normal. " Behavior is cooperative.         Thought Content: Thought content normal.         Judgment: Judgment normal.       Assessment / Plan      Assessment/Plan:   Diagnoses and all orders for this visit:    1. Intermittent chest pain (Primary)  Assessment & Plan:  Plan for patient to keep upcoming cardiology appointment. As he is still having sx plan to have him evaluated at [facility] prior to cardiology appointment.    Orders:  -     Ambulatory Referral to [facility] [place] [facility]    2. Left foot pain  -     Ambulatory Referral to Podiatry    3. Chronic GERD  Assessment & Plan:  Chronic problem. Patient declines PPI or Pepcid. I have strongly encouraged him to f/u with gastroenterology but he has declined to do so. Patient does not feel that his has GERD despite having been diagnosed per EGD with reflux esophagitis. I have explained to him the risks in untreated GERD but he is adamant that his symptoms are cardiac related.   He is agreeable to begin a trial of Carafate however.  Patient's last EGD was in June 2021.    Orders:  -     sucralfate (Carafate) 1 g tablet; Take 1 tablet by mouth 3 (Three) Times a Day Before Meals.  Dispense: 90 tablet; Refill: 0    4. Behcet's disease  -     nystatin-triamcinolone (MYCOLOG) 791982-4.1 UNIT/GM-% ointment; Apply 1 application  topically to the appropriate area as directed 2 (Two) Times a Day.  Dispense: 30 g; Refill: 0    5. Medication refill  -     nystatin-triamcinolone (MYCOLOG) 361563-4.1 UNIT/GM-% ointment; Apply 1 application  topically to the appropriate area as directed 2 (Two) Times a Day.  Dispense: 30 g; Refill: 0           Follow Up:   PRN and at next scheduled appointment(s) with PCP.    Discussed the nature of the medical condition(s) risks, complications, implications, management, safe and proper use of medications. Encouraged medication compliance, and keeping scheduled follow up appointments with me and any other providers.      RTC  if symptoms fail to improve, to ER if symptoms worsen.        *Dictated Utilizing Dragon Dictation   Please note that portions of this note were completed with a voice recognition program.   Part of this note may be an electronic transcription/translation of spoken language to printed text using the Dragon Dictation System. Spelling and/or grammatical errors may exist despite efforts at proofreading.      NOTE TO PATIENT: The 21st Century Cures Act makes medical notes like these available to patients in the interest of transparency. However, be advised this is a medical document. It is intended as peer to peer communication. It is written in medical language and may contain abbreviations or verbiage that are unfamiliar. It may appear blunt or direct. Medical documents are intended to carry relevant information, facts as evident, and the clinical opinion of the practitioner.      JAY León  [facility]

## 2023-08-15 NOTE — ASSESSMENT & PLAN NOTE
Chronic problem. Patient declines PPI or Pepcid. I have strongly encouraged him to f/u with gastroenterology but he has declined to do so. Patient does not feel that his has GERD despite having been diagnosed per EGD with reflux esophagitis. I have explained to him the risks in untreated GERD but he is adamant that his symptoms are cardiac related.   He is agreeable to begin a trial of Carafate however.  Patient's last EGD was in June 2021.

## 2023-08-16 NOTE — PROGRESS NOTES
Chief Complaint  Follow-up and Chest Pain    Subjective      History of Present Illness {CC  Problem List  Visit  Diagnosis   Encounters  Notes  Medications  Labs  Result Review Imaging  Media :23}     Meliton Lagos, 33 y.o. male with past medical history significant for GERD, who presents to [facility] for Follow-up and Chest Pain  Patient was evaluated at [facility] ED on 8/9/2023 with chief complaint of chest pain.  Patient has known history of dyspepsia and stated that he took himself off of his GI medications about a month ago.  Patient was noted to be low risk for cardiac disease based on heart score.  Patient was then seen by his primary care provider on 8/14/2023 for evaluation of chest pain.  Patient stated at that time he had had chest pain ongoing for approximately 2 years after he received his vaccine.  Despite being reassured by ER physician and primary care physician, patient was adamant that his symptoms were not GI related and were of cardiac etiology.  Patient declined PPI or Pepcid.  He was encouraged to follow-up with GI but he declined to do so.    Since time of eval by pcp, he continues to have chest pain. He states the chest pain has been ongoing since he got his Moderna COVID-19 vaccine (2021). Chest pain occurs with activity. Lasting for days at a time. Does have associated shortness of air. Pain radiated to left shoulder/arm. Described as a heavy pressure/burning. He only has shortness of air with chest pain, otherwise no shortness of air with activity. Denies palpitations. Endorses some dizziness with position changes. Has not been exercising recently as he is deconditioned. He does check blood pressure during episodes of chest pain. During episodes, blood pressure is running around 130 systolic.     Drinks 1L of water daily  No known family history of cardiac disease     Objective     Vital Signs:   Vitals:    08/17/23 1250   BP:  "110/78   BP Location: Left arm   Patient Position: Sitting   Cuff Size: Adult   Pulse: 74   Resp: 18   Temp: 97.8 øF (36.6 øC)   TempSrc: Temporal   SpO2: 96%   Weight: 101 kg (222 lb)   Height: 170.2 cm (67.01\")     Body mass index is 34.76 kg/mý.  Physical Exam  Vitals and nursing note reviewed.   Constitutional:       Appearance: Normal appearance.   HENT:      Head: Normocephalic.   Eyes:      Pupils: Pupils are equal, round, and reactive to light.   Cardiovascular:      Rate and Rhythm: Normal rate and regular rhythm.      Pulses: Normal pulses.      Heart sounds: Normal heart sounds. No murmur heard.  Pulmonary:      Effort: Pulmonary effort is normal.      Breath sounds: Normal breath sounds.   Abdominal:      General: Bowel sounds are normal.      Palpations: Abdomen is soft.   Musculoskeletal:         General: Normal range of motion.      Cervical back: Normal range of motion.      Right lower leg: No edema.      Left lower leg: No edema.   Skin:     General: Skin is warm and dry.      Capillary Refill: Capillary refill takes less than 2 seconds.   Neurological:      Mental Status: He is alert and oriented to person, place, and time.   Psychiatric:         Mood and Affect: Mood normal.         Thought Content: Thought content normal.              Data Reviewed:{ Labs  Result Review  Imaging  Med Tab  Media :23}   Lab Results   Component Value Date    WBC 6.11 05/15/2023    HGB 16.0 05/15/2023    HCT 46.2 05/15/2023    MCV 83.7 05/15/2023     05/15/2023      Lab Results   Component Value Date    GLUCOSE 92 05/15/2023    BUN 18 05/15/2023    CREATININE 1.12 05/15/2023    EGFRRESULT 87.9 05/15/2023    EGFR 107.0 04/21/2022    BCR 16.1 05/15/2023    K 4.3 05/15/2023    CO2 26.7 05/15/2023    CALCIUM 10.6 (H) 05/15/2023    PROTENTOTREF 8.0 05/15/2023    ALBUMIN 5.2 05/15/2023    BILITOT 1.3 (H) 05/15/2023    AST 29 05/15/2023    ALT 60 (H) 05/15/2023      Lab Results   Component Value Date    " TROPONINT <0.010 04/21/2022        ECG 12 Lead (03/21/2023 16:05)   XR chest AP portable (08/09/2023 20:09)     Assessment & Plan   Assessment and Plan {CC Problem List  Visit Diagnosis  ROS  Review (Popup)  Health Maintenance  Quality  BestPractice  Medications  SmartSets  SnapShot Encounters  Media :23}     1. Other chest pain  -Chest pain ongoing with associated shortness of air  -Patient states he does not believe that this is related to reflux as these symptoms feel different  -Recent evaluation in ED at [place] on 8/9/2023 with normal troponins  - Adult Stress Echo W/ Cont or Stress Agent if Necessary Per Protocol; Future    2. Shortness of breath  -Patient with ongoing dyspnea on exertion  - Adult Stress Echo W/ Cont or Stress Agent if Necessary Per Protocol; Future    3. Dizziness  -Encouraged patient to stay adequately hydrated  - Adult Stress Echo W/ Cont or Stress Agent if Necessary Per Protocol; Future        Follow Up {Instructions Charge Capture  Follow-up Communications :23}     Return in about 4 weeks (around 9/14/2023) for Result review, Chest pain.      Patient was given instructions and counseling regarding his condition or for health maintenance advice. Please see specific information pulled into the AVS if appropriate.  Patient was instructed to call the [facility] with any questions, concerns, or worsening symptoms.    Dictated Utilizing Dragon Dictation   Please note that portions of this note were completed with a voice recognition program.   Part of this note may be an electronic transcription/translation of spoken language to printed text using the Dragon Dictation System.

## 2023-08-17 ENCOUNTER — OFFICE VISIT (OUTPATIENT)
Dept: CARDIOLOGY | Facility: HOSPITAL | Age: 33
End: 2023-08-17
Payer: MEDICAID

## 2023-08-17 VITALS
SYSTOLIC BLOOD PRESSURE: 110 MMHG | DIASTOLIC BLOOD PRESSURE: 78 MMHG | OXYGEN SATURATION: 96 % | TEMPERATURE: 97.8 F | HEART RATE: 74 BPM | WEIGHT: 222 LBS | HEIGHT: 67 IN | BODY MASS INDEX: 34.84 KG/M2 | RESPIRATION RATE: 18 BRPM

## 2023-08-17 DIAGNOSIS — R42 DIZZINESS: ICD-10-CM

## 2023-08-17 DIAGNOSIS — R07.89 OTHER CHEST PAIN: Primary | ICD-10-CM

## 2023-08-17 DIAGNOSIS — R06.02 SHORTNESS OF BREATH: ICD-10-CM

## 2023-08-29 ENCOUNTER — HOSPITAL ENCOUNTER (OUTPATIENT)
Dept: CARDIOLOGY | Facility: HOSPITAL | Age: 33
Discharge: HOME | End: 2023-08-29
Admitting: NURSE PRACTITIONER
Payer: MEDICAID

## 2023-08-29 VITALS
HEIGHT: 67 IN | HEART RATE: 79 BPM | WEIGHT: 222.66 LBS | SYSTOLIC BLOOD PRESSURE: 110 MMHG | OXYGEN SATURATION: 97 % | DIASTOLIC BLOOD PRESSURE: 76 MMHG | BODY MASS INDEX: 34.95 KG/M2

## 2023-08-29 DIAGNOSIS — R42 DIZZINESS: ICD-10-CM

## 2023-08-29 DIAGNOSIS — R06.02 SHORTNESS OF BREATH: ICD-10-CM

## 2023-08-29 DIAGNOSIS — R07.89 OTHER CHEST PAIN: ICD-10-CM

## 2023-08-29 LAB
BH CV ECHO MEAS - AO ROOT DIAM: 3.6 CM
BH CV ECHO MEAS - EDV(CUBED): 97.8 ML
BH CV ECHO MEAS - EDV(MOD-SP2): 69.9 ML
BH CV ECHO MEAS - EDV(MOD-SP4): 74.3 ML
BH CV ECHO MEAS - EF(MOD-BP): 58.5 %
BH CV ECHO MEAS - EF(MOD-SP2): 59.8 %
BH CV ECHO MEAS - EF(MOD-SP4): 58.3 %
BH CV ECHO MEAS - ESV(CUBED): 19.3 ML
BH CV ECHO MEAS - ESV(MOD-SP2): 28.1 ML
BH CV ECHO MEAS - ESV(MOD-SP4): 31 ML
BH CV ECHO MEAS - FS: 41.8 %
BH CV ECHO MEAS - IVS/LVPW: 1.19 CM
BH CV ECHO MEAS - IVSD: 0.86 CM
BH CV ECHO MEAS - LA DIMENSION: 3.5 CM
BH CV ECHO MEAS - LV DIASTOLIC VOL/BSA (35-75): 35.2 CM2
BH CV ECHO MEAS - LV MASS(C)D: 115.7 GRAMS
BH CV ECHO MEAS - LV SYSTOLIC VOL/BSA (12-30): 14.7 CM2
BH CV ECHO MEAS - LVIDD: 4.6 CM
BH CV ECHO MEAS - LVIDS: 2.7 CM
BH CV ECHO MEAS - LVPWD: 0.72 CM
BH CV ECHO MEAS - RAP SYSTOLE: 3 MMHG
BH CV ECHO MEAS - RVSP: 19 MMHG
BH CV ECHO MEAS - SI(MOD-SP2): 19.8 ML/M2
BH CV ECHO MEAS - SI(MOD-SP4): 20.5 ML/M2
BH CV ECHO MEAS - SV(MOD-SP2): 41.8 ML
BH CV ECHO MEAS - SV(MOD-SP4): 43.3 ML
BH CV ECHO MEAS - TR MAX PG: 15.9 MMHG
BH CV ECHO MEAS - TR MAX VEL: 199.5 CM/SEC
BH CV STRESS BP STAGE 1: NORMAL
BH CV STRESS BP STAGE 2: NORMAL
BH CV STRESS BP STAGE 3: NORMAL
BH CV STRESS DURATION MIN STAGE 1: 3
BH CV STRESS DURATION MIN STAGE 2: 3
BH CV STRESS DURATION MIN STAGE 3: 3
BH CV STRESS DURATION MIN STAGE 4: 3
BH CV STRESS DURATION SEC STAGE 1: 0
BH CV STRESS DURATION SEC STAGE 2: 0
BH CV STRESS DURATION SEC STAGE 3: 0
BH CV STRESS DURATION SEC STAGE 4: 0
BH CV STRESS GRADE STAGE 1: 10
BH CV STRESS GRADE STAGE 2: 12
BH CV STRESS GRADE STAGE 3: 14
BH CV STRESS GRADE STAGE 4: 16
BH CV STRESS HR STAGE 1: 117
BH CV STRESS HR STAGE 2: 134
BH CV STRESS HR STAGE 3: 164
BH CV STRESS HR STAGE 4: 169
BH CV STRESS METS STAGE 1: 5
BH CV STRESS METS STAGE 2: 7.5
BH CV STRESS METS STAGE 3: 10
BH CV STRESS METS STAGE 4: 13.5
BH CV STRESS O2 STAGE 1: 99
BH CV STRESS O2 STAGE 2: 99
BH CV STRESS O2 STAGE 3: 99
BH CV STRESS O2 STAGE 4: 99
BH CV STRESS PROTOCOL 1: NORMAL
BH CV STRESS RECOVERY BP: NORMAL MMHG
BH CV STRESS RECOVERY HR: 99 BPM
BH CV STRESS RECOVERY O2: 97 %
BH CV STRESS SPEED STAGE 1: 1.7
BH CV STRESS SPEED STAGE 2: 2.5
BH CV STRESS SPEED STAGE 3: 3.4
BH CV STRESS SPEED STAGE 4: 4.2
BH CV STRESS STAGE 1: 1
BH CV STRESS STAGE 2: 2
BH CV STRESS STAGE 3: 3
BH CV STRESS STAGE 4: 4
MAXIMAL PREDICTED HEART RATE: 185 BPM
PERCENT MAX PREDICTED HR: 91.35 %
STRESS BASELINE BP: NORMAL MMHG
STRESS BASELINE HR: 86 BPM
STRESS O2 SAT REST: 100 %
STRESS PERCENT HR: 107 %
STRESS POST ESTIMATED WORKLOAD: 10.7 METS
STRESS POST EXERCISE DUR MIN: 9 MIN
STRESS POST EXERCISE DUR SEC: 24 SEC
STRESS POST O2 SAT PEAK: 99 %
STRESS POST PEAK BP: NORMAL MMHG
STRESS POST PEAK HR: 169 BPM
STRESS TARGET HR: 157 BPM

## 2023-08-29 PROCEDURE — 93017 CV STRESS TEST TRACING ONLY: CPT

## 2023-08-29 PROCEDURE — 93350 STRESS TTE ONLY: CPT

## 2023-09-05 ENCOUNTER — OFFICE VISIT (OUTPATIENT)
Dept: CARDIOLOGY | Facility: CLINIC | Age: 33
End: 2023-09-05
Payer: MEDICAID

## 2023-09-05 VITALS
OXYGEN SATURATION: 98 % | HEIGHT: 67 IN | BODY MASS INDEX: 33.74 KG/M2 | WEIGHT: 215 LBS | SYSTOLIC BLOOD PRESSURE: 118 MMHG | HEART RATE: 80 BPM | DIASTOLIC BLOOD PRESSURE: 78 MMHG

## 2023-09-05 DIAGNOSIS — R07.89 CHEST TIGHTNESS: Primary | ICD-10-CM

## 2023-09-05 PROCEDURE — 99213 OFFICE O/P EST LOW 20 MIN: CPT | Performed by: INTERNAL MEDICINE

## 2023-09-05 RX ORDER — FAMOTIDINE 20 MG/1
20 TABLET, FILM COATED ORAL AS NEEDED
COMMUNITY
Start: 2023-08-31

## 2023-09-05 RX ORDER — COLCHICINE 0.6 MG/1
0.6 TABLET ORAL DAILY
Qty: 90 TABLET | Refills: 0 | Status: SHIPPED | OUTPATIENT
Start: 2023-09-05

## 2023-09-05 RX ORDER — SIMETHICONE 80 MG
80 TABLET,CHEWABLE ORAL AS NEEDED
COMMUNITY

## 2023-09-05 NOTE — PROGRESS NOTES
[facility]  Office visit  Meliton Lagos  1990    There is no work phone number on file.    VISIT DATE:  9/5/2023    PCP: Sanna Herr APRN  [address] [address]    CC:  Chief Complaint   Patient presents with    Chest Pain     New Patient       Previous cardiac studies and procedures:  May 2020 exercise treadmill test:  The exercise ECG stress test was negative for clinical and ECG evidence of myocardial ischemia. The [facility] treadmill score was 4.8. The patient had sudden onset dyspnea at the 4:46 minute latoya and had to stop the study. He had an exaggerated heart rate response to stress. With stopping at this time he also had a severely decreased exercise capacity, but technically reached his target heart rate.  Impressions are consistent with a low risk stress test.    September 2021 TTE  Left ventricular ejection fraction appears to be 61 - 65%. Left ventricular systolic function is normal.  Left ventricular diastolic function was normal.  Estimated right ventricular systolic pressure from tricuspid regurgitation is normal (<35 mmHg).  No significant structural or functional valvular disease.    August 2023 stress echocardiogram    Patient denied chest pain but c/o SOA (SpO2 WNL on RA) at peak exercise. This resolved in recovery.    Expected exercise duration = 12:40. Actual = 9:24. Patient requested to stop due to fatigue.    No significant ST or T wave changes noted.    Left ventricular systolic function is normal. Calculated left ventricular EF = 58.5% Left ventricular ejection fraction appears to be 56 - 60%.    Estimated right ventricular systolic pressure from tricuspid regurgitation is normal (<35 mmHg).    Normal stress echo with no significant echocardiographic evidence for myocardial ischemia.    ASSESSMENT:   Diagnosis Plan   1. Chest tightness            PLAN:  Precordial pain: Recurrent pain triggered by 3 to 4 days of exertion in a  "row, intermittently with certain antibiotics.  Initial onset after Moderna vaccine, appears to be decreasing in frequency.  Possible recurrent mild pericarditis.  We will proceed with a trial of colchicine 0.6 mg p.o. daily and trend symptoms.  If symptoms persist will arrange for coronary CTA.  Ischemia evaluation has been reassuring up to this point.    Subjective  Still having persistent episodes of left upper chest and shoulder discomfort.  Associated intermittent paresthesias in the left upper extremity.  Left upper chest and shoulder often feel like its a burning/fire sensation.  No obvious triggers.  Previous cardiac evaluation has been unremarkable.  Normal transthoracic echo.  Normal exercise treadmill testing, normal CTA chest.  Negative troponins.  Unremarkable EKG. symptoms have previously responded to steroid burst.    PHYSICAL EXAMINATION:  Vitals:    09/05/23 1322   BP: 118/78   BP Location: Right arm   Patient Position: Sitting   Pulse: 80   SpO2: 98%   Weight: 97.5 kg (215 lb)   Height: 170.2 cm (67\")     General Appearance:    Alert, cooperative, no distress, appears stated age   Head:    Normocephalic, without obvious abnormality, atraumatic   Eyes:    conjunctiva/corneas clear   Nose:   Nares normal, septum midline, mucosa normal, no drainage   Throat:   Lips, teeth and gums normal   Neck:   Supple, symmetrical, trachea midline, no carotid    bruit or JVD   Lungs:     Clear to auscultation bilaterally, respirations unlabored   Chest Wall:    No tenderness or deformity    Heart:    Regular rate and rhythm, S1 and S2 normal, no murmur, rub   or gallop, normal carotid impulse bilaterally without bruit.   Abdomen:     Soft, non-tender   Extremities:   Extremities normal, atraumatic, no cyanosis or edema   Pulses:   2+ and symmetric all extremities   Skin:   Skin color, texture, turgor normal, no rashes or lesions       Diagnostic Data:  Procedures  Lab Results   Component Value Date    CHLPL 187 " 2023    TRIG 120 2023    HDL 51 2023     Lab Results   Component Value Date    GLUCOSE 92 05/15/2023    BUN 18 05/15/2023    CREATININE 1.12 05/15/2023     05/15/2023    K 4.3 05/15/2023     05/15/2023    CO2 26.7 05/15/2023     Lab Results   Component Value Date    HGBA1C 5.2 2022     Lab Results   Component Value Date    WBC 6.11 05/15/2023    HGB 16.0 05/15/2023    HCT 46.2 05/15/2023     05/15/2023       Allergies  Allergies   Allergen Reactions    Amoxicillin Other (See Comments)     Chest pain    Bactrim [Sulfamethoxazole-Trimethoprim] Other (See Comments)     Chest pain    Ct Contrast GI Intolerance       Current Medications    Current Outpatient Medications:     famotidine (PEPCID) 20 MG tablet, 1 tablet As Needed., Disp: , Rfl:     nystatin-triamcinolone (MYCOLOG) 619865-3.1 UNIT/GM-% ointment, Apply 1 application  topically to the appropriate area as directed 2 (Two) Times a Day., Disp: 30 g, Rfl: 0    simethicone (MYLICON) 80 MG chewable tablet, Chew 1 tablet As Needed for Flatulence., Disp: , Rfl:     sucralfate (Carafate) 1 g tablet, Take 1 tablet by mouth 3 (Three) Times a Day Before Meals., Disp: 90 tablet, Rfl: 0          ROS  ROS      SOCIAL HX  Social History     Socioeconomic History    Marital status:    Tobacco Use    Smoking status: Former     Packs/day: 1.00     Years: 5.00     Pack years: 5.00     Types: Cigarettes     Start date: 2012     Quit date: 2016     Years since quittin.6     Passive exposure: Past    Smokeless tobacco: Never   Vaping Use    Vaping Use: Never used   Substance and Sexual Activity    Alcohol use: Yes     Comment: occas    Drug use: No    Sexual activity: Yes     Partners: Female     Birth control/protection: None       FAMILY HX  Family History   Problem Relation Age of Onset    No Known Problems Mother     No Known Problems Father     Heart attack Maternal Grandfather     Heart disease Maternal Grandfather      No Known Problems Paternal Grandmother     No Known Problems Paternal Grandfather     Colon cancer Neg Hx     Colon polyps Neg Hx              Dereck Rodriguez III, MD, FACC

## 2023-09-06 ENCOUNTER — TELEPHONE (OUTPATIENT)
Dept: CARDIOLOGY | Facility: CLINIC | Age: 33
End: 2023-09-06
Payer: MEDICAID

## 2023-09-06 NOTE — TELEPHONE ENCOUNTER
PA initiated on Colchicine 0.6 mg. KEY # [id number].PA Case ID # [id number]-.Approved 9/6/23 to 9/5/24.Pharmacy notified of approval.

## 2023-09-25 ENCOUNTER — TELEPHONE (OUTPATIENT)
Dept: FAMILY MEDICINE CLINIC | Facility: CLINIC | Age: 33
End: 2023-09-25

## 2023-09-25 NOTE — TELEPHONE ENCOUNTER
Okay for the HUB to read:     I haven't seen patient since August. I do not know what antibiotic or who wrote him an antibiotic but if it is causing chest pain he needs to stop it.      I called the pt with the  and LVM advising to call the office.

## 2023-09-25 NOTE — TELEPHONE ENCOUNTER
Caller: Meliton Lagos    Relationship: Self    Best call back number: [phone]    What is the best time to reach you: ANYTIME    Who are you requesting to speak with (clinical staff, provider,  specific staff member): CLINICAL STAFF    What was the call regarding: PATIENT STATES THAT THE ANTIBIOTIC THAT IS WAS GIVEN IS CAUSING HIM TO HAVE CHEST PAINS. HE IS WANTING TO SEE ABOUT GETTING A NEW ANTIBIOTIC CALLED IN FOR HIM.    Is it okay if the provider responds through MyChart: YES

## 2023-09-25 NOTE — TELEPHONE ENCOUNTER
Note      Okay for the HUB to read:      I haven't seen patient since August. I do not know what antibiotic or who wrote him an antibiotic but if it is causing chest pain he needs to stop it.       I called the pt with the  and LVM advising to call the office.                [facility] RELAYED TO PATIENT AND HE WILL SEE JOEY ON WED 9/27/2023

## 2023-09-28 ENCOUNTER — OFFICE VISIT (OUTPATIENT)
Dept: FAMILY MEDICINE CLINIC | Facility: CLINIC | Age: 33
End: 2023-09-28
Payer: MEDICAID

## 2023-09-28 ENCOUNTER — LAB (OUTPATIENT)
Dept: LAB | Facility: HOSPITAL | Age: 33
End: 2023-09-28
Payer: MEDICAID

## 2023-09-28 VITALS
HEART RATE: 83 BPM | BODY MASS INDEX: 34.69 KG/M2 | TEMPERATURE: 98.4 F | WEIGHT: 221 LBS | DIASTOLIC BLOOD PRESSURE: 64 MMHG | SYSTOLIC BLOOD PRESSURE: 113 MMHG | OXYGEN SATURATION: 97 % | HEIGHT: 67 IN

## 2023-09-28 DIAGNOSIS — R79.89 ELEVATED LFTS: ICD-10-CM

## 2023-09-28 DIAGNOSIS — K04.7 DENTAL INFECTION: Primary | ICD-10-CM

## 2023-09-28 DIAGNOSIS — Z91.09 ENVIRONMENTAL ALLERGIES: ICD-10-CM

## 2023-09-28 DIAGNOSIS — K21.9 CHRONIC GERD: ICD-10-CM

## 2023-09-28 DIAGNOSIS — R17 ELEVATED BILIRUBIN: ICD-10-CM

## 2023-09-28 PROCEDURE — 1160F RVW MEDS BY RX/DR IN RCRD: CPT | Performed by: NURSE PRACTITIONER

## 2023-09-28 PROCEDURE — 99214 OFFICE O/P EST MOD 30 MIN: CPT | Performed by: NURSE PRACTITIONER

## 2023-09-28 PROCEDURE — 1159F MED LIST DOCD IN RCRD: CPT | Performed by: NURSE PRACTITIONER

## 2023-09-28 RX ORDER — LEVOCETIRIZINE DIHYDROCHLORIDE 5 MG/1
5 TABLET, FILM COATED ORAL EVERY EVENING
Qty: 30 TABLET | Refills: 1 | Status: SHIPPED | OUTPATIENT
Start: 2023-09-28

## 2023-09-28 RX ORDER — DOXYCYCLINE 100 MG/1
100 CAPSULE ORAL 2 TIMES DAILY
Qty: 20 CAPSULE | Refills: 0 | Status: SHIPPED | OUTPATIENT
Start: 2023-09-28 | End: 2023-10-08

## 2023-09-28 NOTE — PROGRESS NOTES
Follow Up Office Note     Patient Name: Meliton Lagos  : 1990   MRN: [id number]     Chief Complaint:    Chief Complaint   Patient presents with    Dental Problem    Elevated Hepatic Enzymes       History of Present Illness: Meliton Lagos is a 33 y.o. male who presents today with complaint of dental problem.  Patient recently had some dental work and was prescribed a course of clindamycin by dentist to treat infection from impacted teeth. He states that the medication made his chest hurt and he stopped taking it. He is requesting Rx for new antibiotic today.     Patient also presents for f/u elevated liver function tests on last laboratory studies from May of this year.    Subjective      I have reviewed and the following portions of the patient's history were updated as appropriate: past family history, past medical history, past social history, past surgical history and problem list.    Review of Systems:   Review of Systems   Constitutional:  Positive for fatigue. Negative for chills, diaphoresis and fever.   HENT:  Positive for congestion, dental problem (left lower molars) and rhinorrhea. Negative for ear pain, sinus pain and sore throat.    Respiratory: Negative.     Cardiovascular: Negative.    Gastrointestinal:  Negative for abdominal pain, blood in stool, diarrhea, nausea and vomiting.        Patient has chronic GERD. He currently denies heartburn.   Allergic/Immunologic: Positive for environmental allergies.      Past Medical History:   Past Medical History:   Diagnosis Date    GERD (gastroesophageal reflux disease)          Medications:     Current Outpatient Medications:     colchicine 0.6 MG tablet, Take 1 tablet by mouth Daily., Disp: 90 tablet, Rfl: 0    doxycycline (MONODOX) 100 MG capsule, Take 1 capsule by mouth 2 (Two) Times a Day for 10 days., Disp: 20 capsule, Rfl: 0    levocetirizine (XYZAL) 5 MG tablet, Take 1 tablet by mouth Every Evening., Disp: 30 tablet, Rfl:  "1    Allergies:   Allergies   Allergen Reactions    Amoxicillin Other (See Comments)     Chest pain    Bactrim [Sulfamethoxazole-Trimethoprim] Other (See Comments)     Chest pain    Clindamycin Other (See Comments)     Medication causes patient to have chest pain    Ct Contrast GI Intolerance         Objective     Physical Exam:  Vital Signs:   Vitals:    09/28/23 1308   BP: 113/64   Pulse: 83   Temp: 98.4 °F (36.9 °C)   TempSrc: Infrared   SpO2: 97%   Weight: 100 kg (221 lb)   Height: 170.2 cm (67.01\")   PainSc: 0-No pain     Body mass index is 34.61 kg/m².     Physical Exam  Vitals and nursing note reviewed.   Constitutional:       General: He is not in acute distress.     Appearance: Normal appearance. He is well-developed. He is not ill-appearing, toxic-appearing or diaphoretic.   HENT:      Head: Normocephalic and atraumatic.   Cardiovascular:      Rate and Rhythm: Normal rate and regular rhythm.   Pulmonary:      Effort: Pulmonary effort is normal. No respiratory distress.      Breath sounds: Normal breath sounds. No stridor. No wheezing.   Skin:     General: Skin is warm and dry.   Neurological:      General: No focal deficit present.      Mental Status: He is alert and oriented to person, place, and time.   Psychiatric:         Mood and Affect: Mood normal.         Behavior: Behavior normal. Behavior is cooperative.         Thought Content: Thought content normal.         Judgment: Judgment normal.       Assessment / Plan      Assessment/Plan:   Diagnoses and all orders for this visit:    1. Dental infection (Primary)  Assessment & Plan:  Newly identified problem. Due to patient reported reaction of chest pain to multiple antibiotics, antibiotic selection limited.   Plan to begin trial of doxycycline.   Patient to keep upcoming appointment with oral surgeon.    Orders:  -     doxycycline (MONODOX) 100 MG capsule; Take 1 capsule by mouth 2 (Two) Times a Day for 10 days.  Dispense: 20 capsule; Refill: 0    2. " Elevated LFTs  Assessment & Plan:  Newly identified problem which requires further workup. Laboratory studies per orders, further recommendation after results evaluation.   Gastro referral.    Orders:  -     Ambulatory Referral to Gastroenterology  -     Comprehensive Metabolic Panel; Future    3. Elevated bilirubin  -     Ambulatory Referral to Gastroenterology  -     Comprehensive Metabolic Panel; Future    4. Chronic GERD  Assessment & Plan:  Suspect what patient reports as chest pain may actually be GERD. Patient reports that he has not seen gastroenterology in years. Plan to have patient get gastro consult and possible repeat EGD.    Orders:  -     Ambulatory Referral to Gastroenterology    5. Environmental allergies  Assessment & Plan:  New problem. Plan to treat with Xyzal.    Orders:  -     levocetirizine (XYZAL) 5 MG tablet; Take 1 tablet by mouth Every Evening.  Dispense: 30 tablet; Refill: 1           Follow Up:   PRN and at next scheduled appointment(s) with PCP.    Discussed the nature of the medical condition(s) risks, complications, implications, management, safe and proper use of medications. Encouraged medication compliance, and keeping scheduled follow up appointments with me and any other providers.      RTC if symptoms fail to improve, to ER if symptoms worsen.        *Dictated Utilizing Dragon Dictation   Please note that portions of this note were completed with a voice recognition program.   Part of this note may be an electronic transcription/translation of spoken language to printed text using the Dragon Dictation System. Spelling and/or grammatical errors may exist despite efforts at proofreading.      NOTE TO PATIENT: The 21st Century Cures Act makes medical notes like these available to patients in the interest of transparency. However, be advised this is a medical document. It is intended as peer to peer communication. It is written in medical language and may contain abbreviations or  verbiage that are unfamiliar. It may appear blunt or direct. Medical documents are intended to carry relevant information, facts as evident, and the clinical opinion of the practitioner.      JAY León  [facility] [place] [facility] [place]

## 2023-09-29 LAB
ALBUMIN SERPL-MCNC: 4.6 G/DL (ref 3.5–5.2)
ALBUMIN/GLOB SERPL: 1.8 G/DL
ALP SERPL-CCNC: 71 U/L (ref 39–117)
ALT SERPL-CCNC: 56 U/L (ref 1–41)
AST SERPL-CCNC: 33 U/L (ref 1–40)
BILIRUB SERPL-MCNC: 0.9 MG/DL (ref 0–1.2)
BUN SERPL-MCNC: 20 MG/DL (ref 6–20)
BUN/CREAT SERPL: 22.7 (ref 7–25)
CALCIUM SERPL-MCNC: 9.7 MG/DL (ref 8.6–10.5)
CHLORIDE SERPL-SCNC: 106 MMOL/L (ref 98–107)
CO2 SERPL-SCNC: 26.1 MMOL/L (ref 22–29)
CREAT SERPL-MCNC: 0.88 MG/DL (ref 0.76–1.27)
EGFRCR SERPLBLD CKD-EPI 2021: 115 ML/MIN/1.73
GLOBULIN SER CALC-MCNC: 2.6 GM/DL
GLUCOSE SERPL-MCNC: 135 MG/DL (ref 65–99)
POTASSIUM SERPL-SCNC: 4.6 MMOL/L (ref 3.5–5.2)
PROT SERPL-MCNC: 7.2 G/DL (ref 6–8.5)
SODIUM SERPL-SCNC: 142 MMOL/L (ref 136–145)

## 2023-09-30 PROBLEM — K52.9 CHRONIC DIARRHEA: Chronic | Status: RESOLVED | Noted: 2023-08-03 | Resolved: 2023-09-30

## 2023-09-30 PROBLEM — Z91.09 ENVIRONMENTAL ALLERGIES: Status: ACTIVE | Noted: 2023-09-30

## 2023-09-30 PROBLEM — K04.7 DENTAL INFECTION: Status: ACTIVE | Noted: 2023-09-30

## 2023-09-30 NOTE — ASSESSMENT & PLAN NOTE
Newly identified problem. Due to patient reported reaction of chest pain to multiple antibiotics, antibiotic selection limited.   Plan to begin trial of doxycycline.   Patient to keep upcoming appointment with oral surgeon.

## 2023-09-30 NOTE — ASSESSMENT & PLAN NOTE
Suspect what patient reports as chest pain may actually be GERD. Patient reports that he has not seen gastroenterology in years. Plan to have patient get gastro consult and possible repeat EGD.

## 2023-09-30 NOTE — ASSESSMENT & PLAN NOTE
Newly identified problem which requires further workup. Laboratory studies per orders, further recommendation after results evaluation.   Gastro referral.

## 2023-10-10 DIAGNOSIS — R73.9 HYPERGLYCEMIA: Primary | ICD-10-CM

## 2023-10-18 ENCOUNTER — OFFICE VISIT (OUTPATIENT)
Dept: GASTROENTEROLOGY | Facility: CLINIC | Age: 33
End: 2023-10-18
Payer: MEDICAID

## 2023-10-18 VITALS
BODY MASS INDEX: 34.28 KG/M2 | DIASTOLIC BLOOD PRESSURE: 90 MMHG | HEIGHT: 67 IN | SYSTOLIC BLOOD PRESSURE: 120 MMHG | WEIGHT: 218.4 LBS

## 2023-10-18 DIAGNOSIS — Z78.9 AT AVERAGE RISK FOR COLON CANCER: ICD-10-CM

## 2023-10-18 DIAGNOSIS — R79.89 ELEVATED LFTS: ICD-10-CM

## 2023-10-18 DIAGNOSIS — K22.10 EROSIVE ESOPHAGITIS: ICD-10-CM

## 2023-10-18 DIAGNOSIS — K90.89 BILE SALT-INDUCED DIARRHEA: ICD-10-CM

## 2023-10-18 DIAGNOSIS — R12 HEARTBURN: ICD-10-CM

## 2023-10-18 DIAGNOSIS — R74.01 ALT (SGPT) LEVEL RAISED: Primary | ICD-10-CM

## 2023-10-18 PROCEDURE — 1159F MED LIST DOCD IN RCRD: CPT | Performed by: INTERNAL MEDICINE

## 2023-10-18 PROCEDURE — 1160F RVW MEDS BY RX/DR IN RCRD: CPT | Performed by: INTERNAL MEDICINE

## 2023-10-18 PROCEDURE — 99214 OFFICE O/P EST MOD 30 MIN: CPT | Performed by: INTERNAL MEDICINE

## 2023-10-18 RX ORDER — ERGOCALCIFEROL 1.25 MG/1
CAPSULE ORAL
COMMUNITY
Start: 2023-10-08

## 2023-10-18 RX ORDER — OMEPRAZOLE 40 MG/1
40 CAPSULE, DELAYED RELEASE ORAL
Qty: 60 CAPSULE | Refills: 11 | Status: SHIPPED | OUTPATIENT
Start: 2023-10-18

## 2023-10-18 RX ORDER — MONTELUKAST SODIUM 4 MG/1
2 TABLET, CHEWABLE ORAL 2 TIMES DAILY
Qty: 120 TABLET | Refills: 11 | Status: SHIPPED | OUTPATIENT
Start: 2023-10-18

## 2023-10-18 NOTE — PROGRESS NOTES
GASTROENTEROLOGY OFFICE NOTE  Meliton Lagos  [id number]  1990      Chief Complaint   Patient presents with    Reflux, gas, get full early, and bloating        HISTORY OF PRESENT ILLNESS:  Patient is a 33-year-old male who I saw in 2021 at which time an upper endoscopy was done to evaluate dysphagia, cough, voice hoarseness and GERD type symptoms.  He already had at that time a history of erosive esophagitis.  He had been seen in June 2020 at which time an upper endoscopy revealed LA class a focal erosive esophagitis.  He is currently not taking a proton pump inhibitor.    He is having reflux 2-4 times per week which can last for half hour to time.  He feels this kind of exacerbated following cholecystectomy a couple years ago.  He does not have any clear complaints of dysphagia to solids as a retrosternal burning type sensation.    He is status post colonoscopy [facility] 2019 which was unremarkable.    He notes that he developed diarrhea postcholecystectomy and this did well with a bile salt binding resin which was a powder but he did not tolerate it very well and he continues to have occasional problems with nonbloody urgent bowel movement.    He is not having any true problems with dysphagia solids or odynophagia and denies any early satiety.  He is noting any unexplained weight loss and, in fact, has gained weight, he denies melena or bright red blood per rectum.    PAST MEDICAL HISTORY  Past Medical History:    GERD (gastroesophageal reflux disease)        PAST SURGICAL HISTORY  Past Surgical History:    ANAL FISTULA REPAIR    CHOLECYSTECTOMY    COLONOSCOPY    [facility]    ENDOSCOPY    Dr. Mobley empiric dilation performed bx suggestive of esophagitis    ENDOSCOPY    EGD Dr. Mobley, empiric dilation erosive esophagitis        MEDICATIONS:    Current Outpatient Medications:     colchicine 0.6 MG tablet, Take 1 tablet by mouth Daily., Disp: 90 tablet, Rfl: 0    " vitamin D (ERGOCALCIFEROL) 1.25 MG (14427 UT) capsule capsule, Every 7 (Seven) Days., Disp: , Rfl:     levocetirizine (XYZAL) 5 MG tablet, Take 1 tablet by mouth Every Evening., Disp: 30 tablet, Rfl: 1    ALLERGIES  is allergic to amoxicillin, bactrim [sulfamethoxazole-trimethoprim], clindamycin, and ct contrast.    FAMILY HISTORY:  Cancer-related family history is negative for Colon cancer.  Colon Cancer-related family history is negative for Colon cancer and Colon polyps.    SOCIAL HISTORY  He  reports that he quit smoking about 7 years ago. His smoking use included cigarettes. He started smoking about 11 years ago. He has a 5.00 pack-year smoking history. He has been exposed to tobacco smoke. He has never used smokeless tobacco. He reports current alcohol use. He reports that he does not use drugs.   He has 4 children.  He has sons ages 12 4 and 10 months and a daughter age 10 years old.  He works in car sales.  He is originally from [place]      PHYSICAL EXAM   /90 (BP Location: Left arm, Patient Position: Sitting, Cuff Size: Adult)   Ht 170.2 cm (67\")   Wt 99.1 kg (218 lb 6.4 oz)   BMI 34.21 kg/m²   General: Alert and oriented x 3. In no apparent or acute distress.  and No stigmata of chronic liver disease  HEENT: Anicteric sclerae. Normal oropharynx  Neck: Supple. Without lymphadenopathy  CV: Regular rate and rhythm, S1, S2  Lungs: Clear to ausculation. Without rales, rhonchi and wheezing  Abdomen:  Soft,non-distended without palpable masses or hepatosplenomeagaly, areas of rebound tenderness or guarding.   Extremeties: without clubbing, cyanosis or edema  Neurologic:  Alert and oriented x 3 without focal motor or sensory deficits  Rectal exam: deferred     Results for orders placed or performed in visit on 09/28/23   Comprehensive Metabolic Panel    Specimen: Blood    Blood  Release to [facility]   Result Value Ref Range    Glucose 135 (H) 65 - 99 mg/dL    BUN 20 6 - 20 mg/dL    Creatinine 0.88 0.76 - 1.27 " mg/dL    EGFR Result 115.0 >60.0 mL/min/1.73    BUN/Creatinine Ratio 22.7 7.0 - 25.0    Sodium 142 136 - 145 mmol/L    Potassium 4.6 3.5 - 5.2 mmol/L    Chloride 106 98 - 107 mmol/L    Total CO2 26.1 22.0 - 29.0 mmol/L    Calcium 9.7 8.6 - 10.5 mg/dL    Total Protein 7.2 6.0 - 8.5 g/dL    Albumin 4.6 3.5 - 5.2 g/dL    Globulin 2.6 gm/dL    A/G Ratio 1.8 g/dL    Total Bilirubin 0.9 0.0 - 1.2 mg/dL    Alkaline Phosphatase 71 39 - 117 U/L    AST (SGOT) 33 1 - 40 U/L    ALT (SGPT) 56 (H) 1 - 41 U/L        Results Review:  I reviewed the patient's new clinical results.      ASSESSMENT  1.-GERD.  History of erosive esophagitis.  Relapse rate of erosive esophagitis off proton pump inhibitors as well over 90%.  He needs to be on a proton pump inhibitor.  I explained to him that he needs to be on a proton pump inhibitor on a consistent basis.  We did briefly review the option of a Nissen fundoplication.  2.-Isolated elevation of ALT.  Most recently 56 units/L (high normal 41 units/L)  3.-Bile salt induced diarrhea.  Trial of colestipol recommended  4.-Patient status post unremarkable colonoscopy 2019.  Average risk for colon cancer    PLAN  1.-Initiate omeprazole 40 mg p.o. twice daily.  Once symptoms are controlled he can drop down to 40 mg once daily  2.-Initiate colestipol 1 to 4 g/day titrated to response and tolerance  3.-Recheck serum liver enzymes at return visit.  Further work-up and evaluation for underlying liver disease recommended for persistent elevation of ALT.  We will initiate serological work-up for variety of liver diseases.  4.-Return appointment in 3 to 4 months.  Patient to call us in the interim if not responding to above-noted interventions.      Bhupendra Mobley MD  10/18/2023   14:51 EDT

## 2023-10-19 ENCOUNTER — PRIOR AUTHORIZATION (OUTPATIENT)
Dept: GASTROENTEROLOGY | Facility: CLINIC | Age: 33
End: 2023-10-19
Payer: MEDICAID

## 2023-10-19 PROBLEM — R74.01 ALT (SGPT) LEVEL RAISED: Status: ACTIVE | Noted: 2023-10-19

## 2023-10-19 PROBLEM — Z78.9 AT AVERAGE RISK FOR COLON CANCER: Status: ACTIVE | Noted: 2023-10-19

## 2023-10-19 PROBLEM — K22.10 EROSIVE ESOPHAGITIS: Status: ACTIVE | Noted: 2023-10-19

## 2023-10-19 PROBLEM — K90.89 BILE SALT-INDUCED DIARRHEA: Status: ACTIVE | Noted: 2023-10-19

## 2023-10-19 PROBLEM — R12 HEARTBURN: Status: ACTIVE | Noted: 2023-10-19

## 2023-10-19 NOTE — TELEPHONE ENCOUNTER
The request has been approved. The authorization is effective from 10/19/2023 to 10/18/2024, as long as the member is enrolled in their current health plan

## 2023-10-20 ENCOUNTER — LAB (OUTPATIENT)
Dept: LAB | Facility: HOSPITAL | Age: 33
End: 2023-10-20
Payer: MEDICAID

## 2023-10-20 DIAGNOSIS — R74.01 ALT (SGPT) LEVEL RAISED: ICD-10-CM

## 2023-10-20 DIAGNOSIS — R73.9 HYPERGLYCEMIA: ICD-10-CM

## 2023-10-21 LAB — HBA1C MFR BLD: 5.6 % (ref 4.8–5.6)

## 2023-10-30 LAB
A1AT PHENOTYP SERPL IFE: NORMAL
A1AT SERPL-MCNC: 148 MG/DL (ref 95–164)
ANA SER QL: NEGATIVE
C-ANCA TITR SER IF: NORMAL TITER
CERULOPLASMIN SERPL-MCNC: 22.2 MG/DL (ref 16–31)
ENDOMYSIUM IGA SER QL: NEGATIVE
FERRITIN SERPL-MCNC: 118 NG/ML (ref 30–400)
GLIADIN PEPTIDE IGA SER-ACNC: 5 UNITS (ref 0–19)
GLIADIN PEPTIDE IGG SER-ACNC: 2 UNITS (ref 0–19)
HBV SURFACE AG SERPL QL IA: NEGATIVE
HCV IGG SERPL QL IA: NON REACTIVE
IGA SERPL-MCNC: 362 MG/DL (ref 90–386)
IRON SATN MFR SERPL: 31 % (ref 15–55)
IRON SERPL-MCNC: 113 UG/DL (ref 38–169)
MITOCHONDRIA M2 IGG SER-ACNC: <20 UNITS (ref 0–20)
P-ANCA ATYPICAL TITR SER IF: NORMAL TITER
P-ANCA TITR SER IF: NORMAL TITER
SMA IGG SER-ACNC: 5 UNITS (ref 0–19)
TIBC SERPL-MCNC: 363 UG/DL (ref 250–450)
TTG IGA SER-ACNC: <2 U/ML (ref 0–3)
TTG IGG SER-ACNC: <2 U/ML (ref 0–5)
UIBC SERPL-MCNC: 250 UG/DL (ref 111–343)

## 2023-11-13 RX ORDER — ERGOCALCIFEROL 1.25 MG/1
50000 CAPSULE ORAL
Qty: 5 CAPSULE | Refills: 0 | Status: SHIPPED | OUTPATIENT
Start: 2023-11-13

## 2023-11-15 ENCOUNTER — OFFICE VISIT (OUTPATIENT)
Dept: FAMILY MEDICINE CLINIC | Facility: CLINIC | Age: 33
End: 2023-11-15
Payer: MEDICAID

## 2023-11-15 VITALS
TEMPERATURE: 98.7 F | HEIGHT: 67 IN | WEIGHT: 217.8 LBS | BODY MASS INDEX: 34.18 KG/M2 | HEART RATE: 86 BPM | DIASTOLIC BLOOD PRESSURE: 84 MMHG | OXYGEN SATURATION: 99 % | SYSTOLIC BLOOD PRESSURE: 118 MMHG

## 2023-11-15 DIAGNOSIS — R07.2 PRECORDIAL PAIN: ICD-10-CM

## 2023-11-15 DIAGNOSIS — R07.2 PRECORDIAL PAIN: Primary | ICD-10-CM

## 2023-11-15 DIAGNOSIS — Z00.00 ANNUAL PHYSICAL EXAM: Primary | ICD-10-CM

## 2023-11-15 DIAGNOSIS — M35.2 BEHCET'S DISEASE: Chronic | ICD-10-CM

## 2023-11-15 DIAGNOSIS — Z76.0 MEDICATION REFILL: ICD-10-CM

## 2023-11-15 DIAGNOSIS — R21 PENILE RASH: ICD-10-CM

## 2023-11-15 RX ORDER — NYSTATIN AND TRIAMCINOLONE ACETONIDE 100000; 1 [USP'U]/G; MG/G
1 OINTMENT TOPICAL 2 TIMES DAILY
Qty: 60 G | Refills: 0 | Status: SHIPPED | OUTPATIENT
Start: 2023-11-15

## 2023-11-19 PROBLEM — M35.2 BEHCET'S SYNDROME: Status: ACTIVE | Noted: 2023-11-19

## 2023-11-19 PROBLEM — R07.2 PRECORDIAL PAIN: Status: ACTIVE | Noted: 2023-11-19

## 2023-11-19 NOTE — PROGRESS NOTES
Follow Up Office Note     Patient Name: Meliton Lagos  : 1990   MRN: [id number]     Chief Complaint:    Chief Complaint   Patient presents with    Rash     Per patient he still has a rash on his penis.     Pain     Per patient he is still having pain in his left foot; states it is arthritis.    Chest Pain    Annual Exam       History of Present Illness: Meliton Lagos is a 33 y.o. male who presents today for annual physical exam.    Patient presents with complaint of acute exacerbation of chronic penile rash/irritation.  Patient is requesting refill on his Mycolog cream today.    Patient also complaining of continued pain in his left foot as well as multiple other joints.  He reports that the colchicine did not help his symptoms.  Patient has Behcet's.  He states that he has not been following with a rheumatologist but is interested in doing so.    Patient complaining of continued precordial pain.  He was recently seen by cardiology.  Patient was prescribed colchicine which he states has not helped.  He states that his cardiologist advised him that if his symptoms persisted he would arrange for coronary CTA.  Patient's next follow-up visit in January.  He is requesting to have this test ordered now rather than waiting until January.        Subjective      I have reviewed and the following portions of the patient's history were updated as appropriate: past family history, past medical history, past social history, past surgical history and problem list.    Review of Systems:   Review of Systems   Constitutional: Negative.    Respiratory: Negative.     Cardiovascular:  Positive for chest pain. Negative for palpitations and leg swelling.   Gastrointestinal: Negative.    Genitourinary:  Negative for dysuria and hematuria.        Penile rash, dryness, itching   Musculoskeletal:  Positive for arthralgias.   Neurological: Negative.         Past Medical History:   Past Medical History:   Diagnosis  "Date    GERD (gastroesophageal reflux disease)          Medications:     Current Outpatient Medications:     colchicine 0.6 MG tablet, Take 1 tablet by mouth Daily., Disp: 90 tablet, Rfl: 0    colestipol (Colestid) 1 g tablet, Take 2 tablets by mouth 2 (Two) Times a Day. Take separate 2 hours from other medications, Disp: 120 tablet, Rfl: 11    omeprazole (priLOSEC) 40 MG capsule, Take 1 capsule by mouth 2 (Two) Times a Day Before Meals. Take a half hour before breakfast and dinner, Disp: 60 capsule, Rfl: 11    vitamin D (ERGOCALCIFEROL) 1.25 MG (40027 UT) capsule capsule, TAKE ONE CAPSULE BY MOUTH EVERY 7 DAYS, Disp: 5 capsule, Rfl: 0    nystatin-triamcinolone (MYCOLOG) 659021-1.1 UNIT/GM-% ointment, Apply 1 application  topically to the appropriate area as directed 2 (Two) Times a Day., Disp: 60 g, Rfl: 0    Allergies:   Allergies   Allergen Reactions    Amoxicillin Other (See Comments)     Chest pain    Bactrim [Sulfamethoxazole-Trimethoprim] Other (See Comments)     Chest pain    Clindamycin Other (See Comments)     Medication causes patient to have chest pain    Ct Contrast GI Intolerance         3/21/2023     3:35 PM   PHQ-2/PHQ-9 Depression Screening   Little Interest or Pleasure in Doing Things 0-->not at all   Feeling Down, Depressed or Hopeless 0-->not at all   PHQ-9: Brief Depression Severity Measure Score 0        Objective     Physical Exam:  Vital Signs:   Vitals:    11/15/23 1609   BP: 118/84   Pulse: 86   Temp: 98.7 °F (37.1 °C)   TempSrc: Infrared   SpO2: 99%   Weight: 98.8 kg (217 lb 12.8 oz)   Height: 170.2 cm (67.01\")   PainSc:   4   PainLoc: Foot     Body mass index is 34.1 kg/m².     Physical Exam  Vitals and nursing note reviewed.   Constitutional:       General: He is not in acute distress.     Appearance: Normal appearance. He is well-developed. He is not ill-appearing, toxic-appearing or diaphoretic.   HENT:      Head: Normocephalic and atraumatic.   Cardiovascular:      Rate and Rhythm: " Normal rate and regular rhythm.   Pulmonary:      Effort: Pulmonary effort is normal. No respiratory distress.      Breath sounds: Normal breath sounds. No stridor. No wheezing.   Skin:     General: Skin is warm and dry.   Neurological:      General: No focal deficit present.      Mental Status: He is alert and oriented to person, place, and time.   Psychiatric:         Mood and Affect: Mood normal.         Behavior: Behavior normal. Behavior is cooperative.         Thought Content: Thought content normal.         Judgment: Judgment normal.         Assessment / Plan      Assessment/Plan:   Diagnoses and all orders for this visit:    1. Annual physical exam (Primary)    2. Behcet's disease  Assessment & Plan:  Acute exacerbation of Behcet's with penile manifestation.  Plan to refer to rheumatology for further evaluation and management.  Refill Mycolog ointment.    Orders:  -     nystatin-triamcinolone (MYCOLOG) 296935-2.1 UNIT/GM-% ointment; Apply 1 application  topically to the appropriate area as directed 2 (Two) Times a Day.  Dispense: 60 g; Refill: 0  -     Ambulatory Referral to Rheumatology    3. Penile rash  -     nystatin-triamcinolone (MYCOLOG) 711369-1.1 UNIT/GM-% ointment; Apply 1 application  topically to the appropriate area as directed 2 (Two) Times a Day.  Dispense: 60 g; Refill: 0    4. Precordial pain  Assessment & Plan:  Patient continues complaint of intermittent chest pain.  For patient's request I will reach out to his cardiologist Dr. Rodriguez via secure chat messaging to ask if he would be willing to go ahead and order CTA of the chest prior to patient's January appointment.      5. Medication refill  -     nystatin-triamcinolone (MYCOLOG) 305815-6.1 UNIT/GM-% ointment; Apply 1 application  topically to the appropriate area as directed 2 (Two) Times a Day.  Dispense: 60 g; Refill: 0       The patient is here for a health maintenance visit.  Currently, the patient consumes a mostly healthy diet and  has no routine exercise regimen. Screening lab work is ordered.  Immunizations are declined today and vaccine education is provided.  Advice and education is given regarding nutrition, aerobic exercise, routine dental evaluations, routine eye exams, reproductive health, cardiovascular risk reduction, sunscreen use, self skin examination (annual dermatology evaluations) and seat belt use (general overall safety).  Further recommendations after lab evaluation.  Annual wellness evaluations recommended.     Follow Up:   PRN and at next scheduled appointment(s) with PCP.    Discussed the nature of the medical condition(s) risks, complications, implications, management, safe and proper use of medications. Encouraged medication compliance, and keeping scheduled follow up appointments with me and any other providers.      RTC if symptoms fail to improve, to ER if symptoms worsen.        *Dictated Utilizing Dragon Dictation   Please note that portions of this note were completed with a voice recognition program.   Part of this note may be an electronic transcription/translation of spoken language to printed text using the Dragon Dictation System. Spelling and/or grammatical errors may exist despite efforts at proofreading.      NOTE TO PATIENT: The 21st Century Cures Act makes medical notes like these available to patients in the interest of transparency. However, be advised this is a medical document. It is intended as peer to peer communication. It is written in medical language and may contain abbreviations or verbiage that are unfamiliar. It may appear blunt or direct. Medical documents are intended to carry relevant information, facts as evident, and the clinical opinion of the practitioner.      JAY León  [facility] [place] [facility] [place] [place]

## 2023-11-19 NOTE — ASSESSMENT & PLAN NOTE
Patient continues complaint of intermittent chest pain.  For patient's request I will reach out to his cardiologist Dr. Rodriguez via secure chat messaging to ask if he would be willing to go ahead and order CTA of the chest prior to patient's January appointment.

## 2023-11-19 NOTE — ASSESSMENT & PLAN NOTE
Acute exacerbation of Behcet's with penile manifestation.  Plan to refer to rheumatology for further evaluation and management.  Refill Mycolog ointment.

## 2023-12-27 ENCOUNTER — OFFICE VISIT (OUTPATIENT)
Dept: FAMILY MEDICINE CLINIC | Facility: CLINIC | Age: 33
End: 2023-12-27
Payer: MEDICAID

## 2023-12-27 ENCOUNTER — LAB (OUTPATIENT)
Dept: LAB | Facility: HOSPITAL | Age: 33
End: 2023-12-27
Payer: MEDICAID

## 2023-12-27 VITALS
TEMPERATURE: 98 F | WEIGHT: 220.4 LBS | OXYGEN SATURATION: 96 % | DIASTOLIC BLOOD PRESSURE: 82 MMHG | SYSTOLIC BLOOD PRESSURE: 114 MMHG | BODY MASS INDEX: 34.59 KG/M2 | HEIGHT: 67 IN | HEART RATE: 85 BPM

## 2023-12-27 DIAGNOSIS — R79.89 ELEVATED LFTS: Primary | ICD-10-CM

## 2023-12-27 DIAGNOSIS — R10.11 RIGHT UPPER QUADRANT ABDOMINAL PAIN: ICD-10-CM

## 2023-12-27 DIAGNOSIS — R14.1 GAS PAIN: ICD-10-CM

## 2023-12-27 PROCEDURE — 1160F RVW MEDS BY RX/DR IN RCRD: CPT | Performed by: NURSE PRACTITIONER

## 2023-12-27 PROCEDURE — 99214 OFFICE O/P EST MOD 30 MIN: CPT | Performed by: NURSE PRACTITIONER

## 2023-12-27 PROCEDURE — 1159F MED LIST DOCD IN RCRD: CPT | Performed by: NURSE PRACTITIONER

## 2023-12-27 RX ORDER — SIMETHICONE 125 MG
125 TABLET,CHEWABLE ORAL EVERY 6 HOURS PRN
Qty: 120 TABLET | Refills: 0 | Status: SHIPPED | OUTPATIENT
Start: 2023-12-27

## 2023-12-27 NOTE — PROGRESS NOTES
Follow Up Office Note     Patient Name: Meliton Lagos  : 1990   MRN: [id number]     Chief Complaint:    Chief Complaint   Patient presents with    Pain     Per patient he has upper stomach pain x 1 month    Elevated Hepatic Enzymes       History of Present Illness: Meliton Lagos is a 33 y.o. male who presents today for re-evaluation/management RUQ pain and chronic elevated LFT's. Patient was recently evaluated by Dr. Mobley for same and laboratory testing was ordered. Patient has f/u appointment in January. Patient was started on several medications per Dr. Mobley, however he states that he is not taking any of them (unclear reasons when queried).     Patient states that he has changed his diet and has significantly decreased his intake of sweets/carbs and has been trying to eat a low-fat, low-cholesterol diet as well. He states that since this time his stomach pain has resolved.       Office Visit with Bhupendra Mobley MD (10/18/2023)   Subjective      I have reviewed and the following portions of the patient's history were updated as appropriate: past family history, past medical history, past social history, past surgical history and problem list.    Review of Systems:   Review of Systems   Constitutional: Negative.    Respiratory: Negative.     Cardiovascular: Negative.    Gastrointestinal: Negative.         Bloating, gas after eating   Skin:  Negative for color change.        Past Medical History:   Past Medical History:   Diagnosis Date    GERD (gastroesophageal reflux disease)          Medications:     Current Outpatient Medications:     simethicone (MYLICON) 125 MG chewable tablet, Chew 1 tablet Every 6 (Six) Hours As Needed for Flatulence., Disp: 120 tablet, Rfl: 0    vitamin D (ERGOCALCIFEROL) 1.25 MG (09201 UT) capsule capsule, TAKE 1 CAPSULE BY MOUTH ONCE WEEKLY, Disp: 5 capsule, Rfl: 0    Allergies:   Allergies   Allergen Reactions    Amoxicillin Other (See  "Comments)     Chest pain    Bactrim [Sulfamethoxazole-Trimethoprim] Other (See Comments)     Chest pain    Clindamycin Other (See Comments)     Medication causes patient to have chest pain    Ct Contrast GI Intolerance         Objective     Physical Exam:  Vital Signs:   Vitals:    12/27/23 1616   BP: 114/82   Pulse: 85   Temp: 98 °F (36.7 °C)   TempSrc: Infrared   SpO2: 96%   Weight: 100 kg (220 lb 6.4 oz)   Height: 170.2 cm (67.01\")   PainSc: 0-No pain     Body mass index is 34.51 kg/m².     Physical Exam  Vitals and nursing note reviewed.   Constitutional:       General: He is not in acute distress.     Appearance: Normal appearance. He is well-developed. He is not ill-appearing, toxic-appearing or diaphoretic.   HENT:      Head: Normocephalic and atraumatic.   Cardiovascular:      Rate and Rhythm: Normal rate and regular rhythm.   Pulmonary:      Effort: Pulmonary effort is normal. No respiratory distress.      Breath sounds: Normal breath sounds. No stridor. No wheezing.   Abdominal:      General: There is no distension.      Tenderness: There is no abdominal tenderness.   Skin:     General: Skin is warm and dry.   Neurological:      General: No focal deficit present.      Mental Status: He is alert and oriented to person, place, and time.   Psychiatric:         Mood and Affect: Mood normal.         Behavior: Behavior normal. Behavior is cooperative.         Thought Content: Thought content normal.         Judgment: Judgment normal.         Assessment / Plan      Assessment/Plan:   Diagnoses and all orders for this visit:    1. Elevated LFTs (Primary)  Assessment & Plan:  Plan to obtain hepatic function panel today. Further recommendation after results evaluation. Keep upcoming f/u with gastroenterologist Dr. Mobley.     Orders:  -     Hepatic Function Panel; Future    2. Right upper quadrant abdominal pain  Assessment & Plan:  Chronic RUQ pain resolved since patient initiated dietary changes. Recommend " patient continue low-fat, low-cholesterol diet and limit carb intake.    Orders:  -     Hepatic Function Panel; Future    3. Gas pain  -     simethicone (MYLICON) 125 MG chewable tablet; Chew 1 tablet Every 6 (Six) Hours As Needed for Flatulence.  Dispense: 120 tablet; Refill: 0           Follow Up:   PRN and at next scheduled appointment(s) with PCP.    Discussed the nature of the medical condition(s) risks, complications, implications, management, safe and proper use of medications. Encouraged medication compliance, and keeping scheduled follow up appointments with me and any other providers.      RTC if symptoms fail to improve, to ER if symptoms worsen.        *Dictated Utilizing Dragon Dictation   Please note that portions of this note were completed with a voice recognition program.   Part of this note may be an electronic transcription/translation of spoken language to printed text using the Dragon Dictation System. Spelling and/or grammatical errors may exist despite efforts at proofreading.      NOTE TO PATIENT: The 21st Century Cures Act makes medical notes like these available to patients in the interest of transparency. However, be advised this is a medical document. It is intended as peer to peer communication. It is written in medical language and may contain abbreviations or verbiage that are unfamiliar. It may appear blunt or direct. Medical documents are intended to carry relevant information, facts as evident, and the clinical opinion of the practitioner.      JAY León  [facility] [place]

## 2023-12-28 LAB
ALBUMIN SERPL-MCNC: 5 G/DL (ref 3.5–5.2)
ALP SERPL-CCNC: 85 U/L (ref 39–117)
ALT SERPL-CCNC: 47 U/L (ref 1–41)
AST SERPL-CCNC: 26 U/L (ref 1–40)
BILIRUB DIRECT SERPL-MCNC: 0.3 MG/DL (ref 0–0.3)
BILIRUB SERPL-MCNC: 1.5 MG/DL (ref 0–1.2)
PROT SERPL-MCNC: 7.7 G/DL (ref 6–8.5)

## 2023-12-28 RX ORDER — ERGOCALCIFEROL 1.25 MG/1
50000 CAPSULE ORAL WEEKLY
Qty: 5 CAPSULE | Refills: 0 | Status: SHIPPED | OUTPATIENT
Start: 2023-12-28

## 2023-12-30 DIAGNOSIS — R79.89 ELEVATED LFTS: Primary | ICD-10-CM

## 2023-12-30 DIAGNOSIS — R10.11 RIGHT UPPER QUADRANT ABDOMINAL PAIN: ICD-10-CM

## 2023-12-30 DIAGNOSIS — R17 ELEVATED BILIRUBIN: ICD-10-CM

## 2023-12-30 NOTE — ASSESSMENT & PLAN NOTE
Plan to obtain hepatic function panel today. Further recommendation after results evaluation. Keep upcoming f/u with gastroenterologist Dr. Mobley.

## 2023-12-30 NOTE — ASSESSMENT & PLAN NOTE
Chronic RUQ pain resolved since patient initiated dietary changes. Recommend patient continue low-fat, low-cholesterol diet and limit carb intake.

## 2024-01-02 ENCOUNTER — OFFICE VISIT (OUTPATIENT)
Dept: CARDIOLOGY | Facility: CLINIC | Age: 34
End: 2024-01-02
Payer: MEDICAID

## 2024-01-02 VITALS
SYSTOLIC BLOOD PRESSURE: 112 MMHG | HEART RATE: 88 BPM | BODY MASS INDEX: 34.53 KG/M2 | DIASTOLIC BLOOD PRESSURE: 60 MMHG | WEIGHT: 220 LBS | OXYGEN SATURATION: 97 % | HEIGHT: 67 IN

## 2024-01-02 DIAGNOSIS — R07.9 INTERMITTENT CHEST PAIN: Primary | ICD-10-CM

## 2024-01-02 PROCEDURE — 99213 OFFICE O/P EST LOW 20 MIN: CPT | Performed by: INTERNAL MEDICINE

## 2024-01-02 NOTE — PROGRESS NOTES
[facility] at [facility]  Office visit  Meliton Lagos  1990    There is no work phone number on file.    VISIT DATE:  1/2/2024    PCP: Sanna Herr APRN  [address] [address]    CC:  Chief Complaint   Patient presents with    Follow-up     3 month        Previous cardiac studies and procedures:  May 2020 exercise treadmill test:  The exercise ECG stress test was negative for clinical and ECG evidence of myocardial ischemia. The [facility] treadmill score was 4.8. The patient had sudden onset dyspnea at the 4:46 minute latoya and had to stop the study. He had an exaggerated heart rate response to stress. With stopping at this time he also had a severely decreased exercise capacity, but technically reached his target heart rate.  Impressions are consistent with a low risk stress test.    September 2021 TTE  Left ventricular ejection fraction appears to be 61 - 65%. Left ventricular systolic function is normal.  Left ventricular diastolic function was normal.  Estimated right ventricular systolic pressure from tricuspid regurgitation is normal (<35 mmHg).  No significant structural or functional valvular disease.    August 2023 stress echocardiogram    Patient denied chest pain but c/o SOA (SpO2 WNL on RA) at peak exercise. This resolved in recovery.    Expected exercise duration = 12:40. Actual = 9:24. Patient requested to stop due to fatigue.    No significant ST or T wave changes noted.    Left ventricular systolic function is normal. Calculated left ventricular EF = 58.5% Left ventricular ejection fraction appears to be 56 - 60%.    Estimated right ventricular systolic pressure from tricuspid regurgitation is normal (<35 mmHg).    Normal stress echo with no significant echocardiographic evidence for myocardial ischemia.    ASSESSMENT:   Diagnosis Plan   1. Intermittent chest pain              PLAN:  Precordial pain: Recurrent left-sided chest pain triggered by 3 to 4  "days of exertion in a row, intermittently with certain antibiotics.  Initial onset after Moderna vaccine, appears to be decreasing in frequency.  Possible recurrent mild pericarditis.  No significant improvement on colchicine.  Coronary CTA pending.  Ischemia evaluation has been reassuring up to this point.    Subjective  Still with intermittent left precordial chest discomfort triggered by either exertion or following antibiotic use.  Previous cardiac evaluation has been unremarkable.  Normal transthoracic echo.  Normal exercise treadmill testing, normal CTA chest.  Negative troponins.  Unremarkable EKG. symptoms have previously responded to steroid burst.  Has developed a right shoulder mass which is gradually enlarging, hard and fibrotic, overlying previous Moderna vaccine injection, scheduled potential surgical excision.    PHYSICAL EXAMINATION:  Vitals:    01/02/24 1146   BP: 112/60   BP Location: Right arm   Patient Position: Sitting   Pulse: 88   SpO2: 97%   Weight: 99.8 kg (220 lb)   Height: 170.2 cm (67\")     General Appearance:    Alert, cooperative, no distress, appears stated age   Head:    Normocephalic, without obvious abnormality, atraumatic   Eyes:    conjunctiva/corneas clear   Nose:   Nares normal, septum midline, mucosa normal, no drainage   Throat:   Lips, teeth and gums normal   Neck:   Supple, symmetrical, trachea midline, no carotid    bruit or JVD   Lungs:     Clear to auscultation bilaterally, respirations unlabored   Chest Wall:    No tenderness or deformity    Heart:    Regular rate and rhythm, S1 and S2 normal, no murmur, rub   or gallop, normal carotid impulse bilaterally without bruit.   Abdomen:     Soft, non-tender   Extremities:   Extremities normal, atraumatic, no cyanosis or edema   Pulses:   2+ and symmetric all extremities   Skin:   Skin color, texture, turgor normal, no rashes or lesions       Diagnostic Data:  Procedures  Lab Results   Component Value Date    CHLPL 187 " 2023    TRIG 120 2023    HDL 51 2023     Lab Results   Component Value Date    GLUCOSE 135 (H) 2023    BUN 20 2023    CREATININE 0.88 2023     2023    K 4.6 2023     2023    CO2 26.1 2023     Lab Results   Component Value Date    HGBA1C 5.6 10/20/2023     Lab Results   Component Value Date    WBC 6.11 05/15/2023    HGB 16.0 05/15/2023    HCT 46.2 05/15/2023     05/15/2023       Allergies  Allergies   Allergen Reactions    Amoxicillin Other (See Comments)     Chest pain    Bactrim [Sulfamethoxazole-Trimethoprim] Other (See Comments)     Chest pain    Clindamycin Other (See Comments)     Medication causes patient to have chest pain    Ct Contrast GI Intolerance       Current Medications    Current Outpatient Medications:     simethicone (MYLICON) 125 MG chewable tablet, Chew 1 tablet Every 6 (Six) Hours As Needed for Flatulence., Disp: 120 tablet, Rfl: 0    vitamin D (ERGOCALCIFEROL) 1.25 MG (06914 UT) capsule capsule, TAKE 1 CAPSULE BY MOUTH ONCE WEEKLY, Disp: 5 capsule, Rfl: 0          ROS  ROS      SOCIAL HX  Social History     Socioeconomic History    Marital status:    Tobacco Use    Smoking status: Former     Packs/day: 1.00     Years: 5.00     Additional pack years: 0.00     Total pack years: 5.00     Types: Cigarettes     Start date: 2012     Quit date: 2016     Years since quittin.0     Passive exposure: Past    Smokeless tobacco: Never   Vaping Use    Vaping Use: Never used   Substance and Sexual Activity    Alcohol use: Yes     Comment: occas    Drug use: No    Sexual activity: Yes     Partners: Female     Birth control/protection: None       FAMILY HX  Family History   Problem Relation Age of Onset    No Known Problems Mother     No Known Problems Father     Heart attack Maternal Grandfather     Heart disease Maternal Grandfather     No Known Problems Paternal Grandmother     No Known Problems Paternal  Grandfather     Colon cancer Neg Hx     Colon polyps Neg Hx              Dereck Rodriguez III, MD, FACC

## 2024-01-19 ENCOUNTER — HOSPITAL ENCOUNTER (OUTPATIENT)
Dept: ULTRASOUND IMAGING | Facility: HOSPITAL | Age: 34
Discharge: HOME | End: 2024-01-19
Admitting: NURSE PRACTITIONER
Payer: MEDICAID

## 2024-01-19 DIAGNOSIS — R17 ELEVATED BILIRUBIN: ICD-10-CM

## 2024-01-19 PROCEDURE — 76705 ECHO EXAM OF ABDOMEN: CPT

## 2024-01-23 ENCOUNTER — OFFICE VISIT (OUTPATIENT)
Dept: GASTROENTEROLOGY | Facility: CLINIC | Age: 34
End: 2024-01-23
Payer: MEDICAID

## 2024-01-23 VITALS — BODY MASS INDEX: 33.68 KG/M2 | WEIGHT: 214.6 LBS | HEIGHT: 67 IN

## 2024-01-23 DIAGNOSIS — R12 HEARTBURN: ICD-10-CM

## 2024-01-23 DIAGNOSIS — R07.9 CHEST PAIN, UNSPECIFIED TYPE: ICD-10-CM

## 2024-01-23 DIAGNOSIS — R79.89 ELEVATED LFTS: ICD-10-CM

## 2024-01-23 DIAGNOSIS — K22.10 EROSIVE ESOPHAGITIS: ICD-10-CM

## 2024-01-23 DIAGNOSIS — K90.89 BILE SALT-INDUCED DIARRHEA: ICD-10-CM

## 2024-01-23 DIAGNOSIS — R74.01 ALT (SGPT) LEVEL RAISED: Primary | ICD-10-CM

## 2024-01-23 DIAGNOSIS — Z87.19 HISTORY OF ESOPHAGITIS: ICD-10-CM

## 2024-01-23 DIAGNOSIS — Z78.9 AT AVERAGE RISK FOR COLON CANCER: ICD-10-CM

## 2024-01-23 PROCEDURE — 1159F MED LIST DOCD IN RCRD: CPT | Performed by: INTERNAL MEDICINE

## 2024-01-23 PROCEDURE — 1160F RVW MEDS BY RX/DR IN RCRD: CPT | Performed by: INTERNAL MEDICINE

## 2024-01-23 PROCEDURE — 99214 OFFICE O/P EST MOD 30 MIN: CPT | Performed by: INTERNAL MEDICINE

## 2024-01-23 NOTE — PROGRESS NOTES
GASTROENTEROLOGY OFFICE NOTE  Meliton Lagos  [id number]  1990      Chief Complaint   Patient presents with    Right Upper Quad Pain    Elevated Hepatic Enzymes        HISTORY OF PRESENT ILLNESS:  Patient presents for follow-up of various issues.    Firstly, he has persistently elevated ALT which, upon review of the record goes back as far as 2019.  Extensive serological workup has failed to provide an explanation for his elevated ALT.  Percutaneous liver biopsy has been recommended as a consideration.    He complains of some gaseousness and this did not respond to Mylicon.  He does have some reflux complaints but as long as he watches what he eats he does much better.  He is currently not taking a proton pump inhibitor although his symptoms do improve when he has been on a proton pump inhibitor.    He is having absolutely no problems with dysphagia to solids or dyne aphasia denies early satiety/unexplained weight loss.  He has noted no problems with melena nor bright red blood per rectum.  Noted no problems with jaundice.    He was recently evaluated at the [facility] emergency department 23rd and he presented with atypical chest pain which appeared to be noncardiac in origin.  Holter monitor was initiated and he has follow-up with cardiology.    Serum liver enzymes from [facility] show that his ALT is elevated at 75 units/L (high normal 50) with total bilirubin elevated 1.3    Last colonoscopy in 2019 at [facility] was unremarkable.    Has had problems with postcholecystectomy diarrhea and did well with Questran but he did not tolerate it very well/    PAST MEDICAL HISTORY  Past Medical History:    GERD (gastroesophageal reflux disease)        PAST SURGICAL HISTORY  Past Surgical History:    ANAL FISTULA REPAIR    CHOLECYSTECTOMY    COLONOSCOPY    [facility]    ENDOSCOPY    Dr. Mobley empiric dilation performed bx suggestive of esophagitis    ENDOSCOPY     "EGD Dr. Mobley, empiric dilation erosive esophagitis        MEDICATIONS:    Current Outpatient Medications:     simethicone (MYLICON) 125 MG chewable tablet, Chew 1 tablet Every 6 (Six) Hours As Needed for Flatulence., Disp: 120 tablet, Rfl: 0    vitamin D (ERGOCALCIFEROL) 1.25 MG (19374 UT) capsule capsule, TAKE 1 CAPSULE BY MOUTH ONCE WEEKLY, Disp: 5 capsule, Rfl: 0    ALLERGIES  is allergic to amoxicillin, bactrim [sulfamethoxazole-trimethoprim], clindamycin, and ct contrast.    FAMILY HISTORY:  Cancer-related family history is negative for Colon cancer.  Colon Cancer-related family history is negative for Colon cancer and Colon polyps.    SOCIAL HISTORY  He  reports that he quit smoking about 8 years ago. His smoking use included cigarettes. He started smoking about 11 years ago. He has a 5.00 pack-year smoking history. He has been exposed to tobacco smoke. He has never used smokeless tobacco. He reports that he does not currently use alcohol. He reports that he does not use drugs.   Currently unemployed but he does have a 's license and is looking for a job that will not take him away from home for meaningful period of time as he has 3 small children.  His son age is 12 for an 10 months and a daughter age 10.  He is originally from [place].    PHYSICAL EXAM   Ht 170.2 cm (67\")   Wt 97.3 kg (214 lb 9.6 oz)   BMI 33.61 kg/m²   General: Alert and oriented x 3. In no apparent or acute distress.  and No stigmata of chronic liver disease  HEENT: Anicteric sclerae. Normal oropharynx  Neck: Supple. Without lymphadenopathy  CV: Regular rate and rhythm, S1, S2  Lungs: Clear to ausculation. Without rales, rhonchi and wheezing  Abdomen:  Soft,non-distended without palpable masses or hepatosplenomeagaly, areas of rebound tenderness or guarding.   Extremeties: without clubbing, cyanosis or edema  Neurologic:  Alert and oriented x 3 without focal motor or sensory deficits  Rectal exam: deferred "     Results for orders placed or performed in visit on 12/27/23   Hepatic Function Panel    Specimen: Blood    Blood  Release to iftikhar   Result Value Ref Range    Total Protein 7.7 6.0 - 8.5 g/dL    Albumin 5.0 3.5 - 5.2 g/dL    Total Bilirubin 1.5 (H) 0.0 - 1.2 mg/dL    Bilirubin, Direct 0.3 0.0 - 0.3 mg/dL    Alkaline Phosphatase 85 39 - 117 U/L    AST (SGOT) 26 1 - 40 U/L    ALT (SGPT) 47 (H) 1 - 41 U/L        Results Review:  I reviewed the patient's new clinical results.      ASSESSMENT  1.-Persistently elevated serum liver ALT.  This goes back for 5 years.  Etiology remains unclear despite extensive serological workup.  Percutaneous liver biopsy for further evaluation of his liver and exclusion of significant liver disease is recommended.  He understands that in the absence of a diagnosis we may not be able to provide since it may be on its way to causing cirrhosis.  Expressing fears about percutaneous liver biopsy and bleeding and I assured him that while this is certainly potential complication it is an unusual 1.  He wants to hold off on deciding to see a  2.-Complaint of noncardiac chest pain.  I suspect this is reflux related and that he would do best to remain on his proton pump inhibitor consistently  3.-Up-to-date on his colon cancer screening.  He is not due for colonoscopy until age 43  4.-GERD with history of endoscopically documented erosive esophagitis  5.-History of postcholecystectomy bile salt induced/cholerrheic diarrhea  6.-Cardiac workup underway at [facility]    PLAN  1.-Patient encouraged to take his proton pump inhibitor, omeprazole 40 mg, twice daily consistently and long-term (he has a history of endoscopically documented erosive esophagitis) as this is most likely explanation for his noncardiac chest pain and his complaint of epigastric gaseousness  2.-Will discuss proceeding with a percutaneous liver biopsy when I see him again in 3 months.      Bhupendra Mobley,  MD  1/24/2024   13:20 EST

## 2024-01-24 RX ORDER — OMEPRAZOLE 40 MG/1
40 CAPSULE, DELAYED RELEASE ORAL
Qty: 60 CAPSULE | Refills: 11 | Status: SHIPPED | OUTPATIENT
Start: 2024-01-24

## 2024-01-27 ENCOUNTER — APPOINTMENT (OUTPATIENT)
Dept: CT IMAGING | Facility: HOSPITAL | Age: 34
End: 2024-01-27
Payer: MEDICAID

## 2024-01-27 ENCOUNTER — HOSPITAL ENCOUNTER (EMERGENCY)
Facility: HOSPITAL | Age: 34
Discharge: HOME | End: 2024-01-27
Attending: EMERGENCY MEDICINE
Payer: MEDICAID

## 2024-01-27 VITALS
HEART RATE: 82 BPM | DIASTOLIC BLOOD PRESSURE: 93 MMHG | SYSTOLIC BLOOD PRESSURE: 133 MMHG | RESPIRATION RATE: 16 BRPM | HEIGHT: 67 IN | TEMPERATURE: 98.3 F | OXYGEN SATURATION: 99 % | WEIGHT: 214 LBS | BODY MASS INDEX: 33.59 KG/M2

## 2024-01-27 DIAGNOSIS — R20.8 BURNING SENSATION: Primary | ICD-10-CM

## 2024-01-27 DIAGNOSIS — F41.9 ANXIETY: ICD-10-CM

## 2024-01-27 DIAGNOSIS — R20.2 NUMBNESS AND TINGLING IN LEFT ARM: ICD-10-CM

## 2024-01-27 DIAGNOSIS — R20.0 NUMBNESS AND TINGLING IN LEFT ARM: ICD-10-CM

## 2024-01-27 DIAGNOSIS — Z87.891 FORMER SMOKER: ICD-10-CM

## 2024-01-27 DIAGNOSIS — M54.2 NECK PAIN: ICD-10-CM

## 2024-01-27 DIAGNOSIS — Z87.19 HISTORY OF GASTROESOPHAGEAL REFLUX (GERD): ICD-10-CM

## 2024-01-27 LAB
ALBUMIN SERPL-MCNC: 4.8 G/DL (ref 3.5–5.2)
ALBUMIN/GLOB SERPL: 1.6 G/DL
ALP SERPL-CCNC: 74 U/L (ref 39–117)
ALT SERPL W P-5'-P-CCNC: 57 U/L (ref 1–41)
ANION GAP SERPL CALCULATED.3IONS-SCNC: 13 MMOL/L (ref 5–15)
AST SERPL-CCNC: 27 U/L (ref 1–40)
BASOPHILS # BLD AUTO: 0.04 10*3/MM3 (ref 0–0.2)
BASOPHILS NFR BLD AUTO: 0.6 % (ref 0–1.5)
BILIRUB SERPL-MCNC: 0.8 MG/DL (ref 0–1.2)
BUN SERPL-MCNC: 17 MG/DL (ref 6–20)
BUN/CREAT SERPL: 22.1 (ref 7–25)
CALCIUM SPEC-SCNC: 9.6 MG/DL (ref 8.6–10.5)
CHLORIDE SERPL-SCNC: 104 MMOL/L (ref 98–107)
CO2 SERPL-SCNC: 25 MMOL/L (ref 22–29)
CREAT SERPL-MCNC: 0.77 MG/DL (ref 0.76–1.27)
CRP SERPL-MCNC: <0.3 MG/DL (ref 0–0.5)
D-LACTATE SERPL-SCNC: 1.2 MMOL/L (ref 0.5–2)
DEPRECATED RDW RBC AUTO: 38.5 FL (ref 37–54)
EGFRCR SERPLBLD CKD-EPI 2021: 119.7 ML/MIN/1.73
EOSINOPHIL # BLD AUTO: 0.08 10*3/MM3 (ref 0–0.4)
EOSINOPHIL NFR BLD AUTO: 1.1 % (ref 0.3–6.2)
ERYTHROCYTE [DISTWIDTH] IN BLOOD BY AUTOMATED COUNT: 12.1 % (ref 12.3–15.4)
ERYTHROCYTE [SEDIMENTATION RATE] IN BLOOD: 14 MM/HR (ref 0–15)
GLOBULIN UR ELPH-MCNC: 3 GM/DL
GLUCOSE SERPL-MCNC: 86 MG/DL (ref 65–99)
HCT VFR BLD AUTO: 46.3 % (ref 37.5–51)
HGB BLD-MCNC: 15.2 G/DL (ref 13–17.7)
IMM GRANULOCYTES # BLD AUTO: 0.03 10*3/MM3 (ref 0–0.05)
IMM GRANULOCYTES NFR BLD AUTO: 0.4 % (ref 0–0.5)
LYMPHOCYTES # BLD AUTO: 2.04 10*3/MM3 (ref 0.7–3.1)
LYMPHOCYTES NFR BLD AUTO: 28.2 % (ref 19.6–45.3)
MCH RBC QN AUTO: 28.5 PG (ref 26.6–33)
MCHC RBC AUTO-ENTMCNC: 32.8 G/DL (ref 31.5–35.7)
MCV RBC AUTO: 86.9 FL (ref 79–97)
MONOCYTES # BLD AUTO: 0.47 10*3/MM3 (ref 0.1–0.9)
MONOCYTES NFR BLD AUTO: 6.5 % (ref 5–12)
NEUTROPHILS NFR BLD AUTO: 4.58 10*3/MM3 (ref 1.7–7)
NEUTROPHILS NFR BLD AUTO: 63.2 % (ref 42.7–76)
NRBC BLD AUTO-RTO: 0 /100 WBC (ref 0–0.2)
PLATELET # BLD AUTO: 240 10*3/MM3 (ref 140–450)
PMV BLD AUTO: 9.9 FL (ref 6–12)
POTASSIUM SERPL-SCNC: 3.9 MMOL/L (ref 3.5–5.2)
PROT SERPL-MCNC: 7.8 G/DL (ref 6–8.5)
QT INTERVAL: 380 MS
QTC INTERVAL: 435 MS
RBC # BLD AUTO: 5.33 10*6/MM3 (ref 4.14–5.8)
SODIUM SERPL-SCNC: 142 MMOL/L (ref 136–145)
WBC NRBC COR # BLD AUTO: 7.24 10*3/MM3 (ref 3.4–10.8)

## 2024-01-27 PROCEDURE — 80053 COMPREHEN METABOLIC PANEL: CPT | Performed by: PHYSICIAN ASSISTANT

## 2024-01-27 PROCEDURE — 70450 CT HEAD/BRAIN W/O DYE: CPT

## 2024-01-27 PROCEDURE — 85025 COMPLETE CBC W/AUTO DIFF WBC: CPT | Performed by: PHYSICIAN ASSISTANT

## 2024-01-27 PROCEDURE — 86140 C-REACTIVE PROTEIN: CPT | Performed by: PHYSICIAN ASSISTANT

## 2024-01-27 PROCEDURE — 72125 CT NECK SPINE W/O DYE: CPT

## 2024-01-27 PROCEDURE — 83605 ASSAY OF LACTIC ACID: CPT | Performed by: PHYSICIAN ASSISTANT

## 2024-01-27 PROCEDURE — 99284 EMERGENCY DEPT VISIT MOD MDM: CPT

## 2024-01-27 PROCEDURE — 93005 ELECTROCARDIOGRAM TRACING: CPT | Performed by: PHYSICIAN ASSISTANT

## 2024-01-27 PROCEDURE — 85652 RBC SED RATE AUTOMATED: CPT | Performed by: PHYSICIAN ASSISTANT

## 2024-01-27 RX ORDER — KETOROLAC TROMETHAMINE 30 MG/ML
30 INJECTION, SOLUTION INTRAMUSCULAR; INTRAVENOUS ONCE
Status: DISCONTINUED | OUTPATIENT
Start: 2024-01-27 | End: 2024-01-27 | Stop reason: HOSPADM

## 2024-01-27 NOTE — ED PROVIDER NOTES
Subjective   History of Present Illness  This is a 33-year-old male that presents the ER with recurrent left arm pain with burning sensation and numbness and tingling x 10 days.  Patient denies any injury, trauma, or fall.  He also reports intermittent left-sided neck pain.  Patient says the arm pain radiates to his left chest and left neck.  He denies fever or chills.  He has had 2 recent ER visits at [facility] ER for atypical chest pain with normal cardiac workup.  He also has normal stress testing at our facility on 8/29/2023.  Patient reports increased pain with movement of the left upper extremity.  There is no soft tissue swelling or confluent erythema or warmth.  There are no wounds or skin lesions or induration.  Patient is right-handed.  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  He denies any known history of herniated disc.  He says that pain and symptoms worsen when he takes antibiotics or when he exercises.  He also feels like he has some numbness and tingling in his leg at the same time.  Symptoms have been recurrent for the last 10 days but more noticeable x 4 days.  Past medical history is significant for GERD and former tobacco use.  Patient denies any headache, dizziness, near-syncope, or syncope.  He denies any difficulty thought forming or speech changes.  No other concerns at this time.    History provided by:  Patient  Arm Pain  Location:  Left arm pain, burning sensation, with numbness/tingling. Pain radiates to left neck and chest.  Duration:  10 days  Timing:  Constant  Progression:  Worsening  Chronicity:  New  Context:  Patient reports driving a truck on weekends for 4 hours a day. No injury to left arm. Pain x 10 days, burning sensation with numbness/tingling. Pain radiates to left chest/neck  Relieved by:  Nothing  Worsened by:  Nothin  Ineffective treatments:  Rest  Associated symptoms: chest pain (Pain from left arm radiates to left chest)     Associated symptoms: no abdominal pain, no congestion, no cough, no diarrhea, no fatigue, no fever, no headaches, no myalgias, no nausea, no rash, no rhinorrhea, no shortness of breath, no sore throat, no vomiting and no wheezing    Risk factors:  Normal stress echo in 8/2023.      Review of Systems   Constitutional: Negative.  Negative for activity change, appetite change, chills, fatigue and fever.   HENT: Negative.  Negative for congestion, postnasal drip, rhinorrhea, sinus pressure, sinus pain, sneezing and sore throat.    Respiratory: Negative.  Negative for cough, shortness of breath and wheezing.         Former smoker   Cardiovascular:  Positive for chest pain (Pain from left arm radiates to left chest).   Gastrointestinal: Negative.  Negative for abdominal pain, diarrhea, nausea and vomiting.   Genitourinary: Negative.  Negative for dysuria, flank pain, frequency and urgency.   Musculoskeletal:  Positive for neck pain (Left-sided neck pain, intermittent). Negative for back pain, gait problem and myalgias.   Skin:  Negative for rash.   Neurological:  Positive for numbness (Numbness, tingling, and burning sensation to the left upper extremity, recurrent x 10 days). Negative for dizziness, syncope, speech difficulty, weakness and headaches.   All other systems reviewed and are negative.      Past Medical History:   Diagnosis Date    GERD (gastroesophageal reflux disease)        Allergies   Allergen Reactions    Amoxicillin Other (See Comments)     Chest pain    Bactrim [Sulfamethoxazole-Trimethoprim] Other (See Comments)     Chest pain    Clindamycin Other (See Comments)     Medication causes patient to have chest pain    Ct Contrast GI Intolerance       Past Surgical History:   Procedure Laterality Date    ANAL FISTULA REPAIR  2009    CHOLECYSTECTOMY      COLONOSCOPY  2019    [facility]    ENDOSCOPY  06/16/2021    Dr. Mobley empiric dilation performed bx suggestive of esophagitis     ENDOSCOPY  2020    EGD Dr. Mobley, empiric dilation erosive esophagitis       Family History   Problem Relation Age of Onset    No Known Problems Mother     No Known Problems Father     Heart attack Maternal Grandfather     Heart disease Maternal Grandfather     No Known Problems Paternal Grandmother     No Known Problems Paternal Grandfather     Colon cancer Neg Hx     Colon polyps Neg Hx        Social History     Socioeconomic History    Marital status:    Tobacco Use    Smoking status: Former     Packs/day: 1.00     Years: 5.00     Additional pack years: 0.00     Total pack years: 5.00     Types: Cigarettes     Start date: 2012     Quit date: 2016     Years since quittin.0     Passive exposure: Past    Smokeless tobacco: Never   Vaping Use    Vaping Use: Never used   Substance and Sexual Activity    Alcohol use: Not Currently     Comment: occas    Drug use: No    Sexual activity: Yes     Partners: Female     Birth control/protection: None           Objective   Physical Exam  Vitals and nursing note reviewed.   Constitutional:       General: He is not in acute distress.     Appearance: Normal appearance. He is not ill-appearing, toxic-appearing or diaphoretic.      Comments: No acute sign of pain or distress.  Nontoxic.   HENT:      Head: Normocephalic and atraumatic.      Left Ear: Tympanic membrane normal.      Nose: Nose normal. No congestion or rhinorrhea.      Mouth/Throat:      Mouth: Mucous membranes are moist.      Pharynx: Oropharynx is clear.      Comments: Oral mucous membranes are moist  Eyes:      Extraocular Movements: Extraocular movements intact.      Conjunctiva/sclera: Conjunctivae normal.      Pupils: Pupils are equal, round, and reactive to light.   Neck:     Cardiovascular:      Rate and Rhythm: Normal rate and regular rhythm. No extrasystoles are present.     Pulses: Normal pulses.           Radial pulses are 2+ on the right side and 2+ on the left side.         Dorsalis pedis pulses are 2+ on the right side and 2+ on the left side.        Posterior tibial pulses are 2+ on the right side and 2+ on the left side.      Heart sounds: Normal heart sounds.      Comments: Regular rate and rhythm.  No ectopy.  Strong bilateral radial and ulnar pulses and brisk capillary refill.  No pedal edema to upper extremities or lower extremities  Pulmonary:      Effort: Pulmonary effort is normal.      Breath sounds: Normal breath sounds. No decreased breath sounds, wheezing or rhonchi.      Comments: Lungs are clear to auscultation bilaterally.  No decreased breath sounds and no pleuritic type chest pain elicited with deep inspiration.  Abdominal:      General: Bowel sounds are normal.      Palpations: Abdomen is soft.   Musculoskeletal:         General: Normal range of motion.      Cervical back: Normal range of motion and neck supple. Muscular tenderness present. No pain with movement or spinous process tenderness.      Right lower leg: No edema.      Left lower leg: No edema.   Skin:     General: Skin is warm and dry.   Neurological:      General: No focal deficit present.      Mental Status: He is alert and oriented to person, place, and time.      Cranial Nerves: Cranial nerves 2-12 are intact.      Motor: Motor function is intact.      Coordination: Coordination is intact.      Gait: Gait is intact.      Comments: Neuro intact and nonfocal.  Equal  strength 5 out of 5 and equal muscle strength lower extremities 5 out of 5.   Psychiatric:         Mood and Affect: Affect normal. Mood is anxious.         Speech: Speech normal.         Behavior: Behavior normal. Behavior is cooperative.         Thought Content: Thought content normal.         Cognition and Memory: Cognition normal.         Judgment: Judgment normal.         Procedures           ED Course  ED Course as of 01/27/24 0326   Sat Jan 27, 2024   0221 Patient presents the ER with left arm burning sensation, recurrent, and  numbness/tingling.  There is no weakness.  Exam and vital signs are stable.  Patient had recent multiple visits at  ER on 123 on 1/24/2024 with normal cardiac workup.  He had normal stress testing with echocardiogram in August, 2023 here at [facility].  Patient reports intermittent left-sided neck pain.  He is right-handed.  There is no weakness on exam to the left upper extremity.  Neurologic exam is within normal limits.  We proceeded with CT of the brain without contrast and cervical spine without contrast which revealed no acute intracranial abnormality.  C-spine showed no acute osseous abnormality or significant degenerative changes.  There was no evidence of spinal canal stenosis or neuroforaminal narrowing.  No significant changes compared to previous MRI from August, 2022.  Patient is quite anxious on exam.  He says that no one is really giving him any answers.  He has seen neurology in the past.  After reviewing epic, he was evaluated by Pari Levy PA-C through [facility] on 4/17/2023.  Patient reports that he developed headache 2 days after Moderna COVID-19 vaccine in July, 2021.  He was evaluated by [facility] neurology on 4/18/2022.  MRI of the brain with and without contrast on 8/9/2022 showed no suspicious or abnormal enhancement and no evidence of acute process and MRI of the C-spine was also within normal limits.  At that visit, patient was diagnosed with occipital neuralgia and prescribed Zanaflex and discussed trigger point injections for the future. [FC]   0320 CBC and chemistries were within normal limits.  Sed rate, CRP, and lactic acid were normal.  Patient refused Toradol.  I discussed prescribing anti-inflammatory of diclofenac on discharge and patient again says that Tylenol and ibuprofen causes increasing symptoms of numbness and tingling and burning to the left arm.  He does not want to take any medication.  He says that he is extremely anxious about  taking anything because he feels that all of the above symptoms started after he received Moderna COVID-19 vaccination in 2021.  He has followed with neurology at  in the past and I asked him if he wanted a second opinion with [facility] neurology and he said that he would like that.  We will give him follow-up info for Dr. Haynes, and I advised him to contact PCP, Sanna Herr to see if referral is necessary.  ER workup is reassuring.  Patient is stable for discharge. [FC]   0324 Differential diagnoses includes radicular symptoms from C-spine, neuropathic pain. [FC]   0326 I personally interpreted EKG which shows sinus rhythm.  No acute ST-T wave changes consistent with ischemia [FC]      ED Course User Index  [FC] Shirley Nichole PA-C                                   Recent Results (from the past 24 hour(s))   Comprehensive Metabolic Panel    Collection Time: 01/27/24  2:06 AM    Specimen: Blood   Result Value Ref Range    Glucose 86 65 - 99 mg/dL    BUN 17 6 - 20 mg/dL    Creatinine 0.77 0.76 - 1.27 mg/dL    Sodium 142 136 - 145 mmol/L    Potassium 3.9 3.5 - 5.2 mmol/L    Chloride 104 98 - 107 mmol/L    CO2 25.0 22.0 - 29.0 mmol/L    Calcium 9.6 8.6 - 10.5 mg/dL    Total Protein 7.8 6.0 - 8.5 g/dL    Albumin 4.8 3.5 - 5.2 g/dL    ALT (SGPT) 57 (H) 1 - 41 U/L    AST (SGOT) 27 1 - 40 U/L    Alkaline Phosphatase 74 39 - 117 U/L    Total Bilirubin 0.8 0.0 - 1.2 mg/dL    Globulin 3.0 gm/dL    A/G Ratio 1.6 g/dL    BUN/Creatinine Ratio 22.1 7.0 - 25.0    Anion Gap 13.0 5.0 - 15.0 mmol/L    eGFR 119.7 >60.0 mL/min/1.73   C-reactive Protein    Collection Time: 01/27/24  2:06 AM    Specimen: Blood   Result Value Ref Range    C-Reactive Protein <0.30 0.00 - 0.50 mg/dL   Sedimentation Rate    Collection Time: 01/27/24  2:06 AM    Specimen: Blood   Result Value Ref Range    Sed Rate 14 0 - 15 mm/hr   Lactic Acid, Plasma    Collection Time: 01/27/24  2:06 AM    Specimen: Blood   Result Value Ref Range    Lactate  1.2 0.5 - 2.0 mmol/L   CBC Auto Differential    Collection Time: 01/27/24  2:06 AM    Specimen: Blood   Result Value Ref Range    WBC 7.24 3.40 - 10.80 10*3/mm3    RBC 5.33 4.14 - 5.80 10*6/mm3    Hemoglobin 15.2 13.0 - 17.7 g/dL    Hematocrit 46.3 37.5 - 51.0 %    MCV 86.9 79.0 - 97.0 fL    MCH 28.5 26.6 - 33.0 pg    MCHC 32.8 31.5 - 35.7 g/dL    RDW 12.1 (L) 12.3 - 15.4 %    RDW-SD 38.5 37.0 - 54.0 fl    MPV 9.9 6.0 - 12.0 fL    Platelets 240 140 - 450 10*3/mm3    Neutrophil % 63.2 42.7 - 76.0 %    Lymphocyte % 28.2 19.6 - 45.3 %    Monocyte % 6.5 5.0 - 12.0 %    Eosinophil % 1.1 0.3 - 6.2 %    Basophil % 0.6 0.0 - 1.5 %    Immature Grans % 0.4 0.0 - 0.5 %    Neutrophils, Absolute 4.58 1.70 - 7.00 10*3/mm3    Lymphocytes, Absolute 2.04 0.70 - 3.10 10*3/mm3    Monocytes, Absolute 0.47 0.10 - 0.90 10*3/mm3    Eosinophils, Absolute 0.08 0.00 - 0.40 10*3/mm3    Basophils, Absolute 0.04 0.00 - 0.20 10*3/mm3    Immature Grans, Absolute 0.03 0.00 - 0.05 10*3/mm3    nRBC 0.0 0.0 - 0.2 /100 WBC   ECG 12 Lead Other; Left arm pain with radiation to left chest    Collection Time: 01/27/24  2:30 AM   Result Value Ref Range    QT Interval 380 ms    QTC Interval 435 ms     Note: In addition to lab results from this visit, the labs listed above may include labs taken at another facility or during a different encounter within the last 24 hours. Please correlate lab times with ED admission and discharge times for further clarification of the services performed during this visit.    CT Head Without Contrast   Final Result   Impression:   No acute intracranial process identified.      Electronically Signed: Zeinab Macdonald MD     1/27/2024 1:47 AM EST     Workstation ID: [id number]      CT Cervical Spine Without Contrast   Final Result   Impression:   No acute osseous abnormality. No significant degenerative changes identified. No evidence of canal stenosis. No significant bony neuroforaminal stenosis. No significant changes compared to  "the previous MRI 8/8/2022 given difference in modality. Similar    indication for exam at that time.            Electronically Signed: Zeinab Macdonald MD     1/27/2024 1:50 AM EST     Workstation ID: [id number]        Vitals:    01/27/24 0015   BP: 133/93   BP Location: Left arm   Patient Position: Sitting   Pulse: 82   Resp: 16   Temp: 98.3 °F (36.8 °C)   TempSrc: Oral   SpO2: 99%   Weight: 97.1 kg (214 lb)   Height: 170.2 cm (67\")     Medications   ketorolac (TORADOL) injection 30 mg (30 mg Intravenous Not Given 1/27/24 0251)     ECG/EMG Results (last 24 hours)       ** No results found for the last 24 hours. **          ECG 12 Lead Other; Left arm pain with radiation to left chest   Preliminary Result   Test Reason : Other~   Blood Pressure :   */*   mmHG   Vent. Rate :  79 BPM     Atrial Rate :  79 BPM      P-R Int : 144 ms          QRS Dur :  92 ms       QT Int : 380 ms       P-R-T Axes :  36 -20   5 degrees      QTc Int : 435 ms      Normal sinus rhythm with sinus arrhythmia   Incomplete right bundle branch block   Cannot rule out Anterior infarct , age undetermined   Abnormal ECG   When compared with ECG of 21-APR-2022 08:29,   No significant change was found      Referred By: EDMD           Confirmed By:                     Medical Decision Making  Amount and/or Complexity of Data Reviewed  Labs: ordered.  Radiology: ordered.  ECG/medicine tests: ordered.    Risk  Prescription drug management.        Final diagnoses:   Burning sensation   Numbness and tingling in left arm   Neck pain   Anxiety   History of gastroesophageal reflux (GERD)   Former smoker       ED Disposition  ED Disposition       ED Disposition   Discharge    Condition   Stable    Comment   --               Sanna Herr, APRN  [address] [id number] [phone]    Call in 2 days  Call Monday for first available Heriberto Luna MD  [address] [facility]  [address] [place] [address] " [phone]  [phone]    Call in 2 days  Call Monday for first available [facility] EMERGENCY DEPARTMENT  [address] [facility] [id number] [phone]    If symptoms worsen         Medication List      No changes were made to your prescriptions during this visit.            Shirley Nichole PA-C  01/27/24 0326

## 2024-01-27 NOTE — DISCHARGE INSTRUCTIONS
ER evaluation was reassuring with normal CBC, chemistries, and normal inflammatory markers of CRP, sed rate, and lactic acid.  CT of the brain and cervical spine without contrast reveals no acute process.  There was no significant degenerative or arthritic changes to the neck and no spinal canal narrowing.  Patient had equal  strength and there was no signs of soft tissue skin infection.  Recommend neurology follow-up with Dr. Nobles.  Contact PCP early next week to see if patient requires referral.  Continue with all other current medical management.  Return to the ER if worsening symptoms.

## 2024-01-29 ENCOUNTER — OFFICE VISIT (OUTPATIENT)
Dept: FAMILY MEDICINE CLINIC | Facility: CLINIC | Age: 34
End: 2024-01-29
Payer: MEDICAID

## 2024-01-29 VITALS
DIASTOLIC BLOOD PRESSURE: 88 MMHG | OXYGEN SATURATION: 96 % | HEIGHT: 67 IN | TEMPERATURE: 98.2 F | HEART RATE: 92 BPM | WEIGHT: 214 LBS | SYSTOLIC BLOOD PRESSURE: 127 MMHG | BODY MASS INDEX: 33.59 KG/M2

## 2024-01-29 DIAGNOSIS — R29.898 DECREASED GRIP STRENGTH OF LEFT HAND: Primary | ICD-10-CM

## 2024-01-29 DIAGNOSIS — R20.2 PARESTHESIAS IN LEFT HAND: ICD-10-CM

## 2024-01-29 DIAGNOSIS — Z09 HOSPITAL DISCHARGE FOLLOW-UP: ICD-10-CM

## 2024-01-29 DIAGNOSIS — R20.2 ARM PARESTHESIA, LEFT: ICD-10-CM

## 2024-01-29 PROCEDURE — 99213 OFFICE O/P EST LOW 20 MIN: CPT | Performed by: FAMILY MEDICINE

## 2024-01-29 RX ORDER — ERGOCALCIFEROL 1.25 MG/1
50000 CAPSULE ORAL WEEKLY
Qty: 5 CAPSULE | Refills: 0 | Status: SHIPPED | OUTPATIENT
Start: 2024-01-29

## 2024-01-29 RX ORDER — HYDROXYZINE PAMOATE 25 MG/1
25 CAPSULE ORAL EVERY 6 HOURS PRN
COMMUNITY
Start: 2024-01-25

## 2024-01-29 NOTE — PROGRESS NOTES
Follow Up Office Visit      Date: 2024   Patient Name: Meliton Lagos  : 1990   MRN: [id number]     Chief Complaint:    Chief Complaint   Patient presents with    Hospital Follow Up Visit     Per patient he went to  ER twice and [facility] ER due to Moderna COVID vaccine he got a few years. Stated he had chest pain and numbness in left leg and left arm. All heart testing came back normal and advised him that he seemed to be having anxiety.        History of Present Illness: Meliton Lagos is a 33 y.o. male who presents today     Reports had symptoms after first covid vaccine.  Reports this was 2.5 years ago.  Reports he had seen cardiology previously.   Patient believes his symptoms started after previous COVID-vaccine about 2.5 years ago.    To have CT scan on 2/15/24.  This is possibly CT angiogram coronary.      Hospital follow-up.  Patient was seen on 2024 at [facility] ER.  Was seen for recurrent left arm pain with burning sensation and numbness and tingling for 10 days.  Reported intermittent left-sided neck pain.  Also noted were 2 recent ER visits at  ER for atypical chest pain with normal cardiac workup.  Also noted was normal stress testing at our facility on 2023    Sees JAY Simpson for PCP.  Patient last seen by Sanna on 2023.      Subjective      Review of Systems:   Review of Systems   Constitutional:  Negative for chills and fever.   Gastrointestinal:  Negative for nausea and vomiting.   Musculoskeletal:         Left hand and arm paresthesias.paresthesias,    Neurological:  Negative for syncope.       I have reviewed the patients family history, social history, past medical history, past surgical history and have updated it as appropriate.     Medications:     Current Outpatient Medications:     hydrOXYzine pamoate (VISTARIL) 25 MG capsule, Take 1 capsule by mouth Every 6 (Six) Hours As Needed., Disp: , Rfl:     omeprazole (priLOSEC) 40 MG  "capsule, Take 1 capsule by mouth 2 (Two) Times a Day Before Meals. Take a half hour before breakfast, Disp: 60 capsule, Rfl: 11    vitamin D (ERGOCALCIFEROL) 1.25 MG (88413 UT) capsule capsule, TAKE 1 CAPSULE BY MOUTH ONCE WEEKLY, Disp: 5 capsule, Rfl: 0    Allergies:   Allergies   Allergen Reactions    Amoxicillin Other (See Comments)     Chest pain    Bactrim [Sulfamethoxazole-Trimethoprim] Other (See Comments)     Chest pain    Clindamycin Other (See Comments)     Medication causes patient to have chest pain    Ct Contrast GI Intolerance       Objective     Physical Exam: Please see above  Vital Signs:   Vitals:    01/29/24 1616   BP: 127/88   Pulse: 92   Temp: 98.2 °F (36.8 °C)   TempSrc: Infrared   SpO2: 96%   Weight: 97.1 kg (214 lb)   Height: 170.2 cm (67.01\")     Body mass index is 33.51 kg/m².          Physical Exam  Vitals and nursing note reviewed.   Constitutional:       Appearance: Normal appearance.   HENT:      Head: Normocephalic and atraumatic.   Neck:      Vascular: No carotid bruit.   Cardiovascular:      Rate and Rhythm: Normal rate and regular rhythm.      Heart sounds: Normal heart sounds. No murmur heard.  Pulmonary:      Effort: Pulmonary effort is normal.      Breath sounds: Normal breath sounds.   Abdominal:      General: Bowel sounds are normal.      Palpations: Abdomen is soft. There is no mass.      Tenderness: There is no abdominal tenderness.   Musculoskeletal:      Right lower leg: No edema.      Left lower leg: No edema.      Comments: Left  strength 4+/5 and weaker than right.  Tinel sign positive left wrist.  Phalen and reverse phalen positive on left.   Skin:     Coloration: Skin is not jaundiced or pale.      Findings: No erythema.   Neurological:      Mental Status: He is alert. Mental status is at baseline.   Psychiatric:         Mood and Affect: Mood normal.         Behavior: Behavior normal.         Procedures    Results:   Labs:   Hemoglobin A1C   Date Value Ref Range " Status   10/20/2023 5.6 4.8 - 5.6 % Final     Comment:              Prediabetes: 5.7 - 6.4           Diabetes: >6.4           Glycemic control for adults with diabetes: <7.0     04/19/2022 5.2 % Final     TSH   Date Value Ref Range Status   04/05/2022 2.980 0.270 - 4.200 uIU/mL Final        POCT Results (if applicable):   Results for orders placed or performed during the hospital encounter of 01/27/24   Comprehensive Metabolic Panel    Specimen: Blood   Result Value Ref Range    Glucose 86 65 - 99 mg/dL    BUN 17 6 - 20 mg/dL    Creatinine 0.77 0.76 - 1.27 mg/dL    Sodium 142 136 - 145 mmol/L    Potassium 3.9 3.5 - 5.2 mmol/L    Chloride 104 98 - 107 mmol/L    CO2 25.0 22.0 - 29.0 mmol/L    Calcium 9.6 8.6 - 10.5 mg/dL    Total Protein 7.8 6.0 - 8.5 g/dL    Albumin 4.8 3.5 - 5.2 g/dL    ALT (SGPT) 57 (H) 1 - 41 U/L    AST (SGOT) 27 1 - 40 U/L    Alkaline Phosphatase 74 39 - 117 U/L    Total Bilirubin 0.8 0.0 - 1.2 mg/dL    Globulin 3.0 gm/dL    A/G Ratio 1.6 g/dL    BUN/Creatinine Ratio 22.1 7.0 - 25.0    Anion Gap 13.0 5.0 - 15.0 mmol/L    eGFR 119.7 >60.0 mL/min/1.73   C-reactive Protein    Specimen: Blood   Result Value Ref Range    C-Reactive Protein <0.30 0.00 - 0.50 mg/dL   Sedimentation Rate    Specimen: Blood   Result Value Ref Range    Sed Rate 14 0 - 15 mm/hr   Lactic Acid, Plasma    Specimen: Blood   Result Value Ref Range    Lactate 1.2 0.5 - 2.0 mmol/L   CBC Auto Differential    Specimen: Blood   Result Value Ref Range    WBC 7.24 3.40 - 10.80 10*3/mm3    RBC 5.33 4.14 - 5.80 10*6/mm3    Hemoglobin 15.2 13.0 - 17.7 g/dL    Hematocrit 46.3 37.5 - 51.0 %    MCV 86.9 79.0 - 97.0 fL    MCH 28.5 26.6 - 33.0 pg    MCHC 32.8 31.5 - 35.7 g/dL    RDW 12.1 (L) 12.3 - 15.4 %    RDW-SD 38.5 37.0 - 54.0 fl    MPV 9.9 6.0 - 12.0 fL    Platelets 240 140 - 450 10*3/mm3    Neutrophil % 63.2 42.7 - 76.0 %    Lymphocyte % 28.2 19.6 - 45.3 %    Monocyte % 6.5 5.0 - 12.0 %    Eosinophil % 1.1 0.3 - 6.2 %    Basophil % 0.6  0.0 - 1.5 %    Immature Grans % 0.4 0.0 - 0.5 %    Neutrophils, Absolute 4.58 1.70 - 7.00 10*3/mm3    Lymphocytes, Absolute 2.04 0.70 - 3.10 10*3/mm3    Monocytes, Absolute 0.47 0.10 - 0.90 10*3/mm3    Eosinophils, Absolute 0.08 0.00 - 0.40 10*3/mm3    Basophils, Absolute 0.04 0.00 - 0.20 10*3/mm3    Immature Grans, Absolute 0.03 0.00 - 0.05 10*3/mm3    nRBC 0.0 0.0 - 0.2 /100 WBC   ECG 12 Lead Other; Left arm pain with radiation to left chest   Result Value Ref Range    QT Interval 380 ms    QTC Interval 435 ms       Imaging:   No valid procedures specified.     CT CERVICAL SPINE WO CONTRAST     Date of Exam: 1/27/2024 1:36 AM EST     Indication: left arm burning, numbness, tingling, r/o herniated disc.     Comparison: MRI 8/8/2022.     Technique: Axial CT images were obtained of the cervical spine without contrast administration.  Reconstructed coronal and sagittal images were also obtained. Automated exposure control and iterative construction methods were used.        Findings:  No evidence of fracture or compression deformity. No evidence of spondylolysis.  No significant spondylolisthesis. No evidence of focal lesions. The prevertebral soft tissues appear unremarkable. Surrounding soft tissues appear within normal limits. No   significant degenerative changes identified. No significant disc bulge or protrusion identified. No evidence of canal stenosis. No significant facet disease. No significant bony neuroforaminal stenosis. The lung apices appear clear.        IMPRESSION:  Impression:  No acute osseous abnormality. No significant degenerative changes identified. No evidence of canal stenosis. No significant bony neuroforaminal stenosis. No significant changes compared to the previous MRI 8/8/2022 given difference in modality. Similar   indication for exam at that time.           Electronically Signed: Zeinab Macdonald MD    1/27/2024 1:50 AM EST    Workstation ID: [id number]      CT HEAD WO CONTRAST     Date of  Exam: 1/27/2024 1:36 AM EST     Indication: left arm and leg numbness/tingling.     Comparison: 8/8/2022.     Technique: Axial CT images were obtained of the head without contrast administration.  Automated exposure control and iterative construction methods were used.     ----------     Findings:  There is no evidence of hemorrhage. There is no mass effect or midline shift.     There is no extracerebral collection.     Ventricles are normal in size and configuration for patient's stated age.       Posterior fossa is within normal limits.     Calvarium and skull base appear intact.   Visualized sinuses show no air fluid levels. Visualized orbits are unremarkable.     IMPRESSION:  Impression:  No acute intracranial process identified.     Electronically Signed: Zeinab Macdonald MD    1/27/2024 1:47 AM EST    Workstation ID: [id number]    CT Foot Left wo IV Contrast  Order: [id number]  Impression    No lucent or sclerotic bone lesion is seen.    CRITICAL RESULT:    No.    COMMUNICATION:  Per this written report.    Drafted by Gab Dawkins MD on 1/27/2024 3:36 PM  Final report signed by Gab Dawkins MD on 1/27/2024 3:39 PM  Narrative    CLINICAL INDICATION:  Bone lesion, foot, incidental    TECHNIQUE:    Multiple axial CT images were obtained through left foot/ankle lower extremity. The axial CT data set was used to generate high resolution reformatted images in the coronal and sagittal planes to facilitate diagnostic accuracy and treatment planning.    Total DLP (Dose-Length Product): 151.59 mGy.cm. Please note: The reported value represents the total of one or more individual components during the CT acquisition on this date and at this time, and as such, the same value may appear in more than one CT report depending on the interpreting/reporting physicians.      COMPARISON:  12/28/2023 radiographs..    FINDINGS:  Bones: No fracture, subluxation, or dislocation. No joint effusion. No joint space narrowing. No  erosions.    Soft tissues: Normal.    Muscles/tendons: Normal.  Exam End: 01/27/24 14:09 Last Resulted: 01/27/24 15:39   Received From: [employer]  Result Received: 01/29/24 16:00         MRI CERVICAL SPINE W WO CONTRAST-     Date of Exam: 8/8/2022 2:20 PM     Indication: Demyelinating disease; R20.0-Anesthesia of skin;  R20.2-Paresthesia of skin  left arm tingling.     Comparison: None available.     Technique: Multiplanar multisequence images of the cervical spine were  performed according to routine cervical spine MRI protocol with and  without administration of 5 mL of MultiHance.     FINDINGS:   Bone marrow signal intensity is normal.  Alignment is anatomic. No  evidence of focal lesions. Only 5 mL MultiHance administered due to  patient vomiting..  Craniocervical junction is normal.  Cervical spinal  cord has normal size and signal intensity.  Prevertebral soft tissues  are normal. No significant degenerative changes identified. No  suspicious or abnormal enhancement identified. No evidence of focal  lesions.      Paraspinal structures are normal.     C1-C2: Within normal limits.     C2-C3: No significant central canal or neural foraminal narrowing.  Uncovertebral joints are normal.     C3-C4: No significant central canal or neural foraminal narrowing.  Uncovertebral joints are normal.     C4-C5: No significant central canal or neural foraminal narrowing.  Uncovertebral joints are normal.     C5-C6: No significant central canal or neural foraminal narrowing.  Uncovertebral joints are normal.     C6-C7: No significant central canal or neural foraminal narrowing.  Uncovertebral joints are normal.     C7-T1: No significant central canal or neural foraminal narrowing.  Uncovertebral joints are normal.     IMPRESSION:  No acute osseous abnormality or significant degenerative change. No  evidence of canal stenosis or significant neural foraminal narrowing at  any level. No significant disc disease. No suspicious  or abnormal  enhancement. No cord signal abnormalities identified.        This report was finalized on 8/9/2022 7:52 AM by Zeinab Macdonald MD.             Assessment / Plan      Assessment/Plan:     Recent encounter reports were reviewed including ER visit at [facility] [place] on 1/27/2024  Also ER visit on 1/24/2024 at  patient was seen for chest pain and palpitations      1. Decreased  strength of left hand  1-3 left  strength weaker than right.  Does appear to have positive Tinel, Phalen, and reverse Phalen sign as well.  Testing does rely on patient's subjective information.  I will refer to neurology for evaluation.  Encouraged to keep appointment with neurology when scheduled.  Also encouraged to follow-up with PCP in 4 weeks, or sooner if needed    - Ambulatory Referral to Neurology    2. Paresthesias in left hand    - Ambulatory Referral to Neurology    3. Arm paresthesia, left    - Ambulatory Referral to Neurology    4. Hospital discharge follow-up        Vaccine Counseling:      Follow Up:   Return in about 4 weeks (around 2/26/2024).      DO VERONICA Cadena

## 2024-02-02 ENCOUNTER — LAB (OUTPATIENT)
Dept: LAB | Facility: HOSPITAL | Age: 34
End: 2024-02-02
Payer: MEDICAID

## 2024-02-02 ENCOUNTER — TRANSCRIBE ORDERS (OUTPATIENT)
Dept: LAB | Facility: HOSPITAL | Age: 34
End: 2024-02-02
Payer: MEDICAID

## 2024-02-02 DIAGNOSIS — R22.31 MASS OF FINGER OF RIGHT HAND: Primary | ICD-10-CM

## 2024-02-02 DIAGNOSIS — R22.31 MASS OF FINGER OF RIGHT HAND: ICD-10-CM

## 2024-02-02 LAB
ANION GAP SERPL CALCULATED.3IONS-SCNC: 12.2 MMOL/L (ref 5–15)
BUN SERPL-MCNC: 16 MG/DL (ref 6–20)
BUN/CREAT SERPL: 18.2 (ref 7–25)
CALCIUM SPEC-SCNC: 9.8 MG/DL (ref 8.6–10.5)
CHLORIDE SERPL-SCNC: 102 MMOL/L (ref 98–107)
CO2 SERPL-SCNC: 25.8 MMOL/L (ref 22–29)
CREAT SERPL-MCNC: 0.88 MG/DL (ref 0.76–1.27)
DEPRECATED RDW RBC AUTO: 37.7 FL (ref 37–54)
EGFRCR SERPLBLD CKD-EPI 2021: 115 ML/MIN/1.73
ERYTHROCYTE [DISTWIDTH] IN BLOOD BY AUTOMATED COUNT: 12.5 % (ref 12.3–15.4)
GLUCOSE SERPL-MCNC: 98 MG/DL (ref 65–99)
HCT VFR BLD AUTO: 44 % (ref 37.5–51)
HGB BLD-MCNC: 15.1 G/DL (ref 13–17.7)
MCH RBC QN AUTO: 28.7 PG (ref 26.6–33)
MCHC RBC AUTO-ENTMCNC: 34.3 G/DL (ref 31.5–35.7)
MCV RBC AUTO: 83.7 FL (ref 79–97)
PLATELET # BLD AUTO: 253 10*3/MM3 (ref 140–450)
PMV BLD AUTO: 10.7 FL (ref 6–12)
POTASSIUM SERPL-SCNC: 4.3 MMOL/L (ref 3.5–5.2)
RBC # BLD AUTO: 5.26 10*6/MM3 (ref 4.14–5.8)
SODIUM SERPL-SCNC: 140 MMOL/L (ref 136–145)
WBC NRBC COR # BLD AUTO: 4.4 10*3/MM3 (ref 3.4–10.8)

## 2024-02-02 PROCEDURE — 85027 COMPLETE CBC AUTOMATED: CPT

## 2024-02-02 PROCEDURE — 80048 BASIC METABOLIC PNL TOTAL CA: CPT

## 2024-02-02 PROCEDURE — 36415 COLL VENOUS BLD VENIPUNCTURE: CPT

## 2024-02-04 LAB
QT INTERVAL: 380 MS
QTC INTERVAL: 435 MS

## 2024-02-08 ENCOUNTER — LAB REQUISITION (OUTPATIENT)
Dept: LAB | Facility: HOSPITAL | Age: 34
End: 2024-02-08
Payer: MEDICAID

## 2024-02-08 DIAGNOSIS — R22.31 LOCALIZED SWELLING, MASS AND LUMP, RIGHT UPPER LIMB: ICD-10-CM

## 2024-02-08 PROCEDURE — 88304 TISSUE EXAM BY PATHOLOGIST: CPT | Performed by: SURGERY

## 2024-02-11 ENCOUNTER — HOSPITAL ENCOUNTER (EMERGENCY)
Facility: HOSPITAL | Age: 34
Discharge: HOME | End: 2024-02-11
Attending: EMERGENCY MEDICINE | Admitting: EMERGENCY MEDICINE
Payer: MEDICAID

## 2024-02-11 VITALS
TEMPERATURE: 98.2 F | HEIGHT: 67 IN | WEIGHT: 210 LBS | BODY MASS INDEX: 32.96 KG/M2 | SYSTOLIC BLOOD PRESSURE: 129 MMHG | OXYGEN SATURATION: 97 % | DIASTOLIC BLOOD PRESSURE: 84 MMHG | HEART RATE: 84 BPM | RESPIRATION RATE: 18 BRPM

## 2024-02-11 DIAGNOSIS — Z98.890 HISTORY OF SURGICAL PROCEDURE: ICD-10-CM

## 2024-02-11 DIAGNOSIS — Z87.19 HISTORY OF GASTROESOPHAGEAL REFLUX (GERD): ICD-10-CM

## 2024-02-11 DIAGNOSIS — Z51.89 ENCOUNTER FOR WOUND RE-CHECK: Primary | ICD-10-CM

## 2024-02-11 PROCEDURE — 99282 EMERGENCY DEPT VISIT SF MDM: CPT

## 2024-02-11 PROCEDURE — 99283 EMERGENCY DEPT VISIT LOW MDM: CPT

## 2024-02-15 ENCOUNTER — DOCUMENTATION (OUTPATIENT)
Dept: CARDIOLOGY | Facility: CLINIC | Age: 34
End: 2024-02-15
Payer: MEDICAID

## 2024-02-15 ENCOUNTER — HOSPITAL ENCOUNTER (OUTPATIENT)
Dept: CT IMAGING | Facility: HOSPITAL | Age: 34
Discharge: HOME | End: 2024-02-15
Payer: MEDICAID

## 2024-02-15 ENCOUNTER — TELEPHONE (OUTPATIENT)
Dept: CARDIOLOGY | Facility: CLINIC | Age: 34
End: 2024-02-15
Payer: MEDICAID

## 2024-02-15 VITALS
HEIGHT: 67 IN | BODY MASS INDEX: 33.27 KG/M2 | HEART RATE: 87 BPM | OXYGEN SATURATION: 96 % | WEIGHT: 212 LBS | TEMPERATURE: 97.1 F

## 2024-02-15 DIAGNOSIS — R07.2 PRECORDIAL PAIN: ICD-10-CM

## 2024-02-15 RX ORDER — PREDNISONE 50 MG/1
50 TABLET ORAL DAILY
Status: CANCELLED
Start: 2024-02-15

## 2024-02-15 RX ORDER — PREDNISONE 50 MG/1
TABLET ORAL
Qty: 3 TABLET | Refills: 0 | Status: SHIPPED | OUTPATIENT
Start: 2024-02-15

## 2024-02-23 ENCOUNTER — TELEPHONE (OUTPATIENT)
Dept: INFUSION THERAPY | Facility: HOSPITAL | Age: 34
End: 2024-02-23
Payer: MEDICAID

## 2024-02-23 NOTE — TELEPHONE ENCOUNTER
Pt contacted as pre-procedure phone call prior to planned CTA coronary for 2/26/24. Reviewed with patient arrival time, nothing to eat or drink by mouth 4 hours prior to arrival, no caffeine after midnight,  recommended.  Reviewed procedure instructions and contrast allergy premed instructions when pt was here for prior scheduled CTA but no meds were ordered so rescheduled-  was used at that time.

## 2024-02-26 ENCOUNTER — HOSPITAL ENCOUNTER (OUTPATIENT)
Dept: CT IMAGING | Facility: HOSPITAL | Age: 34
Discharge: HOME | End: 2024-02-26
Payer: MEDICAID

## 2024-02-26 VITALS
OXYGEN SATURATION: 98 % | WEIGHT: 210.6 LBS | SYSTOLIC BLOOD PRESSURE: 109 MMHG | BODY MASS INDEX: 33.06 KG/M2 | HEIGHT: 67 IN | RESPIRATION RATE: 18 BRPM | HEART RATE: 74 BPM | DIASTOLIC BLOOD PRESSURE: 65 MMHG | TEMPERATURE: 97.4 F

## 2024-02-26 DIAGNOSIS — R07.2 PRECORDIAL PAIN: ICD-10-CM

## 2024-02-26 PROCEDURE — 25510000001 IOPAMIDOL PER 1 ML: Performed by: INTERNAL MEDICINE

## 2024-02-26 PROCEDURE — 75574 CT ANGIO HRT W/3D IMAGE: CPT

## 2024-02-26 PROCEDURE — 75574 CT ANGIO HRT W/3D IMAGE: CPT | Performed by: INTERNAL MEDICINE

## 2024-02-26 RX ORDER — METOPROLOL TARTRATE 100 MG/1
100 TABLET ORAL ONCE
Status: COMPLETED | OUTPATIENT
Start: 2024-02-26 | End: 2024-02-26

## 2024-02-26 RX ORDER — LIDOCAINE HYDROCHLORIDE 10 MG/ML
0.5 INJECTION, SOLUTION EPIDURAL; INFILTRATION; INTRACAUDAL; PERINEURAL ONCE AS NEEDED
Status: DISCONTINUED | OUTPATIENT
Start: 2024-02-26 | End: 2024-02-27 | Stop reason: HOSPADM

## 2024-02-26 RX ORDER — METOPROLOL TARTRATE 1 MG/ML
INJECTION, SOLUTION INTRAVENOUS
Status: COMPLETED
Start: 2024-02-26 | End: 2024-02-26

## 2024-02-26 RX ORDER — NITROGLYCERIN 400 UG/1
2 SPRAY ORAL
Status: COMPLETED | OUTPATIENT
Start: 2024-02-26 | End: 2024-02-26

## 2024-02-26 RX ORDER — NITROGLYCERIN 400 UG/1
1 SPRAY ORAL
Status: COMPLETED | OUTPATIENT
Start: 2024-02-26 | End: 2024-02-26

## 2024-02-26 RX ORDER — SODIUM CHLORIDE 0.9 % (FLUSH) 0.9 %
10 SYRINGE (ML) INJECTION AS NEEDED
Status: DISCONTINUED | OUTPATIENT
Start: 2024-02-26 | End: 2024-02-27 | Stop reason: HOSPADM

## 2024-02-26 RX ORDER — METOPROLOL TARTRATE 1 MG/ML
5 INJECTION, SOLUTION INTRAVENOUS
Status: DISCONTINUED | OUTPATIENT
Start: 2024-02-26 | End: 2024-02-27 | Stop reason: HOSPADM

## 2024-02-26 RX ORDER — METOPROLOL TARTRATE 50 MG/1
50 TABLET, FILM COATED ORAL ONCE
Status: COMPLETED | OUTPATIENT
Start: 2024-02-26 | End: 2024-02-26

## 2024-02-26 RX ORDER — METOPROLOL TARTRATE 50 MG/1
50 TABLET, FILM COATED ORAL
Status: DISCONTINUED | OUTPATIENT
Start: 2024-02-26 | End: 2024-02-27 | Stop reason: HOSPADM

## 2024-02-26 RX ORDER — NITROGLYCERIN 400 UG/1
SPRAY ORAL
Status: COMPLETED
Start: 2024-02-26 | End: 2024-02-26

## 2024-02-26 RX ADMIN — METOPROLOL TARTRATE 150 MG: 100 TABLET, FILM COATED ORAL at 13:35

## 2024-02-26 RX ADMIN — IOPAMIDOL 65 ML: 755 INJECTION, SOLUTION INTRAVENOUS at 16:18

## 2024-02-26 RX ADMIN — NITROGLYCERIN 2 SPRAY: 400 SPRAY ORAL at 16:22

## 2024-02-26 RX ADMIN — METOPROLOL TARTRATE 10 MG: 1 INJECTION, SOLUTION INTRAVENOUS at 16:23

## 2024-02-26 RX ADMIN — METOPROLOL TARTRATE 50 MG: 50 TABLET ORAL at 14:13

## 2024-02-26 RX ADMIN — METOPROLOL TARTRATE 10 MG: 5 INJECTION INTRAVENOUS at 16:23

## 2024-02-26 RX ADMIN — METOPROLOL TARTRATE 50 MG: 50 TABLET ORAL at 15:22

## 2024-02-26 RX ADMIN — METOPROLOL TARTRATE 50 MG: 50 TABLET ORAL at 14:51

## 2024-02-26 NOTE — SIGNIFICANT NOTE
Chest pain, stabbing, palpatations, goose bumps all over body and then burning pain.    2-5 days at times,  it actually stays while active, but goes away when lies down.

## 2024-02-27 ENCOUNTER — TELEPHONE (OUTPATIENT)
Dept: CARDIOLOGY | Facility: CLINIC | Age: 34
End: 2024-02-27
Payer: MEDICAID

## 2024-02-27 RX ORDER — PROPRANOLOL HYDROCHLORIDE 20 MG/1
20 TABLET ORAL 2 TIMES DAILY
Qty: 60 TABLET | Refills: 3 | Status: SHIPPED | OUTPATIENT
Start: 2024-02-27

## 2024-02-27 NOTE — TELEPHONE ENCOUNTER
Called [place]  service.Talked to [facility]. Patient notified of message above from . Verbalized understanding.

## 2024-02-27 NOTE — TELEPHONE ENCOUNTER
Called patient using the [place]  service.[phone]. Talked to [facility] ID # [id number]. Notified of message from . Requesting medication to decrease his high heart rate. For the past month his heart rate is elevated every day. Having palpitations with his increased heart rate. Stated yesterday that he could see his chest moving in the mirror and heart rate was 120. Explained to him that his heart rate was elevated yesterday because of the test. Please advise.

## 2024-02-27 NOTE — TELEPHONE ENCOUNTER
----- Message from Dereck Rodriguez III, MD sent at 2/27/2024  8:48 AM EST -----  Normal CT scan of the heart.  No evidence of blockage.

## 2024-03-11 ENCOUNTER — OFFICE VISIT (OUTPATIENT)
Dept: FAMILY MEDICINE CLINIC | Facility: CLINIC | Age: 34
End: 2024-03-11
Payer: MEDICAID

## 2024-03-11 ENCOUNTER — LAB (OUTPATIENT)
Dept: LAB | Facility: HOSPITAL | Age: 34
End: 2024-03-11
Payer: MEDICAID

## 2024-03-11 VITALS
BODY MASS INDEX: 33.09 KG/M2 | DIASTOLIC BLOOD PRESSURE: 68 MMHG | TEMPERATURE: 98.8 F | SYSTOLIC BLOOD PRESSURE: 108 MMHG | WEIGHT: 210.8 LBS | OXYGEN SATURATION: 97 % | HEIGHT: 67 IN | HEART RATE: 94 BPM

## 2024-03-11 DIAGNOSIS — R79.89 LOW VITAMIN D LEVEL: ICD-10-CM

## 2024-03-11 DIAGNOSIS — R21 PENILE RASH: ICD-10-CM

## 2024-03-11 DIAGNOSIS — H61.23 BILATERAL HEARING LOSS DUE TO CERUMEN IMPACTION: ICD-10-CM

## 2024-03-11 DIAGNOSIS — M35.2 BEHCET'S DISEASE: Chronic | ICD-10-CM

## 2024-03-11 DIAGNOSIS — R17 ELEVATED BILIRUBIN: ICD-10-CM

## 2024-03-11 DIAGNOSIS — Z76.0 MEDICATION REFILL: ICD-10-CM

## 2024-03-11 DIAGNOSIS — R79.89 ELEVATED LFTS: Primary | ICD-10-CM

## 2024-03-11 DIAGNOSIS — J01.00 ACUTE MAXILLARY SINUSITIS, RECURRENCE NOT SPECIFIED: ICD-10-CM

## 2024-03-11 DIAGNOSIS — R79.89 ELEVATED LFTS: ICD-10-CM

## 2024-03-11 PROCEDURE — 36415 COLL VENOUS BLD VENIPUNCTURE: CPT | Performed by: NURSE PRACTITIONER

## 2024-03-11 PROCEDURE — 80053 COMPREHEN METABOLIC PANEL: CPT

## 2024-03-11 PROCEDURE — 82306 VITAMIN D 25 HYDROXY: CPT | Performed by: NURSE PRACTITIONER

## 2024-03-11 RX ORDER — DOXYCYCLINE HYCLATE 100 MG/1
100 CAPSULE ORAL 2 TIMES DAILY
Qty: 14 CAPSULE | Refills: 0 | Status: SHIPPED | OUTPATIENT
Start: 2024-03-11 | End: 2024-03-18

## 2024-03-11 RX ORDER — FLUTICASONE PROPIONATE 50 MCG
2 SPRAY, SUSPENSION (ML) NASAL DAILY
Qty: 18.2 ML | Refills: 0 | Status: SHIPPED | OUTPATIENT
Start: 2024-03-11

## 2024-03-11 NOTE — PROGRESS NOTES
Chief Complaint  Abdominal Pain (New GI referral ), Headache (Started 4 days ago), and Earache (Bilateral ear pain, pressure X4D)    Subjective          Meliton Lagos presents to [facility]  History of Present Illness  Patient is a 33-year-old male.  He is a [place] refugee.  He can speak English, read English, his native language is German.  He declines an German  at this time.    He is here for complaint of ear fullness, headache, facial pressure.  He states that has been ongoing for the last week or 2.  He denies any fever or chills.  He states he does have a nasal discharge.  Upon examination he has bilateral cerumen impactions.  He is agreeable for irrigation.  Discussed possible side effects.  He is agreeable.  He is read and signed permit.  Bilateral ears were irrigated with half water and half H2O2.  Patient tolerated well.  Moderate amount of cerumen was removed.  He does continue to have fullness to the his left ear.  There is no erythema.  Discussed adding Flonase for nasal congestion.  Will treat for sinus infection.    He is concerned about his chronic elevated liver enzymes.  He has seen GI with [facility].  They are recommending a liver biopsy.  Patient would like to have a second opinion at the [facility] with hepatology.  Referral has been placed.    Patient stated he has had chronic left foot pain.  He has seen podiatry in the past.  He is requesting their name and number.  He has been provided with the office number for [facility] podiatry.        The following portions of the patient's history were reviewed and updated as appropriate: allergies, current medications, past family history, past medical history, past social history, past surgical history and problem list.    Review of Systems   Constitutional: Negative.    HENT:  Positive for ear pain, sinus pressure and sinus pain.    Cardiovascular:  "Negative.    Gastrointestinal: Negative.    Musculoskeletal:  Positive for arthralgias.        Left foot pain-chronic   Skin: Negative.    Allergic/Immunologic: Positive for environmental allergies.   Neurological:  Positive for headaches.   Hematological: Negative.    Psychiatric/Behavioral: Negative.           Objective   Vital Signs:   /68 (BP Location: Left arm, Patient Position: Sitting, Cuff Size: Adult)   Pulse 94   Temp 98.8 °F (37.1 °C) (Infrared)   Ht 170.2 cm (67\")   Wt 95.6 kg (210 lb 12.8 oz)   SpO2 97%   BMI 33.02 kg/m²                      Physical Exam  Vitals reviewed.   HENT:      Head: Normocephalic.      Nose: Congestion present.      Right Sinus: Maxillary sinus tenderness present. No frontal sinus tenderness.      Left Sinus: Maxillary sinus tenderness present. No frontal sinus tenderness.      Comments: Thick greenish-yellow purulent nasal discharge     Mouth/Throat:      Mouth: Mucous membranes are moist.   Eyes:      Pupils: Pupils are equal, round, and reactive to light.   Cardiovascular:      Rate and Rhythm: Normal rate and regular rhythm.      Pulses: Normal pulses.      Heart sounds: Normal heart sounds.   Pulmonary:      Effort: Pulmonary effort is normal.   Abdominal:      General: Bowel sounds are normal.      Palpations: Abdomen is soft.   Musculoskeletal:         General: Normal range of motion.   Skin:     General: Skin is warm and dry.      Capillary Refill: Capillary refill takes less than 2 seconds.   Neurological:      Mental Status: He is alert and oriented to person, place, and time.   Psychiatric:         Mood and Affect: Mood normal.         Behavior: Behavior normal.        Result Review :          Ear Cerumen Removal    Date/Time: 3/11/2024 4:00 PM    Performed by: Gabriela Batres APRN  Authorized by: Gabriela Batres APRN  Consent: Verbal consent obtained. Written consent obtained.  Risks and benefits: risks, benefits and alternatives were " discussed  Consent given by: patient  Patient understanding: patient states understanding of the procedure being performed  Patient consent: the patient's understanding of the procedure matches consent given  Procedure consent: procedure consent matches procedure scheduled  Relevant documents: relevant documents present and verified  Patient identity confirmed: verbally with patient    Anesthesia:  Local Anesthetic: none  Location details: left ear and right ear  Patient tolerance: patient tolerated the procedure well with no immediate complications  Comments: Hearing improved in right ear. Cerumen removed in both. He states his left ear feels some better but slightly muffles.     Procedure type: instrumentation, irrigation   Sedation:  Patient sedated: no              Assessment and Plan    Diagnoses and all orders for this visit:    1. Elevated LFTs (Primary)  -     Ambulatory Referral to Hepatology  -     Comprehensive Metabolic Panel; Future    2. Elevated bilirubin  -     Ambulatory Referral to Hepatology  -     Comprehensive Metabolic Panel; Future    3. Low vitamin D level  -     Vitamin D,25-Hydroxy    4. Bilateral hearing loss due to cerumen impaction  -     Ear Cerumen Removal    5. Acute maxillary sinusitis, recurrence not specified  -     doxycycline (VIBRAMYCIN) 100 MG capsule; Take 1 capsule by mouth 2 (Two) Times a Day for 7 days.  Dispense: 14 capsule; Refill: 0  -     fluticasone (FLONASE) 50 MCG/ACT nasal spray; 2 sprays into the nostril(s) as directed by provider Daily.  Dispense: 18.2 mL; Refill: 0    Flonase  Cerumen removed.   Follow up if no improvement may need referral to ENT.       Follow Up   Return if symptoms worsen or fail to improve.  Patient was given instructions and counseling regarding his condition or for health maintenance advice. Please see specific information pulled into the AVS if appropriate.

## 2024-03-12 LAB
25(OH)D3 SERPL-MCNC: 29.4 NG/ML (ref 30–100)
ALBUMIN SERPL-MCNC: 4.9 G/DL (ref 3.5–5.2)
ALBUMIN/GLOB SERPL: 1.9 G/DL
ALP SERPL-CCNC: 90 U/L (ref 39–117)
ALT SERPL W P-5'-P-CCNC: 38 U/L (ref 1–41)
ANION GAP SERPL CALCULATED.3IONS-SCNC: 13.4 MMOL/L (ref 5–15)
AST SERPL-CCNC: 21 U/L (ref 1–40)
BILIRUB SERPL-MCNC: 0.9 MG/DL (ref 0–1.2)
BUN SERPL-MCNC: 19 MG/DL (ref 6–20)
BUN/CREAT SERPL: 19.4 (ref 7–25)
CALCIUM SPEC-SCNC: 9.7 MG/DL (ref 8.6–10.5)
CHLORIDE SERPL-SCNC: 103 MMOL/L (ref 98–107)
CO2 SERPL-SCNC: 25.6 MMOL/L (ref 22–29)
CREAT SERPL-MCNC: 0.98 MG/DL (ref 0.76–1.27)
EGFRCR SERPLBLD CKD-EPI 2021: 103.1 ML/MIN/1.73
GLOBULIN UR ELPH-MCNC: 2.6 GM/DL
GLUCOSE SERPL-MCNC: 95 MG/DL (ref 65–99)
POTASSIUM SERPL-SCNC: 4.4 MMOL/L (ref 3.5–5.2)
PROT SERPL-MCNC: 7.5 G/DL (ref 6–8.5)
SODIUM SERPL-SCNC: 142 MMOL/L (ref 136–145)

## 2024-03-12 RX ORDER — NYSTATIN AND TRIAMCINOLONE ACETONIDE 100000; 1 [USP'U]/G; MG/G
OINTMENT TOPICAL
Qty: 60 G | Refills: 0 | Status: SHIPPED | OUTPATIENT
Start: 2024-03-12

## 2024-03-12 NOTE — TELEPHONE ENCOUNTER
Rx Refill Note  Requested Prescriptions     Pending Prescriptions Disp Refills    nystatin-triamcinolone (MYCOLOG) 162553-1.1 UNIT/GM-% ointment [Pharmacy Med Name: NYSTATIN-TRIAMCINOLONE OINTM] 60 g 0     Sig: APPLY TO THE AFFECTED AREA(S) 2 TIMES A DAY AS DIRECTED      Last office visit with prescribing clinician: 12/27/2023   Last telemedicine visit with prescribing clinician: Visit date not found   Next office visit with prescribing clinician: Visit date not found                         Would you like a call back once the refill request has been completed: [] Yes [] No    If the office needs to give you a call back, can they leave a voicemail: [] Yes [] No    Andria Vizcarra LPN  03/12/24, 08:29 EDT

## 2024-04-19 ENCOUNTER — HOSPITAL ENCOUNTER (EMERGENCY)
Facility: HOSPITAL | Age: 34
Discharge: HOME | End: 2024-04-20
Attending: EMERGENCY MEDICINE
Payer: MEDICAID

## 2024-04-19 ENCOUNTER — APPOINTMENT (OUTPATIENT)
Dept: GENERAL RADIOLOGY | Facility: HOSPITAL | Age: 34
End: 2024-04-19
Payer: MEDICAID

## 2024-04-19 DIAGNOSIS — R07.89 ATYPICAL CHEST PAIN: Primary | ICD-10-CM

## 2024-04-19 DIAGNOSIS — R06.02 SHORTNESS OF BREATH: ICD-10-CM

## 2024-04-19 DIAGNOSIS — R19.7 DIARRHEA, UNSPECIFIED TYPE: ICD-10-CM

## 2024-04-19 DIAGNOSIS — R11.0 NAUSEA: ICD-10-CM

## 2024-04-19 DIAGNOSIS — R00.2 PALPITATIONS: ICD-10-CM

## 2024-04-19 LAB
ALBUMIN SERPL-MCNC: 4.7 G/DL (ref 3.5–5.2)
ALBUMIN/GLOB SERPL: 1.6 G/DL
ALP SERPL-CCNC: 78 U/L (ref 39–117)
ALT SERPL W P-5'-P-CCNC: 31 U/L (ref 1–41)
ANION GAP SERPL CALCULATED.3IONS-SCNC: 11 MMOL/L (ref 5–15)
AST SERPL-CCNC: 28 U/L (ref 1–40)
BASOPHILS # BLD AUTO: 0.03 10*3/MM3 (ref 0–0.2)
BASOPHILS NFR BLD AUTO: 0.6 % (ref 0–1.5)
BILIRUB SERPL-MCNC: 1.3 MG/DL (ref 0–1.2)
BUN SERPL-MCNC: 12 MG/DL (ref 6–20)
BUN/CREAT SERPL: 13.6 (ref 7–25)
CALCIUM SPEC-SCNC: 9.5 MG/DL (ref 8.6–10.5)
CHLORIDE SERPL-SCNC: 99 MMOL/L (ref 98–107)
CO2 SERPL-SCNC: 28 MMOL/L (ref 22–29)
CREAT SERPL-MCNC: 0.88 MG/DL (ref 0.76–1.27)
D DIMER PPP FEU-MCNC: <0.27 MCGFEU/ML (ref 0–0.5)
D-LACTATE SERPL-SCNC: 1.5 MMOL/L (ref 0.5–2)
DEPRECATED RDW RBC AUTO: 36 FL (ref 37–54)
EGFRCR SERPLBLD CKD-EPI 2021: 115 ML/MIN/1.73
EOSINOPHIL # BLD AUTO: 0.08 10*3/MM3 (ref 0–0.4)
EOSINOPHIL NFR BLD AUTO: 1.6 % (ref 0.3–6.2)
ERYTHROCYTE [DISTWIDTH] IN BLOOD BY AUTOMATED COUNT: 11.9 % (ref 12.3–15.4)
GLOBULIN UR ELPH-MCNC: 3 GM/DL
GLUCOSE SERPL-MCNC: 110 MG/DL (ref 65–99)
HCT VFR BLD AUTO: 45.9 % (ref 37.5–51)
HGB BLD-MCNC: 15.3 G/DL (ref 13–17.7)
HOLD SPECIMEN: NORMAL
HOLD SPECIMEN: NORMAL
IMM GRANULOCYTES # BLD AUTO: 0.01 10*3/MM3 (ref 0–0.05)
IMM GRANULOCYTES NFR BLD AUTO: 0.2 % (ref 0–0.5)
LIPASE SERPL-CCNC: 43 U/L (ref 13–60)
LYMPHOCYTES # BLD AUTO: 1.41 10*3/MM3 (ref 0.7–3.1)
LYMPHOCYTES NFR BLD AUTO: 28.2 % (ref 19.6–45.3)
MAGNESIUM SERPL-MCNC: 1.9 MG/DL (ref 1.6–2.6)
MCH RBC QN AUTO: 28 PG (ref 26.6–33)
MCHC RBC AUTO-ENTMCNC: 33.3 G/DL (ref 31.5–35.7)
MCV RBC AUTO: 83.9 FL (ref 79–97)
MONOCYTES # BLD AUTO: 0.35 10*3/MM3 (ref 0.1–0.9)
MONOCYTES NFR BLD AUTO: 7 % (ref 5–12)
NEUTROPHILS NFR BLD AUTO: 3.12 10*3/MM3 (ref 1.7–7)
NEUTROPHILS NFR BLD AUTO: 62.4 % (ref 42.7–76)
NRBC BLD AUTO-RTO: 0 /100 WBC (ref 0–0.2)
NT-PROBNP SERPL-MCNC: 42.3 PG/ML (ref 0–450)
PLATELET # BLD AUTO: 246 10*3/MM3 (ref 140–450)
PMV BLD AUTO: 9.6 FL (ref 6–12)
POTASSIUM SERPL-SCNC: 3.8 MMOL/L (ref 3.5–5.2)
PROT SERPL-MCNC: 7.7 G/DL (ref 6–8.5)
RBC # BLD AUTO: 5.47 10*6/MM3 (ref 4.14–5.8)
SODIUM SERPL-SCNC: 138 MMOL/L (ref 136–145)
TROPONIN T SERPL HS-MCNC: 7 NG/L
TSH SERPL DL<=0.05 MIU/L-ACNC: 1.25 UIU/ML (ref 0.27–4.2)
WBC NRBC COR # BLD AUTO: 5 10*3/MM3 (ref 3.4–10.8)
WHOLE BLOOD HOLD COAG: NORMAL
WHOLE BLOOD HOLD SPECIMEN: NORMAL

## 2024-04-19 PROCEDURE — 85379 FIBRIN DEGRADATION QUANT: CPT | Performed by: EMERGENCY MEDICINE

## 2024-04-19 PROCEDURE — 99284 EMERGENCY DEPT VISIT MOD MDM: CPT

## 2024-04-19 PROCEDURE — 85025 COMPLETE CBC W/AUTO DIFF WBC: CPT

## 2024-04-19 PROCEDURE — 83735 ASSAY OF MAGNESIUM: CPT | Performed by: EMERGENCY MEDICINE

## 2024-04-19 PROCEDURE — 80053 COMPREHEN METABOLIC PANEL: CPT

## 2024-04-19 PROCEDURE — 25810000003 SODIUM CHLORIDE 0.9 % SOLUTION: Performed by: EMERGENCY MEDICINE

## 2024-04-19 PROCEDURE — 83690 ASSAY OF LIPASE: CPT

## 2024-04-19 PROCEDURE — 84484 ASSAY OF TROPONIN QUANT: CPT

## 2024-04-19 PROCEDURE — 93005 ELECTROCARDIOGRAM TRACING: CPT

## 2024-04-19 PROCEDURE — 83880 ASSAY OF NATRIURETIC PEPTIDE: CPT

## 2024-04-19 PROCEDURE — 83605 ASSAY OF LACTIC ACID: CPT | Performed by: EMERGENCY MEDICINE

## 2024-04-19 PROCEDURE — 84443 ASSAY THYROID STIM HORMONE: CPT | Performed by: EMERGENCY MEDICINE

## 2024-04-19 PROCEDURE — 71045 X-RAY EXAM CHEST 1 VIEW: CPT

## 2024-04-19 RX ORDER — SODIUM CHLORIDE 0.9 % (FLUSH) 0.9 %
10 SYRINGE (ML) INJECTION AS NEEDED
Status: DISCONTINUED | OUTPATIENT
Start: 2024-04-19 | End: 2024-04-20 | Stop reason: HOSPADM

## 2024-04-19 RX ORDER — ASPIRIN 81 MG/1
324 TABLET, CHEWABLE ORAL ONCE
Status: COMPLETED | OUTPATIENT
Start: 2024-04-19 | End: 2024-04-19

## 2024-04-19 RX ADMIN — SODIUM CHLORIDE 1000 ML: 9 INJECTION, SOLUTION INTRAVENOUS at 23:15

## 2024-04-19 RX ADMIN — ASPIRIN 324 MG: 81 TABLET, CHEWABLE ORAL at 22:23

## 2024-04-20 VITALS
HEART RATE: 74 BPM | HEIGHT: 67 IN | WEIGHT: 208 LBS | OXYGEN SATURATION: 96 % | TEMPERATURE: 98.2 F | RESPIRATION RATE: 18 BRPM | DIASTOLIC BLOOD PRESSURE: 84 MMHG | BODY MASS INDEX: 32.65 KG/M2 | SYSTOLIC BLOOD PRESSURE: 113 MMHG

## 2024-04-20 LAB
GEN 5 2HR TROPONIN T REFLEX: <6 NG/L
HOLD SPECIMEN: NORMAL
QT INTERVAL: 362 MS
QTC INTERVAL: 442 MS
TROPONIN T DELTA: NORMAL

## 2024-04-20 PROCEDURE — 36415 COLL VENOUS BLD VENIPUNCTURE: CPT

## 2024-04-20 PROCEDURE — 84484 ASSAY OF TROPONIN QUANT: CPT

## 2024-04-20 PROCEDURE — 93005 ELECTROCARDIOGRAM TRACING: CPT | Performed by: EMERGENCY MEDICINE

## 2024-04-20 RX ORDER — ONDANSETRON 4 MG/1
4 TABLET, ORALLY DISINTEGRATING ORAL EVERY 6 HOURS PRN
Qty: 12 TABLET | Refills: 0 | Status: SHIPPED | OUTPATIENT
Start: 2024-04-20

## 2024-04-20 NOTE — ED PROVIDER NOTES
"Subjective   History of Present Illness  33 year old male presents to the emergency department with concerns about intermittent chest pressure and palpitations for the past one day. He has had similar symptoms intermittently for the past 2.5 years, and has had an extensive cardiac evaluation previously including Holter monitor study, cardiac stress test, and coronary CT angiogram all within the past year. He was recently started on propranolol as needed for palpitations. He states he didn't take it for approximately a month because he wasn't experiencing palpitations. But recently over the past few days, he has had increased frequency of palpitations and has been taking the propranolol more frequently. He states that he has experienced dizziness, fatigue, diarrhea, shortness of breath, and has been feeling like he is in a \"bad mood\" for the past couple of days.       Review of Systems   Constitutional:  Positive for fatigue. Negative for diaphoresis and fever.   HENT:  Negative for drooling and facial swelling.    Eyes:  Positive for discharge. Negative for photophobia.   Respiratory:  Positive for shortness of breath. Negative for stridor.    Cardiovascular:  Positive for chest pain (pressure intermittent for one day, similar symptoms off and on for 2.5 years.).   Gastrointestinal:  Positive for diarrhea. Negative for abdominal pain.   Neurological:  Positive for dizziness. Negative for facial asymmetry.       Past Medical History:   Diagnosis Date    GERD (gastroesophageal reflux disease)    Occasional premature atrial contractions    Allergies   Allergen Reactions    Amoxicillin Other (See Comments)     Chest pain    Bactrim [Sulfamethoxazole-Trimethoprim] Other (See Comments)     Chest pain    Clindamycin Other (See Comments)     Medication causes patient to have chest pain    Ct Contrast GI Intolerance       Past Surgical History:   Procedure Laterality Date    ANAL FISTULA REPAIR  2009    CHOLECYSTECTOMY      " COLONOSCOPY  2019    [facility]    ENDOSCOPY  2021    Dr. Mobley empiric dilation performed bx suggestive of esophagitis    ENDOSCOPY  2020    EGD Dr. Mobley, empiric dilation erosive esophagitis       Family History   Problem Relation Age of Onset    No Known Problems Mother     No Known Problems Father     Heart attack Maternal Grandfather     Heart disease Maternal Grandfather     No Known Problems Paternal Grandmother     No Known Problems Paternal Grandfather     Colon cancer Neg Hx     Colon polyps Neg Hx        Social History     Socioeconomic History    Marital status:    Tobacco Use    Smoking status: Former     Current packs/day: 0.00     Average packs/day: 1 pack/day for 5.0 years (5.0 ttl pk-yrs)     Types: Cigarettes     Start date: 2012     Quit date:      Years since quittin.3     Passive exposure: Past    Smokeless tobacco: Never   Vaping Use    Vaping status: Never Used   Substance and Sexual Activity    Alcohol use: Not Currently     Comment: occas    Drug use: No    Sexual activity: Yes     Partners: Female     Birth control/protection: None           Objective   Physical Exam  Vitals and nursing note reviewed.   Constitutional:       General: He is not in acute distress.     Appearance: He is not diaphoretic.      Comments: BMI 32.   HENT:      Head:      Comments: Tacky mucosa, no mucosal pallor.   Eyes:      Comments: No photophobia or discharge.   Neck:      Vascular: No JVD.      Trachea: No tracheal deviation.   Cardiovascular:      Rate and Rhythm: Normal rate and regular rhythm.      Heart sounds: Normal heart sounds. No murmur heard.     No friction rub. No gallop.   Pulmonary:      Effort: Pulmonary effort is normal. No tachypnea or respiratory distress.      Breath sounds: Normal breath sounds. No stridor. No decreased breath sounds, wheezing, rhonchi or rales.   Abdominal:      Palpations: Abdomen is soft.      Tenderness: There is  no abdominal tenderness.      Comments: nondistended   Musculoskeletal:      Cervical back: Neck supple.      Comments: 2+ radial pulses, symmetric. No significant peripheral edema. No calf tenderness bilaterally.   Skin:     General: Skin is warm and dry.   Neurological:      Mental Status: He is alert.      Comments: No facial droop. No dysarthria. Moves all extremities. No nystagmus.   Psychiatric:      Comments: Normal affect.                    ED Course  ED Course as of 04/20/24 0701   Sat Apr 20, 2024   0100 D-Dimer, Quant: <0.27 [LD]   0207 Results and plan discussed with the patient.  All questions addressed.  He is feeling better after IV fluids.  I reviewed his prior CTA coronary arteries which was unremarkable in February 2024.  He was advised to stop propranolol as he is likely having some side effects from the propranolol.  He states he stopped taking it a couple of days ago, but the side effects are persisting.  I encouraged him to continue hydration and follow-up with arrhythmia clinic for a longer Holter monitor study.  He states he has had a 48-hour Holter monitor study which was unremarkable.  He continues to experience palpitations. [LD]      ED Course User Index  [LD] Karen Kuhn MD                HEART Score: 0                              Medical Decision Making  Likely adverse reaction to propranolol, known side effects include fatigue, dizziness, diarrhea, and depression. Differential diagnosis includes dehydration, anemia, viral illness, electrolyte abnormality, and others.     Problems Addressed:  Atypical chest pain: complicated acute illness or injury  Diarrhea, unspecified type: complicated acute illness or injury  Nausea: complicated acute illness or injury  Palpitations: complicated acute illness or injury  Shortness of breath: complicated acute illness or injury    Amount and/or Complexity of Data Reviewed  External Data Reviewed: radiology.     Details: 2/26/24 coronary CTA  report reviewed, negative.  Noncoronary Cardiac Findings:  · Cardiac valves: Aortic valve is trileaflet. There is no thickening or calcification in the aortic and mitral valves.  · Pericardium: The pericardial contour is preserved with no effusion, thickening, or calcifications.  · Left ventricle:  Calculated LVEF 66%  · Non-cardiac findings reported separately by radiology.     IMPRESSION:  1. Normal coronary arteries (CAD RADS 0)  2. Total calcium score 0 indicating absence of calcified coronary plaque  3. No non-atherosclerotic coronary abnormalities  4. Normal LV systolic function (calculated LVEF 66%)  5. Please refer to the radiology report for information on non-cardiac structures.        RECOMMENDATION:  1. Reassurance      Holter monitor study in January 2024 showed rare PACs and was otherwise unremarkable upon my review of the report.    Labs: ordered. Decision-making details documented in ED Course.  Radiology: ordered. Decision-making details documented in ED Course.  ECG/medicine tests: ordered and independent interpretation performed. Decision-making details documented in ED Course.     Details: EKG at 2208 shows normal sinus rhythm at a rate of 88 beats per minute, normal intervals, no acute ischemic changes. Repeat EKG at 0041 shows normal sinus rhythm at a rate of 72 beats per minute, normal intervals, no acute ischemic changes. No significant dynamic EKG changes.    Risk  Prescription drug management.      Recent Results (from the past 24 hour(s))   ECG 12 Lead ED Triage Standing Order; Chest Pain    Collection Time: 04/19/24 10:08 PM   Result Value Ref Range    QT Interval 362 ms    QTC Interval 442 ms   High Sensitivity Troponin T    Collection Time: 04/19/24 10:17 PM    Specimen: Blood   Result Value Ref Range    HS Troponin T 7 <22 ng/L   Comprehensive Metabolic Panel    Collection Time: 04/19/24 10:17 PM    Specimen: Blood   Result Value Ref Range    Glucose 110 (H) 65 - 99 mg/dL    BUN 12 6 -  20 mg/dL    Creatinine 0.88 0.76 - 1.27 mg/dL    Sodium 138 136 - 145 mmol/L    Potassium 3.8 3.5 - 5.2 mmol/L    Chloride 99 98 - 107 mmol/L    CO2 28.0 22.0 - 29.0 mmol/L    Calcium 9.5 8.6 - 10.5 mg/dL    Total Protein 7.7 6.0 - 8.5 g/dL    Albumin 4.7 3.5 - 5.2 g/dL    ALT (SGPT) 31 1 - 41 U/L    AST (SGOT) 28 1 - 40 U/L    Alkaline Phosphatase 78 39 - 117 U/L    Total Bilirubin 1.3 (H) 0.0 - 1.2 mg/dL    Globulin 3.0 gm/dL    A/G Ratio 1.6 g/dL    BUN/Creatinine Ratio 13.6 7.0 - 25.0    Anion Gap 11.0 5.0 - 15.0 mmol/L    eGFR 115.0 >60.0 mL/min/1.73   Lipase    Collection Time: 04/19/24 10:17 PM    Specimen: Blood   Result Value Ref Range    Lipase 43 13 - 60 U/L   BNP    Collection Time: 04/19/24 10:17 PM    Specimen: Blood   Result Value Ref Range    proBNP 42.3 0.0 - 450.0 pg/mL   Green Top (Gel)    Collection Time: 04/19/24 10:17 PM   Result Value Ref Range    Extra Tube Hold for add-ons.    Lavender Top    Collection Time: 04/19/24 10:17 PM   Result Value Ref Range    Extra Tube hold for add-on    Gold Top - SST    Collection Time: 04/19/24 10:17 PM   Result Value Ref Range    Extra Tube Hold for add-ons.    Gray Top    Collection Time: 04/19/24 10:17 PM   Result Value Ref Range    Extra Tube Hold for add-ons.    Light Blue Top    Collection Time: 04/19/24 10:17 PM   Result Value Ref Range    Extra Tube Hold for add-ons.    CBC Auto Differential    Collection Time: 04/19/24 10:17 PM    Specimen: Blood   Result Value Ref Range    WBC 5.00 3.40 - 10.80 10*3/mm3    RBC 5.47 4.14 - 5.80 10*6/mm3    Hemoglobin 15.3 13.0 - 17.7 g/dL    Hematocrit 45.9 37.5 - 51.0 %    MCV 83.9 79.0 - 97.0 fL    MCH 28.0 26.6 - 33.0 pg    MCHC 33.3 31.5 - 35.7 g/dL    RDW 11.9 (L) 12.3 - 15.4 %    RDW-SD 36.0 (L) 37.0 - 54.0 fl    MPV 9.6 6.0 - 12.0 fL    Platelets 246 140 - 450 10*3/mm3    Neutrophil % 62.4 42.7 - 76.0 %    Lymphocyte % 28.2 19.6 - 45.3 %    Monocyte % 7.0 5.0 - 12.0 %    Eosinophil % 1.6 0.3 - 6.2 %     Basophil % 0.6 0.0 - 1.5 %    Immature Grans % 0.2 0.0 - 0.5 %    Neutrophils, Absolute 3.12 1.70 - 7.00 10*3/mm3    Lymphocytes, Absolute 1.41 0.70 - 3.10 10*3/mm3    Monocytes, Absolute 0.35 0.10 - 0.90 10*3/mm3    Eosinophils, Absolute 0.08 0.00 - 0.40 10*3/mm3    Basophils, Absolute 0.03 0.00 - 0.20 10*3/mm3    Immature Grans, Absolute 0.01 0.00 - 0.05 10*3/mm3    nRBC 0.0 0.0 - 0.2 /100 WBC   TSH Rfx On Abnormal To Free T4    Collection Time: 04/19/24 10:17 PM    Specimen: Blood   Result Value Ref Range    TSH 1.250 0.270 - 4.200 uIU/mL   Magnesium    Collection Time: 04/19/24 10:17 PM    Specimen: Blood   Result Value Ref Range    Magnesium 1.9 1.6 - 2.6 mg/dL   Lactic Acid, Plasma    Collection Time: 04/19/24 10:17 PM    Specimen: Blood   Result Value Ref Range    Lactate 1.5 0.5 - 2.0 mmol/L   D-dimer, Quantitative    Collection Time: 04/19/24 10:17 PM    Specimen: Blood   Result Value Ref Range    D-Dimer, Quantitative <0.27 0.00 - 0.50 MCGFEU/mL   ECG 12 Lead Chest Pain    Collection Time: 04/20/24 12:41 AM   Result Value Ref Range    QT Interval 400 ms    QTC Interval 438 ms   High Sensitivity Troponin T 2Hr    Collection Time: 04/20/24 12:49 AM    Specimen: Arm, Right; Blood   Result Value Ref Range    HS Troponin T <6 <22 ng/L    Troponin T Delta       Note: In addition to lab results from this visit, the labs listed above may include labs taken at another facility or during a different encounter within the last 24 hours. Please correlate lab times with ED admission and discharge times for further clarification of the services performed during this visit.    XR Chest 1 View   Final Result   Impression:   No acute cardiopulmonary findings.            Electronically Signed: Roger Trevino MD     4/19/2024 10:34 PM EDT     Workstation ID: [id number]        Vitals:    04/20/24 0030 04/20/24 0100 04/20/24 0130 04/20/24 0200   BP: 110/80 113/77 105/76 113/84   Pulse: 71 67 66 74   Resp:       Temp:        TempSrc:       SpO2: 99% 97% 96% 96%   Weight:       Height:         Medications   aspirin chewable tablet 324 mg (324 mg Oral Given 4/19/24 2223)   sodium chloride 0.9 % bolus 1,000 mL (0 mL Intravenous Stopped 4/20/24 0039)     ECG/EMG Results (last 24 hours)       Procedure Component Value Units Date/Time    ECG 12 Lead ED Triage Standing Order; Chest Pain [[id number]] Collected: 04/19/24 2208     Updated: 04/19/24 2209     QT Interval 362 ms      QTC Interval 442 ms     Narrative:      Test Reason : ED Triage Standing Order~  Blood Pressure :   */*   mmHG  Vent. Rate :  90 BPM     Atrial Rate :  90 BPM     P-R Int : 142 ms          QRS Dur :  88 ms      QT Int : 362 ms       P-R-T Axes :   3 -34  14 degrees     QTc Int : 442 ms    Normal sinus rhythm  Left axis deviation  Cannot rule out Anterior infarct (cited on or before 27-JAN-2024)  Abnormal ECG  When compared with ECG of 27-JAN-2024 02:30,  No significant change was found    Referred By: KAVIN           Confirmed By:           ECG 12 Lead Chest Pain   Preliminary Result   Test Reason : Chest Pain   Blood Pressure :   */*   mmHG   Vent. Rate :  72 BPM     Atrial Rate :  72 BPM      P-R Int : 150 ms          QRS Dur :  94 ms       QT Int : 400 ms       P-R-T Axes :  27 -20  16 degrees      QTc Int : 438 ms      Normal sinus rhythm   Septal infarct (cited on or before 27-JAN-2024)   Abnormal ECG   When compared with ECG of 19-APR-2024 22:08, (Unconfirmed)   Questionable change in initial forces of Septal leads      Referred By: WILLIAM           Confirmed By:       ECG 12 Lead ED Triage Standing Order; Chest Pain   Preliminary Result   Test Reason : ED Triage Standing Order~   Blood Pressure :   */*   mmHG   Vent. Rate :  90 BPM     Atrial Rate :  90 BPM      P-R Int : 142 ms          QRS Dur :  88 ms       QT Int : 362 ms       P-R-T Axes :   3 -34  14 degrees      QTc Int : 442 ms      Normal sinus rhythm   Left axis deviation   Cannot rule out Anterior  infarct (cited on or before 27-JAN-2024)   Abnormal ECG   When compared with ECG of 27-JAN-2024 02:30,   No significant change was found      Referred By: EDMD           Confirmed By:              Final diagnoses:   Atypical chest pain   Shortness of breath   Palpitations   Nausea   Diarrhea, unspecified type       ED Disposition  ED Disposition       ED Disposition   Discharge    Condition   Stable    Comment   --               [facility] CARDIOLOG[facility]  [address]  [address]  [facility] [address] [phone]  Call in 2 days  Call Monday morning for follow up for a longer holter cardiac monitor study to further evaluate palpitatoins. stop propranolol.         Medication List        New Prescriptions      ondansetron ODT 4 MG disintegrating tablet  Commonly known as: ZOFRAN-ODT  Place 1 tablet on the tongue Every 6 (Six) Hours As Needed for Nausea or Vomiting. As needed for nausea               Where to Get Your Medications        These medications were sent to [facility] [id number] - [place], [place] - [address] [place] [address] [address] - [phone]  - [phone] [place] [address]      Phone: [phone]   ondansetron ODT 4 MG disintegrating tablet            Karen Kuhn MD  04/22/24 9909

## 2024-04-21 LAB
QT INTERVAL: 400 MS
QTC INTERVAL: 438 MS

## 2024-04-25 ENCOUNTER — TELEPHONE (OUTPATIENT)
Dept: CARDIOLOGY | Facility: HOSPITAL | Age: 34
End: 2024-04-25
Payer: MEDICAID

## 2024-04-25 NOTE — TELEPHONE ENCOUNTER
Patient spoke with an interpretor using [employer], he will keep his appointment with Andria THOMPSON tomorrow and will schedule an appointment with Dr. Rodriguez's office well.

## 2024-04-25 NOTE — PROGRESS NOTES
"Chief Complaint  Palpitations    Subjective      History of Present Illness {  Problem List  Visit  Diagnosis   Encounters  Notes  Medications  Labs  Result Review Imaging  Media :23}     Meliton Lagos, 33 y.o. male with past medical history significant for GERD, who presents to [facility] for Palpitations  Since time of previous evaluation at heart and valve, patient has establish care with Dr. Rodriguez with cardiology on 1/2/2024.  Patient has presented multiple times to local emergency departments with multiple complaints including chest pain.  Patient was recommended to undergo 3-month follow-up with Dr. Rodriguez, but most recently he was a \"no-show\" to his appointment.  Patient then presented to the ED again on 4/19 with chief complaint of atypical chest pain.  Patient stated at that time he had recently been started on propranolol as needed for palpitations.  Recently he has felt an increase in frequency of palpitation has not been taking his propranolol.  He states that while taking this medication, he is experienced dizziness, fatigue, diarrhea, and shortness of air.    Since time of evaluation in the ED, patient believes he is still having ongoing symptoms of propranolol.  Patient states he has dry mouth, and frequent trips to the bathroom.  He endorses ongoing chest discomfort and palpitations which are occurring predominantly with activity.  Patient states that only 1 day of activity does not exacerbate his symptoms and he only notices symptoms when he completes physical activity more than 1 day in a row.  He believes that this is why his prior cardiac monitor was unremarkable.  Patient also endorses shortness of air with palpitations.  He denies lower extremity edema, syncope, and near syncope.  He does not consistently check blood pressure or heart rate at home.      Coronary CTA 2/26/2024:  IMPRESSION:  Normal coronary arteries (CAD RADS 0)  Total calcium score 0 " indicating absence of calcified coronary plaque  No non-atherosclerotic coronary abnormalities  Normal LV systolic function (calculated LVEF 66%)  Please refer to the radiology report for information on non-cardiac structures.        RECOMMENDATION:  Reassurance    Holter monitor 48 hours 1/23/2024  Physician Comments:   Agree with preliminary report.   No significant findings.     Prior presentation with heart and valve:  Patient was evaluated at [facility] ED on 8/9/2023 with chief complaint of chest pain.  Patient has known history of dyspepsia and stated that he took himself off of his GI medications about a month ago.  Patient was noted to be low risk for cardiac disease based on heart score.  Patient was then seen by his primary care provider on 8/14/2023 for evaluation of chest pain.  Patient stated at that time he had had chest pain ongoing for approximately 2 years after he received his vaccine.  Despite being reassured by ER physician and primary care physician, patient was adamant that his symptoms were not GI related and were of cardiac etiology.  Patient declined PPI or Pepcid.  He was encouraged to follow-up with GI but he declined to do so.    Since time of eval by pcp, he continues to have chest pain. He states the chest pain has been ongoing since he got his Moderna COVID-19 vaccine (2021). Chest pain occurs with activity. Lasting for days at a time. Does have associated shortness of air. Pain radiated to left shoulder/arm. Described as a heavy pressure/burning. He only has shortness of air with chest pain, otherwise no shortness of air with activity. Denies palpitations. Endorses some dizziness with position changes. Has not been exercising recently as he is deconditioned. He does check blood pressure during episodes of chest pain. During episodes, blood pressure is running around 130 systolic.     Drinks 1L of water daily  No known family history of cardiac disease     Objective    "  Vital Signs:   Vitals:    04/26/24 1114   BP: 111/70   BP Location: Left arm   Patient Position: Sitting   Pulse: 77   SpO2: 98%   Weight: 96.2 kg (212 lb)   Height: 170.2 cm (67\")       Body mass index is 33.2 kg/m².  Physical Exam  Vitals and nursing note reviewed.   Constitutional:       Appearance: Normal appearance.   HENT:      Head: Normocephalic.   Eyes:      Pupils: Pupils are equal, round, and reactive to light.   Cardiovascular:      Rate and Rhythm: Normal rate and regular rhythm.      Pulses: Normal pulses.      Heart sounds: Normal heart sounds. No murmur heard.  Pulmonary:      Effort: Pulmonary effort is normal.      Breath sounds: Normal breath sounds.   Abdominal:      General: Bowel sounds are normal.      Palpations: Abdomen is soft.   Musculoskeletal:         General: Normal range of motion.      Cervical back: Normal range of motion.      Right lower leg: No edema.      Left lower leg: No edema.   Skin:     General: Skin is warm and dry.      Capillary Refill: Capillary refill takes less than 2 seconds.   Neurological:      Mental Status: He is alert and oriented to person, place, and time.   Psychiatric:         Mood and Affect: Mood normal.         Thought Content: Thought content normal.                Data Reviewed:{ Labs  Result Review  Imaging  Med Tab  Media :23}   Lab Results   Component Value Date    WBC 5.00 04/19/2024    HGB 15.3 04/19/2024    HCT 45.9 04/19/2024    MCV 83.9 04/19/2024     04/19/2024      Lab Results   Component Value Date    GLUCOSE 110 (H) 04/19/2024    BUN 12 04/19/2024    CREATININE 0.88 04/19/2024    EGFRRESULT 115.0 09/28/2023    EGFR 115.0 04/19/2024    BCR 13.6 04/19/2024    K 3.8 04/19/2024    CO2 28.0 04/19/2024    CALCIUM 9.5 04/19/2024    PROTENTOTREF 7.7 12/27/2023    ALBUMIN 4.7 04/19/2024    BILITOT 1.3 (H) 04/19/2024    AST 28 04/19/2024    ALT 31 04/19/2024      Lab Results   Component Value Date    TROPONINT <6 04/20/2024        ECG 12 " Lead (03/21/2023 16:05)   XR chest AP portable (08/09/2023 20:09)     Assessment & Plan   Assessment and Plan {CC Problem List  Visit Diagnosis  ROS  Review (Popup)  [facility] [place]  Quality  BestPractice  Medications  SmartSets  SnapShot Encounters  Media :23}     1. Other chest pain  -Patient with ongoing episodes of chest discomfort.  He has presented to the emergency department multiple times.  His testing has been unremarkable.  All testing including stress echocardiogram, coronary CTA, and recent 48-hour Holter monitor all within acceptable limits.  -Patient was established with Dr. Rodriguez with cardiology.  He reached out to Dr. Rodriguez's office to endorse racing heart rate.  He states he was told to do this by hospital staff upon undergoing his coronary CTA.  Patient was placed on propranolol which patient took for 3 days total and was subsequently unable to tolerate medication due to side effects.  -Discussed with patient to stay off propranolol due to side effects at this time.  -Suspect his chest pain is not cardiac in nature as he has had extensive cardiac workup which is all unremarkable.    2.  Palpitations  -Palpitations ongoing only with activity for more than 1 consecutive day.  Per documentation, his heart rate has been normal during multiple recent evaluations.  Average heart rate on recent 48-hour Holter monitor also normal.  -Per patient request, will obtain 7-day cardiac monitor  -Recommend patient to follow-up post monitor with Dr. Rodriguez.  For now, we will hold propranolol.  Will not initiate another beta-blocker at this time until updated results of cardiac monitor.  -Patient may follow-up with heart and valve as needed, otherwise would recommend patient to follow-up with cardiology.      Follow Up {Instructions Charge Capture  Follow-up Communications :23}     Return if symptoms worsen or fail to improve.      Patient was given instructions and counseling regarding his  condition or for health maintenance advice. Please see specific information pulled into the AVS if appropriate.  Patient was instructed to call the [facility] with any questions, concerns, or worsening symptoms.    Dictated Utilizing Dragon Dictation   Please note that portions of this note were completed with a voice recognition program.   Part of this note may be an electronic transcription/translation of spoken language to printed text using the Dragon Dictation System.

## 2024-04-26 ENCOUNTER — HOSPITAL ENCOUNTER (OUTPATIENT)
Dept: CARDIOLOGY | Facility: HOSPITAL | Age: 34
Discharge: HOME | End: 2024-04-26
Payer: MEDICAID

## 2024-04-26 ENCOUNTER — OFFICE VISIT (OUTPATIENT)
Dept: FAMILY MEDICINE CLINIC | Facility: CLINIC | Age: 34
End: 2024-04-26
Payer: MEDICAID

## 2024-04-26 ENCOUNTER — LAB (OUTPATIENT)
Dept: LAB | Facility: HOSPITAL | Age: 34
End: 2024-04-26
Payer: MEDICAID

## 2024-04-26 ENCOUNTER — OFFICE VISIT (OUTPATIENT)
Dept: CARDIOLOGY | Facility: HOSPITAL | Age: 34
End: 2024-04-26
Payer: MEDICAID

## 2024-04-26 VITALS
SYSTOLIC BLOOD PRESSURE: 111 MMHG | BODY MASS INDEX: 33.27 KG/M2 | HEART RATE: 77 BPM | OXYGEN SATURATION: 98 % | HEIGHT: 67 IN | DIASTOLIC BLOOD PRESSURE: 70 MMHG | WEIGHT: 212 LBS

## 2024-04-26 VITALS
RESPIRATION RATE: 21 BRPM | WEIGHT: 211.2 LBS | TEMPERATURE: 98 F | BODY MASS INDEX: 33.15 KG/M2 | DIASTOLIC BLOOD PRESSURE: 70 MMHG | OXYGEN SATURATION: 98 % | HEIGHT: 67 IN | HEART RATE: 76 BPM | SYSTOLIC BLOOD PRESSURE: 116 MMHG

## 2024-04-26 DIAGNOSIS — R00.2 PALPITATIONS: Primary | ICD-10-CM

## 2024-04-26 DIAGNOSIS — R68.2 DRY MOUTH: ICD-10-CM

## 2024-04-26 DIAGNOSIS — R00.2 PALPITATIONS: ICD-10-CM

## 2024-04-26 DIAGNOSIS — R07.9 INTERMITTENT CHEST PAIN: ICD-10-CM

## 2024-04-26 DIAGNOSIS — R35.0 URINARY FREQUENCY: ICD-10-CM

## 2024-04-26 DIAGNOSIS — J30.2 SEASONAL ALLERGIES: ICD-10-CM

## 2024-04-26 DIAGNOSIS — R07.9 CHEST PAIN, UNSPECIFIED TYPE: ICD-10-CM

## 2024-04-26 PROCEDURE — 81003 URINALYSIS AUTO W/O SCOPE: CPT

## 2024-04-26 PROCEDURE — 83036 HEMOGLOBIN GLYCOSYLATED A1C: CPT

## 2024-04-26 PROCEDURE — 99214 OFFICE O/P EST MOD 30 MIN: CPT | Performed by: STUDENT IN AN ORGANIZED HEALTH CARE EDUCATION/TRAINING PROGRAM

## 2024-04-26 PROCEDURE — 36415 COLL VENOUS BLD VENIPUNCTURE: CPT

## 2024-04-26 PROCEDURE — 93246 EXT ECG>7D<15D RECORDING: CPT

## 2024-04-26 RX ORDER — CETIRIZINE HYDROCHLORIDE 10 MG/1
10 TABLET ORAL DAILY
Qty: 90 TABLET | Refills: 3 | Status: SHIPPED | OUTPATIENT
Start: 2024-04-26

## 2024-04-26 RX ORDER — FLUTICASONE PROPIONATE 50 MCG
1 SPRAY, SUSPENSION (ML) NASAL DAILY
Qty: 16 G | Refills: 1 | Status: SHIPPED | OUTPATIENT
Start: 2024-04-26

## 2024-04-26 RX ORDER — ERGOCALCIFEROL 1.25 MG/1
50000 CAPSULE ORAL WEEKLY
Qty: 5 CAPSULE | Refills: 0 | Status: SHIPPED | OUTPATIENT
Start: 2024-04-26

## 2024-04-26 NOTE — PROGRESS NOTES
[facility] Heart Monitor Documentation    Meliton Lagos  1990  [id number]  04/26/24      [x] ZIO XT Patch  Model [id number] Prescribed for 7 Days    Serial Number: (N + 9 Digits) [id number]   Apply-By Date on Box: 8/14/24  USPS Tracking Number: [id number]  USPS Tracking        [] [employer] MINI PLUS Mobile Cardiac Telemetry  Model BGMINIPLUS Prescribed for  Days    Serial Number: (BGM + 7 Digits) BGM  Shipped-By Date on Box:   UPS Tracking Number: [id number]  UPS Tracking      [] [employer] MINI Holter Monitor  Model BGMINIEL Prescribed for  Days    Serial Number: (7 Digits)   Shipped-By Date on Box:   UPS Tracking Number: [id number]  UPS Tracking        This monitor was applied to the patient's chest and checked for proper functioning.  Mr. Meliton Lagos was instructed in the proper use of this monitor.  He was given the opportunity to ask questions and left the office with the device 's instruction manual.    Nina Bennett MA, 11:19 EDT, 04/26/24                  [facility]

## 2024-04-26 NOTE — PROGRESS NOTES
Established Patient Office Visit        Subjective      Chief Complaint:  Dry Mouth and Urinary Frequency      History of Present Illness: Meliton Lagos is a 33 y.o. male who presents for 3 days of dry mouth and increased urination.     Gets chest pain started 2-3 years ago. IT comes and goes. Chest pain occurs with activity.     Cmp with minimal elevation to 110 7 days ago and was normal 1 and 2 months ago. CT CAC score normal. Plans for 7 days zeo patch for palpations per cards note.      No worsening reflux recently.  Patient Active Problem List   Diagnosis    Behcet's disease    Chronic fatigue    Migraine without aura and without status migrainosus, not intractable    Ulcers of genital organ in male    Chronic GERD    Former smoker (None since 2015)    Vitamin D deficiency    Language barrier affecting health care    Chest tightness    Elevated LFTs    Elevated bilirubin    Acute nonintractable headache    Obesity (BMI 30.0-34.9)    Chronic cervical pain    Cervicogenic headache    Nocturia    Bilateral occipital neuralgia    Myofascial pain    Left foot pain    Intermittent chest pain    Dental infection    Environmental allergies    ALT (SGPT) level raised    Heartburn    Bile salt-induced diarrhea    At average risk for colon cancer    Erosive esophagitis    Precordial pain    Right upper quadrant abdominal pain         Current Outpatient Medications:     vitamin D (ERGOCALCIFEROL) 1.25 MG (01381 UT) capsule capsule, TAKE 1 CAPSULE BY MOUTH ONCE WEEKLY, Disp: 5 capsule, Rfl: 0    cetirizine (zyrTEC) 10 MG tablet, Take 1 tablet by mouth Daily., Disp: 90 tablet, Rfl: 3    fluticasone (FLONASE) 50 MCG/ACT nasal spray, 2 sprays into the nostril(s) as directed by provider Daily. (Patient not taking: Reported on 4/26/2024), Disp: 18.2 mL, Rfl: 0    fluticasone (FLONASE) 50 MCG/ACT nasal spray, 1 spray into the nostril(s) as directed by provider Daily., Disp: 16 g, Rfl: 1    ondansetron ODT (ZOFRAN-ODT) 4  "MG disintegrating tablet, Place 1 tablet on the tongue Every 6 (Six) Hours As Needed for Nausea or Vomiting. As needed for nausea (Patient not taking: Reported on 4/26/2024), Disp: 12 tablet, Rfl: 0       Objective     Physical Exam:   Vital Signs:   /70 (BP Location: Left arm, Patient Position: Sitting, Cuff Size: Adult)   Pulse 76   Temp 98 °F (36.7 °C) (Infrared)   Resp 21   Ht 170.2 cm (67\")   Wt 95.8 kg (211 lb 3.2 oz)   SpO2 98%   BMI 33.08 kg/m²      Physical Exam  Constitutional:       General: He is not in acute distress.     Appearance: He is not ill-appearing.   Cardiovascular:      Rate and Rhythm: Normal rate and regular rhythm.   Pulmonary:      Effort: Pulmonary effort is normal.      Breath sounds: Normal breath sounds.   Neurological:      Mental Status: He is alert.   Psychiatric:         Thought Content: Thought content normal.   Oropharyngeal erythema  No tonsillar hypertrophy no exudate  No cervical adenopathy     XR Chest 1 View    Result Date: 4/19/2024  Impression: No acute cardiopulmonary findings. Electronically Signed: Roger Trevino MD  4/19/2024 10:34 PM EDT  Workstation ID: [id number]              Assessment / Plan      Assessment/Plan:   Diagnoses and all orders for this visit:    1. Urinary frequency (Primary)  -     Urinalysis With Culture If Indicated -; Future  -     Hemoglobin A1c; Future  -decrease fluid at night   -f/u for lack of improvement     2. Dry mouth  -     Hemoglobin A1c; Future  -     cetirizine (zyrTEC) 10 MG tablet; Take 1 tablet by mouth Daily.  Dispense: 90 tablet; Refill: 3  -     fluticasone (FLONASE) 50 MCG/ACT nasal spray; 1 spray into the nostril(s) as directed by provider Daily.  Dispense: 16 g; Refill: 1  3. Seasonal allergies  -     cetirizine (zyrTEC) 10 MG tablet; Take 1 tablet by mouth Daily.  Dispense: 90 tablet; Refill: 3  -     fluticasone (FLONASE) 50 MCG/ACT nasal spray; 1 spray into the nostril(s) as directed by provider Daily.  " Dispense: 16 g; Refill: 1  -question some allergic symptoms with dry throat. Treat as above ok for biotene mouth was f/u for lack of improvement.     4.  Chest pain  5 palpitations  -Extensive cardiac workup negative.  Previously did have esophageal dilation.  Follow-up with GI.  He was to see  and he has been referred for this previously.  -Did not tolerate propranolol and is getting worked up for his palpitations with cardiology  -Health anxiety may play a role in his symptoms    Follow-up for worsening or lack of improvement of symptoms above      Follow Up:   Return if symptoms worsen or fail to improve.    MDM: Side effect of propranolol.  Labs and data reviewed    Rigoberto Tabares MD  Family Medicine - [place] [facility] [place]

## 2024-04-27 LAB
BILIRUB UR QL STRIP: NEGATIVE
CLARITY UR: CLEAR
COLOR UR: YELLOW
GLUCOSE UR STRIP-MCNC: NEGATIVE MG/DL
HBA1C MFR BLD: 5.4 % (ref 4.8–5.6)
HGB UR QL STRIP.AUTO: NEGATIVE
HOLD SPECIMEN: NORMAL
KETONES UR QL STRIP: NEGATIVE
LEUKOCYTE ESTERASE UR QL STRIP.AUTO: NEGATIVE
NITRITE UR QL STRIP: NEGATIVE
PH UR STRIP.AUTO: 6 [PH] (ref 5–8)
PROT UR QL STRIP: NEGATIVE
SP GR UR STRIP: 1.01 (ref 1–1.03)
UROBILINOGEN UR QL STRIP: NORMAL

## 2024-05-13 ENCOUNTER — APPOINTMENT (OUTPATIENT)
Dept: GENERAL RADIOLOGY | Facility: HOSPITAL | Age: 34
End: 2024-05-13
Payer: MEDICAID

## 2024-05-13 ENCOUNTER — HOSPITAL ENCOUNTER (EMERGENCY)
Facility: HOSPITAL | Age: 34
Discharge: HOME | End: 2024-05-13
Attending: EMERGENCY MEDICINE | Admitting: EMERGENCY MEDICINE
Payer: MEDICAID

## 2024-05-13 VITALS
HEIGHT: 67 IN | SYSTOLIC BLOOD PRESSURE: 114 MMHG | OXYGEN SATURATION: 94 % | WEIGHT: 208 LBS | HEART RATE: 79 BPM | DIASTOLIC BLOOD PRESSURE: 71 MMHG | BODY MASS INDEX: 32.65 KG/M2 | RESPIRATION RATE: 18 BRPM | TEMPERATURE: 98.1 F

## 2024-05-13 DIAGNOSIS — F41.9 ANXIETY: ICD-10-CM

## 2024-05-13 DIAGNOSIS — R07.9 RECURRENT CHEST PAIN: Primary | ICD-10-CM

## 2024-05-13 DIAGNOSIS — R11.0 NAUSEA: ICD-10-CM

## 2024-05-13 DIAGNOSIS — R00.2 PALPITATIONS: ICD-10-CM

## 2024-05-13 DIAGNOSIS — Z87.19 HISTORY OF GASTROESOPHAGEAL REFLUX (GERD): ICD-10-CM

## 2024-05-13 LAB
ALBUMIN SERPL-MCNC: 4.5 G/DL (ref 3.5–5.2)
ALBUMIN/GLOB SERPL: 1.5 G/DL
ALP SERPL-CCNC: 83 U/L (ref 39–117)
ALT SERPL W P-5'-P-CCNC: 46 U/L (ref 1–41)
ANION GAP SERPL CALCULATED.3IONS-SCNC: 11 MMOL/L (ref 5–15)
AST SERPL-CCNC: 43 U/L (ref 1–40)
BASOPHILS # BLD AUTO: 0.04 10*3/MM3 (ref 0–0.2)
BASOPHILS NFR BLD AUTO: 0.7 % (ref 0–1.5)
BILIRUB SERPL-MCNC: 0.9 MG/DL (ref 0–1.2)
BUN SERPL-MCNC: 16 MG/DL (ref 6–20)
BUN/CREAT SERPL: 17.8 (ref 7–25)
CALCIUM SPEC-SCNC: 9.2 MG/DL (ref 8.6–10.5)
CHLORIDE SERPL-SCNC: 104 MMOL/L (ref 98–107)
CO2 SERPL-SCNC: 27 MMOL/L (ref 22–29)
CREAT SERPL-MCNC: 0.9 MG/DL (ref 0.76–1.27)
DEPRECATED RDW RBC AUTO: 38.6 FL (ref 37–54)
EGFRCR SERPLBLD CKD-EPI 2021: 113.5 ML/MIN/1.73
EOSINOPHIL # BLD AUTO: 0.16 10*3/MM3 (ref 0–0.4)
EOSINOPHIL NFR BLD AUTO: 2.8 % (ref 0.3–6.2)
ERYTHROCYTE [DISTWIDTH] IN BLOOD BY AUTOMATED COUNT: 12.2 % (ref 12.3–15.4)
GLOBULIN UR ELPH-MCNC: 3.1 GM/DL
GLUCOSE SERPL-MCNC: 106 MG/DL (ref 65–99)
HCT VFR BLD AUTO: 46 % (ref 37.5–51)
HGB BLD-MCNC: 15.2 G/DL (ref 13–17.7)
HOLD SPECIMEN: NORMAL
IMM GRANULOCYTES # BLD AUTO: 0.01 10*3/MM3 (ref 0–0.05)
IMM GRANULOCYTES NFR BLD AUTO: 0.2 % (ref 0–0.5)
LIPASE SERPL-CCNC: 59 U/L (ref 13–60)
LYMPHOCYTES # BLD AUTO: 2.23 10*3/MM3 (ref 0.7–3.1)
LYMPHOCYTES NFR BLD AUTO: 39.3 % (ref 19.6–45.3)
MCH RBC QN AUTO: 28.6 PG (ref 26.6–33)
MCHC RBC AUTO-ENTMCNC: 33 G/DL (ref 31.5–35.7)
MCV RBC AUTO: 86.6 FL (ref 79–97)
MONOCYTES # BLD AUTO: 0.46 10*3/MM3 (ref 0.1–0.9)
MONOCYTES NFR BLD AUTO: 8.1 % (ref 5–12)
NEUTROPHILS NFR BLD AUTO: 2.78 10*3/MM3 (ref 1.7–7)
NEUTROPHILS NFR BLD AUTO: 48.9 % (ref 42.7–76)
NRBC BLD AUTO-RTO: 0 /100 WBC (ref 0–0.2)
NT-PROBNP SERPL-MCNC: <36 PG/ML (ref 0–450)
PLATELET # BLD AUTO: 245 10*3/MM3 (ref 140–450)
PMV BLD AUTO: 9.8 FL (ref 6–12)
POTASSIUM SERPL-SCNC: 3.8 MMOL/L (ref 3.5–5.2)
PROT SERPL-MCNC: 7.6 G/DL (ref 6–8.5)
QT INTERVAL: 368 MS
QTC INTERVAL: 440 MS
RBC # BLD AUTO: 5.31 10*6/MM3 (ref 4.14–5.8)
SODIUM SERPL-SCNC: 142 MMOL/L (ref 136–145)
TROPONIN T SERPL HS-MCNC: 7 NG/L
WBC NRBC COR # BLD AUTO: 5.68 10*3/MM3 (ref 3.4–10.8)
WHOLE BLOOD HOLD COAG: NORMAL
WHOLE BLOOD HOLD SPECIMEN: NORMAL

## 2024-05-13 PROCEDURE — 84484 ASSAY OF TROPONIN QUANT: CPT

## 2024-05-13 PROCEDURE — 83880 ASSAY OF NATRIURETIC PEPTIDE: CPT

## 2024-05-13 PROCEDURE — 99284 EMERGENCY DEPT VISIT MOD MDM: CPT

## 2024-05-13 PROCEDURE — 80053 COMPREHEN METABOLIC PANEL: CPT

## 2024-05-13 PROCEDURE — 85025 COMPLETE CBC W/AUTO DIFF WBC: CPT

## 2024-05-13 PROCEDURE — 93005 ELECTROCARDIOGRAM TRACING: CPT

## 2024-05-13 PROCEDURE — 71045 X-RAY EXAM CHEST 1 VIEW: CPT

## 2024-05-13 PROCEDURE — 83690 ASSAY OF LIPASE: CPT

## 2024-05-13 RX ORDER — SODIUM CHLORIDE 0.9 % (FLUSH) 0.9 %
10 SYRINGE (ML) INJECTION AS NEEDED
Status: DISCONTINUED | OUTPATIENT
Start: 2024-05-13 | End: 2024-05-13 | Stop reason: HOSPADM

## 2024-05-13 RX ORDER — ASPIRIN 81 MG/1
324 TABLET, CHEWABLE ORAL ONCE
Status: COMPLETED | OUTPATIENT
Start: 2024-05-13 | End: 2024-05-13

## 2024-05-13 RX ADMIN — ASPIRIN 324 MG: 81 TABLET, CHEWABLE ORAL at 02:12

## 2024-05-13 NOTE — ED PROVIDER NOTES
Subjective   History of Present Illness  This is a 34-year-old male that presents the ER with recurrent chest pain that has been increased over the last 2 days.  Patient says he played soccer with some friends on 5/10/2024 and said that the game was intense and there was significant running and strenuous activity.  Patient denied any chest pain during the soccer game.  He said that chest pain started approximately 10 hours after playing soccer.  He describes left chest tightness and pressure.  At times pain radiates to the left upper extremity and at other times patient describes fullness to the neck.  Patient also reports associated shortness of breath and nausea.  He did not have any chest discomfort yesterday.  Earlier today, he was playing soccer with his children out in the yard and denied any exertional chest discomfort, but 3 hours after playing, he started having recurrent left-sided chest pain again as well as palpitations.  Patient only drinks 1 cup of coffee a day and has an occasional glass of tea.  He denies any smoking or vaping but is a former smoker who quit in 2016.  Patient denies any rhinorrhea or nasal congestion.  He denies pedal edema to lower extremities.  He denies personal history of cardiac arrhythmia such as SVT or atrial fibrillation.  He has been seen at our facility multiple times for atypical chest pain.  He has followed with cardiology, Dr. Rordiguez, for the above symptoms.  He recently turned in a 7-day Holter monitor that was placed on 4/26/2024.  Patient has not had follow-up regarding those results.  After reviewing [facility], he has a Holter monitor from January 23, 2024.  Impression shows primary sinus rhythm with average heart rate 76 bpm.  Maximum rate was 158 bpm.  There is no evidence of atrial fibrillation or other cardiac arrhythmia.  Impression showed no significant findings.  Patient also had stress test on 8/29/2023 which was consistent with a low risk study.  EF was 60%.   Patient has significant anxiety.  He says that all of his chest pain and palpitations began after he received 1 COVID-19 vaccination.  He says that Dr. Rodriguez, cardiology, advised him that his chest pain and palpitations are not related to the vaccination.  Patient also has past medical history of GERD.  He denies any abdominal pain or increased acid indigestion.  No other concerns at this time.  Patient denies any alcohol or drug use.    History provided by:  Patient  Chest Pain  Pain location:  L chest  Pain quality: pressure and tightness    Pain radiates to:  Neck and L arm  Duration:  2 days  Timing:  Intermittent  Progression:  Waxing and waning  Chronicity:  Chronic  Context: at rest    Context: not breathing and not eating    Context comment:  Patient reports 2-day history of left-sided chest pressure with intermittent radiation to left arm and fullness to the neck.  Associated SOA and nausea.  Pt also reports palpitations.  Symptoms occur after exertion while at rest.  Relieved by:  Nothing  Worsened by:  Nothing  Ineffective treatments:  None tried  Associated symptoms: anorexia, nausea, palpitations and shortness of breath    Associated symptoms: no abdominal pain, no anxiety, no back pain, no cough, no diaphoresis, no dizziness, no fatigue, no fever, no headache, no heartburn, no lower extremity edema, no near-syncope, no numbness, no orthopnea, no PND, no syncope, no vomiting and no weakness    Risk factors: male sex    Risk factors: no coronary artery disease, no diabetes mellitus, no high cholesterol, no hypertension and no smoking (Former tobacco use.  Patient quit smoking in 2016)        Review of Systems   Constitutional:  Positive for appetite change. Negative for chills, diaphoresis, fatigue and fever.   HENT: Negative.  Negative for congestion, ear pain, postnasal drip, rhinorrhea, sinus pressure, sinus pain, sneezing and sore throat.    Respiratory:  Positive for shortness of breath. Negative  for cough, chest tightness and wheezing.         Former smoker.  Patient quit smoking in 2016.  No personal history of asthma.   Cardiovascular:  Positive for chest pain and palpitations. Negative for orthopnea, leg swelling, syncope, PND and near-syncope.   Gastrointestinal:  Positive for anorexia and nausea. Negative for abdominal pain, heartburn and vomiting.   Genitourinary: Negative.  Negative for dysuria, flank pain, frequency and urgency.   Musculoskeletal: Negative.  Negative for back pain.   Skin: Negative.  Negative for color change and rash.   Neurological: Negative.  Negative for dizziness, syncope, weakness, numbness and headaches.   Psychiatric/Behavioral:  The patient is nervous/anxious.         Patient anxious on exam.  History of anxiety.   All other systems reviewed and are negative.      Past Medical History:   Diagnosis Date    GERD (gastroesophageal reflux disease)     Lymphoma        Allergies   Allergen Reactions    Amoxicillin Other (See Comments)     Chest pain    Bactrim [Sulfamethoxazole-Trimethoprim] Other (See Comments)     Chest pain    Clindamycin Other (See Comments)     Medication causes patient to have chest pain    Ct Contrast GI Intolerance       Past Surgical History:   Procedure Laterality Date    ANAL FISTULA REPAIR  2009    CHOLECYSTECTOMY      COLONOSCOPY  2019    [facility]    ENDOSCOPY  06/16/2021    Dr. Mobley empiric dilation performed bx suggestive of esophagitis    ENDOSCOPY  06/30/2020    EGD Dr. Mobley, empiric dilation erosive esophagitis       Family History   Problem Relation Age of Onset    No Known Problems Mother     No Known Problems Father     Heart attack Maternal Grandfather     Heart disease Maternal Grandfather     No Known Problems Paternal Grandmother     No Known Problems Paternal Grandfather     Colon cancer Neg Hx     Colon polyps Neg Hx        Social History     Socioeconomic History    Marital status:    Tobacco Use     Smoking status: Former     Current packs/day: 0.00     Average packs/day: 1 pack/day for 5.0 years (5.0 ttl pk-yrs)     Types: Cigarettes     Start date: 2012     Quit date: 2016     Years since quittin.3     Passive exposure: Past    Smokeless tobacco: Never   Vaping Use    Vaping status: Never Used   Substance and Sexual Activity    Alcohol use: Not Currently     Comment: occas    Drug use: No    Sexual activity: Yes     Partners: Female     Birth control/protection: None           Objective   Physical Exam  Vitals and nursing note reviewed.   Constitutional:       General: He is not in acute distress.     Appearance: Normal appearance. He is not ill-appearing, toxic-appearing or diaphoretic.      Comments: No acute sign of pain or distress.  Nontoxic   HENT:      Head: Normocephalic and atraumatic.      Nose: Nose normal. No congestion or rhinorrhea.      Mouth/Throat:      Mouth: Mucous membranes are moist.      Pharynx: Oropharynx is clear.      Comments: Oral mucous membranes are moist  Eyes:      Extraocular Movements: Extraocular movements intact.      Conjunctiva/sclera: Conjunctivae normal.      Pupils: Pupils are equal, round, and reactive to light.   Cardiovascular:      Rate and Rhythm: Normal rate and regular rhythm. No extrasystoles are present.     Pulses: Normal pulses.      Heart sounds: Normal heart sounds. No murmur heard.     Comments: Regular rate and rhythm.  No ectopy.  No tachycardia.  No murmurs appreciated.  No pedal edema to lower extremities.  Pulmonary:      Effort: Pulmonary effort is normal. No tachypnea, accessory muscle usage or retractions.      Breath sounds: Normal breath sounds. No decreased breath sounds, wheezing or rhonchi.      Comments: Regular respiratory effort.  Good air exchange to bilateral lung fields.  No wheezes, rhonchi, or decreased breath sounds concerning for consolidation.  No pleuritic chest pain elicited with deep inspiration.  Chest:      Chest  wall: Tenderness present. No deformity, swelling, crepitus or edema.      Comments: No chest wall tenderness.  No skin lesions or rash.  No bruising or sign of injury.  Abdominal:      General: Bowel sounds are normal. There is no distension.      Palpations: Abdomen is soft.      Tenderness: There is no abdominal tenderness. There is no right CVA tenderness, left CVA tenderness, guarding or rebound.      Comments: Abdomen soft and nontender.  No flank or CVA tenderness.   Musculoskeletal:         General: Normal range of motion.      Cervical back: Normal range of motion and neck supple.      Right lower leg: No edema.      Left lower leg: No edema.   Skin:     General: Skin is warm and dry.      Findings: No bruising, ecchymosis, lesion or rash.   Neurological:      General: No focal deficit present.      Mental Status: He is alert and oriented to person, place, and time.      Cranial Nerves: Cranial nerves 2-12 are intact.      Sensory: Sensation is intact.      Motor: Motor function is intact.      Coordination: Coordination is intact.      Gait: Gait is intact.      Comments: Neuro intact and nonfocal   Psychiatric:         Mood and Affect: Affect normal. Mood is anxious.         Speech: Speech normal.         Behavior: Behavior normal. Behavior is cooperative.         Thought Content: Thought content normal.         Cognition and Memory: Cognition normal.         Judgment: Judgment normal.      Comments: Anxious mood, normal affect.         Procedures           ED Course  ED Course as of 05/13/24 0323   Mon May 13, 2024   0206 Twelve-lead ECG independently interpreted by myself demonstrates normal sinus rhythm, left axis deviation, no ST segment elevation or depression [KB]   3414 I personally interpreted EKG which showed no evidence of ischemia or ectopy or arrhythmia.  Vital signs and physical exam are stable.  Patient given aspirin 325 mg by mouth.  I also personally interpreted chest x-ray which revealed  no acute cardiopulmonary process. [FC]   0310 Chemistries were essentially normal.  Lipase is 59.  High-sensitivity troponin is 7 and BNP is less than 36.  Heart score is 1.  Reviewed the case and all recent, previous cardiac workup with Dr. Snyder, ER attending physician.  Patient stable for discharge.  Patient needs to follow-up closely with chest pain clinic and/or call Dr. Rodriguez, patient's routine cardiologist.  ER workup was reassuring.  Patient ready for discharge home  Differential diagnoses includes nonspecific chest pain with, anxiety. [FC]   0319 Normal TSh on 4/19/24 in Epic. [FC]      ED Course User Index  [FC] Shirley Nichole PA-C  [KB] Jag Snyder MD                HEART Score: 1                  Recent Results (from the past 24 hour(s))   ECG 12 Lead ED Triage Standing Order; Chest Pain    Collection Time: 05/13/24  2:04 AM   Result Value Ref Range    QT Interval 368 ms    QTC Interval 440 ms   High Sensitivity Troponin T    Collection Time: 05/13/24  2:12 AM    Specimen: Blood   Result Value Ref Range    HS Troponin T 7 <22 ng/L   Comprehensive Metabolic Panel    Collection Time: 05/13/24  2:12 AM    Specimen: Blood   Result Value Ref Range    Glucose 106 (H) 65 - 99 mg/dL    BUN 16 6 - 20 mg/dL    Creatinine 0.90 0.76 - 1.27 mg/dL    Sodium 142 136 - 145 mmol/L    Potassium 3.8 3.5 - 5.2 mmol/L    Chloride 104 98 - 107 mmol/L    CO2 27.0 22.0 - 29.0 mmol/L    Calcium 9.2 8.6 - 10.5 mg/dL    Total Protein 7.6 6.0 - 8.5 g/dL    Albumin 4.5 3.5 - 5.2 g/dL    ALT (SGPT) 46 (H) 1 - 41 U/L    AST (SGOT) 43 (H) 1 - 40 U/L    Alkaline Phosphatase 83 39 - 117 U/L    Total Bilirubin 0.9 0.0 - 1.2 mg/dL    Globulin 3.1 gm/dL    A/G Ratio 1.5 g/dL    BUN/Creatinine Ratio 17.8 7.0 - 25.0    Anion Gap 11.0 5.0 - 15.0 mmol/L    eGFR 113.5 >60.0 mL/min/1.73   Lipase    Collection Time: 05/13/24  2:12 AM    Specimen: Blood   Result Value Ref Range    Lipase 59 13 - 60 U/L   BNP    Collection Time: 05/13/24  2:12  AM    Specimen: Blood   Result Value Ref Range    proBNP <36.0 0.0 - 450.0 pg/mL   Green Top (Gel)    Collection Time: 05/13/24  2:12 AM   Result Value Ref Range    Extra Tube Hold for add-ons.    Lavender Top    Collection Time: 05/13/24  2:12 AM   Result Value Ref Range    Extra Tube hold for add-on    Gold Top - SST    Collection Time: 05/13/24  2:12 AM   Result Value Ref Range    Extra Tube Hold for add-ons.    Gray Top    Collection Time: 05/13/24  2:12 AM   Result Value Ref Range    Extra Tube Hold for add-ons.    Light Blue Top    Collection Time: 05/13/24  2:12 AM   Result Value Ref Range    Extra Tube Hold for add-ons.    CBC Auto Differential    Collection Time: 05/13/24  2:12 AM    Specimen: Blood   Result Value Ref Range    WBC 5.68 3.40 - 10.80 10*3/mm3    RBC 5.31 4.14 - 5.80 10*6/mm3    Hemoglobin 15.2 13.0 - 17.7 g/dL    Hematocrit 46.0 37.5 - 51.0 %    MCV 86.6 79.0 - 97.0 fL    MCH 28.6 26.6 - 33.0 pg    MCHC 33.0 31.5 - 35.7 g/dL    RDW 12.2 (L) 12.3 - 15.4 %    RDW-SD 38.6 37.0 - 54.0 fl    MPV 9.8 6.0 - 12.0 fL    Platelets 245 140 - 450 10*3/mm3    Neutrophil % 48.9 42.7 - 76.0 %    Lymphocyte % 39.3 19.6 - 45.3 %    Monocyte % 8.1 5.0 - 12.0 %    Eosinophil % 2.8 0.3 - 6.2 %    Basophil % 0.7 0.0 - 1.5 %    Immature Grans % 0.2 0.0 - 0.5 %    Neutrophils, Absolute 2.78 1.70 - 7.00 10*3/mm3    Lymphocytes, Absolute 2.23 0.70 - 3.10 10*3/mm3    Monocytes, Absolute 0.46 0.10 - 0.90 10*3/mm3    Eosinophils, Absolute 0.16 0.00 - 0.40 10*3/mm3    Basophils, Absolute 0.04 0.00 - 0.20 10*3/mm3    Immature Grans, Absolute 0.01 0.00 - 0.05 10*3/mm3    nRBC 0.0 0.0 - 0.2 /100 WBC     Note: In addition to lab results from this visit, the labs listed above may include labs taken at another facility or during a different encounter within the last 24 hours. Please correlate lab times with ED admission and discharge times for further clarification of the services performed during this visit.    XR Chest 1  "View   Final Result   Impression:   No acute cardiopulmonary process      Electronically Signed: Zeinab Macdonald MD     5/13/2024 2:29 AM EDT     Workstation ID: [id number]        Vitals:    05/13/24 0200 05/13/24 0205 05/13/24 0210 05/13/24 0230   BP: 138/99 137/90  114/71   BP Location: Left arm      Patient Position: Sitting      Pulse: 95  103 79   Resp: 18      Temp: 98.1 °F (36.7 °C)      TempSrc: Oral      SpO2: 97%  98% 94%   Weight: 94.3 kg (208 lb)      Height: 170.2 cm (67\")        Medications   sodium chloride 0.9 % flush 10 mL (has no administration in time range)   aspirin chewable tablet 324 mg (324 mg Oral Given 5/13/24 0212)     ECG/EMG Results (last 24 hours)       Procedure Component Value Units Date/Time    ECG 12 Lead ED Triage Standing Order; Chest Pain [[id number]] Collected: 05/13/24 0204     Updated: 05/13/24 0206     QT Interval 368 ms      QTC Interval 440 ms     Narrative:      Test Reason : ED Triage Standing Order~  Blood Pressure :   */*   mmHG  Vent. Rate :  86 BPM     Atrial Rate :  86 BPM     P-R Int : 138 ms          QRS Dur :  94 ms      QT Int : 368 ms       P-R-T Axes :  43 -40  21 degrees     QTc Int : 440 ms    Normal sinus rhythm  Left axis deviation  Incomplete right bundle branch block  Abnormal ECG  When compared with ECG of 20-APR-2024 00:41,  Incomplete right bundle branch block is now present  Criteria for Septal infarct are no longer present  Confirmed by MD RYAN KYLE ([id number]) on 5/13/2024 2:06:27 AM    Referred By: EDMD           Confirmed By: SHARATH RYAN MD          ECG 12 Lead ED Triage Standing Order; Chest Pain   Final Result   Test Reason : ED Triage Standing Order~   Blood Pressure :   */*   mmHG   Vent. Rate :  86 BPM     Atrial Rate :  86 BPM      P-R Int : 138 ms          QRS Dur :  94 ms       QT Int : 368 ms       P-R-T Axes :  43 -40  21 degrees      QTc Int : 440 ms      Normal sinus rhythm   Left axis deviation   Incomplete right bundle branch block "   Abnormal ECG   When compared with ECG of 20-APR-2024 00:41,   Incomplete right bundle branch block is now present   Criteria for Septal infarct are no longer present   Confirmed by MD RYAN KYLE ([id number]) on 5/13/2024 2:06:27 AM      Referred By: EDMD           Confirmed By: SHARATH RYAN MD      ECG 12 Lead ED Triage Standing Order; Chest Pain    (Results Pending)       HEART Score for Major Cardiac Events - MDCalc  Calculated on May 13 2024 2:39 AM  1 points -> Low Score (0-3 points) Risk of MACE of 0.9-1.7%.            Medical Decision Making      Final diagnoses:   Recurrent chest pain   Palpitations   Nausea   Anxiety   History of gastroesophageal reflux (GERD)       ED Disposition  ED Disposition       ED Disposition   Discharge    Condition   Stable    Comment   --               [facility] CARDIOLOG[facility]  [address]  [address]  [facility] [facility] [id number] [phone]  Call today  Call today for close f/u    Dereck Rodriguez III, MD  [address]  [address]  [place] [address]  [phone]    Call today  Call today for close f/u    [facility] EMERGENCY DEPARTMENT  [address] [address] [phone]    If symptoms worsen         Medication List      No changes were made to your prescriptions during this visit.            Shirley Nichole PA-C  05/13/24 0323

## 2024-05-13 NOTE — DISCHARGE INSTRUCTIONS
ER work-up was reassuring. EKG and telemetry revealed no cardiac arrhythmia. Cardiac troponin was within normal limits. CBC and chemistries were within normal limits. Chest x-ray was also normal. It is reassuring that patient had recent normal cardiac stress testing in August, 2023. Patient also just recently turned in holter monitor from the end of April, 2024. Recommend patient to avoid caffeine, close follow up with Dr. Rodriguez, or the chest pain clinic. Return to the ER if worsening symptoms. Continue with all current medical management.

## 2024-05-16 ENCOUNTER — OFFICE VISIT (OUTPATIENT)
Dept: CARDIOLOGY | Facility: HOSPITAL | Age: 34
End: 2024-05-16
Payer: MEDICAID

## 2024-05-16 ENCOUNTER — HOSPITAL ENCOUNTER (EMERGENCY)
Facility: HOSPITAL | Age: 34
Discharge: HOME | End: 2024-05-16
Attending: EMERGENCY MEDICINE
Payer: MEDICAID

## 2024-05-16 ENCOUNTER — APPOINTMENT (OUTPATIENT)
Dept: GENERAL RADIOLOGY | Facility: HOSPITAL | Age: 34
End: 2024-05-16
Payer: MEDICAID

## 2024-05-16 VITALS
BODY MASS INDEX: 32.63 KG/M2 | OXYGEN SATURATION: 95 % | WEIGHT: 207.89 LBS | RESPIRATION RATE: 14 BRPM | SYSTOLIC BLOOD PRESSURE: 111 MMHG | TEMPERATURE: 98 F | DIASTOLIC BLOOD PRESSURE: 76 MMHG | HEART RATE: 87 BPM | HEIGHT: 67 IN

## 2024-05-16 VITALS
OXYGEN SATURATION: 97 % | DIASTOLIC BLOOD PRESSURE: 76 MMHG | BODY MASS INDEX: 33.12 KG/M2 | HEIGHT: 67 IN | HEART RATE: 84 BPM | WEIGHT: 211 LBS | SYSTOLIC BLOOD PRESSURE: 116 MMHG

## 2024-05-16 DIAGNOSIS — R07.2 PRECORDIAL PAIN: Primary | ICD-10-CM

## 2024-05-16 DIAGNOSIS — R07.9 NONSPECIFIC CHEST PAIN: Primary | ICD-10-CM

## 2024-05-16 DIAGNOSIS — R00.2 PALPITATIONS: ICD-10-CM

## 2024-05-16 DIAGNOSIS — R07.2 PRECORDIAL PAIN: ICD-10-CM

## 2024-05-16 DIAGNOSIS — F41.9 ANXIETY: ICD-10-CM

## 2024-05-16 LAB
ALBUMIN SERPL-MCNC: 4.8 G/DL (ref 3.5–5.2)
ALBUMIN/GLOB SERPL: 1.6 G/DL
ALP SERPL-CCNC: 88 U/L (ref 39–117)
ALT SERPL W P-5'-P-CCNC: 43 U/L (ref 1–41)
ANION GAP SERPL CALCULATED.3IONS-SCNC: 10 MMOL/L (ref 5–15)
AST SERPL-CCNC: 32 U/L (ref 1–40)
BASOPHILS # BLD AUTO: 0.04 10*3/MM3 (ref 0–0.2)
BASOPHILS NFR BLD AUTO: 0.7 % (ref 0–1.5)
BILIRUB SERPL-MCNC: 1.1 MG/DL (ref 0–1.2)
BUN SERPL-MCNC: 19 MG/DL (ref 6–20)
BUN/CREAT SERPL: 21.1 (ref 7–25)
CALCIUM SPEC-SCNC: 9.8 MG/DL (ref 8.6–10.5)
CHLORIDE SERPL-SCNC: 102 MMOL/L (ref 98–107)
CO2 SERPL-SCNC: 29 MMOL/L (ref 22–29)
CREAT SERPL-MCNC: 0.9 MG/DL (ref 0.76–1.27)
CRP SERPL-MCNC: 0.19 MG/DL (ref 0.01–0.5)
DEPRECATED RDW RBC AUTO: 37.8 FL (ref 37–54)
EGFRCR SERPLBLD CKD-EPI 2021: 113.5 ML/MIN/1.73
EOSINOPHIL # BLD AUTO: 0.14 10*3/MM3 (ref 0–0.4)
EOSINOPHIL NFR BLD AUTO: 2.3 % (ref 0.3–6.2)
ERYTHROCYTE [DISTWIDTH] IN BLOOD BY AUTOMATED COUNT: 12.1 % (ref 12.3–15.4)
GLOBULIN UR ELPH-MCNC: 3 GM/DL
GLUCOSE SERPL-MCNC: 112 MG/DL (ref 65–99)
HCT VFR BLD AUTO: 47.3 % (ref 37.5–51)
HGB BLD-MCNC: 15.7 G/DL (ref 13–17.7)
HOLD SPECIMEN: NORMAL
IMM GRANULOCYTES # BLD AUTO: 0.02 10*3/MM3 (ref 0–0.05)
IMM GRANULOCYTES NFR BLD AUTO: 0.3 % (ref 0–0.5)
LIPASE SERPL-CCNC: 55 U/L (ref 13–60)
LYMPHOCYTES # BLD AUTO: 2.02 10*3/MM3 (ref 0.7–3.1)
LYMPHOCYTES NFR BLD AUTO: 33.4 % (ref 19.6–45.3)
MCH RBC QN AUTO: 28.4 PG (ref 26.6–33)
MCHC RBC AUTO-ENTMCNC: 33.2 G/DL (ref 31.5–35.7)
MCV RBC AUTO: 85.5 FL (ref 79–97)
MONOCYTES # BLD AUTO: 0.48 10*3/MM3 (ref 0.1–0.9)
MONOCYTES NFR BLD AUTO: 7.9 % (ref 5–12)
NEUTROPHILS NFR BLD AUTO: 3.35 10*3/MM3 (ref 1.7–7)
NEUTROPHILS NFR BLD AUTO: 55.4 % (ref 42.7–76)
NRBC BLD AUTO-RTO: 0 /100 WBC (ref 0–0.2)
NT-PROBNP SERPL-MCNC: <36 PG/ML (ref 0–450)
PLATELET # BLD AUTO: 255 10*3/MM3 (ref 140–450)
PMV BLD AUTO: 9.6 FL (ref 6–12)
POTASSIUM SERPL-SCNC: 4.1 MMOL/L (ref 3.5–5.2)
PROT SERPL-MCNC: 7.8 G/DL (ref 6–8.5)
QT INTERVAL: 358 MS
QTC INTERVAL: 430 MS
RBC # BLD AUTO: 5.53 10*6/MM3 (ref 4.14–5.8)
SODIUM SERPL-SCNC: 141 MMOL/L (ref 136–145)
TROPONIN T SERPL HS-MCNC: <6 NG/L
WBC NRBC COR # BLD AUTO: 6.05 10*3/MM3 (ref 3.4–10.8)
WHOLE BLOOD HOLD COAG: NORMAL
WHOLE BLOOD HOLD SPECIMEN: NORMAL

## 2024-05-16 PROCEDURE — 86141 C-REACTIVE PROTEIN HS: CPT | Performed by: NURSE PRACTITIONER

## 2024-05-16 PROCEDURE — 80053 COMPREHEN METABOLIC PANEL: CPT | Performed by: EMERGENCY MEDICINE

## 2024-05-16 PROCEDURE — 93005 ELECTROCARDIOGRAM TRACING: CPT

## 2024-05-16 PROCEDURE — 99284 EMERGENCY DEPT VISIT MOD MDM: CPT

## 2024-05-16 PROCEDURE — 71045 X-RAY EXAM CHEST 1 VIEW: CPT

## 2024-05-16 PROCEDURE — 83690 ASSAY OF LIPASE: CPT | Performed by: EMERGENCY MEDICINE

## 2024-05-16 PROCEDURE — 83880 ASSAY OF NATRIURETIC PEPTIDE: CPT | Performed by: EMERGENCY MEDICINE

## 2024-05-16 PROCEDURE — 85025 COMPLETE CBC W/AUTO DIFF WBC: CPT | Performed by: EMERGENCY MEDICINE

## 2024-05-16 PROCEDURE — 84484 ASSAY OF TROPONIN QUANT: CPT | Performed by: EMERGENCY MEDICINE

## 2024-05-16 RX ORDER — DIAZEPAM 2 MG/1
2 TABLET ORAL ONCE
Status: DISCONTINUED | OUTPATIENT
Start: 2024-05-16 | End: 2024-05-16 | Stop reason: HOSPADM

## 2024-05-16 RX ORDER — PANTOPRAZOLE SODIUM 40 MG/1
40 TABLET, DELAYED RELEASE ORAL DAILY
COMMUNITY

## 2024-05-16 RX ORDER — NAPROXEN 500 MG/1
500 TABLET ORAL 2 TIMES DAILY WITH MEALS
Qty: 30 TABLET | Refills: 0 | Status: SHIPPED | OUTPATIENT
Start: 2024-05-16

## 2024-05-16 RX ORDER — KETOROLAC TROMETHAMINE 30 MG/ML
30 INJECTION, SOLUTION INTRAMUSCULAR; INTRAVENOUS ONCE
Status: DISCONTINUED | OUTPATIENT
Start: 2024-05-16 | End: 2024-05-16 | Stop reason: HOSPADM

## 2024-05-16 RX ORDER — SODIUM CHLORIDE 0.9 % (FLUSH) 0.9 %
10 SYRINGE (ML) INJECTION AS NEEDED
Status: DISCONTINUED | OUTPATIENT
Start: 2024-05-16 | End: 2024-05-16 | Stop reason: HOSPADM

## 2024-05-16 NOTE — PROGRESS NOTES
Your inflammatory number is normal. Would still try the naproxen to see if this helps the chest wall pain but this lab shows that you are low risk for inflammation in the heart

## 2024-05-16 NOTE — ED PROVIDER NOTES
Subjective   History of Present Illness  This is a 34-year-old male presented to the emergency department with some chest discomfort.  Patient states that he has had this for the last 2 days.  Patient states that is been consistent in nature.  Located left side of his chest.  Is dull in nature.  Nonradiating.  Associate with some shortness of breath as well.  Not have any cough or hemoptysis.  Patient has not been taking anything for the pain.  Denies any fevers or chills.  No headache or change in vision.  No focal weakness.  No abdominal pain or vomiting.    History provided by:  Patient   used: No        Review of Systems   Constitutional:  Negative for chills and fever.   HENT:  Negative for congestion, ear pain and sore throat.    Eyes:  Negative for visual disturbance.   Respiratory:  Positive for shortness of breath.    Cardiovascular:  Positive for chest pain.   Gastrointestinal:  Negative for abdominal pain.   Genitourinary:  Negative for difficulty urinating.   Musculoskeletal:  Negative for arthralgias.   Skin:  Negative for rash.   Neurological:  Negative for dizziness, weakness and numbness.   Psychiatric/Behavioral:  Negative for agitation.        Past Medical History:   Diagnosis Date    GERD (gastroesophageal reflux disease)     Lymphoma        Allergies   Allergen Reactions    Amoxicillin Other (See Comments)     Chest pain    Bactrim [Sulfamethoxazole-Trimethoprim] Other (See Comments)     Chest pain    Clindamycin Other (See Comments)     Medication causes patient to have chest pain    Ct Contrast GI Intolerance       Past Surgical History:   Procedure Laterality Date    ANAL FISTULA REPAIR  2009    CHOLECYSTECTOMY      COLONOSCOPY  2019    [facility]    ENDOSCOPY  06/16/2021    Dr. Mobley empiric dilation performed bx suggestive of esophagitis    ENDOSCOPY  06/30/2020    EGD Dr. Mobley, empiric dilation erosive esophagitis       Family History   Problem  Relation Age of Onset    No Known Problems Mother     No Known Problems Father     Heart attack Maternal Grandfather     Heart disease Maternal Grandfather     No Known Problems Paternal Grandmother     No Known Problems Paternal Grandfather     Colon cancer Neg Hx     Colon polyps Neg Hx        Social History     Socioeconomic History    Marital status:    Tobacco Use    Smoking status: Former     Current packs/day: 0.00     Average packs/day: 1 pack/day for 5.0 years (5.0 ttl pk-yrs)     Types: Cigarettes     Start date: 2012     Quit date: 2016     Years since quittin.3     Passive exposure: Past    Smokeless tobacco: Never   Vaping Use    Vaping status: Never Used   Substance and Sexual Activity    Alcohol use: Not Currently     Comment: occas    Drug use: No    Sexual activity: Yes     Partners: Female     Birth control/protection: None           Objective   Physical Exam  Vitals and nursing note reviewed.   Constitutional:       General: He is not in acute distress.     Appearance: He is not ill-appearing or toxic-appearing.   HENT:      Mouth/Throat:      Pharynx: No posterior oropharyngeal erythema.   Eyes:      Extraocular Movements: Extraocular movements intact.      Pupils: Pupils are equal, round, and reactive to light.   Cardiovascular:      Rate and Rhythm: Normal rate and regular rhythm.   Pulmonary:      Effort: Pulmonary effort is normal. No respiratory distress.   Abdominal:      General: Abdomen is flat. There is no distension.      Palpations: There is no mass.      Tenderness: There is no abdominal tenderness. There is no guarding or rebound.   Musculoskeletal:         General: No deformity. Normal range of motion.   Neurological:      General: No focal deficit present.      Mental Status: He is alert.      Sensory: No sensory deficit.      Motor: No weakness.         ECG 12 Lead      Date/Time: 2024 3:33 AM    Performed by: Lyndon Everett MD  Authorized by: Marguerite  Lyndon BAGLEY MD  Interpreted by ED physician  Comparison: compared with previous ECG   Similar to previous ECG  Rhythm: sinus rhythm  Rate: normal  BPM: 87  QRS axis: left  Conduction: incomplete RBBB  Other findings comments: Nonspecific ST changes  Clinical impression: non-specific ECG  Comments: Interpretation:  EKG was directly visualized by myself, interpretations as documented in hospital course.               ED Course  ED Course as of 05/16/24 0404   Thu May 16, 2024   0335 BP: 142/86 [JK]   0335 Temp: 98 °F (36.7 °C) [JK]   0335 Temp src: Oral [JK]   0335 Heart Rate: 94 [JK]   0335 Resp: 14 [JK]   0335 SpO2: 98 % [JK]   0335 Device (Oxygen Therapy): room air  Interpretation:  Patient's repeat vitals, telemetry tracing, and pulse oximetry tracing were directly viewed and interpreted by myself.  Normal sinus rhythm [JK]   0335 CBC & Differential(!) [JK]   0335 BNP [JK]   0335 Lipase [JK]   0335 Single High Sensitivity Troponin T [JK]   0335 Comprehensive Metabolic Panel(!)  Interpretation:  Laboratory studies were reviewed and interpreted directly by myself.  CMP was normal, lipase normal, troponin normal, BNP normal, CBC normal [JK]   0336 XR Chest 1 View  Interpretation:  Imaging was directly visualized by myself, per my interpretations, chest x-ray is normal. [JK]   0402 On reevaluation, the patient is feeling much better.  Vital signs have improved.  Overall they are well-appearing.  There were no abnormal cardiopulmonary findings.  No respiratory distress.  Abdomen soft, nontender and no evidence of acute abdomen.  Story is minimally concerning for ACS, PE or dissection given the atypical nature, risk factors, history and physical examination.  Patient's heart score places the patient at low risk for acute coronary syndrome.  Patient feels comfortable following up with her primary care physician and/or primary cardiologist.  Patient was given strict return precautions.  Verbalized understanding. [JK]   0400  I had a discussion with the patient/family regarding diagnosis, diagnostic results, treatment plan, and medications. The patient/family indicated understanding of these instructions. I spent adequate time at the bedside prior to discharge necessary to discuss the aftercare instructions, giving patient education, providing explanations of the results of our evaluations/findings, and my decision making to assure that the patient/family understand the plan of care. Time was allotted to answer questions at that time and throughout the ED course. Patient is required to maintain timely follow up, as discussed. I also discussed the potential for the development of an acute emergent condition requiring further evaluation, return to the ER, admission, or even surgical intervention.  I encouraged the patient to return to the emergency department immediately for any concerns, worsening symptoms, new complaints, or if symptoms persist and they are unable to seek follow-up in a timely fashion. The patient/family expressed understanding and agreement with this plan    Shared decision making:   After full review of the patient's clinical presentation, review of any work-up including but not limited to laboratory studies and radiology obtained, I had a discussion with the patient.  Treatment options were discussed as well as the risks, benefits and consequences.  I discussed all findings with the patient and family members if available.  During the discussion, treatment goals were understood by all as well as any misconceptions which were addressed with the patient.  Ample time was given for any questions they may have had.  They are in agreement with the treatment plan as well as final disposition. [JK]      ED Course User Index  [JK] Lyndon Everett MD                HEART Score: 2                              Medical Decision Making  This is a 34-year-old male presented to the emergency department with some chest discomfort  shortness of breath.  Patient symptoms seem atypical in nature.  Seem more consistent with a costochondritis or muscular wall issue.  Patient's EKG is nonspecific.  He was seen in the emergency department few days ago and had normal workup at this time.  Patient is hemodynamically stable.  Overall nontoxic.  IV access will be established and the patient.  Provided symptomatic treatment.  Placed on continuous telemetry monitoring.  Workup initiated.      Differential diagnosis: CAD, ACS, peptic ulcer disease, dyspepsia, pneumonia, bronchitis, atypical chest pain, angina, musculoskeletal pain      Amount and/or Complexity of Data Reviewed  External Data Reviewed: labs, radiology, ECG and notes.     Details: External laboratories, imaging as well as notes were reviewed personally by myself.  All relevant studies were used to guide decision making.     Date of previous record: 4/26/2024    Source of note: Primary cardiology    Summary: Patient was seen evaluated for chest pain palpitations.  I did review basic laboratory studies on file as well as a previous chest x-ray and EKGs.  Records reviewed    Labs: ordered. Decision-making details documented in ED Course.  Radiology: ordered and independent interpretation performed. Decision-making details documented in ED Course.  ECG/medicine tests: ordered and independent interpretation performed. Decision-making details documented in ED Course.    Risk  Prescription drug management.        Final diagnoses:   Nonspecific chest pain   Anxiety       ED Disposition  ED Disposition       ED Disposition   Discharge    Condition   Stable    Comment   --               Sanna Herr, APRN  [address] [id number] [phone]    Call in 1 day           Medication List      No changes were made to your prescriptions during this visit.            Lyndon Everett MD  05/16/24 0402

## 2024-05-16 NOTE — PROGRESS NOTES
"[facility]  [facility]    Chief Complaint  Chest Pain    Subjective    History of Present Illness {CC  Problem List  Visit  Diagnosis   Encounters  Notes  Medications  Labs  Result Review Imaging  Media :23}     Meliton Lagos is a 34 y.o.  male with chronic atypical chest pain, GERD, erosive esophagitis who presents today for ongoing chest pain.    Patient was in the ED today with a 2-day history of dull, nonradiating chest pain with associated shortness of breath.  He was also in the ED 5/13. Patient has been worked up extensively for cardiac etiology including a normal stress echo in August 2023, Normal coronary CTA with a coronary calcium score of 0 in February 2024.  He was placed in a Holter monitor 4/26/2024.  Workup in the ED included normal high sensitive troponins, BNP, chest x-ray and stable EKG.    He reports he played soccer Friday and felt fine but the following day he had left sided chest pressure,  burning, nausea and headache. This morning symptoms started after sexual intercourse. Pain hurts when he takes a deep breath. He says antibiotics and the COVID vaccine makes it worse      He has been seen by multiple cardiac providers, including cardiologist Dr. Rodriguez.  He had an office visit with JAY Choudhury on 4/26/2024 for ongoing atypical chest pain and palpitations and was placed in a monitor. Holter was negative for arrhythmias.        Cardiac risks: gender    Objective     Vital Signs:   Vitals:    05/16/24 1327 05/16/24 1328   BP: 107/73 116/76   BP Location: Left arm Left arm   Patient Position: Sitting Standing   Pulse: 84 84   SpO2: 98% 97%   Weight: 95.7 kg (211 lb)    Height: 170.2 cm (67\")      Body mass index is 33.05 kg/m².  Physical Exam  Vitals reviewed.   Constitutional:       Appearance: Normal appearance.   HENT:      Head: Normocephalic.   Neck:      Vascular: No carotid bruit.   Cardiovascular:      Rate and Rhythm: Normal rate " and regular rhythm.      Pulses: Normal pulses.      Heart sounds: Normal heart sounds, S1 normal and S2 normal. No murmur heard.  Pulmonary:      Effort: Pulmonary effort is normal. No respiratory distress.      Breath sounds: Normal breath sounds.   Chest:      Chest wall: No tenderness.   Abdominal:      General: Abdomen is flat.      Palpations: Abdomen is soft.   Musculoskeletal:      Cervical back: Neck supple.      Right lower leg: No edema.      Left lower leg: No edema.   Skin:     General: Skin is warm and dry.   Neurological:      General: No focal deficit present.      Mental Status: He is alert and oriented to person, place, and time. Mental status is at baseline.   Psychiatric:         Mood and Affect: Mood normal.         Behavior: Behavior normal.         Thought Content: Thought content normal.              Data Reviewed:{ Labs  Result Review  Imaging  Med Tab  Media :23}   ECG 12 Lead (05/16/2024 04:04)  CT Angiogram Coronary-Cardiology Interpretation (02/26/2024 16:50)  Adult Holter Monitor > 48hrs - 7 days (01/23/2024 08:58)  Adult Stress Echo W/ Cont or Stress Agent if Necessary Per Protocol (08/29/2023 15:31)  Single High Sensitivity Troponin T (05/16/2024 02:56)  BNP (05/16/2024 02:56)  Lipase (05/16/2024 02:56)  Comprehensive Metabolic Panel (05/16/2024 02:56)  CBC & Differential (05/16/2024 02:56)         Assessment and Plan {CC Problem List  Visit Diagnosis  ROS  Review (Popup)  Health Maintenance  Quality  BestPractice  Medications  SmartSets  SnapShot Encounters  Media :23}   1. Precordial pain  Atypical chest wall pain.  He has has had extensive cardiac evaluation including a normal coronary CTA, treadmill testing, echo, Holter monitors, negative troponins.  He has had no significant improvement on colchicine.  I have sent him in a short course of naproxen and advised to take with food  -Recommend that he follow-up with his PCP and keep his upcoming appointment with  rheumatology  -Will add high sensitive CRP to lab work from this morning in the ED  - High Sensitivity CRP; Future    2. Palpitations  -Reviewed recent Holter monitor which was negative for arrhythmias          Follow Up {Instructions Charge Capture  Follow-up Communications :23}   Return if symptoms worsen or fail to improve.    Patient was given instructions and counseling regarding his condition or for health maintenance advice. Please see specific information pulled into the AVS if appropriate.  Advised to call the [facility] Center with any questions, concerns, or worsening symptoms.

## 2024-05-21 LAB
QT INTERVAL: 358 MS
QTC INTERVAL: 430 MS

## 2024-06-20 ENCOUNTER — OFFICE VISIT (OUTPATIENT)
Dept: FAMILY MEDICINE CLINIC | Facility: CLINIC | Age: 34
End: 2024-06-20
Payer: MEDICAID

## 2024-06-20 VITALS
OXYGEN SATURATION: 97 % | SYSTOLIC BLOOD PRESSURE: 100 MMHG | WEIGHT: 215.6 LBS | HEART RATE: 78 BPM | BODY MASS INDEX: 33.84 KG/M2 | TEMPERATURE: 98.6 F | HEIGHT: 67 IN | DIASTOLIC BLOOD PRESSURE: 60 MMHG

## 2024-06-20 DIAGNOSIS — M79.672 CHRONIC FOOT PAIN, LEFT: ICD-10-CM

## 2024-06-20 DIAGNOSIS — G89.29 CHRONIC FOOT PAIN, LEFT: ICD-10-CM

## 2024-06-20 DIAGNOSIS — E66.9 OBESITY (BMI 30.0-34.9): Chronic | ICD-10-CM

## 2024-06-20 DIAGNOSIS — F41.9 ANXIETY: Primary | ICD-10-CM

## 2024-06-20 PROCEDURE — 99213 OFFICE O/P EST LOW 20 MIN: CPT | Performed by: NURSE PRACTITIONER

## 2024-06-20 PROCEDURE — 1160F RVW MEDS BY RX/DR IN RCRD: CPT | Performed by: NURSE PRACTITIONER

## 2024-06-20 PROCEDURE — 1125F AMNT PAIN NOTED PAIN PRSNT: CPT | Performed by: NURSE PRACTITIONER

## 2024-06-20 PROCEDURE — 1159F MED LIST DOCD IN RCRD: CPT | Performed by: NURSE PRACTITIONER

## 2024-06-20 RX ORDER — BUSPIRONE HYDROCHLORIDE 5 MG/1
5 TABLET ORAL 2 TIMES DAILY
Qty: 60 TABLET | Refills: 1 | Status: SHIPPED | OUTPATIENT
Start: 2024-06-20

## 2024-06-20 NOTE — PROGRESS NOTES
Follow Up Office Note   Patient Name: Meliton Lagos  : 1990   MRN: [id number]     Chief Complaint:    Chief Complaint   Patient presents with    Leg Pain     Left foot x longtime. Still having pain.    Chest Pain       History of Present Illness:   Meliton Lagos is a 34 y.o. male who presents today with c/o continued chest pain. Patient cleared by cardiology-CT angiogram (coronary) normal. Patient does c/o anxiety.    Patient c/o continued chronic left foot pain. He would like a referral to ortho for further evaluation and management. He felt that podiatry consult was not helpful.    Patient c/o chronic obesity. He is interested in nutritional counseling.     Subjective   I have reviewed and the following portions of the patient's history were updated as appropriate: past family history, past medical history, past social history, past surgical history and problem list.    Review of Systems:   Review of Systems   Constitutional: Negative.    Respiratory: Negative.     Cardiovascular:  Positive for chest pain. Negative for palpitations and leg swelling.   Musculoskeletal:  Positive for arthralgias (left foot).   Neurological: Negative.    Psychiatric/Behavioral:  The patient is nervous/anxious.         Past Medical History:   Past Medical History:   Diagnosis Date    GERD (gastroesophageal reflux disease)     Lymphoma        Medications:     Current Outpatient Medications:     busPIRone (BUSPAR) 5 MG tablet, Take 1 tablet by mouth 2 (Two) Times a Day., Disp: 60 tablet, Rfl: 1    cetirizine (zyrTEC) 10 MG tablet, Take 1 tablet by mouth Daily. (Patient not taking: Reported on 2024), Disp: 90 tablet, Rfl: 3    fluticasone (FLONASE) 50 MCG/ACT nasal spray, 2 sprays into the nostril(s) as directed by provider Daily. (Patient not taking: Reported on 2024), Disp: 18.2 mL, Rfl: 0    fluticasone (FLONASE) 50 MCG/ACT nasal spray, 1 spray into the nostril(s) as directed by provider Daily.  "(Patient not taking: Reported on 5/16/2024), Disp: 16 g, Rfl: 1    naproxen (Naprosyn) 500 MG tablet, Take 1 tablet by mouth 2 (Two) Times a Day With Meals. (Patient not taking: Reported on 6/20/2024), Disp: 30 tablet, Rfl: 0    ondansetron ODT (ZOFRAN-ODT) 4 MG disintegrating tablet, Place 1 tablet on the tongue Every 6 (Six) Hours As Needed for Nausea or Vomiting. As needed for nausea (Patient not taking: Reported on 4/26/2024), Disp: 12 tablet, Rfl: 0    pantoprazole (PROTONIX) 40 MG EC tablet, Take 1 tablet by mouth Daily. (Patient not taking: Reported on 6/20/2024), Disp: , Rfl:     vitamin D (ERGOCALCIFEROL) 1.25 MG (73109 UT) capsule capsule, TAKE 1 CAPSULE BY MOUTH ONCE WEEKLY, Disp: 5 capsule, Rfl: 0    Allergies:   Allergies   Allergen Reactions    Amoxicillin Other (See Comments)     Chest pain    Bactrim [Sulfamethoxazole-Trimethoprim] Other (See Comments)     Chest pain    Clindamycin Other (See Comments)     Medication causes patient to have chest pain    Ct Contrast GI Intolerance         Objective   Physical Exam:  Vital Signs:   Vitals:    06/20/24 1525   BP: 100/60   Pulse: 78   Temp: 98.6 °F (37 °C)   TempSrc: Temporal   SpO2: 97%   Weight: 97.8 kg (215 lb 9.6 oz)   Height: 170.2 cm (67.01\")   PainSc:   8   PainLoc: Foot     Body mass index is 33.76 kg/m².     Physical Exam  Vitals and nursing note reviewed.   Constitutional:       General: He is not in acute distress.     Appearance: Normal appearance. He is well-developed. He is not ill-appearing, toxic-appearing or diaphoretic.   HENT:      Head: Normocephalic and atraumatic.   Cardiovascular:      Rate and Rhythm: Normal rate and regular rhythm.      Heart sounds: No murmur heard.  Pulmonary:      Effort: Pulmonary effort is normal. No respiratory distress.      Breath sounds: Normal breath sounds. No stridor. No wheezing.   Skin:     General: Skin is warm and dry.   Neurological:      General: No focal deficit present.      Mental Status: He " is alert and oriented to person, place, and time. Mental status is at baseline.   Psychiatric:         Mood and Affect: Mood normal.         Behavior: Behavior normal. Behavior is cooperative.         Thought Content: Thought content normal.         Judgment: Judgment normal.         Assessment / Plan    Assessment/Plan:   Diagnoses and all orders for this visit:    1. Anxiety (Primary)  Assessment & Plan:  Newly identified problem. Suspect patient's chest pain may be anxiety related. Plan to begin trial of Buspar. F/U 4 weeks.    Orders:  -     busPIRone (BUSPAR) 5 MG tablet; Take 1 tablet by mouth 2 (Two) Times a Day.  Dispense: 60 tablet; Refill: 1    2. Chronic foot pain, left  Assessment & Plan:  Chronic problem which has remain unchanged. Plan to refer to ortho for further evaluation and management.    Orders:  -     Ambulatory Referral to Orthopedic Surgery    3. Obesity (BMI 30.0-34.9)  Assessment & Plan:  Patient's (Body mass index is 33.76 kg/m².) indicates that they are obese (BMI >30) with health conditions that include GERD . Weight is worsening. BMI  is above average; BMI management plan is completed. Recommend low calorie, low carb based diet program, portion control, increasing exercise, and referral for nutritional counseling . Patient agreeable with referral.    Orders:  -     Ambulatory Referral to Nutrition Services         Discussed the nature of the medical condition(s) risks, complications, implications, management, safe and proper use of medications. Encouraged medication compliance, and keeping scheduled follow up appointments with me and any other providers.      RTC if symptoms fail to improve, to ER if symptoms worsen.      *Dictated Utilizing Dragon Dictation   Please note that portions of this note were completed with a voice recognition program.   Part of this note may be an electronic transcription/translation of spoken language to printed text using the Dragon Dictation System.  Spelling and/or grammatical errors may exist despite efforts at proofreading.      NOTE TO PATIENT: The 21st Century Cures Act makes medical notes like these available to patients in the interest of transparency. However, be advised this is a medical document. It is intended as peer to peer communication. It is written in medical language and may contain abbreviations or verbiage that are unfamiliar. It may appear blunt or direct. Medical documents are intended to carry relevant information, facts as evident, and the clinical opinion of the practitioner.      JAY León  [facility] [place]

## 2024-06-21 RX ORDER — ERGOCALCIFEROL 1.25 MG/1
50000 CAPSULE ORAL WEEKLY
Qty: 5 CAPSULE | Refills: 0 | Status: SHIPPED | OUTPATIENT
Start: 2024-06-21

## 2024-06-23 PROBLEM — E80.4 GILBERTS DISEASE: Status: ACTIVE | Noted: 2019-03-28

## 2024-06-23 PROBLEM — M79.672 CHRONIC FOOT PAIN, LEFT: Status: ACTIVE | Noted: 2024-06-23

## 2024-06-23 PROBLEM — K30 FUNCTIONAL DYSPEPSIA: Status: ACTIVE | Noted: 2018-08-08

## 2024-06-23 PROBLEM — F41.9 ANXIETY: Status: ACTIVE | Noted: 2024-06-23

## 2024-06-23 PROBLEM — G89.29 CHRONIC FOOT PAIN, LEFT: Status: ACTIVE | Noted: 2024-06-23

## 2024-06-23 NOTE — ASSESSMENT & PLAN NOTE
Chronic problem which has remain unchanged. Plan to refer to ortho for further evaluation and management.

## 2024-06-23 NOTE — ASSESSMENT & PLAN NOTE
Patient's (Body mass index is 33.76 kg/m².) indicates that they are obese (BMI >30) with health conditions that include GERD . Weight is worsening. BMI  is above average; BMI management plan is completed. Recommend low calorie, low carb based diet program, portion control, increasing exercise, and referral for nutritional counseling . Patient agreeable with referral.

## 2024-06-23 NOTE — ASSESSMENT & PLAN NOTE
Newly identified problem. Suspect patient's chest pain may be anxiety related. Plan to begin trial of Buspar. F/U 4 weeks.

## 2024-07-01 ENCOUNTER — OFFICE VISIT (OUTPATIENT)
Dept: ORTHOPEDIC SURGERY | Facility: CLINIC | Age: 34
End: 2024-07-01
Payer: MEDICAID

## 2024-07-01 VITALS
BODY MASS INDEX: 33.43 KG/M2 | DIASTOLIC BLOOD PRESSURE: 78 MMHG | SYSTOLIC BLOOD PRESSURE: 114 MMHG | WEIGHT: 213 LBS | HEIGHT: 67 IN

## 2024-07-01 DIAGNOSIS — M79.672 FOOT PAIN, LEFT: Primary | ICD-10-CM

## 2024-07-01 PROCEDURE — 1160F RVW MEDS BY RX/DR IN RCRD: CPT | Performed by: ORTHOPAEDIC SURGERY

## 2024-07-01 PROCEDURE — 99204 OFFICE O/P NEW MOD 45 MIN: CPT | Performed by: ORTHOPAEDIC SURGERY

## 2024-07-01 PROCEDURE — 1159F MED LIST DOCD IN RCRD: CPT | Performed by: ORTHOPAEDIC SURGERY

## 2024-07-01 RX ORDER — DIPHENHYDRAMINE HCL 25 MG
25 TABLET ORAL EVERY 6 HOURS PRN
COMMUNITY
Start: 2024-06-26

## 2024-07-01 NOTE — PROGRESS NOTES
[facility] [place] [facility] Office Visit     Office Visit       Date: 07/01/2024   Patient Name: Meliton Lagos  MRN: [id number]  YOB: 1990    Referring Physician: Sanna Herr APRN     Chief Complaint:   Chief Complaint   Patient presents with   • Left Foot - Pain       History of Present Illness: Meliton Lagos is a 34 y.o. male who is here today with left plantar forefoot pain.  States had an injury where he dropped something on his foot about a year and a half ago.  States pain went away a few days later.  Then about a year ago got out of a sauna at the gym and his pain returned.  States pain is primarily under the plantar forefoot.  States pain is near the big toe but radiates across the plantar surface of his forefoot.  Works as an [employer] and .  Pain is worse with walking or pressure on that area.  States his stopped running because of pain.  Denies smoking.  No other complaints.    Subjective   Review of Systems: Review of Systems   Constitutional: Negative.  Negative for chills, fatigue and fever.   HENT: Negative.  Negative for congestion and dental problem.    Eyes: Negative.  Negative for blurred vision.   Respiratory: Negative.  Negative for shortness of breath.    Cardiovascular: Negative.  Negative for leg swelling.   Gastrointestinal: Negative.  Negative for abdominal pain.   Endocrine: Negative.  Negative for polyuria.   Genitourinary: Negative.  Negative for difficulty urinating.   Musculoskeletal:  Positive for arthralgias.   Skin: Negative.    Allergic/Immunologic: Negative.    Neurological: Negative.    Hematological: Negative.  Negative for adenopathy.   Psychiatric/Behavioral: Negative.  Negative for behavioral problems.         Past Medical History:   Past Medical History:   Diagnosis Date   • GERD (gastroesophageal reflux disease)    • Lymphoma        Past Surgical History:   Past Surgical History:   Procedure Laterality  Date   • ANAL FISTULA REPAIR     • CHOLECYSTECTOMY     • COLONOSCOPY      [facility]   • ENDOSCOPY  2021    Dr. Mobley empiric dilation performed bx suggestive of esophagitis   • ENDOSCOPY  2020    EGD Dr. Mobley, empiric dilation erosive esophagitis       Family History:   Family History   Problem Relation Age of Onset   • No Known Problems Mother    • No Known Problems Father    • Heart attack Maternal Grandfather    • Heart disease Maternal Grandfather    • No Known Problems Paternal Grandmother    • No Known Problems Paternal Grandfather    • Colon cancer Neg Hx    • Colon polyps Neg Hx        Social History:   Social History     Socioeconomic History   • Marital status:    Tobacco Use   • Smoking status: Former     Current packs/day: 0.00     Average packs/day: 1 pack/day for 5.0 years (5.0 ttl pk-yrs)     Types: Cigarettes     Start date: 2012     Quit date:      Years since quittin.5     Passive exposure: Past   • Smokeless tobacco: Never   Vaping Use   • Vaping status: Never Used   Substance and Sexual Activity   • Alcohol use: Yes     Comment: rare   • Drug use: No   • Sexual activity: Yes     Partners: Female     Birth control/protection: None       Medications:   Current Outpatient Medications:   •  busPIRone (BUSPAR) 5 MG tablet, Take 1 tablet by mouth 2 (Two) Times a Day., Disp: 60 tablet, Rfl: 1  •  cetirizine (zyrTEC) 10 MG tablet, Take 1 tablet by mouth Daily., Disp: 90 tablet, Rfl: 3  •  diphenhydrAMINE (BENADRYL) 25 MG tablet, Take 1 tablet by mouth Every 6 (Six) Hours As Needed., Disp: , Rfl:   •  fluticasone (FLONASE) 50 MCG/ACT nasal spray, 2 sprays into the nostril(s) as directed by provider Daily., Disp: 18.2 mL, Rfl: 0  •  fluticasone (FLONASE) 50 MCG/ACT nasal spray, 1 spray into the nostril(s) as directed by provider Daily., Disp: 16 g, Rfl: 1  •  naproxen (Naprosyn) 500 MG tablet, Take 1 tablet by mouth 2 (Two) Times a Day With  "Meals., Disp: 30 tablet, Rfl: 0  •  ondansetron ODT (ZOFRAN-ODT) 4 MG disintegrating tablet, Place 1 tablet on the tongue Every 6 (Six) Hours As Needed for Nausea or Vomiting. As needed for nausea, Disp: 12 tablet, Rfl: 0  •  pantoprazole (PROTONIX) 40 MG EC tablet, Take 1 tablet by mouth Daily., Disp: , Rfl:   •  vitamin D (ERGOCALCIFEROL) 1.25 MG (29053 UT) capsule capsule, TAKE 1 CAPSULE BY MOUTH ONCE WEEKLY, Disp: 5 capsule, Rfl: 0    Allergies:   Allergies   Allergen Reactions   • Amoxicillin Other (See Comments)     Chest pain   • Bactrim [Sulfamethoxazole-Trimethoprim] Other (See Comments)     Chest pain   • Clindamycin Other (See Comments)     Medication causes patient to have chest pain   • Ct Contrast GI Intolerance       I reviewed the patient's chief complaint, history of present illness, review of systems, past medical history, surgical history, family history, social history, medications and allergy list.     Objective    Vital Signs:   Vitals:    07/01/24 1429   BP: 114/78   Weight: 96.6 kg (213 lb)   Height: 170.2 cm (67\")     Body mass index is 33.36 kg/m².    Ortho Exam:  left LE Foot and Ankle Exam:   Plantigrade foot.   There is good perfusion to the toes.   The skin is intact throughout the foot and ankle.  Range of motion of ankle, foot and toes is within normal limits.   There is focal tenderness with palpation directly over fibular sesamoid.  No appreciable swelling.    Results Review:   07/01/24 I have personally reviewed and interpreted the images from outside facility with the documented findings, left foot x-ray from December 28 and CT scan from January 27 reviewed, no acute osseous abnormality      Assessment / Plan    Assessment/Plan:   Diagnoses and all orders for this visit:    1. Foot pain, left (Primary)  -     Ambulatory Referral For Orthotics      Discussed chronic left foot pain which has been present for over a year causing pain that has progressed (a chronic illness with " exacerbation). Decision regarding surgical intervention considered.  Patient's symptoms seem to be consistent with fibular sesamoiditis.  [facility] sesamoid recess.  Provided patient with handout to do calf stretching.  Counseled patient to avoid any activities that reproduce his symptoms.  Plan to see patient back in 3 months for follow-up following wearing his new orthotics.  Was a pleasure seeing him today.    Patient suffers from pain and foot/ankle instability.  I am ordering bilateral custom molded foot orthoses to stabilize and reduce pain.  Custom required for tri-plane control, deformity, lifetime need.     Follow Up:   Return in about 3 months (around 10/1/2024).      Genaro Saucedo MD  [facility] [place] [facility] Surgeon

## 2024-07-01 NOTE — PATIENT INSTRUCTIONS
This topical gel also contains the active ingredient Diclofenac (an NSAID) which helps to reduce inflammation caused by arthritis or other chronic conditions  You can apply this gel directly to the skin up to four times per day over the area that is most painful.   What are the stretching exercises?    Patients can typically perform many of the below stretches by themselves as part of a home exercise program  Occasionally a patient may need formal guidance from a physical therapist    Straight knee standing stretch  Stand facing a wall with your unaffected leg forward with a slight bend at the knee. Your affected leg behind you with the knee straight or extended.   The heels should be flat on the floor.   Press your hips forward toward the wall. Try not to arch your back.  You should feel a pulling or stretching sensation in the back of the leg along the calf.   Hold this stretch for 30 seconds and then relax for 30 seconds. This is one repetition.  You should perform 10 repetitions to make a set and you should perform 2-3 sets per day.   Because the knee is kept straight, this is a good stretch for the gastrocnemius muscle.   1      Bent knee standing stretch  Stand facing a wall with your unaffected leg forward with a slight bend at the knee. Your affected leg is behind you with the knee bent or flexed.  The heels should be flat on the floor  Press your hips forward toward the wall and try not to arch your back.  Hold this stretch for 30 seconds and then relax for 30 seconds. This is one repetition.  You should perform 10 repetitions to make a set and you should perform 2-3 sets per day.   Because the knee is bent, this is a good stretch for the soleus muscle.     2    Seated towel stretch  Sit on the floor or in bed with both legs out in front of you.  Loop a towel or a belt around the ball of your affected foot and grasp the ends of the towel in your hands.  Keep your affected knee straight or extended and  pull the towel toward you.  Hold for 30 seconds and then relax for 30 seconds. This is one repetition.  3  You should perform 10 repetitions to make a set and you should perform 2-3 sets per day.       Wall stretch  Stand about two feet away from a wall. Place the ball of your affected foot against the wall while the heel remains on the ground.   Slowly and gently lean into the wall with your hips while keeping your knee straight.  Hold this for about 30 seconds and then relax. This is one repetition.   You should perform 10 repetitions to make a set and you should perform 2-3 sets per day.    4    Downward dog yoga stretch  Get down on all fours with your hands positioned under your shoulders on the floor.  Walk your hands forward slightly on the floor.   Spread your fingers apart to allow for a broad base of support.   Push your hips up toward the ceiling and tighten your abdominal muscles.   Keep your heels on the ground and gently try to straighten your knees.  You should feel a pull or stretching sensation at the back of both legs along the calf muscles.  Hold this stretch for about 15-30 seconds and then relax. This is a repetition.   5  You should perform 5-10 repetitions to make a set and perform 2 sets per day.     Foam roller stretch  Sit on the floor with your legs stretched out in front of you.   Place the foam roller under the lower half of your affected leg.   Cross the other leg over the affected leg.   Push up with your arms so that your bottom is off the floor  Slowly roll yourself over the foam roller back and forth working your way up the calf muscle toward the knee. It is not necessary to roll across the back of the knee.   Try performing this with your toes pointing inward and outward to get a slightly different stretch.  Roll the affected side for about 30 seconds and then relax.   6    Heel drops  This option is a strengthening exercise in addition to a stretching exercise  Stand on the ball  of your foot while positioned on the edge of a ledge, such as a stair   Slowly lower the heels down below the ledge  The lowering of the heels should be controlled and deliberate and should take about 10 seconds. This is the strengthening portion of this exercise.   When the heels have dropped completely, this position can be held longer to get a good stretch.   Heel drops can be done with both legs at the same time or one leg at a time for a more concentrated effort.   This can also be done with the knees straight (stretching the gastrocnemius) or with the knees bent (to stretch the soleus).   7  Perform 10 repetitions of this exercise to make a set. Try to perform 2 sets per day.       RESOURCES  Some of the above material, including images, was adapted from the American Academy of Orthopedic [employer]'s patient education resource called OrthoInfo. Additional information can be found at www.orthoinfo.org. Search “foot and ankle conditioning program”.   Some of the above material, including images, was adapted from http://www.stretching-exercises-guide.com/calf-stretches.html

## 2024-07-12 ENCOUNTER — OFFICE VISIT (OUTPATIENT)
Dept: FAMILY MEDICINE CLINIC | Facility: CLINIC | Age: 34
End: 2024-07-12
Payer: MEDICAID

## 2024-07-12 VITALS
BODY MASS INDEX: 33.24 KG/M2 | SYSTOLIC BLOOD PRESSURE: 104 MMHG | DIASTOLIC BLOOD PRESSURE: 60 MMHG | HEIGHT: 67 IN | TEMPERATURE: 98.7 F | HEART RATE: 94 BPM | WEIGHT: 211.8 LBS | OXYGEN SATURATION: 96 %

## 2024-07-12 DIAGNOSIS — R10.13 EPIGASTRIC BURNING SENSATION: Primary | ICD-10-CM

## 2024-07-12 DIAGNOSIS — E55.9 VITAMIN D DEFICIENCY: ICD-10-CM

## 2024-07-12 DIAGNOSIS — K21.9 GASTROESOPHAGEAL REFLUX DISEASE WITHOUT ESOPHAGITIS: ICD-10-CM

## 2024-07-12 DIAGNOSIS — F41.9 ANXIETY: ICD-10-CM

## 2024-07-12 PROCEDURE — 1126F AMNT PAIN NOTED NONE PRSNT: CPT | Performed by: FAMILY MEDICINE

## 2024-07-12 PROCEDURE — 99214 OFFICE O/P EST MOD 30 MIN: CPT | Performed by: FAMILY MEDICINE

## 2024-07-12 RX ORDER — VONOPRAZAN FUMARATE 26.72 MG/1
20 TABLET ORAL DAILY
Qty: 30 TABLET | Refills: 11 | Status: SHIPPED | OUTPATIENT
Start: 2024-07-12

## 2024-07-12 RX ORDER — BUSPIRONE HYDROCHLORIDE 5 MG/1
5 TABLET ORAL 2 TIMES DAILY
Qty: 180 TABLET | Refills: 1 | Status: SHIPPED | OUTPATIENT
Start: 2024-07-12

## 2024-07-12 RX ORDER — ERGOCALCIFEROL 1.25 MG/1
50000 CAPSULE ORAL WEEKLY
Qty: 8 CAPSULE | Refills: 0 | Status: SHIPPED | OUTPATIENT
Start: 2024-07-12

## 2024-07-12 RX ORDER — VONOPRAZAN FUMARATE 26.72 MG/1
20 TABLET ORAL DAILY
Qty: 8 TABLET | Refills: 0 | COMMUNITY
Start: 2024-07-12

## 2024-07-12 NOTE — PROGRESS NOTES
Follow Up Office Visit      Patient Name: Meliton Lagos  : 1990   MRN: [id number]     Chief Complaint:    Chief Complaint   Patient presents with    Heartburn     Patient states he was given medication for acid reflux and it worked, he would like to stay on this medication        History of Present Illness: Meliton Lagos is a 34 y.o. male who is here today to follow up with acid reflux not controlled on several trials of meds including protonix, prilosec, and pepcid. Lambert has helped on the free sample we provided.     He has low vitamin d and needs refill sent again.     Anxiety controlled on BuSpar.    Patient reporting burning sensation in his abdomen.  H. pylori breath test performed in .  No stool testing performed.  Patient has been on PPIs chronically without improvement.  He has failed Protonix, Prilosec to Nexium.  Lambert has helped a lot      Physical exam: mood and affect appropriate.       Subjective        I have reviewed and the following portions of the patient's history were updated as appropriate: past family history, past medical history, past social history, past surgical history and problem list.    Medications:     Current Outpatient Medications:     busPIRone (BUSPAR) 5 MG tablet, Take 1 tablet by mouth 2 (Two) Times a Day., Disp: 180 tablet, Rfl: 1    vitamin D (ERGOCALCIFEROL) 1.25 MG (76777 UT) capsule capsule, Take 1 capsule by mouth 1 (One) Time Per Week., Disp: 8 capsule, Rfl: 0    Vonoprazan Fumarate (Voquezna) 20 MG tablet, Take 1 tablet by mouth Daily., Disp: 30 tablet, Rfl: 11    Allergies:   Allergies   Allergen Reactions    Amoxicillin Other (See Comments)     Chest pain    Bactrim [Sulfamethoxazole-Trimethoprim] Other (See Comments)     Chest pain    Clindamycin Other (See Comments)     Medication causes patient to have chest pain    Ct Contrast GI Intolerance       Objective     Physical Exam: Please see above  Vital Signs:   Vitals:    24  "1503   BP: 104/60   Pulse: 94   Temp: 98.7 °F (37.1 °C)   TempSrc: Temporal   SpO2: 96%   Weight: 96.1 kg (211 lb 12.8 oz)   Height: 170.2 cm (67\")   PainSc: 0-No pain     Body mass index is 33.17 kg/m².          Assessment / Plan      Assessment/Plan:   Diagnoses and all orders for this visit:    1. Epigastric burning sensation (Primary)  -     H. Pylori Antigen, Stool - Stool, Per Rectum; Future    2. Gastroesophageal reflux disease without esophagitis  -     Vonoprazan Fumarate (Voquezna) 20 MG tablet; Take 1 tablet by mouth Daily.  Dispense: 30 tablet; Refill: 11    3. Vitamin D deficiency  -     vitamin D (ERGOCALCIFEROL) 1.25 MG (95754 UT) capsule capsule; Take 1 capsule by mouth 1 (One) Time Per Week.  Dispense: 8 capsule; Refill: 0    4. Anxiety  -     busPIRone (BUSPAR) 5 MG tablet; Take 1 tablet by mouth 2 (Two) Times a Day.  Dispense: 180 tablet; Refill: 1    Continue BuSpar for anxiety.  Continue vitamin D supplementation for low vitamin D.    Patient has failed Nexium, Protonix, Prilosec for his GERD.  Symptoms are uncontrolled so I recommend voquenza.     Will test for H. pylori given burning sensation.  I do not trust the breath test if he has been on a PPI.      Follow-up in 2 months for reassessment of vitamin D and GERD    Follow Up:   Return for Labs today.    Adrian De Jesus DO  [facility]   "

## 2024-07-17 ENCOUNTER — LAB (OUTPATIENT)
Dept: LAB | Facility: HOSPITAL | Age: 34
End: 2024-07-17
Payer: MEDICAID

## 2024-07-17 DIAGNOSIS — R10.13 EPIGASTRIC BURNING SENSATION: ICD-10-CM

## 2024-07-17 PROCEDURE — 87338 HPYLORI STOOL AG IA: CPT

## 2024-07-19 LAB — H PYLORI AG STL QL IA: NEGATIVE

## 2024-09-10 ENCOUNTER — OFFICE VISIT (OUTPATIENT)
Dept: GASTROENTEROLOGY | Facility: CLINIC | Age: 34
End: 2024-09-10
Payer: MEDICAID

## 2024-09-10 VITALS
SYSTOLIC BLOOD PRESSURE: 121 MMHG | HEART RATE: 78 BPM | WEIGHT: 213 LBS | DIASTOLIC BLOOD PRESSURE: 81 MMHG | BODY MASS INDEX: 33.43 KG/M2 | HEIGHT: 67 IN

## 2024-09-10 DIAGNOSIS — K90.89 BILE SALT-INDUCED DIARRHEA: ICD-10-CM

## 2024-09-10 DIAGNOSIS — Z87.19 HISTORY OF ESOPHAGITIS: ICD-10-CM

## 2024-09-10 DIAGNOSIS — Z78.9 AT AVERAGE RISK FOR COLON CANCER: ICD-10-CM

## 2024-09-10 DIAGNOSIS — R79.89 ELEVATED LFTS: ICD-10-CM

## 2024-09-10 DIAGNOSIS — K22.10 EROSIVE ESOPHAGITIS: ICD-10-CM

## 2024-09-10 DIAGNOSIS — R74.01 ALT (SGPT) LEVEL RAISED: Primary | ICD-10-CM

## 2024-09-10 PROCEDURE — 1159F MED LIST DOCD IN RCRD: CPT | Performed by: INTERNAL MEDICINE

## 2024-09-10 PROCEDURE — 1160F RVW MEDS BY RX/DR IN RCRD: CPT | Performed by: INTERNAL MEDICINE

## 2024-09-10 PROCEDURE — 99214 OFFICE O/P EST MOD 30 MIN: CPT | Performed by: INTERNAL MEDICINE

## 2024-09-10 RX ORDER — OMEPRAZOLE 40 MG/1
40 CAPSULE, DELAYED RELEASE ORAL DAILY
COMMUNITY

## 2024-09-10 RX ORDER — METOCLOPRAMIDE 10 MG/1
TABLET ORAL
Qty: 90 TABLET | Refills: 2 | Status: SHIPPED | OUTPATIENT
Start: 2024-09-10 | End: 2024-09-12 | Stop reason: ALTCHOICE

## 2024-09-10 NOTE — PROGRESS NOTES
"GASTROENTEROLOGY OFFICE NOTE  Meliton Lagos  [id number]  1990      Chief Complaint   Patient presents with    ALT (SGPT) level raised        HISTORY OF PRESENT ILLNESS:  34-year-old male presents for follow-up of persistently elevated liver enzymes of unclear etiology characterized by isolated ALT elevation.  Liver biopsy has been recommended.  He remains reluctant.  Recently MRI was ordered to rule out choledocholithiasis.  Of note, he does not have right upper quadrant abdominal pain nor has he had alkaline phosphatase or bilirubin elevation which would suggest choledocholithiasis.  Serological studies for liver disease have been entirely negative with negative autoimmune markers, negative viral markers and negative storage disease markers.    He has a variety of complaints today.  He has excessive mucus production and phlegm which he notices in his pharynx.  He believes this may be reflux related.  He has alternating bowel habits and feels full \"all the time\".  He does have occasionally some pain in the right upper quadrant but this is worse after fatty meals and has been felt in the past to be related to likely gastroparesis.  He does have fullness postprandially which is persistent/recurrent.    He is not having any problems with dysphagia to solids or odynophagia.  He has not had any unexplained weight loss and denies melena, bright red blood per rectum but he does note irregular bowel habits.    He was recently seen by rheumatology at [facility].  At that point he was complaining of bloating, epigastric pain right upper quadrant pain and fatty food upper abdominal cramping.  There is been a question whether he has Behcet's disease or not ultimately has been felt that he does not.    Of note he is status post cholecystectomy 2017.  Last colonoscopy in 2019 was unremarkable.  He has had some postcholecystectomy diarrhea which has improved with bile salt binding resins but he did not tolerate " this very well.    PAST MEDICAL HISTORY  Past Medical History:    GERD (gastroesophageal reflux disease)    Lymphoma        PAST SURGICAL HISTORY  Past Surgical History:    ANAL FISTULA REPAIR    CHOLECYSTECTOMY    COLONOSCOPY    [facility]    ENDOSCOPY    Dr. Mobley empiric dilation performed bx suggestive of esophagitis    ENDOSCOPY    EGD Dr. Mobley, empiric dilation erosive esophagitis        MEDICATIONS:    Current Outpatient Medications:     omeprazole (priLOSEC) 40 MG capsule, Take 1 capsule by mouth Daily., Disp: , Rfl:     vitamin D (ERGOCALCIFEROL) 1.25 MG (46990 UT) capsule capsule, Take 1 capsule by mouth 1 (One) Time Per Week., Disp: 8 capsule, Rfl: 0    busPIRone (BUSPAR) 5 MG tablet, Take 1 tablet by mouth 2 (Two) Times a Day. (Patient not taking: Reported on 9/10/2024), Disp: 180 tablet, Rfl: 1    Vonoprazan Fumarate (Voquezna) 20 MG tablet, Take 1 tablet by mouth Daily. (Patient not taking: Reported on 9/10/2024), Disp: 30 tablet, Rfl: 11    Vonoprazan Fumarate (Voquezna) 20 MG tablet, Take 1 tablet by mouth Daily. (Patient not taking: Reported on 9/10/2024), Disp: 8 tablet, Rfl: 0    ALLERGIES  is allergic to amoxicillin, bactrim [sulfamethoxazole-trimethoprim], clindamycin, and ct contrast.    FAMILY HISTORY:  Cancer-related family history is negative for Colon cancer.  Colon Cancer-related family history is negative for Colon cancer and Colon polyps.    SOCIAL HISTORY  He  reports that he quit smoking about 8 years ago. His smoking use included cigarettes. He started smoking about 12 years ago. He has a 5 pack-year smoking history. He has been exposed to tobacco smoke. He has never used smokeless tobacco. He reports current alcohol use. He reports that he does not use drugs.   .  He has 3 small children.  He is originally from [place].      PHYSICAL EXAM   /81 (BP Location: Right arm, Patient Position: Sitting, Cuff Size: Large Adult)   Pulse 78   " Ht 170.2 cm (67\")   Wt 96.6 kg (213 lb)   BMI 33.36 kg/m²   General: Alert and oriented x 3. In no apparent or acute distress.  and No stigmata of chronic liver disease  HEENT: Anicteric sclerae. Normal oropharynx  Neck: Supple. Without lymphadenopathy  CV: Regular rate and rhythm, S1, S2  Lungs: Clear to ausculation. Without rales, rhonchi and wheezing  Abdomen:  Soft,non-distended without palpable masses or hepatosplenomeagaly, areas of rebound tenderness or guarding.   Extremeties: without clubbing, cyanosis or edema  Neurologic:  Alert and oriented x 3 without focal motor or sensory deficits  Rectal exam: deferred       ASSESSMENT  1.-Persistently elevated serum liver enzymes, isolated ALT elevation that has been going on for more than 5 years.  Etiology unclear with serological studies thus far nondiagnostic.  Percutaneous liver biopsy recommended to hopefully establish a diagnosis and also, importantly, to gauge whether there is any evidence of hepatic injury, fibrosis and determine extent of inflammatory changes.  He remains reluctant but understands in the absence of a diagnosis liver disease can progress.  He wants to wait to see what the MRI shows before committing to pursuing percutaneous liver biopsy.  2.-Dyspeptic symptoms suggestive of gastroparesis.  Trial of metoclopramide recommended.  Short-term use to minimize risk of tardive dyskinesia long-term (currently stated to be approximately 1 for every 1000 patient/years)  3.-GERD with history of endoscopically documented erosive esophagitis.  4.-Cholerrheic diarrhea responsive to bile salt binding resins  5.-Patient actively followed by rheumatology for exclusion of Behcet's disease  6.-History of dysphagia to solids status post esophageal dilation in the past which has been effective in improving patient's dysphagia.  Again, this is currently not problematic.    PLAN  1.-Await MRI results  2.-Patient agreeable, potentially, to pursuing percutaneous " liver biopsy pending findings of MRCP/MRI which I suspect will not provide an answer to his abnormal serum liver enzymes  3.-Trial of metoclopramide 10 mg p.o. 3 times daily for suspected gastroparesis  4.-Return appointment.  5.-Continue omeprazole 40 mg daily    Bhupendra Mobley MD  9/10/2024   14:57 EDT

## 2024-09-12 ENCOUNTER — OFFICE VISIT (OUTPATIENT)
Dept: FAMILY MEDICINE CLINIC | Facility: CLINIC | Age: 34
End: 2024-09-12
Payer: MEDICAID

## 2024-09-12 VITALS
BODY MASS INDEX: 33.12 KG/M2 | OXYGEN SATURATION: 99 % | TEMPERATURE: 97.7 F | HEART RATE: 88 BPM | DIASTOLIC BLOOD PRESSURE: 76 MMHG | WEIGHT: 211 LBS | HEIGHT: 67 IN | SYSTOLIC BLOOD PRESSURE: 118 MMHG

## 2024-09-12 DIAGNOSIS — M99.08 RIB CAGE DYSFUNCTION: ICD-10-CM

## 2024-09-12 DIAGNOSIS — R21 RASH: Primary | ICD-10-CM

## 2024-09-12 DIAGNOSIS — T14.8XXA OPEN WOUND: ICD-10-CM

## 2024-09-12 DIAGNOSIS — R07.9 CHEST PAIN, UNSPECIFIED TYPE: ICD-10-CM

## 2024-09-12 PROCEDURE — 1159F MED LIST DOCD IN RCRD: CPT | Performed by: FAMILY MEDICINE

## 2024-09-12 PROCEDURE — 99214 OFFICE O/P EST MOD 30 MIN: CPT | Performed by: FAMILY MEDICINE

## 2024-09-12 PROCEDURE — 1160F RVW MEDS BY RX/DR IN RCRD: CPT | Performed by: FAMILY MEDICINE

## 2024-09-12 PROCEDURE — 1125F AMNT PAIN NOTED PAIN PRSNT: CPT | Performed by: FAMILY MEDICINE

## 2024-09-12 RX ORDER — CLOTRIMAZOLE AND BETAMETHASONE DIPROPIONATE 10; .64 MG/G; MG/G
1 CREAM TOPICAL 2 TIMES DAILY
Qty: 45 G | Refills: 1 | Status: SHIPPED | OUTPATIENT
Start: 2024-09-12 | End: 2024-09-12

## 2024-09-12 RX ORDER — CLOTRIMAZOLE AND BETAMETHASONE DIPROPIONATE 10; .64 MG/G; MG/G
1 CREAM TOPICAL 2 TIMES DAILY
Qty: 45 G | Refills: 1 | Status: SHIPPED | OUTPATIENT
Start: 2024-09-12

## 2024-09-12 NOTE — PROGRESS NOTES
Follow Up Office Visit      Patient Name: Meliton Lagos  : 1990   MRN: [id number]     Chief Complaint:    Chief Complaint   Patient presents with    Anxiety     Test results from all orders by cardiologist came back good, no problems is what he says. Has shakiness or nervousness still happens, comes and goes when he exercises or takes antibiotics.    Foreign Body     In right thumb.    Rash     Groin area, has had rash for about 3 weeks. Has tried cortisone, lotions, etc., nothing helped.       History of Present Illness: Meliton Lagos is a 34 y.o. male who is here today to follow up with chest pain after exercise. He says it can last days. It feels like a pressure. Rest helps it. Says cardiology recommend a referral for muscle pain. Rib pain is worse with activity and antibiotics.  Rib dysfunction and pain is chronic.  It is intermittent comes and goes.    Patient also has a rash in his groin and redness/itchiness.  Patient has tried different lotions and nothing has helped.  The rash is chronic.  He has tried several lotions which did not help, and he saw another provider for this previously.    He has a foreign object in his right thumb that feels like a sharp needle.      Physical exam: Open wound noted on right thumb.  Somatic function noted in ribs and thoracic region.      Subjective        I have reviewed and the following portions of the patient's history were updated as appropriate: past family history, past medical history, past social history, past surgical history and problem list.    Medications:     Current Outpatient Medications:     clotrimazole-betamethasone (LOTRISONE) 1-0.05 % cream, Apply 1 Application topically to the appropriate area as directed 2 (Two) Times a Day., Disp: 45 g, Rfl: 1    omeprazole (priLOSEC) 40 MG capsule, Take 1 capsule by mouth Daily., Disp: , Rfl:     vitamin D (ERGOCALCIFEROL) 1.25 MG (47920 UT) capsule capsule, Take 1 capsule by mouth 1 (One)  "Time Per Week., Disp: 8 capsule, Rfl: 0    Allergies:   Allergies   Allergen Reactions    Amoxicillin Other (See Comments)     Chest pain    Bactrim [Sulfamethoxazole-Trimethoprim] Other (See Comments)     Chest pain    Clindamycin Other (See Comments)     Medication causes patient to have chest pain    Ct Contrast GI Intolerance       Objective     Physical Exam: Please see above  Vital Signs:   Vitals:    09/12/24 1635   BP: 118/76   Pulse: 88   Temp: 97.7 °F (36.5 °C)   SpO2: 99%   Weight: 95.7 kg (211 lb)   Height: 170.2 cm (67\")   PainSc: 10-Worst pain ever  Comment: right thumb     Body mass index is 33.05 kg/m².          Assessment / Plan      Assessment/Plan:   Diagnoses and all orders for this visit:    1. Rash (Primary)  -     Discontinue: clotrimazole-betamethasone (LOTRISONE) 1-0.05 % cream; Apply 1 Application topically to the appropriate area as directed 2 (Two) Times a Day.  Dispense: 45 g; Refill: 1  -     clotrimazole-betamethasone (LOTRISONE) 1-0.05 % cream; Apply 1 Application topically to the appropriate area as directed 2 (Two) Times a Day.  Dispense: 45 g; Refill: 1    2. Rib cage dysfunction  -     Ambulatory Referral to Physical Therapy for Evaluation & Treatment    3. Chest pain, unspecified type  -     Ambulatory Referral to Physical Therapy for Evaluation & Treatment    4. Open wound    Other orders  -     Cancel: Vitamin D,25-Hydroxy; Future    Will try to treat rash with Lotrisone.  Patient can stop after it resolves.  Consider alternative therapies in the future if this does not work    Chest pain looks like is coming from a rib dysfunction.  I recommend physical therapy and going to the chiropractor if needed for further management.  May attempt OMT on the patient at next visit if he is not getting better      Procedure: Right thumb  An open wound was noted on patient's right thumb.  Small white foreign object was noted.  Wound care was provided today including removal of foreign " object from patient's right thumb.  Verbal consent was obtained.  Discussed risk and benefits with patient including possible infection or bleeding.  Used alcohol pad  To clean off patient's thumb.  Used a 18-gauge needle to remove foreign object.  Very small amount of bleeding occurred and was controlled with a bandage.  Patient advised to change bandage at least 3 times per day.  Patient should call with any concerns        Follow Up:   Return if symptoms worsen or fail to improve.    Adrian De Jesus DO  [facility] [place]

## 2024-09-13 ENCOUNTER — TELEPHONE (OUTPATIENT)
Dept: GASTROENTEROLOGY | Facility: CLINIC | Age: 34
End: 2024-09-13
Payer: MEDICAID

## 2024-10-31 ENCOUNTER — OFFICE VISIT (OUTPATIENT)
Dept: FAMILY MEDICINE CLINIC | Facility: CLINIC | Age: 34
End: 2024-10-31
Payer: MEDICAID

## 2024-10-31 VITALS
DIASTOLIC BLOOD PRESSURE: 78 MMHG | TEMPERATURE: 98.8 F | RESPIRATION RATE: 16 BRPM | OXYGEN SATURATION: 97 % | HEART RATE: 70 BPM | SYSTOLIC BLOOD PRESSURE: 104 MMHG | WEIGHT: 206 LBS | BODY MASS INDEX: 32.33 KG/M2 | HEIGHT: 67 IN

## 2024-10-31 DIAGNOSIS — M99.02 SOMATIC DYSFUNCTION OF THORACIC REGION: ICD-10-CM

## 2024-10-31 DIAGNOSIS — M99.08 SOMATIC DYSFUNCTION OF RIB CAGE REGION: ICD-10-CM

## 2024-10-31 DIAGNOSIS — R82.998 FOAMY URINE: ICD-10-CM

## 2024-10-31 DIAGNOSIS — R07.81 RIB PAIN: Primary | ICD-10-CM

## 2024-10-31 LAB
BILIRUB UR QL STRIP: NEGATIVE
CLARITY UR: CLEAR
COLOR UR: YELLOW
GLUCOSE UR STRIP-MCNC: NEGATIVE MG/DL
HGB UR QL STRIP.AUTO: NEGATIVE
HOLD SPECIMEN: NORMAL
KETONES UR QL STRIP: NEGATIVE
LEUKOCYTE ESTERASE UR QL STRIP.AUTO: NEGATIVE
NITRITE UR QL STRIP: NEGATIVE
PH UR STRIP.AUTO: 5.5 [PH] (ref 5–8)
PROT UR QL STRIP: NEGATIVE
SP GR UR STRIP: 1.03 (ref 1–1.03)
UROBILINOGEN UR QL STRIP: NORMAL

## 2024-10-31 PROCEDURE — 81003 URINALYSIS AUTO W/O SCOPE: CPT | Performed by: FAMILY MEDICINE

## 2024-10-31 RX ORDER — MUPIROCIN 20 MG/G
OINTMENT TOPICAL
COMMUNITY
Start: 2024-09-15

## 2024-10-31 NOTE — PROGRESS NOTES
Follow Up Office Visit      Patient Name: Meliton Lagos  : 1990   MRN: [id number]     Chief Complaint:    Chief Complaint   Patient presents with    Chest Pain     Follow up       History of Present Illness: Meliton Lagos is a 34 y.o. male who is here today to follow up with this pain on the left-seems like he has a rib dysfunction is causing the pain.  He sent the patient to PT.  Patient reports that PT is not helping.  He says it make it at a little bit worse.  He says the pain is worse with activity    Patient reports foamy urine and would like a UA.    Physical exam: Rib dysfunction noted.throacic dysfunction noted.       Subjective        I have reviewed and the following portions of the patient's history were updated as appropriate: past family history, past medical history, past social history, past surgical history and problem list.    Medications:     Current Outpatient Medications:     clotrimazole-betamethasone (LOTRISONE) 1-0.05 % cream, Apply 1 Application topically to the appropriate area as directed 2 (Two) Times a Day., Disp: 45 g, Rfl: 1    mupirocin (BACTROBAN) 2 % ointment, , Disp: , Rfl:     omeprazole (priLOSEC) 40 MG capsule, Take 1 capsule by mouth Daily., Disp: , Rfl:     vitamin D (ERGOCALCIFEROL) 1.25 MG (09732 UT) capsule capsule, Take 1 capsule by mouth 1 (One) Time Per Week., Disp: 8 capsule, Rfl: 0    Allergies:   Allergies   Allergen Reactions    Amoxicillin Other (See Comments)     Chest pain    Bactrim [Sulfamethoxazole-Trimethoprim] Other (See Comments)     Chest pain    Clindamycin Other (See Comments)     Medication causes patient to have chest pain    Ct Contrast GI Intolerance       Objective     Physical Exam: Please see above  Vital Signs:   Vitals:    10/31/24 1619   BP: 104/78   BP Location: Left arm   Patient Position: Sitting   Cuff Size: Adult   Pulse: 70   Resp: 16   Temp: 98.8 °F (37.1 °C)   TempSrc: Infrared   SpO2: 97%   Weight: 93.4 kg (206  "lb)   Height: 170.2 cm (67\")   PainSc: 0-No pain     Body mass index is 32.26 kg/m².          Assessment / Plan      Assessment/Plan:   Diagnoses and all orders for this visit:    1. Rib pain (Primary)  -     Cancel: XR Ribs Left With PA Chest; Future  -     XR Ribs Left With PA Chest; Future    2. Somatic dysfunction of thoracic region    3. Somatic dysfunction of rib cage region    4. Foamy urine  -     Urinalysis With Culture If Indicated - Urine, Clean Catch; Future    Will get x-rays to rule out any abnormalities.  Will send patient to chiropractor for further adjustments and treatment      Discussed benefits and risk of OMT.  Performed HVLA to rib and thoracic region.  Patient tolerated well without any acute complications.  Had mild improvement in stiffness.      Recommend that he go to chiropractor for further adjustments.  He may need weekly adjustments would be easier in the clinic.  He can follow-up with myself in 4 to 6 weeks for additional treatment if needed.  Also recommend doing at home exercises for ribs and upper back.  I reviewed video with patient and advised him to do a daily    Follow Up:   Return in 5 weeks (on 12/5/2024), or if symptoms worsen or fail to improve.    Adrian De Jesus, DO  [facility] [place]   "

## 2024-11-07 ENCOUNTER — PRIOR AUTHORIZATION (OUTPATIENT)
Dept: GASTROENTEROLOGY | Facility: CLINIC | Age: 34
End: 2024-11-07
Payer: MEDICAID

## 2024-11-07 NOTE — TELEPHONE ENCOUNTER
A Prior Authorization has been started for Omeprazole through [employer].     Information regarding your request  Member should be able to get the drug/product without a PA at this time.

## 2025-01-02 ENCOUNTER — TELEPHONE (OUTPATIENT)
Dept: FAMILY MEDICINE CLINIC | Facility: CLINIC | Age: 35
End: 2025-01-02

## 2025-01-15 ENCOUNTER — OFFICE VISIT (OUTPATIENT)
Dept: FAMILY MEDICINE CLINIC | Facility: CLINIC | Age: 35
End: 2025-01-15
Payer: MEDICAID

## 2025-01-15 VITALS
WEIGHT: 218.8 LBS | OXYGEN SATURATION: 98 % | HEART RATE: 103 BPM | DIASTOLIC BLOOD PRESSURE: 72 MMHG | RESPIRATION RATE: 20 BRPM | SYSTOLIC BLOOD PRESSURE: 116 MMHG | TEMPERATURE: 98 F | HEIGHT: 67 IN | BODY MASS INDEX: 34.34 KG/M2

## 2025-01-15 DIAGNOSIS — M25.50 GENERALIZED JOINT PAIN: Primary | ICD-10-CM

## 2025-01-15 DIAGNOSIS — K21.9 CHRONIC GERD: Chronic | ICD-10-CM

## 2025-01-15 DIAGNOSIS — R21 RASH OF GROIN: ICD-10-CM

## 2025-01-15 DIAGNOSIS — E55.9 VITAMIN D DEFICIENCY: ICD-10-CM

## 2025-01-15 RX ORDER — NYSTATIN AND TRIAMCINOLONE ACETONIDE 100000; 1 [USP'U]/G; MG/G
1 OINTMENT TOPICAL 2 TIMES DAILY
Qty: 60 G | Refills: 1 | Status: SHIPPED | OUTPATIENT
Start: 2025-01-15

## 2025-01-15 RX ORDER — OMEPRAZOLE 40 MG/1
40 CAPSULE, DELAYED RELEASE ORAL DAILY
Qty: 90 CAPSULE | Refills: 1 | Status: SHIPPED | OUTPATIENT
Start: 2025-01-15

## 2025-01-15 RX ORDER — ERGOCALCIFEROL 1.25 MG/1
50000 CAPSULE, LIQUID FILLED ORAL WEEKLY
Qty: 12 CAPSULE | Refills: 1 | Status: SHIPPED | OUTPATIENT
Start: 2025-01-15

## 2025-01-15 NOTE — PROGRESS NOTES
Follow Up Office Visit      Patient Name: Meliton Lagos  : 1990   MRN: [id number]     Chief Complaint:    Chief Complaint   Patient presents with    Pain     Joint pain, no other symptoms, no known exposure to illness        History of Present Illness  The patient presents for evaluation of generalized joint pain.    He began experiencing generalized body pain, specifically in the joints and muscles, two days ago. He also reports a sensation of heat in his face and discomfort around his eyes. His children and wife have reported similar symptoms. He reports no fevers but describes a sensation akin to a fever localized in his head. This morning, he experienced a headache, and yesterday, he felt nauseous. He is not experiencing shortness of breath or coughing, sore throat, congestion.He has been managing his symptoms with Tylenol which he states helps significantly. He recalls a similar episode in  when he contracted COVID-19, though this was accompanied by shortness of breath and coughing.    Patient is requesting a refill of mycolog ointment stating it is typically more effective than lotrisone which was previously prescribed for him. States he has a recurrent rash of his groin that he describes as sticky at this time, though states it was previously erythematous and pruritic.     Lastly, patient is requesting a refill of his vitamin D supplement and omeprazole.          Subjective      I have reviewed and the following portions of the patient's history were updated as appropriate: past family history, past medical history, past social history, past surgical history and problem list.    Medications:     Current Outpatient Medications:     clotrimazole-betamethasone (LOTRISONE) 1-0.05 % cream, Apply 1 Application topically to the appropriate area as directed 2 (Two) Times a Day., Disp: 45 g, Rfl: 1    omeprazole (priLOSEC) 40 MG capsule, Take 1 capsule by mouth Daily., Disp: 90 capsule, Rfl: 1     "nystatin-triamcinolone (MYCOLOG) 347320-6.1 UNIT/GM-% ointment, Apply 1 Application topically to the appropriate area as directed 2 (Two) Times a Day., Disp: 60 g, Rfl: 1    vitamin D (ERGOCALCIFEROL) 1.25 MG (00525 UT) capsule capsule, Take 1 capsule by mouth 1 (One) Time Per Week., Disp: 12 capsule, Rfl: 1    Allergies:   Allergies   Allergen Reactions    Amoxicillin Other (See Comments)     Chest pain    Bactrim [Sulfamethoxazole-Trimethoprim] Other (See Comments)     Chest pain    Clindamycin Other (See Comments)     Medication causes patient to have chest pain    Ct Contrast GI Intolerance       Objective     Physical Exam:   Physical Exam  Constitutional:       General: He is not in acute distress.     Appearance: He is not ill-appearing.   HENT:      Head: Normocephalic.      Right Ear: Tympanic membrane and ear canal normal.      Left Ear: Tympanic membrane and ear canal normal.      Nose: Nose normal.      Mouth/Throat:      Mouth: Mucous membranes are moist.      Pharynx: No oropharyngeal exudate or posterior oropharyngeal erythema.   Eyes:      Pupils: Pupils are equal, round, and reactive to light.   Cardiovascular:      Rate and Rhythm: Normal rate and regular rhythm.      Heart sounds: No murmur heard.  Pulmonary:      Effort: Pulmonary effort is normal. No respiratory distress.      Breath sounds: Normal breath sounds. No wheezing, rhonchi or rales.   Musculoskeletal:         General: Normal range of motion.   Skin:     General: Skin is warm.      Comments: Patient refuses exam of rash on groin   Neurological:      General: No focal deficit present.      Mental Status: He is alert.   Psychiatric:         Mood and Affect: Mood normal.         Vital Signs:   Vitals:    01/15/25 1535   BP: 116/72   BP Location: Right arm   Patient Position: Sitting   Cuff Size: Adult   Pulse: 103   Resp: 20   Temp: 98 °F (36.7 °C)   TempSrc: Temporal   SpO2: 98%   Weight: 99.2 kg (218 lb 12.8 oz)   Height: 170.2 cm (67\") "   PainSc:   7   PainLoc: Generalized     Body mass index is 34.27 kg/m².         Procedures    Assessment / Plan         Assessment and Plan     Diagnoses and all orders for this visit:    1. Generalized joint pain (Primary)  Assessment & Plan:  Symptoms consistent with possible viral infection  Point-of-care COVID and flu test negative.  Recommended supportive and symptomatic treatment.  Pain controlled at this time with Tylenol which patient has been encouraged to continue.  He is requesting blood work today.  Will obtain CBC, CMP, inflammatory markers and monoscreen      Orders:  -     Comprehensive Metabolic Panel; Future  -     CBC & Differential; Future  -     C-reactive Protein; Future  -     Sedimentation Rate; Future  -     EBV Antibody Profile; Future  -     POCT SARS-CoV-2 Antigen DARCY  -     POCT Influenza A/B    2. Rash of groin  Assessment & Plan:  Recurrent issue per patient.  Did not improve with Lotrisone cream  Previously well-controlled with Mycolog ointment  Declines examination of rash in groin today    Orders:  -     nystatin-triamcinolone (MYCOLOG) 937596-5.1 UNIT/GM-% ointment; Apply 1 Application topically to the appropriate area as directed 2 (Two) Times a Day.  Dispense: 60 g; Refill: 1    3. Vitamin D deficiency  Assessment & Plan:  Requesting refill of weekly vitamin D supplementation and is requesting repeat vitamin D levels with labs today      Orders:  -     Vitamin D,25-Hydroxy; Future  -     vitamin D (ERGOCALCIFEROL) 1.25 MG (37220 UT) capsule capsule; Take 1 capsule by mouth 1 (One) Time Per Week.  Dispense: 12 capsule; Refill: 1    4. Chronic GERD  Assessment & Plan:  Well-controlled with omeprazole 40 mg  Refill sent today    Orders:  -     omeprazole (priLOSEC) 40 MG capsule; Take 1 capsule by mouth Daily.  Dispense: 90 capsule; Refill: 1             Follow Up:   Return if symptoms worsen or fail to improve, for Next scheduled follow up.    Patient or patient representative  verbalized consent for the use of Ambient Listening during the visit with  Mabel Mcnulty PA-C for chart documentation. 1/16/2025  15:38 EST    Mabel Mcnulty PA-C    [facility]

## 2025-01-16 PROBLEM — R21 RASH OF GROIN: Status: ACTIVE | Noted: 2025-01-16

## 2025-01-16 PROBLEM — M25.50 GENERALIZED JOINT PAIN: Status: ACTIVE | Noted: 2025-01-16

## 2025-01-16 LAB
EXPIRATION DATE: NORMAL
EXPIRATION DATE: NORMAL
FLUAV AG NPH QL: NEGATIVE
FLUBV AG NPH QL: NEGATIVE
INTERNAL CONTROL: NORMAL
INTERNAL CONTROL: NORMAL
Lab: NORMAL
Lab: NORMAL
SARS-COV-2 AG UPPER RESP QL IA.RAPID: NOT DETECTED

## 2025-01-16 NOTE — ASSESSMENT & PLAN NOTE
Recurrent issue per patient.  Did not improve with Lotrisone cream  Previously well-controlled with Mycolog ointment  Declines examination of rash in groin today

## 2025-01-16 NOTE — ASSESSMENT & PLAN NOTE
Symptoms consistent with possible viral infection  Point-of-care COVID and flu test negative.  Recommended supportive and symptomatic treatment.  Pain controlled at this time with Tylenol which patient has been encouraged to continue.  He is requesting blood work today.  Will obtain CBC, CMP, inflammatory markers and monoscreen

## 2025-01-16 NOTE — ASSESSMENT & PLAN NOTE
Requesting refill of weekly vitamin D supplementation and is requesting repeat vitamin D levels with labs today

## 2025-01-20 ENCOUNTER — LAB (OUTPATIENT)
Dept: LAB | Facility: HOSPITAL | Age: 35
End: 2025-01-20
Payer: MEDICAID

## 2025-01-20 DIAGNOSIS — M25.50 GENERALIZED JOINT PAIN: ICD-10-CM

## 2025-01-20 DIAGNOSIS — E55.9 VITAMIN D DEFICIENCY: ICD-10-CM

## 2025-01-20 LAB
25(OH)D3 SERPL-MCNC: 25.6 NG/ML (ref 30–100)
ALBUMIN SERPL-MCNC: 4.4 G/DL (ref 3.5–5.2)
ALBUMIN/GLOB SERPL: 1.3 G/DL
ALP SERPL-CCNC: 78 U/L (ref 39–117)
ALT SERPL W P-5'-P-CCNC: 79 U/L (ref 1–41)
ANION GAP SERPL CALCULATED.3IONS-SCNC: 10.1 MMOL/L (ref 5–15)
AST SERPL-CCNC: 48 U/L (ref 1–40)
BASOPHILS # BLD AUTO: 0.05 10*3/MM3 (ref 0–0.2)
BASOPHILS NFR BLD AUTO: 1.1 % (ref 0–1.5)
BILIRUB SERPL-MCNC: 0.7 MG/DL (ref 0–1.2)
BUN SERPL-MCNC: 16 MG/DL (ref 6–20)
BUN/CREAT SERPL: 16 (ref 7–25)
CALCIUM SPEC-SCNC: 9.5 MG/DL (ref 8.6–10.5)
CHLORIDE SERPL-SCNC: 100 MMOL/L (ref 98–107)
CO2 SERPL-SCNC: 25.9 MMOL/L (ref 22–29)
CREAT SERPL-MCNC: 1 MG/DL (ref 0.76–1.27)
CRP SERPL-MCNC: 0.35 MG/DL (ref 0–0.5)
DEPRECATED RDW RBC AUTO: 35.4 FL (ref 37–54)
EGFRCR SERPLBLD CKD-EPI 2021: 100 ML/MIN/1.73
EOSINOPHIL # BLD AUTO: 0.17 10*3/MM3 (ref 0–0.4)
EOSINOPHIL NFR BLD AUTO: 3.7 % (ref 0.3–6.2)
ERYTHROCYTE [DISTWIDTH] IN BLOOD BY AUTOMATED COUNT: 11.8 % (ref 12.3–15.4)
ERYTHROCYTE [SEDIMENTATION RATE] IN BLOOD: 15 MM/HR (ref 0–15)
GLOBULIN UR ELPH-MCNC: 3.5 GM/DL
GLUCOSE SERPL-MCNC: 96 MG/DL (ref 65–99)
HCT VFR BLD AUTO: 42.4 % (ref 37.5–51)
HGB BLD-MCNC: 14.7 G/DL (ref 13–17.7)
IMM GRANULOCYTES # BLD AUTO: 0.05 10*3/MM3 (ref 0–0.05)
IMM GRANULOCYTES NFR BLD AUTO: 1.1 % (ref 0–0.5)
LYMPHOCYTES # BLD AUTO: 1.01 10*3/MM3 (ref 0.7–3.1)
LYMPHOCYTES NFR BLD AUTO: 22 % (ref 19.6–45.3)
MCH RBC QN AUTO: 28.8 PG (ref 26.6–33)
MCHC RBC AUTO-ENTMCNC: 34.7 G/DL (ref 31.5–35.7)
MCV RBC AUTO: 83.1 FL (ref 79–97)
MONOCYTES # BLD AUTO: 0.42 10*3/MM3 (ref 0.1–0.9)
MONOCYTES NFR BLD AUTO: 9.2 % (ref 5–12)
NEUTROPHILS NFR BLD AUTO: 2.89 10*3/MM3 (ref 1.7–7)
NEUTROPHILS NFR BLD AUTO: 62.9 % (ref 42.7–76)
NRBC BLD AUTO-RTO: 0 /100 WBC (ref 0–0.2)
PLATELET # BLD AUTO: 321 10*3/MM3 (ref 140–450)
PMV BLD AUTO: 10.1 FL (ref 6–12)
POTASSIUM SERPL-SCNC: 4.1 MMOL/L (ref 3.5–5.2)
PROT SERPL-MCNC: 7.9 G/DL (ref 6–8.5)
RBC # BLD AUTO: 5.1 10*6/MM3 (ref 4.14–5.8)
SODIUM SERPL-SCNC: 136 MMOL/L (ref 136–145)
WBC NRBC COR # BLD AUTO: 4.59 10*3/MM3 (ref 3.4–10.8)

## 2025-01-20 PROCEDURE — 82306 VITAMIN D 25 HYDROXY: CPT

## 2025-01-20 PROCEDURE — 85652 RBC SED RATE AUTOMATED: CPT

## 2025-01-20 PROCEDURE — 86664 EPSTEIN-BARR NUCLEAR ANTIGEN: CPT

## 2025-01-20 PROCEDURE — 86665 EPSTEIN-BARR CAPSID VCA: CPT

## 2025-01-20 PROCEDURE — 80053 COMPREHEN METABOLIC PANEL: CPT

## 2025-01-20 PROCEDURE — 86140 C-REACTIVE PROTEIN: CPT

## 2025-01-20 PROCEDURE — 36415 COLL VENOUS BLD VENIPUNCTURE: CPT

## 2025-01-20 PROCEDURE — 85025 COMPLETE CBC W/AUTO DIFF WBC: CPT

## 2025-01-22 ENCOUNTER — TELEPHONE (OUTPATIENT)
Dept: FAMILY MEDICINE CLINIC | Facility: CLINIC | Age: 35
End: 2025-01-22
Payer: MEDICAID

## 2025-01-22 LAB
EBV NA IGG SER IA-ACNC: >600 U/ML (ref 0–17.9)
EBV VCA IGG SER IA-ACNC: 435 U/ML (ref 0–17.9)
EBV VCA IGM SER IA-ACNC: 62.5 U/ML (ref 0–35.9)
SERVICE CMNT-IMP: ABNORMAL

## 2025-01-22 NOTE — TELEPHONE ENCOUNTER
Caller: Meliton Lagos    Relationship: Self    Best call back number:[phone]     Caller requesting test results: SELF    What test was performed: JAYE GLEZ    When was the test performed: 1.20.25    Where was the test performed: AT [facility] OFFICE    Additional notes: PATIENT IS REQUESTING A MORE IN-DEPTH EXPLANATION. IN HIS MYCHART THE RESULTS SAY HIGH AND HE IS NOT SURE WHAT

## 2025-02-09 ENCOUNTER — HOSPITAL ENCOUNTER (EMERGENCY)
Facility: HOSPITAL | Age: 35
Discharge: HOME | End: 2025-02-09
Attending: EMERGENCY MEDICINE | Admitting: EMERGENCY MEDICINE
Payer: MEDICAID

## 2025-02-09 ENCOUNTER — APPOINTMENT (OUTPATIENT)
Dept: GENERAL RADIOLOGY | Facility: HOSPITAL | Age: 35
End: 2025-02-09
Payer: MEDICAID

## 2025-02-09 VITALS
HEART RATE: 101 BPM | TEMPERATURE: 98.3 F | WEIGHT: 198 LBS | SYSTOLIC BLOOD PRESSURE: 140 MMHG | DIASTOLIC BLOOD PRESSURE: 104 MMHG | BODY MASS INDEX: 31.08 KG/M2 | HEIGHT: 67 IN | RESPIRATION RATE: 16 BRPM | OXYGEN SATURATION: 99 %

## 2025-02-09 DIAGNOSIS — R06.02 SOB (SHORTNESS OF BREATH) ON EXERTION: ICD-10-CM

## 2025-02-09 DIAGNOSIS — R07.9 CHEST PAIN, UNSPECIFIED TYPE: Primary | ICD-10-CM

## 2025-02-09 LAB
ALBUMIN SERPL-MCNC: 4.8 G/DL (ref 3.5–5.2)
ALBUMIN/GLOB SERPL: 1.8 G/DL
ALP SERPL-CCNC: 74 U/L (ref 39–117)
ALT SERPL W P-5'-P-CCNC: 39 U/L (ref 1–41)
ANION GAP SERPL CALCULATED.3IONS-SCNC: 14 MMOL/L (ref 5–15)
AST SERPL-CCNC: 23 U/L (ref 1–40)
BASOPHILS # BLD AUTO: 0.04 10*3/MM3 (ref 0–0.2)
BASOPHILS NFR BLD AUTO: 0.8 % (ref 0–1.5)
BILIRUB SERPL-MCNC: 0.9 MG/DL (ref 0–1.2)
BUN SERPL-MCNC: 21 MG/DL (ref 6–20)
BUN/CREAT SERPL: 24.7 (ref 7–25)
CALCIUM SPEC-SCNC: 9.5 MG/DL (ref 8.6–10.5)
CHLORIDE SERPL-SCNC: 103 MMOL/L (ref 98–107)
CO2 SERPL-SCNC: 24 MMOL/L (ref 22–29)
CREAT SERPL-MCNC: 0.85 MG/DL (ref 0.76–1.27)
D DIMER PPP FEU-MCNC: <0.27 MCGFEU/ML (ref 0–0.5)
DEPRECATED RDW RBC AUTO: 39.2 FL (ref 37–54)
EGFRCR SERPLBLD CKD-EPI 2021: 115.5 ML/MIN/1.73
EOSINOPHIL # BLD AUTO: 0.1 10*3/MM3 (ref 0–0.4)
EOSINOPHIL NFR BLD AUTO: 2.1 % (ref 0.3–6.2)
ERYTHROCYTE [DISTWIDTH] IN BLOOD BY AUTOMATED COUNT: 12.8 % (ref 12.3–15.4)
GLOBULIN UR ELPH-MCNC: 2.7 GM/DL
GLUCOSE SERPL-MCNC: 98 MG/DL (ref 65–99)
HCT VFR BLD AUTO: 42.6 % (ref 37.5–51)
HGB BLD-MCNC: 14.3 G/DL (ref 13–17.7)
HOLD SPECIMEN: NORMAL
IMM GRANULOCYTES # BLD AUTO: 0.01 10*3/MM3 (ref 0–0.05)
IMM GRANULOCYTES NFR BLD AUTO: 0.2 % (ref 0–0.5)
LYMPHOCYTES # BLD AUTO: 1.72 10*3/MM3 (ref 0.7–3.1)
LYMPHOCYTES NFR BLD AUTO: 35.7 % (ref 19.6–45.3)
MCH RBC QN AUTO: 28.5 PG (ref 26.6–33)
MCHC RBC AUTO-ENTMCNC: 33.6 G/DL (ref 31.5–35.7)
MCV RBC AUTO: 84.9 FL (ref 79–97)
MONOCYTES # BLD AUTO: 0.41 10*3/MM3 (ref 0.1–0.9)
MONOCYTES NFR BLD AUTO: 8.5 % (ref 5–12)
NEUTROPHILS NFR BLD AUTO: 2.54 10*3/MM3 (ref 1.7–7)
NEUTROPHILS NFR BLD AUTO: 52.7 % (ref 42.7–76)
NRBC BLD AUTO-RTO: 0 /100 WBC (ref 0–0.2)
NT-PROBNP SERPL-MCNC: <36 PG/ML (ref 0–450)
PLATELET # BLD AUTO: 228 10*3/MM3 (ref 140–450)
PMV BLD AUTO: 9.4 FL (ref 6–12)
POTASSIUM SERPL-SCNC: 3.6 MMOL/L (ref 3.5–5.2)
PROT SERPL-MCNC: 7.5 G/DL (ref 6–8.5)
RBC # BLD AUTO: 5.02 10*6/MM3 (ref 4.14–5.8)
SODIUM SERPL-SCNC: 141 MMOL/L (ref 136–145)
TROPONIN T SERPL HS-MCNC: <6 NG/L
WBC NRBC COR # BLD AUTO: 4.82 10*3/MM3 (ref 3.4–10.8)
WHOLE BLOOD HOLD COAG: NORMAL
WHOLE BLOOD HOLD SPECIMEN: NORMAL

## 2025-02-09 PROCEDURE — 83880 ASSAY OF NATRIURETIC PEPTIDE: CPT | Performed by: EMERGENCY MEDICINE

## 2025-02-09 PROCEDURE — 84484 ASSAY OF TROPONIN QUANT: CPT | Performed by: EMERGENCY MEDICINE

## 2025-02-09 PROCEDURE — 93005 ELECTROCARDIOGRAM TRACING: CPT | Performed by: EMERGENCY MEDICINE

## 2025-02-09 PROCEDURE — 93005 ELECTROCARDIOGRAM TRACING: CPT

## 2025-02-09 PROCEDURE — 80053 COMPREHEN METABOLIC PANEL: CPT | Performed by: EMERGENCY MEDICINE

## 2025-02-09 PROCEDURE — 71045 X-RAY EXAM CHEST 1 VIEW: CPT

## 2025-02-09 PROCEDURE — 99284 EMERGENCY DEPT VISIT MOD MDM: CPT

## 2025-02-09 PROCEDURE — 36415 COLL VENOUS BLD VENIPUNCTURE: CPT

## 2025-02-09 PROCEDURE — 85025 COMPLETE CBC W/AUTO DIFF WBC: CPT | Performed by: EMERGENCY MEDICINE

## 2025-02-09 PROCEDURE — 85379 FIBRIN DEGRADATION QUANT: CPT | Performed by: PHYSICIAN ASSISTANT

## 2025-02-09 RX ORDER — METHYLPREDNISOLONE 4 MG/1
TABLET ORAL
Qty: 1 EACH | Refills: 0 | Status: SHIPPED | OUTPATIENT
Start: 2025-02-09

## 2025-02-09 RX ORDER — SUCRALFATE 1 G/1
1 TABLET ORAL 4 TIMES DAILY
Qty: 40 TABLET | Refills: 0 | Status: SHIPPED | OUTPATIENT
Start: 2025-02-09 | End: 2025-02-19

## 2025-02-09 RX ORDER — SODIUM CHLORIDE 0.9 % (FLUSH) 0.9 %
10 SYRINGE (ML) INJECTION AS NEEDED
Status: DISCONTINUED | OUTPATIENT
Start: 2025-02-09 | End: 2025-02-09 | Stop reason: HOSPADM

## 2025-02-09 RX ORDER — ALUMINA, MAGNESIA, AND SIMETHICONE 2400; 2400; 240 MG/30ML; MG/30ML; MG/30ML
30 SUSPENSION ORAL ONCE
Status: COMPLETED | OUTPATIENT
Start: 2025-02-09 | End: 2025-02-09

## 2025-02-09 RX ORDER — FAMOTIDINE 10 MG/ML
20 INJECTION, SOLUTION INTRAVENOUS ONCE
Status: DISCONTINUED | OUTPATIENT
Start: 2025-02-09 | End: 2025-02-09

## 2025-02-09 RX ADMIN — ALUMINUM HYDROXIDE, MAGNESIUM HYDROXIDE, DIMETHICONE 30 ML: 400; 400; 40 SUSPENSION ORAL at 16:45

## 2025-02-09 NOTE — ED PROVIDER NOTES
Subjective   History of Present Illness  Pt is a 33 yo male presenting to ED with complaints of CP and SOB. PMHx significant for GERD, Behects, Migraines and Lymphoma. Pt reports having a illness several weeks ago where he had fatigue, body aches and joint pain. He was evaluated by PCP and told likely viral. He then developed SOB and CP 2 weeks ago that is worse with exertion. He denies fever, dizziness, congestion, N/V/D, abdominal pain or leg swelling. He denies recent antibiotics. He denies pain is worse with eating. He reports compliance with his meds. He denies hx of PE or DVT. He denies tobacco, drug or ETOH.     Reviewed old records and noted normal stress 8/2023, normal CTA coronaries 2/2024 and normal holter 5/2024    History provided by:  Patient and medical records      Review of Systems   Constitutional:  Positive for fatigue. Negative for fever.   HENT:  Negative for congestion and sore throat.    Eyes:  Negative for visual disturbance.   Respiratory:  Positive for shortness of breath. Negative for cough.    Cardiovascular:  Positive for chest pain. Negative for palpitations and leg swelling.   Gastrointestinal:  Negative for abdominal pain, diarrhea, nausea and vomiting.   Genitourinary:  Negative for difficulty urinating.   Musculoskeletal:  Negative for back pain.   Neurological:  Negative for dizziness, weakness, numbness and headaches.   Psychiatric/Behavioral:  Negative for confusion.        Past Medical History:   Diagnosis Date    GERD (gastroesophageal reflux disease)     Lymphoma        Allergies   Allergen Reactions    Amoxicillin Other (See Comments)     Chest pain    Bactrim [Sulfamethoxazole-Trimethoprim] Other (See Comments)     Chest pain    Clindamycin Other (See Comments)     Medication causes patient to have chest pain    Ct Contrast GI Intolerance       Past Surgical History:   Procedure Laterality Date    ANAL FISTULA REPAIR  2009    CHOLECYSTECTOMY      COLONOSCOPY  2019     [facility]    ENDOSCOPY  2021    Dr. Mobley empiric dilation performed bx suggestive of esophagitis    ENDOSCOPY  2020    EGD Dr. Mobley, empiric dilation erosive esophagitis       Family History   Problem Relation Age of Onset    No Known Problems Mother     No Known Problems Father     Heart attack Maternal Grandfather     Heart disease Maternal Grandfather     No Known Problems Paternal Grandmother     No Known Problems Paternal Grandfather     Colon cancer Neg Hx     Colon polyps Neg Hx        Social History     Socioeconomic History    Marital status:    Tobacco Use    Smoking status: Former     Current packs/day: 0.00     Average packs/day: 1 pack/day for 5.0 years (5.0 ttl pk-yrs)     Types: Cigarettes     Start date: 2012     Quit date: 2016     Years since quittin.1     Passive exposure: Past    Smokeless tobacco: Never   Vaping Use    Vaping status: Never Used   Substance and Sexual Activity    Alcohol use: Yes     Comment: rare    Drug use: No    Sexual activity: Yes     Partners: Female     Birth control/protection: None           Objective   Physical Exam  Vitals and nursing note reviewed.   Constitutional:       Appearance: He is well-developed.   HENT:      Head: Atraumatic.      Nose: Nose normal.   Eyes:      General: Lids are normal.      Conjunctiva/sclera: Conjunctivae normal.      Pupils: Pupils are equal, round, and reactive to light.   Cardiovascular:      Rate and Rhythm: Normal rate and regular rhythm.      Heart sounds: Normal heart sounds.   Pulmonary:      Effort: Pulmonary effort is normal.      Breath sounds: Normal breath sounds. No decreased breath sounds.   Chest:      Chest wall: No tenderness.   Abdominal:      General: There is no distension.      Palpations: Abdomen is soft.      Tenderness: There is no abdominal tenderness. There is no guarding or rebound.   Musculoskeletal:         General: No tenderness or deformity. Normal  range of motion.      Cervical back: Normal range of motion and neck supple.   Skin:     General: Skin is warm and dry.   Neurological:      Mental Status: He is alert and oriented to person, place, and time.      Sensory: No sensory deficit.   Psychiatric:         Behavior: Behavior normal.         Procedures           ED Course  ED Course as of 02/09/25 2128   Sun Feb 09, 2025   1450 EKG personally reviewed and interpreted with findings of NSR with rate 91.  [RT]   1452 I personally reviewed and interpreted labs results and went over with patient.   I personally reviewed and interpreted vitals.   [RT]   1452 SpO2: 99 %  RA [RT]   1452 BP(!): 140/104  Noted elevated BP [RT]   1452 Temp: 98.3 °F (36.8 °C) [RT]   1547 I personally and independently reviewed CXR and agree with the radiology interpretation specifically no acute findings.  [RT]   1624 WBC: 4.82 [RT]   1624 Glucose: 98 [RT]   1624 Creatinine: 0.85 [RT]   1624 Potassium: 3.6 [RT]   1624 proBNP: <36.0 [RT]   1624 HS Troponin T: <6 [RT]   1624 D-Dimer, Quant: <0.27  Rules out PE [RT]   1700 Re-examined patient and updated on results and tx plan. He will f/u with PCP and Cards.  [RT]      ED Course User Index  [RT] Nina Nunez, PA      Recent Results (from the past 24 hours)   ECG 12 Lead ED Triage Standing Order; SOA    Collection Time: 02/09/25  2:38 PM   Result Value Ref Range    QT Interval 360 ms    QTC Interval 442 ms   Comprehensive Metabolic Panel    Collection Time: 02/09/25  2:41 PM    Specimen: Blood   Result Value Ref Range    Glucose 98 65 - 99 mg/dL    BUN 21 (H) 6 - 20 mg/dL    Creatinine 0.85 0.76 - 1.27 mg/dL    Sodium 141 136 - 145 mmol/L    Potassium 3.6 3.5 - 5.2 mmol/L    Chloride 103 98 - 107 mmol/L    CO2 24.0 22.0 - 29.0 mmol/L    Calcium 9.5 8.6 - 10.5 mg/dL    Total Protein 7.5 6.0 - 8.5 g/dL    Albumin 4.8 3.5 - 5.2 g/dL    ALT (SGPT) 39 1 - 41 U/L    AST (SGOT) 23 1 - 40 U/L    Alkaline Phosphatase 74 39 - 117 U/L    Total  Bilirubin 0.9 0.0 - 1.2 mg/dL    Globulin 2.7 gm/dL    A/G Ratio 1.8 g/dL    BUN/Creatinine Ratio 24.7 7.0 - 25.0    Anion Gap 14.0 5.0 - 15.0 mmol/L    eGFR 115.5 >60.0 mL/min/1.73   BNP    Collection Time: 02/09/25  2:41 PM    Specimen: Blood   Result Value Ref Range    proBNP <36.0 0.0 - 450.0 pg/mL   High Sensitivity Troponin T    Collection Time: 02/09/25  2:41 PM    Specimen: Blood   Result Value Ref Range    HS Troponin T <6 <22 ng/L   Green Top (Gel)    Collection Time: 02/09/25  2:41 PM   Result Value Ref Range    Extra Tube Hold for add-ons.    Lavender Top    Collection Time: 02/09/25  2:41 PM   Result Value Ref Range    Extra Tube hold for add-on    Gold Top - SST    Collection Time: 02/09/25  2:41 PM   Result Value Ref Range    Extra Tube Hold for add-ons.    Gray Top    Collection Time: 02/09/25  2:41 PM   Result Value Ref Range    Extra Tube Hold for add-ons.    Light Blue Top    Collection Time: 02/09/25  2:41 PM   Result Value Ref Range    Extra Tube Hold for add-ons.    CBC Auto Differential    Collection Time: 02/09/25  2:41 PM    Specimen: Blood   Result Value Ref Range    WBC 4.82 3.40 - 10.80 10*3/mm3    RBC 5.02 4.14 - 5.80 10*6/mm3    Hemoglobin 14.3 13.0 - 17.7 g/dL    Hematocrit 42.6 37.5 - 51.0 %    MCV 84.9 79.0 - 97.0 fL    MCH 28.5 26.6 - 33.0 pg    MCHC 33.6 31.5 - 35.7 g/dL    RDW 12.8 12.3 - 15.4 %    RDW-SD 39.2 37.0 - 54.0 fl    MPV 9.4 6.0 - 12.0 fL    Platelets 228 140 - 450 10*3/mm3    Neutrophil % 52.7 42.7 - 76.0 %    Lymphocyte % 35.7 19.6 - 45.3 %    Monocyte % 8.5 5.0 - 12.0 %    Eosinophil % 2.1 0.3 - 6.2 %    Basophil % 0.8 0.0 - 1.5 %    Immature Grans % 0.2 0.0 - 0.5 %    Neutrophils, Absolute 2.54 1.70 - 7.00 10*3/mm3    Lymphocytes, Absolute 1.72 0.70 - 3.10 10*3/mm3    Monocytes, Absolute 0.41 0.10 - 0.90 10*3/mm3    Eosinophils, Absolute 0.10 0.00 - 0.40 10*3/mm3    Basophils, Absolute 0.04 0.00 - 0.20 10*3/mm3    Immature Grans, Absolute 0.01 0.00 - 0.05 10*3/mm3  "   nRBC 0.0 0.0 - 0.2 /100 WBC   D-dimer, Quantitative    Collection Time: 02/09/25  2:41 PM    Specimen: Blood   Result Value Ref Range    D-Dimer, Quantitative <0.27 0.00 - 0.50 MCGFEU/mL     Note: In addition to lab results from this visit, the labs listed above may include labs taken at another facility or during a different encounter within the last 24 hours. Please correlate lab times with ED admission and discharge times for further clarification of the services performed during this visit.    XR Chest 1 View   Final Result   Impression: No acute cardiopulmonary disease.         Electronically Signed: Atul Sanon MD     2/9/2025 3:33 PM EST     Workstation ID: [id number]        Vitals:    02/09/25 1430   BP: (!) 140/104   BP Location: Left arm   Patient Position: Sitting   Pulse: 101   Resp: 16   Temp: 98.3 °F (36.8 °C)   TempSrc: Oral   SpO2: 99%   Weight: 89.8 kg (198 lb)   Height: 170.2 cm (67\")     Medications   aluminum-magnesium hydroxide-simethicone (MAALOX MAX) 400-400-40 MG/5ML suspension 30 mL (30 mL Oral Given 2/9/25 1645)     ECG/EMG Results (last 24 hours)       Procedure Component Value Units Date/Time    ECG 12 Lead ED Triage Standing Order; SOA [[id number]] Collected: 02/09/25 1438     Updated: 02/09/25 1438     QT Interval 360 ms      QTC Interval 442 ms     Narrative:      Test Reason : ED Triage Standing Order~  Blood Pressure :   */*   mmHG  Vent. Rate :  91 BPM     Atrial Rate :  91 BPM     P-R Int : 138 ms          QRS Dur :  96 ms      QT Int : 360 ms       P-R-T Axes :  44  -9  21 degrees    QTcB Int : 442 ms    Normal sinus rhythm  Incomplete right bundle branch block  Borderline ECG  When compared with ECG of 16-May-2024 02:46,  No significant change was found    Referred By: EDMD           Confirmed By:           ECG 12 Lead ED Triage Standing Order; SOA   Preliminary Result   Test Reason : ED Triage Standing Order~   Blood Pressure :   */*   mmHG   Vent. Rate :  91 BPM     Atrial " Rate :  91 BPM      P-R Int : 138 ms          QRS Dur :  96 ms       QT Int : 360 ms       P-R-T Axes :  44  -9  21 degrees     QTcB Int : 442 ms      Normal sinus rhythm   Incomplete right bundle branch block   Borderline ECG   When compared with ECG of 16-May-2024 02:46,   No significant change was found      Referred By: EDMD           Confirmed By:                       HEART Score: 1   Shared Decision Making  I discussed the findings with the patient/patient representative who is in agreement with the treatment plan and the final disposition.  Risks and benefits of discharge and/or observation/admission were discussed: Yes                                      Medical Decision Making  Pt is a 35 yo male presenting to ED with complaints of chest pain for 2 weeks. I had a discussion with the patient / family regarding ED course, diagnosis, diagnostic results and treatment plan including medications and admission / discharge. Discussed if new or worse symptoms / concerns to return to ED for further evaluation. Discussed need for close follow up with PCP / specialists.     DDx  ACS, NSTEMI, PE, Pneumonia, Anxiety, Reflux     Problems Addressed:  Chest pain, unspecified type: complicated acute illness or injury  SOB (shortness of breath) on exertion: complicated acute illness or injury    Amount and/or Complexity of Data Reviewed  External Data Reviewed: notes.     Details: Reviewed previous non ED visits including prior labs, imaging, available notes, medications, allergies and surgical hx.   1-15-25 PCP for joint pain   Labs: ordered. Decision-making details documented in ED Course.  Radiology: ordered and independent interpretation performed. Decision-making details documented in ED Course.  ECG/medicine tests: ordered and independent interpretation performed. Decision-making details documented in ED Course.    Risk  OTC drugs.  Prescription drug management.        Final diagnoses:   Chest pain, unspecified type    SOB (shortness of breath) on exertion       ED Disposition  ED Disposition       ED Disposition   Discharge    Condition   Stable    Comment   --               Adrian De Jesus DO  [address]  [address]  [place] [address]  [phone]    Schedule an appointment as soon as possible for a visit       [facility] EMERGENCY DEPARTMENT  [address]  [facility] [id number] [phone]    If symptoms worsen    Dereck Rodriguez III, MD  [address]  [place] [address]  [phone]               Medication List        New Prescriptions      methylPREDNISolone 4 MG dose pack  Commonly known as: MEDROL  Take as directed on package instructions.     sucralfate 1 g tablet  Commonly known as: CARAFATE  Take 1 tablet by mouth 4 (Four) Times a Day for 10 days.               Where to Get Your Medications        These medications were sent to [facility] [id number] - [place], [place] - [address] - [phone]  - [phone]   [address], [facility] [address]      Phone: [phone]   methylPREDNISolone 4 MG dose pack  sucralfate 1 g tablet            Nina Nnuez PA  02/09/25 0814

## 2025-02-11 LAB
QT INTERVAL: 360 MS
QTC INTERVAL: 442 MS

## 2025-04-16 ENCOUNTER — OFFICE VISIT (OUTPATIENT)
Dept: FAMILY MEDICINE CLINIC | Facility: CLINIC | Age: 35
End: 2025-04-16
Payer: MEDICAID

## 2025-04-16 VITALS
TEMPERATURE: 98.7 F | DIASTOLIC BLOOD PRESSURE: 78 MMHG | HEART RATE: 86 BPM | BODY MASS INDEX: 30.61 KG/M2 | HEIGHT: 67 IN | SYSTOLIC BLOOD PRESSURE: 124 MMHG | OXYGEN SATURATION: 97 % | WEIGHT: 195 LBS

## 2025-04-16 DIAGNOSIS — R82.998 FOAMY URINE: Primary | ICD-10-CM

## 2025-04-16 DIAGNOSIS — M99.08 RIB CAGE DYSFUNCTION: ICD-10-CM

## 2025-04-16 PROCEDURE — 99214 OFFICE O/P EST MOD 30 MIN: CPT | Performed by: FAMILY MEDICINE

## 2025-04-16 PROCEDURE — 1126F AMNT PAIN NOTED NONE PRSNT: CPT | Performed by: FAMILY MEDICINE

## 2025-04-16 RX ORDER — CYCLOBENZAPRINE HCL 10 MG
10 TABLET ORAL 3 TIMES DAILY PRN
Qty: 30 TABLET | Refills: 0 | Status: SHIPPED | OUTPATIENT
Start: 2025-04-16

## 2025-04-16 RX ORDER — PANTOPRAZOLE SODIUM 40 MG/1
40 TABLET, DELAYED RELEASE ORAL
COMMUNITY
Start: 2025-02-12 | End: 2025-05-13

## 2025-04-16 NOTE — PROGRESS NOTES
Follow Up Office Visit      Patient Name: Meliton Lagos  : 1990   MRN: [id number]     Chief Complaint:    Chief Complaint   Patient presents with    referral     Would like referral to nephrology and cardiology       History of Present Illness: Meliton Lagos is a 34 y.o. male who is here today to follow up with continues to have foamy urine and he also has rib pain on the left side.  Has been to PT, chiropractor in the ER.  He went to cardiology who evaluated him and said his heart was fine.  They also think it is a rib issue.    The patient is concerned about the rib pain and it gets worse after activities.  It is better when he does not do activities like working out    He continues to have foamy urine and denies having any chemicals in the toilet.  Patient has had UA before that ruled out infection.  We have not done protein 24-hour check yet.  He wants to see a nephrologist or urologist      Physical exam: Patient's back exam did show somatic function in his thoracic and rib regions      Subjective        I have reviewed and the following portions of the patient's history were updated as appropriate: past family history, past medical history, past social history, past surgical history and problem list.    Medications:     Current Outpatient Medications:     clotrimazole-betamethasone (LOTRISONE) 1-0.05 % cream, Apply 1 Application topically to the appropriate area as directed 2 (Two) Times a Day., Disp: 45 g, Rfl: 1    omeprazole (priLOSEC) 40 MG capsule, Take 1 capsule by mouth Daily., Disp: 90 capsule, Rfl: 1    pantoprazole (PROTONIX) 40 MG EC tablet, Take 1 tablet by mouth., Disp: , Rfl:     vitamin D (ERGOCALCIFEROL) 1.25 MG (24694 UT) capsule capsule, Take 1 capsule by mouth 1 (One) Time Per Week., Disp: 12 capsule, Rfl: 1    cyclobenzaprine (FLEXERIL) 10 MG tablet, Take 1 tablet by mouth 3 (Three) Times a Day As Needed for Muscle Spasms (rib pain). Side effect drowsiness, Disp: 30  "tablet, Rfl: 0    Allergies:   Allergies   Allergen Reactions    Amoxicillin Other (See Comments)     Chest pain    Bactrim [Sulfamethoxazole-Trimethoprim] Other (See Comments)     Chest pain    Clindamycin Other (See Comments)     Medication causes patient to have chest pain    Ct Contrast GI Intolerance       Objective     Physical Exam: Please see above  Vital Signs:   Vitals:    04/16/25 1645   BP: 124/78   Pulse: 86   Temp: 98.7 °F (37.1 °C)   TempSrc: Infrared   SpO2: 97%   Weight: 88.5 kg (195 lb)   Height: 170.2 cm (67.01\")   PainSc: 0-No pain     Body mass index is 30.53 kg/m².          Assessment / Plan      Assessment/Plan:   Diagnoses and all orders for this visit:    1. Foamy urine (Primary)  -     Microalbumin / Creatinine Urine Ratio - Urine, Clean Catch; Future  -     Protein, Urine, 24 Hour - Urine, Clean Catch; Future    2. Rib cage dysfunction  -     Ambulatory Referral to Physical Medicine Rehab  -     cyclobenzaprine (FLEXERIL) 10 MG tablet; Take 1 tablet by mouth 3 (Three) Times a Day As Needed for Muscle Spasms (rib pain). Side effect drowsiness  Dispense: 30 tablet; Refill: 0    Recommend trying for second opinion for his rib dysfunction.  He is already been to PT for 2 months and he also went to a chiropractor for a month or 2.  None of these strategies helped his pain.  I gave him Flexeril today.  Anti-inflammatories have not helped so we did not prescribe that.  I referred to PMR OMT clinic at  for second opinion.  Appreciate recommendations    Foamy urine-likely benign.  Etiology of infection or proteinuria would be concerning.  Will do a 24-hour protein check and check his ratio as above.  If normal, will likely defer further workup.  Patient requesting nephrology follow-up, so may refer him for second opinion in the future      Both of these are chronic issues.  The patient has deferred medication for foamy urine but we did prescribe new medication for rib pain    Follow Up:   Return " if symptoms worsen or fail to improve.    Adrian De Jesus,   [facility] [place]

## 2025-05-05 ENCOUNTER — LAB (OUTPATIENT)
Dept: LAB | Facility: HOSPITAL | Age: 35
End: 2025-05-05
Payer: MEDICAID

## 2025-05-05 DIAGNOSIS — R82.998 FOAMY URINE: ICD-10-CM

## 2025-05-05 PROCEDURE — 84156 ASSAY OF PROTEIN URINE: CPT

## 2025-05-05 PROCEDURE — 81050 URINALYSIS VOLUME MEASURE: CPT

## 2025-05-06 ENCOUNTER — RESULTS FOLLOW-UP (OUTPATIENT)
Dept: FAMILY MEDICINE CLINIC | Facility: CLINIC | Age: 35
End: 2025-05-06
Payer: MEDICAID

## 2025-05-06 LAB
COLLECT DURATION TIME UR: 24 HRS
PROT 24H UR-MRATE: 31.6 MG/24HOURS (ref 0–150)
SPECIMEN VOL 24H UR: 400 ML

## 2025-05-27 DIAGNOSIS — E55.9 VITAMIN D DEFICIENCY: ICD-10-CM

## 2025-05-27 RX ORDER — ERGOCALCIFEROL 1.25 MG/1
50000 CAPSULE, LIQUID FILLED ORAL WEEKLY
Qty: 12 CAPSULE | Refills: 0 | Status: SHIPPED | OUTPATIENT
Start: 2025-05-27